# Patient Record
Sex: MALE | Race: WHITE | NOT HISPANIC OR LATINO | Employment: OTHER | URBAN - METROPOLITAN AREA
[De-identification: names, ages, dates, MRNs, and addresses within clinical notes are randomized per-mention and may not be internally consistent; named-entity substitution may affect disease eponyms.]

---

## 2019-01-17 ENCOUNTER — APPOINTMENT (OUTPATIENT)
Dept: CT IMAGING | Facility: CLINIC | Age: 79
DRG: 194 | End: 2019-01-17
Payer: COMMERCIAL

## 2019-01-17 ENCOUNTER — HOSPITAL ENCOUNTER (INPATIENT)
Facility: CLINIC | Age: 79
LOS: 10 days | Discharge: HOME HEALTH | DRG: 194 | End: 2019-01-28
Attending: EMERGENCY MEDICINE | Admitting: HOSPITALIST
Payer: COMMERCIAL

## 2019-01-17 DIAGNOSIS — R04.2 HEMOPTYSIS: ICD-10-CM

## 2019-01-17 DIAGNOSIS — I77.89 SNEDDON SYNDROME (H): ICD-10-CM

## 2019-01-17 DIAGNOSIS — J44.9 CHRONIC OBSTRUCTIVE PULMONARY DISEASE, UNSPECIFIED COPD TYPE (H): Primary | ICD-10-CM

## 2019-01-17 DIAGNOSIS — I10 BENIGN ESSENTIAL HYPERTENSION: ICD-10-CM

## 2019-01-17 LAB
ANION GAP SERPL CALCULATED.3IONS-SCNC: 9 MMOL/L (ref 3–14)
BASOPHILS # BLD AUTO: 0.1 10E9/L (ref 0–0.2)
BASOPHILS NFR BLD AUTO: 1 %
BUN SERPL-MCNC: 15 MG/DL (ref 7–30)
CALCIUM SERPL-MCNC: 8.2 MG/DL (ref 8.5–10.1)
CHLORIDE SERPL-SCNC: 109 MMOL/L (ref 94–109)
CO2 SERPL-SCNC: 26 MMOL/L (ref 20–32)
CREAT SERPL-MCNC: 0.97 MG/DL (ref 0.66–1.25)
DIFFERENTIAL METHOD BLD: ABNORMAL
EOSINOPHIL # BLD AUTO: 0.5 10E9/L (ref 0–0.7)
EOSINOPHIL NFR BLD AUTO: 7 %
ERYTHROCYTE [DISTWIDTH] IN BLOOD BY AUTOMATED COUNT: 14.3 % (ref 10–15)
GFR SERPL CREATININE-BSD FRML MDRD: 75 ML/MIN/{1.73_M2}
GLUCOSE SERPL-MCNC: 122 MG/DL (ref 70–99)
HCT VFR BLD AUTO: 39.3 % (ref 40–53)
HCT VFR BLD CALC: 39 %PCV (ref 40–53)
HGB BLD CALC-MCNC: 13.3 G/DL (ref 13.3–17.7)
HGB BLD-MCNC: 13 G/DL (ref 13.3–17.7)
IMM GRANULOCYTES # BLD: 0 10E9/L (ref 0–0.4)
IMM GRANULOCYTES NFR BLD: 0.4 %
INR PPP: 2.16 (ref 0.86–1.14)
LYMPHOCYTES # BLD AUTO: 1.9 10E9/L (ref 0.8–5.3)
LYMPHOCYTES NFR BLD AUTO: 25.8 %
MCH RBC QN AUTO: 30.7 PG (ref 26.5–33)
MCHC RBC AUTO-ENTMCNC: 33.1 G/DL (ref 31.5–36.5)
MCV RBC AUTO: 93 FL (ref 78–100)
MONOCYTES # BLD AUTO: 0.5 10E9/L (ref 0–1.3)
MONOCYTES NFR BLD AUTO: 7.2 %
NEUTROPHILS # BLD AUTO: 4.3 10E9/L (ref 1.6–8.3)
NEUTROPHILS NFR BLD AUTO: 58.6 %
NRBC # BLD AUTO: 0 10*3/UL
NRBC BLD AUTO-RTO: 0 /100
NT-PROBNP SERPL-MCNC: 125 PG/ML (ref 0–1800)
PLATELET # BLD AUTO: 153 10E9/L (ref 150–450)
POTASSIUM BLD-SCNC: 3.5 MMOL/L (ref 3.4–5.3)
POTASSIUM SERPL-SCNC: 3.6 MMOL/L (ref 3.4–5.3)
RBC # BLD AUTO: 4.24 10E12/L (ref 4.4–5.9)
SODIUM BLD-SCNC: 143 MMOL/L (ref 133–144)
SODIUM SERPL-SCNC: 144 MMOL/L (ref 133–144)
TROPONIN I SERPL-MCNC: <0.015 UG/L (ref 0–0.04)
WBC # BLD AUTO: 7.3 10E9/L (ref 4–11)

## 2019-01-17 PROCEDURE — 40000501 ZZHCL STATISTIC HEMATOCRIT ED POCT

## 2019-01-17 PROCEDURE — 85018 HEMOGLOBIN: CPT | Performed by: INTERNAL MEDICINE

## 2019-01-17 PROCEDURE — 94640 AIRWAY INHALATION TREATMENT: CPT

## 2019-01-17 PROCEDURE — 85610 PROTHROMBIN TIME: CPT | Performed by: EMERGENCY MEDICINE

## 2019-01-17 PROCEDURE — 25000125 ZZHC RX 250: Performed by: EMERGENCY MEDICINE

## 2019-01-17 PROCEDURE — 83880 ASSAY OF NATRIURETIC PEPTIDE: CPT | Performed by: EMERGENCY MEDICINE

## 2019-01-17 PROCEDURE — 36415 COLL VENOUS BLD VENIPUNCTURE: CPT | Performed by: INTERNAL MEDICINE

## 2019-01-17 PROCEDURE — 25000128 H RX IP 250 OP 636: Performed by: EMERGENCY MEDICINE

## 2019-01-17 PROCEDURE — 84484 ASSAY OF TROPONIN QUANT: CPT | Performed by: EMERGENCY MEDICINE

## 2019-01-17 PROCEDURE — 25000125 ZZHC RX 250

## 2019-01-17 PROCEDURE — 40000498 ZZHCL STATISTIC POTASSIUM ED POCT

## 2019-01-17 PROCEDURE — 40000497 ZZHCL STATISTIC SODIUM ED POCT

## 2019-01-17 PROCEDURE — 71260 CT THORAX DX C+: CPT

## 2019-01-17 PROCEDURE — 99285 EMERGENCY DEPT VISIT HI MDM: CPT | Mod: 25

## 2019-01-17 PROCEDURE — 84145 PROCALCITONIN (PCT): CPT | Performed by: EMERGENCY MEDICINE

## 2019-01-17 PROCEDURE — 80048 BASIC METABOLIC PNL TOTAL CA: CPT | Performed by: EMERGENCY MEDICINE

## 2019-01-17 PROCEDURE — 85025 COMPLETE CBC W/AUTO DIFF WBC: CPT | Performed by: EMERGENCY MEDICINE

## 2019-01-17 RX ORDER — CHOLECALCIFEROL (VITAMIN D3) 50 MCG
2000 TABLET ORAL DAILY
COMMUNITY

## 2019-01-17 RX ORDER — IPRATROPIUM BROMIDE AND ALBUTEROL SULFATE 2.5; .5 MG/3ML; MG/3ML
SOLUTION RESPIRATORY (INHALATION)
Status: DISCONTINUED
Start: 2019-01-17 | End: 2019-01-17 | Stop reason: HOSPADM

## 2019-01-17 RX ORDER — MULTIPLE VITAMINS W/ MINERALS TAB 9MG-400MCG
1 TAB ORAL EVERY MORNING
COMMUNITY

## 2019-01-17 RX ORDER — UBIDECARENONE 100 MG
100 CAPSULE ORAL EVERY MORNING
COMMUNITY

## 2019-01-17 RX ORDER — DOCUSATE SODIUM 100 MG/1
100 CAPSULE, LIQUID FILLED ORAL EVERY EVENING
COMMUNITY

## 2019-01-17 RX ORDER — IPRATROPIUM BROMIDE AND ALBUTEROL SULFATE 2.5; .5 MG/3ML; MG/3ML
3 SOLUTION RESPIRATORY (INHALATION) ONCE
Status: COMPLETED | OUTPATIENT
Start: 2019-01-17 | End: 2019-01-17

## 2019-01-17 RX ORDER — BRINZOLAMIDE 10 MG/ML
1 SUSPENSION/ DROPS OPHTHALMIC EVERY MORNING
COMMUNITY

## 2019-01-17 RX ORDER — MIRABEGRON 50 MG/1
50 TABLET, EXTENDED RELEASE ORAL EVERY MORNING
COMMUNITY

## 2019-01-17 RX ORDER — AMLODIPINE BESYLATE 2.5 MG/1
2.5 TABLET ORAL EVERY MORNING
Status: ON HOLD | COMMUNITY
End: 2019-01-28

## 2019-01-17 RX ORDER — ESOMEPRAZOLE MAGNESIUM 40 MG/1
40 CAPSULE, DELAYED RELEASE ORAL
COMMUNITY

## 2019-01-17 RX ORDER — WARFARIN SODIUM 5 MG/1
TABLET ORAL EVERY EVENING
Status: ON HOLD | COMMUNITY
End: 2019-01-21

## 2019-01-17 RX ORDER — IOPAMIDOL 755 MG/ML
79 INJECTION, SOLUTION INTRAVASCULAR ONCE
Status: COMPLETED | OUTPATIENT
Start: 2019-01-17 | End: 2019-01-17

## 2019-01-17 RX ORDER — TIOTROPIUM BROMIDE 18 UG/1
18 CAPSULE ORAL; RESPIRATORY (INHALATION) EVERY MORNING
Status: ON HOLD | COMMUNITY
End: 2019-01-21

## 2019-01-17 RX ORDER — BRIMONIDINE TARTRATE AND TIMOLOL MALEATE 2; 5 MG/ML; MG/ML
1 SOLUTION OPHTHALMIC EVERY MORNING
COMMUNITY

## 2019-01-17 RX ORDER — LATANOPROST 50 UG/ML
1 SOLUTION/ DROPS OPHTHALMIC AT BEDTIME
COMMUNITY

## 2019-01-17 RX ORDER — FUROSEMIDE 40 MG
40 TABLET ORAL DAILY
Status: ON HOLD | COMMUNITY
End: 2019-01-21

## 2019-01-17 RX ORDER — IPRATROPIUM BROMIDE AND ALBUTEROL SULFATE 2.5; .5 MG/3ML; MG/3ML
SOLUTION RESPIRATORY (INHALATION)
Status: COMPLETED
Start: 2019-01-17 | End: 2019-01-17

## 2019-01-17 RX ORDER — WARFARIN SODIUM 1 MG/1
TABLET ORAL DAILY
COMMUNITY
End: 2019-01-17

## 2019-01-17 RX ADMIN — IPRATROPIUM BROMIDE AND ALBUTEROL SULFATE 3 ML: .5; 2.5 SOLUTION RESPIRATORY (INHALATION) at 22:00

## 2019-01-17 RX ADMIN — LIDOCAINE HYDROCHLORIDE: 20 JELLY TOPICAL at 22:03

## 2019-01-17 RX ADMIN — IPRATROPIUM BROMIDE AND ALBUTEROL SULFATE 3 ML: .5; 2.5 SOLUTION RESPIRATORY (INHALATION) at 22:38

## 2019-01-17 RX ADMIN — SODIUM CHLORIDE 100 ML: 9 INJECTION, SOLUTION INTRAVENOUS at 23:40

## 2019-01-17 RX ADMIN — IPRATROPIUM BROMIDE AND ALBUTEROL SULFATE 3 ML: .5; 2.5 SOLUTION RESPIRATORY (INHALATION) at 22:18

## 2019-01-17 RX ADMIN — IOPAMIDOL 79 ML: 755 INJECTION, SOLUTION INTRAVENOUS at 23:22

## 2019-01-17 RX ADMIN — IPRATROPIUM BROMIDE AND ALBUTEROL SULFATE 3 ML: 2.5; .5 SOLUTION RESPIRATORY (INHALATION) at 22:00

## 2019-01-17 ASSESSMENT — MIFFLIN-ST. JEOR: SCORE: 1731.9

## 2019-01-18 ENCOUNTER — APPOINTMENT (OUTPATIENT)
Dept: PHYSICAL THERAPY | Facility: CLINIC | Age: 79
DRG: 194 | End: 2019-01-18
Payer: COMMERCIAL

## 2019-01-18 PROBLEM — R04.2 HEMOPTYSIS: Status: ACTIVE | Noted: 2019-01-18

## 2019-01-18 LAB
ABO + RH BLD: NORMAL
ALBUMIN SERPL-MCNC: 2.7 G/DL (ref 3.4–5)
ALP SERPL-CCNC: 55 U/L (ref 40–150)
ALT SERPL W P-5'-P-CCNC: 19 U/L (ref 0–70)
ANION GAP SERPL CALCULATED.3IONS-SCNC: 7 MMOL/L (ref 3–14)
AST SERPL W P-5'-P-CCNC: 18 U/L (ref 0–45)
BILIRUB DIRECT SERPL-MCNC: <0.1 MG/DL (ref 0–0.2)
BILIRUB SERPL-MCNC: 0.4 MG/DL (ref 0.2–1.3)
BLD GP AB SCN SERPL QL: NORMAL
BLOOD BANK CMNT PATIENT-IMP: NORMAL
BUN SERPL-MCNC: 22 MG/DL (ref 7–30)
CALCIUM SERPL-MCNC: 8 MG/DL (ref 8.5–10.1)
CHLORIDE SERPL-SCNC: 110 MMOL/L (ref 94–109)
CO2 SERPL-SCNC: 26 MMOL/L (ref 20–32)
CREAT SERPL-MCNC: 0.93 MG/DL (ref 0.66–1.25)
GFR SERPL CREATININE-BSD FRML MDRD: 79 ML/MIN/{1.73_M2}
GLUCOSE BLDC GLUCOMTR-MCNC: 101 MG/DL (ref 70–99)
GLUCOSE BLDC GLUCOMTR-MCNC: 111 MG/DL (ref 70–99)
GLUCOSE BLDC GLUCOMTR-MCNC: 122 MG/DL (ref 70–99)
GLUCOSE SERPL-MCNC: 119 MG/DL (ref 70–99)
HGB BLD-MCNC: 11.3 G/DL (ref 13.3–17.7)
HGB BLD-MCNC: 11.5 G/DL (ref 13.3–17.7)
HGB BLD-MCNC: 11.6 G/DL (ref 13.3–17.7)
HGB BLD-MCNC: 11.7 G/DL (ref 13.3–17.7)
INR PPP: 1.78 (ref 0.86–1.14)
POTASSIUM SERPL-SCNC: 3.9 MMOL/L (ref 3.4–5.3)
PROCALCITONIN SERPL-MCNC: <0.05 NG/ML
PROT SERPL-MCNC: 5.9 G/DL (ref 6.8–8.8)
SODIUM SERPL-SCNC: 143 MMOL/L (ref 133–144)
SPECIMEN EXP DATE BLD: NORMAL
SPECIMEN EXP DATE BLD: NORMAL

## 2019-01-18 PROCEDURE — 25000128 H RX IP 250 OP 636: Performed by: HOSPITALIST

## 2019-01-18 PROCEDURE — 40000193 ZZH STATISTIC PT WARD VISIT

## 2019-01-18 PROCEDURE — 97161 PT EVAL LOW COMPLEX 20 MIN: CPT | Mod: GP

## 2019-01-18 PROCEDURE — 86850 RBC ANTIBODY SCREEN: CPT | Performed by: HOSPITALIST

## 2019-01-18 PROCEDURE — 25000132 ZZH RX MED GY IP 250 OP 250 PS 637: Performed by: INTERNAL MEDICINE

## 2019-01-18 PROCEDURE — 80076 HEPATIC FUNCTION PANEL: CPT | Performed by: HOSPITALIST

## 2019-01-18 PROCEDURE — 85610 PROTHROMBIN TIME: CPT | Performed by: HOSPITALIST

## 2019-01-18 PROCEDURE — 25000132 ZZH RX MED GY IP 250 OP 250 PS 637: Performed by: HOSPITALIST

## 2019-01-18 PROCEDURE — 99223 1ST HOSP IP/OBS HIGH 75: CPT | Mod: AI | Performed by: HOSPITALIST

## 2019-01-18 PROCEDURE — 85018 HEMOGLOBIN: CPT | Performed by: HOSPITALIST

## 2019-01-18 PROCEDURE — 80048 BASIC METABOLIC PNL TOTAL CA: CPT | Performed by: HOSPITALIST

## 2019-01-18 PROCEDURE — 86900 BLOOD TYPING SEROLOGIC ABO: CPT | Performed by: HOSPITALIST

## 2019-01-18 PROCEDURE — 25000125 ZZHC RX 250: Performed by: HOSPITALIST

## 2019-01-18 PROCEDURE — 97110 THERAPEUTIC EXERCISES: CPT | Mod: GP

## 2019-01-18 PROCEDURE — 36415 COLL VENOUS BLD VENIPUNCTURE: CPT | Performed by: HOSPITALIST

## 2019-01-18 PROCEDURE — 25000128 H RX IP 250 OP 636: Performed by: INTERNAL MEDICINE

## 2019-01-18 PROCEDURE — 86901 BLOOD TYPING SEROLOGIC RH(D): CPT | Performed by: HOSPITALIST

## 2019-01-18 PROCEDURE — 97530 THERAPEUTIC ACTIVITIES: CPT | Mod: GP

## 2019-01-18 PROCEDURE — 12000000 ZZH R&B MED SURG/OB

## 2019-01-18 PROCEDURE — 00000146 ZZHCL STATISTIC GLUCOSE BY METER IP

## 2019-01-18 RX ORDER — MIRABEGRON 50 MG/1
50 TABLET, EXTENDED RELEASE ORAL EVERY MORNING
Status: DISCONTINUED | OUTPATIENT
Start: 2019-01-18 | End: 2019-01-28 | Stop reason: HOSPADM

## 2019-01-18 RX ORDER — PANTOPRAZOLE SODIUM 40 MG/1
40 TABLET, DELAYED RELEASE ORAL
Status: DISCONTINUED | OUTPATIENT
Start: 2019-01-18 | End: 2019-01-28 | Stop reason: HOSPADM

## 2019-01-18 RX ORDER — CEFTRIAXONE 1 G/1
1 INJECTION, POWDER, FOR SOLUTION INTRAMUSCULAR; INTRAVENOUS EVERY 24 HOURS
Status: DISCONTINUED | OUTPATIENT
Start: 2019-01-18 | End: 2019-01-21

## 2019-01-18 RX ORDER — BRINZOLAMIDE 10 MG/ML
1 SUSPENSION/ DROPS OPHTHALMIC EVERY MORNING
Status: DISCONTINUED | OUTPATIENT
Start: 2019-01-18 | End: 2019-01-28 | Stop reason: HOSPADM

## 2019-01-18 RX ORDER — AMLODIPINE BESYLATE 2.5 MG/1
2.5 TABLET ORAL EVERY MORNING
Status: DISCONTINUED | OUTPATIENT
Start: 2019-01-18 | End: 2019-01-22

## 2019-01-18 RX ORDER — BRIMONIDINE TARTRATE AND TIMOLOL MALEATE 2; 5 MG/ML; MG/ML
1 SOLUTION OPHTHALMIC EVERY MORNING
Status: DISCONTINUED | OUTPATIENT
Start: 2019-01-18 | End: 2019-01-28 | Stop reason: HOSPADM

## 2019-01-18 RX ORDER — ONDANSETRON 4 MG/1
4 TABLET, ORALLY DISINTEGRATING ORAL EVERY 6 HOURS PRN
Status: DISCONTINUED | OUTPATIENT
Start: 2019-01-18 | End: 2019-01-28 | Stop reason: HOSPADM

## 2019-01-18 RX ORDER — AZITHROMYCIN 250 MG/1
250 TABLET, FILM COATED ORAL DAILY
Status: DISCONTINUED | OUTPATIENT
Start: 2019-01-19 | End: 2019-01-21

## 2019-01-18 RX ORDER — ONDANSETRON 2 MG/ML
4 INJECTION INTRAMUSCULAR; INTRAVENOUS EVERY 6 HOURS PRN
Status: DISCONTINUED | OUTPATIENT
Start: 2019-01-18 | End: 2019-01-28 | Stop reason: HOSPADM

## 2019-01-18 RX ORDER — HYDRALAZINE HYDROCHLORIDE 20 MG/ML
10 INJECTION INTRAMUSCULAR; INTRAVENOUS EVERY 4 HOURS PRN
Status: DISCONTINUED | OUTPATIENT
Start: 2019-01-18 | End: 2019-01-28 | Stop reason: HOSPADM

## 2019-01-18 RX ORDER — BENZONATATE 100 MG/1
200 CAPSULE ORAL 3 TIMES DAILY PRN
Status: DISCONTINUED | OUTPATIENT
Start: 2019-01-18 | End: 2019-01-28 | Stop reason: HOSPADM

## 2019-01-18 RX ORDER — GUAIFENESIN 600 MG/1
600 TABLET, EXTENDED RELEASE ORAL 2 TIMES DAILY
Status: DISCONTINUED | OUTPATIENT
Start: 2019-01-18 | End: 2019-01-28 | Stop reason: HOSPADM

## 2019-01-18 RX ORDER — NALOXONE HYDROCHLORIDE 0.4 MG/ML
.1-.4 INJECTION, SOLUTION INTRAMUSCULAR; INTRAVENOUS; SUBCUTANEOUS
Status: DISCONTINUED | OUTPATIENT
Start: 2019-01-18 | End: 2019-01-28 | Stop reason: HOSPADM

## 2019-01-18 RX ORDER — LATANOPROST 50 UG/ML
1 SOLUTION/ DROPS OPHTHALMIC AT BEDTIME
Status: DISCONTINUED | OUTPATIENT
Start: 2019-01-18 | End: 2019-01-28 | Stop reason: HOSPADM

## 2019-01-18 RX ORDER — AMOXICILLIN 250 MG
2 CAPSULE ORAL 2 TIMES DAILY PRN
Status: DISCONTINUED | OUTPATIENT
Start: 2019-01-18 | End: 2019-01-28 | Stop reason: HOSPADM

## 2019-01-18 RX ORDER — IPRATROPIUM BROMIDE AND ALBUTEROL SULFATE 2.5; .5 MG/3ML; MG/3ML
3 SOLUTION RESPIRATORY (INHALATION) EVERY 4 HOURS PRN
Status: DISCONTINUED | OUTPATIENT
Start: 2019-01-18 | End: 2019-01-19

## 2019-01-18 RX ORDER — SODIUM CHLORIDE 9 MG/ML
INJECTION, SOLUTION INTRAVENOUS CONTINUOUS
Status: DISCONTINUED | OUTPATIENT
Start: 2019-01-18 | End: 2019-01-18

## 2019-01-18 RX ORDER — DOCUSATE SODIUM 100 MG/1
100 CAPSULE, LIQUID FILLED ORAL EVERY EVENING
Status: DISCONTINUED | OUTPATIENT
Start: 2019-01-18 | End: 2019-01-28 | Stop reason: HOSPADM

## 2019-01-18 RX ORDER — AZITHROMYCIN 250 MG/1
500 TABLET, FILM COATED ORAL ONCE
Status: COMPLETED | OUTPATIENT
Start: 2019-01-18 | End: 2019-01-18

## 2019-01-18 RX ORDER — AMOXICILLIN 250 MG
1 CAPSULE ORAL 2 TIMES DAILY PRN
Status: DISCONTINUED | OUTPATIENT
Start: 2019-01-18 | End: 2019-01-28 | Stop reason: HOSPADM

## 2019-01-18 RX ADMIN — UMECLIDINIUM 1 PUFF: 62.5 AEROSOL, POWDER ORAL at 09:45

## 2019-01-18 RX ADMIN — PHYTONADIONE 5 MG: 10 INJECTION, EMULSION INTRAMUSCULAR; INTRAVENOUS; SUBCUTANEOUS at 02:19

## 2019-01-18 RX ADMIN — DOCUSATE SODIUM 100 MG: 100 CAPSULE, LIQUID FILLED ORAL at 21:31

## 2019-01-18 RX ADMIN — AMLODIPINE BESYLATE 2.5 MG: 2.5 TABLET ORAL at 09:39

## 2019-01-18 RX ADMIN — AZITHROMYCIN 500 MG: 250 TABLET, FILM COATED ORAL at 09:52

## 2019-01-18 RX ADMIN — GUAIFENESIN 600 MG: 600 TABLET, EXTENDED RELEASE ORAL at 21:31

## 2019-01-18 RX ADMIN — LATANOPROST 1 DROP: 50 SOLUTION OPHTHALMIC at 21:32

## 2019-01-18 RX ADMIN — MIRABEGRON 50 MG: 50 TABLET, FILM COATED, EXTENDED RELEASE ORAL at 09:39

## 2019-01-18 RX ADMIN — PANTOPRAZOLE SODIUM 40 MG: 40 TABLET, DELAYED RELEASE ORAL at 07:38

## 2019-01-18 RX ADMIN — BRINZOLAMIDE 1 DROP: 10 SUSPENSION/ DROPS OPHTHALMIC at 09:46

## 2019-01-18 RX ADMIN — BRIMONIDINE TARTRATE, TIMOLOL MALEATE 1 DROP: 2; 5 SOLUTION/ DROPS OPHTHALMIC at 09:43

## 2019-01-18 RX ADMIN — CEFTRIAXONE SODIUM 1 G: 1 INJECTION, POWDER, FOR SOLUTION INTRAMUSCULAR; INTRAVENOUS at 09:40

## 2019-01-18 RX ADMIN — HYDRALAZINE HYDROCHLORIDE 10 MG: 20 INJECTION INTRAMUSCULAR; INTRAVENOUS at 04:39

## 2019-01-18 RX ADMIN — SODIUM CHLORIDE: 9 INJECTION, SOLUTION INTRAVENOUS at 02:15

## 2019-01-18 ASSESSMENT — ENCOUNTER SYMPTOMS
FEVER: 0
COUGH: 1
WHEEZING: 1
FATIGUE: 1
CHEST TIGHTNESS: 0
CHILLS: 0
SHORTNESS OF BREATH: 1

## 2019-01-18 ASSESSMENT — ACTIVITIES OF DAILY LIVING (ADL)
ADLS_ACUITY_SCORE: 20
ADLS_ACUITY_SCORE: 19

## 2019-01-18 NOTE — PLAN OF CARE
PT:  Orders received and chart reviewed. Attempted to complete PT evaluation this AM, able to gain subjective information from patient (will need to clarify with family when able as patient unreliable historian). Patient noting lightheadedness while supine, BP WNL; MD then in to talk to patient and family. Discussed with RN.

## 2019-01-18 NOTE — PLAN OF CARE
Discharge Planner PT   Patient plan for discharge: Per spouse, would like to return home with home PT/OT services   Current status: Orders received, chart reviewed, PT evaluation completed and treatment initiated. Patient admitted on 1/17/19 for evaluation of coughing up blood. Patient lives in a condo with his spouse with no steps to enter or contend with. Spouse notes patient normally uses a 4WW at baseline and receives assistance from her getting dressed and showering. Spouse notes she does not receive any additional services to care for her spouse, but notes she has seen things declining in the last few weeks with patient needing more assistance with mobility and ADLs.     Patient supine in bed upon arrival of therapist, agreeable to working with PT. Patient noting lightheadedness at in supine, but BP WNL. Completed supine<>sit with CGA and bed rails. Patient needing assist to use bedside urinal at EOB. Sit<>stand and stand-pivot to chair completed with min A x 2 and B HHA. Patient slightly unsteady during standing and few steps. Patient in chair and completed seated exercises x 15 reps bilaterally, tolerating well. Patient on 1L O2 throughout session with SpO2 noted as 91-96% with mobility. Patient in chair at end of session with all needs in reach and chair alarm on. Discussed discharge recommendations with spouse, who states she would prefer to go home instead of a rehab/TCU.   Barriers to return to prior living situation: Current level of assist, decreased tolerance to activity   Recommendations for discharge: TCU   Rationale for recommendations: Patient currently requiring A for all mobility this date and demonstrates decreased tolerance to activity. Patient would benefit from continued skilled therapy at TCU to further improve strength, balance, and independence with mobility and ambulation to address functional limitations and decrease falls risk. If patient/spouse not open to TCU, would require 24 hour A  for all mobility and benefit from further skilled home PT to address above functional limitations.        Entered by: Gretta Hernandez 01/18/2019 10:50 AM

## 2019-01-18 NOTE — PLAN OF CARE
"Transefr from ICU this afternoon.      AOx2, reoriented to time and situation.  VSS on r/a, denies pain.  Up with 1 with GB&W.  Tolerating low sat. Fat diet.  Urinal for voids.  C/o mild light headedness, states, \"I've felt like this since I came in.\"  Patient up in chair.  BM's black.  No hemoptysis this shift.  Hgb 11.7 now.  INR 1.78. IVF discontinued.  "

## 2019-01-18 NOTE — ED NOTES
"[facility]  ED Nurse Handoff Report    ED Chief complaint: Hemoptysis (coughing up bright red blood x 3 hrs)      ED Diagnosis:   Final diagnoses:   None       Code Status: DNR / DNI- per patient report, no scanned document    Allergies: No Known Allergies    Activity level - Baseline/Home:  Independent    Activity Level - Current:   Total care     Needed?: No    Isolation: Yes  Infection: pending, coughing up blood    Bariatric?: Yes    Vital Signs:   Vitals:    01/17/19 2154 01/17/19 2156 01/17/19 2220 01/17/19 2230   BP: (!) 175/93 167/78 148/53 161/72   Pulse:  98 98 101   Resp: 26 18 22 20   Temp:       TempSrc:       SpO2: 91% 91%     Weight:       Height:           Cardiac Rhythm: ,        Pain level: 0-10 Pain Scale: 0    Is this patient confused?: No   Does this patient have a guardian?  No         If yes, is there guardianship documents in the Epic \"Code/ACP\" activity?  N/A         Guardian Notified?  N/A  [place] - Suicide Severity Rating Scale Completed?  Yes  If yes, what color did the patient score?  White    Patient Report: Initial Complaint: coughing up blood  Focused Assessment: Patient arrived via EMS. Report of cough productive of large amounts bloody sputum. Patient is alert and oriented x 4. He denies pain. Says he had a frequent cough since yesterday but bloody sputum began 3 hrs ago. Long term use of Warfarin for CVA at a young age. Patient does  Have COPD. Continent of B&B. Per [atients wife patient has not been taking his  Furosemide for a few weeks due to lapsed prescription. He has 3+ edema of bilateral lower extremities.   Tests Performed: labs, chest CT  Abnormal Results:   Results for orders placed or performed during the hospital encounter of 01/17/19   CBC with platelets differential   Result Value Ref Range    WBC 7.3 4.0 - 11.0 10e9/L    RBC Count 4.24 (L) 4.4 - 5.9 10e12/L    Hemoglobin 13.0 (L) 13.3 - 17.7 g/dL    Hematocrit 39.3 (L) 40.0 - 53.0 %    " MCV 93 78 - 100 fl    MCH 30.7 26.5 - 33.0 pg    MCHC 33.1 31.5 - 36.5 g/dL    RDW 14.3 10.0 - 15.0 %    Platelet Count 153 150 - 450 10e9/L    Diff Method Automated Method     % Neutrophils 58.6 %    % Lymphocytes 25.8 %    % Monocytes 7.2 %    % Eosinophils 7.0 %    % Basophils 1.0 %    % Immature Granulocytes 0.4 %    Nucleated RBCs 0 0 /100    Absolute Neutrophil 4.3 1.6 - 8.3 10e9/L    Absolute Lymphocytes 1.9 0.8 - 5.3 10e9/L    Absolute Monocytes 0.5 0.0 - 1.3 10e9/L    Absolute Eosinophils 0.5 0.0 - 0.7 10e9/L    Absolute Basophils 0.1 0.0 - 0.2 10e9/L    Abs Immature Granulocytes 0.0 0 - 0.4 10e9/L    Absolute Nucleated RBC 0.0    INR   Result Value Ref Range    INR 2.16 (H) 0.86 - 1.14   Basic metabolic panel   Result Value Ref Range    Sodium 144 133 - 144 mmol/L    Potassium 3.6 3.4 - 5.3 mmol/L    Chloride 109 94 - 109 mmol/L    Carbon Dioxide 26 20 - 32 mmol/L    Anion Gap 9 3 - 14 mmol/L    Glucose 122 (H) 70 - 99 mg/dL    Urea Nitrogen 15 7 - 30 mg/dL    Creatinine 0.97 0.66 - 1.25 mg/dL    GFR Estimate 75 >60 mL/min/[1.73_m2]    GFR Estimate If Black 87 >60 mL/min/[1.73_m2]    Calcium 8.2 (L) 8.5 - 10.1 mg/dL   Nt probnp inpatient   Result Value Ref Range    N-Terminal Pro BNP Inpatient 125 0 - 1,800 pg/mL   Troponin I   Result Value Ref Range    Troponin I ES <0.015 0.000 - 0.045 ug/L   ISTAT electrolytes POCT   Result Value Ref Range    Sodium 143 133 - 144 mmol/L    Potassium 3.5 3.4 - 5.3 mmol/L    Hemoglobin 13.3 13.3 - 17.7 g/dL    Hematocrit - POCT 39 (L) 40.0 - 53.0 %PCV       Treatments provided: nebs, O2    Family Comments: wife and daughter at bedside    OBS brochure/video discussed/provided to patient/family: No              Name of person given brochure if not patient: n/a              Relationship to patient: n/a    ED Medications:   Medications   ipratropium - albuterol 0.5 mg/2.5 mg/3 mL (DUONEB) 0.5-2.5 (3) MG/3ML neb solution (not administered)   ipratropium - albuterol 0.5 mg/2.5  mg/3 mL (DUONEB) neb solution 3 mL (3 mLs Nebulization Given 1/17/19 2200)   lidocaine (XYLOCAINE) 2 % topical gel (  Given 1/17/19 2203)   ipratropium - albuterol 0.5 mg/2.5 mg/3 mL (DUONEB) neb solution 3 mL (3 mLs Nebulization Given 1/17/19 2218)   ipratropium - albuterol 0.5 mg/2.5 mg/3 mL (DUONEB) neb solution 3 mL (3 mLs Nebulization Given 1/17/19 2238)       Drips infusing?:  No    For the majority of the shift this patient was Green.   Interventions performed were n/a.    Severe Sepsis OR Septic Shock Diagnosis Present: No    To be done/followed up on inpatient unit:  none    ED NURSE PHONE NUMBER: [phone]

## 2019-01-18 NOTE — CONSULTS
Pulmonology Consultation      Edison Saldivar MRN# [id number]   YOB: 1940 Age: 78 year old   Date of Admission: 1/17/2019     Reason for consult: I was asked by Dr Barr to evaluate this patient for second opinion on bronchoscopy for hemoptysis that took place yesterday.            Assessment and Plan:   Massive hemoptysis  Aspiration of blood/pulmonary infiltrates  Acute anemia due to blood loss; hgb 13 with normal indices dropped to 11 g while in ED last night.   Cough, started 1 day before.   Renal density, incidental finding on CT      Also: recent problems with INR; was up to 4.8 then down below 2.0 then up a little. Dose was being adjusted. Was 2.1 at presentation.    Asthma, lifelong  Hx of Sarcoidosis in remission  COPD quit smoking 1989  FV Leiden mutation/Snedden's worked up at [facility] on chronic coumadin for 30 y  Hx of nosebleeds  Hx of multiple strokes  Hx of weakness due to strokes; uses walker and wc.   Hx of mild vascular dementia  Hx of normopressure hydrocephalus, unshunted due to risk of bleeding  Hx of KORY on CPAP  Recently moved here 10/2018 from 25 y in [place] to be near daughter.     PLAN:    He bled for hours last night and intubation for safe bronchoscopy to locate active bleeding would have been required. ICU staff opted to observe.  He is no longer actively bleeding, just going to cough up fresh clot most likely. If bleeding resumes, intubation and scope to try to locate site of bleeding while in the icu would be reasonable.  Suspect left side source since most of aspirated blood is in left lower lobe. IR could do a coil procedure with angiography if we localized the bleeding first. (There is currently no good  way to stop bleeding with a bronchoscope except temporarily with topical epi).  As he is not presently bleeding, bronchoscopy will be deferred. There will be considerable clot and he will likely cough this up over the next couple of days.   Keep under observation for the next  "24-48 h  Keep BP under control  Cough suppression at night.   Discuss with HEME whether he can safely resume coumadin in a few days or should be on a different anticoagulant.   Intubate and scope for recurrence of massive hemoptysis.   Most cases are self limited.   Complete a course of antibiotics at 5 days. Doubt infiltrates are pneumonia in this clinical setting--almost all of it is certainly blood he aspirated--but he did have a cough for one day before the hemoptysis started.   Check ACE and calcium, ferritin, serial hgb   Nebs for pulmonary toilet to make clots easy to cough up.   Might consider resuming anticoagulant next week sometime.   Will need follow up imaging in 2-3 weeks to see if aspirated blood is being resolved. He may see Dr Durbin in the office in [place] in 3 weeks. Infiltrates must be followed until cleared and if area at left base does not resolve, would investigate further.   We will do PFTS in the office.   At discharge, consider changing his asthma/sarcoidosis/copd meds from Spiriva alone to Trelegy, a better choice.     Renal lesion may also need eval.     Thank you for asking up to see him.     KRGromerMD  [phone]  [phone]  [facility]                  Chief Complaint:   Hemoptysis yesterday    History is obtained from the patient and electronic health record and chiefly from his wife of many years/caregiver         History of Present Illness:   This patient is a 78 year old male who presents with a complex medical history of multiple strokes and small nosebleeds on coumadin for 30 y due to FVLeiden mutation and NPH; his wife gives most of his history very well. They recently moved her in October and his INR was going up and down more than usual. It was recently low and dose was increased. Day before admission, he was \"coughing a lot\" nonproductively but was not ill, no fever, sputum, chills, or malaise. Wife says he \"thought it was the coffee\", of which he drinks over " "a pot per day. He complained of a mild nosebleed.  He was fine during a social hour at 5 pm and then a visit with daughter last night which ended about 7 pm. He went to bathroom. He stayed there coughing a long time and wife checked on him about 8 pm and found the bathroom covered in blood. She called for help and he went to ED via 911. She reports there were multiple cups worth of blood and it took her over an hour to clean it up at 3 am last night. It was a frightening amount of blood.   His Hgb was 3 g when he presented and has remained dropped to 11 g.Lost 2 g of hgb. His CT showed bibasilar infiltrates and consolidation.   He has a normal wbc and no purulent sputum.   Active bleeding stopped after several hours in the ED. He got afrin, INR was reversed, upper airway was examined with blood in post pharynx. He was admitted to ICU over night and they opted not to intubate to scope. He has not coughed up blood today until during my interview when he coughed up a fresh looking BRB clot in clear mucous about 2\" long just once. He has had no further active bleeding since last night.               Past Medical History:   NPH  Vascular dementia, mild  Multiple strokes  Sarcoidosis diagnosed in 1990's, treated, never required repeat rx.  Lifelong asthma  Copd  Past smoking  FV Leiden mutation  Snedden's syndrome  Multiple strokes; requires walker and wc.   Obesity  KORY on CPAP  He follows with Neurology and Gastro and is looking for new caregivers in this area.   Medically complex  Wife recalls he had a TB work up for something over 25 y ago when she took the job in [place] because he was in hosp at [place] when she moved--possibly when sarcoidosis was diagnosed.        Past Surgical History:   No past surgical history on file.            Social History:     Social History     Tobacco Use     Smoking status: Not on file   Substance Use Topics     Alcohol use: Not on file      quit smoking 1989  Quit drinking for health " reasons years ago    Lives with wife   She retired from a [employer] job.   Daughter lives here and another in [facility]>       Family History:   No family history on file.          Immunizations:     There is no immunization history on file for this patient.          Allergies:   No Known Allergies          Medications:       He takes spiriva and prn albuterol and prn duonebs at home for asthma but no ICS>       Current Facility-Administered Medications Ordered in Epic   Medication Dose Route Frequency Last Rate Last Dose     amLODIPine (NORVASC) tablet 2.5 mg  2.5 mg Oral QAM   2.5 mg at 01/18/19 0939     [START ON 1/19/2019] azithromycin (ZITHROMAX) tablet 250 mg  250 mg Oral Daily         brimonidine-timolol (COMBIGAN) 0.2-0.5 % ophthalmic solution 1 drop  1 drop Both Eyes QAM   1 drop at 01/18/19 0943     brinzolamide (AZOPT) 1 % ophthalmic susp 1 drop  1 drop Both Eyes QAM   1 drop at 01/18/19 0946     cefTRIAXone (ROCEPHIN) 1 g vial to attach to  mL bag for ADULTS or NS 50 mL bag for PEDS  1 g Intravenous Q24H   1 g at 01/18/19 0940     docusate sodium (COLACE) capsule 100 mg  100 mg Oral QPM         hydrALAZINE (APRESOLINE) injection 10 mg  10 mg Intravenous Q4H PRN   10 mg at 01/18/19 0439     ipratropium - albuterol 0.5 mg/2.5 mg/3 mL (DUONEB) neb solution 3 mL  3 mL Nebulization Q4H PRN         latanoprost (XALATAN) 0.005 % ophthalmic solution 1 drop  1 drop Both Eyes At Bedtime         mirabegron (MYRBETRIQ) 24 hr tablet 50 mg  50 mg Oral QAM   50 mg at 01/18/19 0939     naloxone (NARCAN) injection 0.1-0.4 mg  0.1-0.4 mg Intravenous Q2 Min PRN         ondansetron (ZOFRAN-ODT) ODT tab 4 mg  4 mg Oral Q6H PRN        Or     ondansetron (ZOFRAN) injection 4 mg  4 mg Intravenous Q6H PRN         pantoprazole (PROTONIX) EC tablet 40 mg  40 mg Oral QAM AC   40 mg at 01/18/19 0738     senna-docusate (SENOKOT-S/PERICOLACE) 8.6-50 MG per tablet 1 tablet  1 tablet Oral BID PRN        Or     senna-docusate  (SENOKOT-S/PERICOLACE) 8.6-50 MG per tablet 2 tablet  2 tablet Oral BID PRN         umeclidinium (INCRUSE ELLIPTA) 62.5 MCG/INH inhaler 1 puff  1 puff Inhalation QAM   1 puff at 01/18/19 0945     No current [facility]-ordered outpatient medications on file.             Review of Systems:   The 5 point Review of Systems is negative other than noted in the HPI            Physical Exam:     Vitals were reviewed  Patient Vitals for the past 24 hrs:   BP Temp Temp src Pulse Heart Rate Resp SpO2 Height Weight   01/18/19 1157 120/67 97.2  F (36.2  C) Oral -- 75 16 95 % -- --   01/18/19 1100 143/46 -- -- 70 69 23 96 % -- --   01/18/19 1024 (!) 183/95 -- -- -- 73 (!) 31 -- -- --   01/18/19 1000 158/82 -- -- 77 76 (!) 33 98 % -- --   01/18/19 0900 158/76 -- -- 82 82 21 98 % -- --   01/18/19 0800 194/83 97.9  F (36.6  C) Oral 88 90 (!) 35 97 % -- --   01/18/19 0700 132/70 -- -- 91 92 10 99 % -- --   01/18/19 0600 140/63 -- -- 85 81 28 -- -- --   01/18/19 0530 123/69 -- -- 89 86 29 99 % -- --   01/18/19 0500 134/68 -- -- 94 92 13 99 % -- --   01/18/19 0430 195/86 -- -- 101 105 (!) 38 95 % -- --   01/18/19 0400 151/69 97.6  F (36.4  C) Axillary 82 82 28 97 % -- --   01/18/19 0330 142/69 -- -- -- 84 26 96 % -- --   01/18/19 0315 141/68 -- -- -- 84 25 95 % -- --   01/18/19 0300 138/62 -- -- -- 85 25 95 % -- --   01/18/19 0245 128/59 -- -- -- 87 26 96 % -- --   01/18/19 0230 144/67 -- -- 92 93 26 97 % -- --   01/18/19 0215 141/68 -- -- 92 94 23 95 % -- --   01/18/19 0200 140/67 -- -- 93 93 23 97 % -- --   01/18/19 0145 131/79 -- -- 93 95 15 98 % -- --   01/18/19 0130 148/75 97.9  F (36.6  C) Axillary 98 101 22 98 % -- --   01/18/19 0115 (!) 180/108 -- -- 109 111 9 97 % -- --   01/18/19 0112 (!) 177/105 -- -- 107 102 20 97 % -- --   01/18/19 0109 (!) 185/91 -- -- 105 109 23 97 % -- --   01/18/19 0050 125/78 -- -- 70 84 (!) 38 97 % -- --   01/18/19 0045 125/78 -- -- -- 101 (!) 31 96 % -- --   01/18/19 0000 151/81 -- -- 114 -- -- 95 % -- --  "  01/17/19 2310 133/65 -- -- 101 101 19 94 % -- --   01/17/19 2300 113/79 -- -- 112 102 22 94 % -- --   01/17/19 2240 139/64 -- -- 93 94 22 -- -- --   01/17/19 2230 161/72 -- -- 101 94 20 -- -- --   01/17/19 2220 148/53 -- -- 98 91 22 -- -- --   01/17/19 2156 167/78 -- -- 98 92 18 91 % -- --   01/17/19 2154 (!) 175/93 -- -- -- 96 26 91 % -- --   01/17/19 2142 -- 99.6  F (37.6  C) Temporal -- 109 22 92 % 1.702 m (5' 7\") 104.3 kg (230 lb)     Alert pleasant man nad ox3 but looks to wife to answer hx questions.   Obese  Seated in chair upright on RA nad  Cough once, BRB clot about 2\" in clear mucous on washrag.   Decreased in lung bases no wheeze no tachypnea no rales  RRR  +edema  -CC  Drinking his second cup of coffee in an hour as we visited    Wife present, excellent historian.            Data:     Lab Results   Component Value Date    WBC 7.3 01/17/2019    HGB 11.7 (L) 01/18/2019    HGB 11.5 (L) 01/18/2019    HGB 11.3 (L) 01/18/2019    HCT 39.3 (L) 01/17/2019     01/17/2019     01/18/2019     01/17/2019     01/17/2019    POTASSIUM 3.9 01/18/2019    POTASSIUM 3.5 01/17/2019    POTASSIUM 3.6 01/17/2019    CHLORIDE 110 (H) 01/18/2019    CHLORIDE 109 01/17/2019    CO2 26 01/18/2019    CO2 26 01/17/2019    BUN 22 01/18/2019    BUN 15 01/17/2019    CR 0.93 01/18/2019    CR 0.97 01/17/2019     (H) 01/18/2019     (H) 01/17/2019    NTBNPI 125 01/17/2019    TROPI <0.015 01/17/2019    AST 18 01/18/2019    ALT 19 01/18/2019    ALKPHOS 55 01/18/2019    BILITOTAL 0.4 01/18/2019    INR 1.78 (H) 01/18/2019    INR 2.16 (H) 01/17/2019     CT CHEST PULMONARY EMBOLISM WITH CONTRAST  1/17/2019 11:39 PM     HISTORY:  Shortness of breath; hemoptysis.     TECHNIQUE: Scans obtained from the apices through the diaphragm with  IV contrast, 79 mL Isovue-370 injected. Radiation dose for this scan  was reduced using automated exposure control, adjustment of the mA  and/or kV according to patient size, or " iterative reconstruction  technique.     COMPARISON:  None.     FINDINGS:  Evaluation of the pulmonary arterial system shows no  evidence of embolus. The thoracic aorta is calcified and tortuous. No  aortic aneurysm or dissection. There are coronary artery  atherosclerotic calcifications. The heart size is normal. There are  multiple calcified lymph nodes in  the mediastinum and bilateral lauri.  No lymph node enlargement. There is a consolidation in the left lower  lobe medially which is likely pneumonia. Patchy infiltrates in  bilateral lung bases are also likely pneumonia. Mild pulmonary edema  is also a possibility. There are a few peripheral bands of scar or  atelectasis. Calcified granuloma in the right lower lobe. There are 2  small nodules in the left upper lobe laterally measuring up to 0.7 cm.  No pneumothorax or pleural effusion. Images through the upper abdomen  are remarkable for an indeterminant lesion at the lower pole of the  right kidney measuring 3.8 cm. This may be a high-density cyst. There  are cysts in the upper pole of the right kidney. No upper abdominal  lymph node enlargement. The gallbladder is absent. The adrenal glands  appear normal. There is atherosclerotic calcification of the aorta and  its branches. Degenerative disease in the spine.                                                                      IMPRESSION:  1. There is no pulmonary embolus, aortic aneurysm or dissection.  2. Left lower lobe consolidation is most likely pneumonia. Follow-up  to resolution is recommended to rule out the possibility of a mass.  There are patchy infiltrates in bilateral lower lungs, also likely  pneumonia.  3. Old granulomatous disease.  4. Indeterminant lesion in the lower pole of the right kidney.  Follow-up recommended.  5. There are 2 small indeterminate left upper lobe lung nodules.     Recommendations for an incidental lung nodule = or > 6mm to 8mm:    Low risk patients: Initial follow-up CT  at 6-12 months, then  consider CT at 18-24 months if no change.    High risk patients: Initial follow-up CT at 6-12 months, then CT at  18-24 months if no change.     *Low Risk: Minimal or absent history of smoking or other known risk  factors.  *Nonsolid (ground-glass) or partly solid nodules may require longer  follow-up to exclude indolent adenocarcinoma.  *Recommendations based on Guidelines for the Management of Incidental  Pulmonary Nodules Detected at CT: From the Fleischner Society 2017,  Radiology 2017.        TAZ RICE MD   Order-Level Documents:     There are no order-level documents.   Lab and Collection     CT Chest Pulmonary Embolism w Contrast (Order: [id number]) - 1/17/2019   Result History     CT Chest Pulmonary Embolism w Contrast (Order #[id number]) on 1/18/2019 - Order Result History Report - Result Edited   Result Information     Status: Final result (Exam End: 1/17/2019 11:39 PM)       All imaging studies reviewed by me.

## 2019-01-18 NOTE — ED NOTES
Bed: [facility]  Expected date: 1/17/19  Expected time: 9:30 PM  Means of arrival: Ambulance  Comments:   78M coughing bright red blood

## 2019-01-18 NOTE — PROGRESS NOTES
[facility]    Internal Medicine Hospitalist Progress Note  01/18/2019  I evaluated patient on the above date.    Jason Louis Jr., MD  [phone] (p)  Text Page        Assessment & Plan New actions/orders today (01/18/2019) are underlined.    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    Hemoptysis, unclear etiology, question pneumonia or lung mass with concomitant anticoagulation.  LLL consolidation/pneumonia vs mass.  Initially had an episode of epistaxis evening 1/17 that was mild and stopped on its own. This was followed by coughing up blood x 3 hours. Presented to ED 1/17 and continued to cough up blood. No evidence of continued epistaxis when evaluated by ED physician.  On initial evaluation 1/17, hgb 13.0, WBC normal, plts normal, INR 2.16, procalcitonin < 0.05. CT chest 1/17 showed LLL consolidation, PNA vs mass with some scattered patchy infiltrates with some indeterminate GEORGE nodules. Pt given vit K early am 1/18.  - Start azithromycin + ceftriaxone for possible pneumonia.  - Continue to hold warfarin for now.  - Pulmonology consulted, appreciate help.  - Advance diet.    Anemia, mild, suspect acute blood loss component from above.  * Issues with hemoptysis as above.  Recent Labs   Lab 01/18/19  0530 01/18/19  0230 01/17/19  2148 01/17/19  2140   HGB 11.5* 11.3* 13.3 13.0*   - Management of hemoptysis as above.  - Monitor CBC.  - Consider prbc transfusion if hgb </= 7.0 or if significant bleeding with hemodynamic instability or if symptomatic.     Skin changes right forearm, suspect secondary to blood pressure cuff placement in the setting of livedo reticularis.  Pt with petechial type skin changes that developed on right forearm with blood pressure cuff placement.   - Try cuff on left upper extremity; if unable, then monitor closely for signs of vascular compromise - no signs  so far.    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  * Pt with h/o Sneddon syndrome, which is a rare genetic disorder characterized by widespread livedo reticularis in association with stroke. Diagnosed at [facility]. Maintained on warfarin. Unclear if pt has h/o antiphospholipid antibodies (commonly associated with Sneddon syndrome).  * Warfarin held and given vit K on admit as above.  - Continue to hold warfarin for now.    Left upper lobe lung nodules.  Noted by CT as above 1/17. Hx smoking.  - Follow-up CT short term for above LLL consolidation; otherwise, would recommend repeat CT in 6-12mo, then 18-24 mo if no change for the GEORGE nodules.  - Follow-up with PCP.     Hypertension (benign essential).  Possible h/o CHF.  [PTA: amlodipine 2.5 mg daily, furosemide 40 mg daily.]  * Pt notes h/o heart murmur and issues with his left ventricle, details unclear.  - Continue amlodipine.  - Hold PTA Lasix given risk of hypovolemia given hemoptysis.\  - Continue PRN IV hydralazine.  - Monitor i/o's, daily wts.  - Obtain records from [place].     COPD.  Chronic, stable.   - Continue Incruse Ellipta, PRN inh/nebs.     KORY.  - Continue CPAP at night, ONLY if patient is without hemoptysis x2 hours.        Diet: NPO for Medical/Clinical Reasons Except for: Meds, Ice Chips    Prophylaxis: PCD's, ambulation.  Torres Catheter: not present  Code Status: Full Code      Disposition Plan   Expected discharge: 1-2d, recommended to prior living arrangement once if continues to improved and pending further Pulm rec's.  Entered: Jason Louis MD 01/18/2019, 8:55 AM       Communication.  - I d/w pt's wife and daughter 1/18.    Interval History   Doing better. No further hemoptysis. C/o dry mouth, desires to eat/drink.     -Data reviewed today: I reviewed all new labs and imaging over the last 24 hours. I personally reviewed no images or EKG's today.    Physical Exam   Heart Rate: 92, Blood pressure 132/70, pulse  "91, temperature 97.9  F (36.6  C), temperature source Oral, resp. rate 10, height 1.702 m (5' 7\"), weight 104.3 kg (230 lb), SpO2 99 %.  Vitals:    01/17/19 2142   Weight: 104.3 kg (230 lb)     Vital Signs with Ranges  Temp:  [97.6  F (36.4  C)-99.6  F (37.6  C)] 97.9  F (36.6  C)  Pulse:  [] 91  Heart Rate:  [] 92  Resp:  [9-38] 10  BP: (113-195)/() 132/70  SpO2:  [91 %-99 %] 99 %  Patient Vitals for the past 24 hrs:   BP Temp Temp src Pulse Heart Rate Resp SpO2 Height Weight   01/18/19 0800 -- 97.9  F (36.6  C) Oral -- -- -- -- -- --   01/18/19 0700 132/70 -- -- 91 92 10 99 % -- --   01/18/19 0600 140/63 -- -- 85 81 28 -- -- --   01/18/19 0530 123/69 -- -- 89 86 29 99 % -- --   01/18/19 0500 134/68 -- -- 94 92 13 99 % -- --   01/18/19 0430 195/86 -- -- 101 105 (!) 38 95 % -- --   01/18/19 0400 151/69 97.6  F (36.4  C) Axillary 82 82 28 97 % -- --   01/18/19 0330 142/69 -- -- -- 84 26 96 % -- --   01/18/19 0315 141/68 -- -- -- 84 25 95 % -- --   01/18/19 0300 138/62 -- -- -- 85 25 95 % -- --   01/18/19 0245 128/59 -- -- -- 87 26 96 % -- --   01/18/19 0230 144/67 -- -- 92 93 26 97 % -- --   01/18/19 0215 141/68 -- -- 92 94 23 95 % -- --   01/18/19 0200 140/67 -- -- 93 93 23 97 % -- --   01/18/19 0145 131/79 -- -- 93 95 15 98 % -- --   01/18/19 0130 148/75 97.9  F (36.6  C) Axillary 98 101 22 98 % -- --   01/18/19 0115 (!) 180/108 -- -- 109 111 9 97 % -- --   01/18/19 0112 (!) 177/105 -- -- 107 102 20 97 % -- --   01/18/19 0109 (!) 185/91 -- -- 105 109 23 97 % -- --   01/18/19 0050 125/78 -- -- 70 84 (!) 38 97 % -- --   01/18/19 0045 125/78 -- -- -- 101 (!) 31 96 % -- --   01/18/19 0000 151/81 -- -- 114 -- -- 95 % -- --   01/17/19 2310 133/65 -- -- 101 101 19 94 % -- --   01/17/19 2300 113/79 -- -- 112 102 22 94 % -- --   01/17/19 2240 139/64 -- -- 93 94 22 -- -- --   01/17/19 2230 161/72 -- -- 101 94 20 -- -- --   01/17/19 2220 148/53 -- -- 98 91 22 -- -- --   01/17/19 2156 167/78 -- -- 98 92 18 91 " "% -- --   01/17/19 2154 (!) 175/93 -- -- -- 96 26 91 % -- --   01/17/19 2142 -- 99.6  F (37.6  C) Temporal -- 109 22 92 % 1.702 m (5' 7\") 104.3 kg (230 lb)     I/O's Last 24 hours  I/O last 3 completed shifts:  In: 87.5 [I.V.:87.5]  Out: 380 [Urine:380]    Constitutional: Alert, oriented, pleasant.  Respiratory: Diminished in bases. No crackles or wheezes.  Cardiovascular: RRR, +I/VI systolic m, no g/r.  GI: Obese, nt, +BS.  Skin/Integumen:   Other:        Data   Recent Labs   Lab 01/18/19 0530 01/18/19 0230 01/17/19 2148 01/17/19 2140   WBC  --   --   --  7.3   HGB 11.5* 11.3* 13.3 13.0*   MCV  --   --   --  93   PLT  --   --   --  153   INR 1.78*  --   --  2.16*     --  143 144   POTASSIUM 3.9  --  3.5 3.6   CHLORIDE 110*  --   --  109   CO2 26  --   --  26   BUN 22  --   --  15   CR 0.93  --   --  0.97   ANIONGAP 7  --   --  9   JULIANNE 8.0*  --   --  8.2*   *  --   --  122*   ALBUMIN  --  2.7*  --   --    PROTTOTAL  --  5.9*  --   --    BILITOTAL  --  0.4  --   --    ALKPHOS  --  55  --   --    ALT  --  19  --   --    AST  --  18  --   --    TROPI  --   --   --  <0.015     Recent Labs   Lab Test 01/18/19 0530 01/18/19  0103 01/17/19  2140   *  --  122*   BGM  --  122*  --          Recent Results (from the past 24 hour(s))   CT Chest Pulmonary Embolism w Contrast    Narrative    CT CHEST PULMONARY EMBOLISM WITH CONTRAST  1/17/2019 11:39 PM    HISTORY:  Shortness of breath; hemoptysis.    TECHNIQUE: Scans obtained from the apices through the diaphragm with  IV contrast, 79 mL Isovue-370 injected. Radiation dose for this scan  was reduced using automated exposure control, adjustment of the mA  and/or kV according to patient size, or iterative reconstruction  technique.    COMPARISON:  None.    FINDINGS:  Evaluation of the pulmonary arterial system shows no  evidence of embolus. The thoracic aorta is calcified and tortuous. No  aortic aneurysm or dissection. There are coronary " artery  atherosclerotic calcifications. The heart size is normal. There are  multiple calcified lymph nodes in  the mediastinum and bilateral lauri.  No lymph node enlargement. There is a consolidation in the left lower  lobe medially which is likely pneumonia. Patchy infiltrates in  bilateral lung bases are also likely pneumonia. Mild pulmonary edema  is also a possibility. There are a few peripheral bands of scar or  atelectasis. Calcified granuloma in the right lower lobe. There are 2  small nodules in the left upper lobe laterally measuring up to 0.7 cm.  No pneumothorax or pleural effusion. Images through the upper abdomen  are remarkable for an indeterminant lesion at the lower pole of the  right kidney measuring 3.8 cm. This may be a high-density cyst. There  are cysts in the upper pole of the right kidney. No upper abdominal  lymph node enlargement. The gallbladder is absent. The adrenal glands  appear normal. There is atherosclerotic calcification of the aorta and  its branches. Degenerative disease in the spine.      Impression    IMPRESSION:  1. There is no pulmonary embolus, aortic aneurysm or dissection.  2. Left lower lobe consolidation is most likely pneumonia. Follow-up  to resolution is recommended to rule out the possibility of a mass.  There are patchy infiltrates in bilateral lower lungs, also likely  pneumonia.  3. Old granulomatous disease.  4. Indeterminant lesion in the lower pole of the right kidney.  Follow-up recommended.  5. There are 2 small indeterminate left upper lobe lung nodules.    Recommendations for an incidental lung nodule = or > 6mm to 8mm:    Low risk patients: Initial follow-up CT at 6-12 months, then  consider CT at 18-24 months if no change.    High risk patients: Initial follow-up CT at 6-12 months, then CT at  18-24 months if no change.    *Low Risk: Minimal or absent history of smoking or other known risk  factors.  *Nonsolid (ground-glass) or partly solid nodules may  require longer  follow-up to exclude indolent adenocarcinoma.  *Recommendations based on Guidelines for the Management of Incidental  Pulmonary Nodules Detected at CT: From the Fleischner Society 2017,  Radiology 2017.       TAZ RICE MD       Medications   All medications were reviewed.    sodium chloride 50 mL/hr at 01/18/19 0215       amLODIPine  2.5 mg Oral QAM     brimonidine-timolol  1 drop Both Eyes QAM     brinzolamide  1 drop Both Eyes QAM     docusate sodium  100 mg Oral QPM     latanoprost  1 drop Both Eyes At Bedtime     mirabegron  50 mg Oral QAM     pantoprazole  40 mg Oral QAM AC     umeclidinium  1 puff Inhalation QAM

## 2019-01-18 NOTE — PHARMACY-ADMISSION MEDICATION HISTORY
Admission Medication History    Admission medication history interview status for the 1/17/2019 admission is complete. See EPIC admission navigator for prior to admission medications     Medication history source reliability:Good    Actions taken by pharmacist (provider contacted, etc):None     Additional medication history information not noted on PTA med list :None    Medication reconciliation/reorder completed by provider prior to medication history? No    Time spent in this activity: 30 minutes    Prior to Admission medications    Medication Sig Last Dose Taking? Auth Provider   amLODIPine (NORVASC) 2.5 MG tablet Take 2.5 mg by mouth every morning 1/17/2019 at AM Yes Unknown, Entered By History   brimonidine-timolol (COMBIGAN) 0.2-0.5 % ophthalmic solution Place 1 drop into both eyes every morning 1/17/2019 at AM Yes Unknown, Entered By History   brinzolamide (AZOPT) 1 % ophthalmic suspension Place 1 drop into both eyes every morning 1/17/2019 at AM Yes Unknown, Entered By History   CALCIUM-MAGNESIUM-ZINC PO Take 1 tablet by mouth every morning 100/40/15  Yes Unknown, Entered By History   co-enzyme Q-10 100 MG CAPS capsule Take 100 mg by mouth every morning 1/17/2019 at AM Yes Unknown, Entered By History   docusate sodium (COLACE) 100 MG capsule Take 100 mg by mouth every evening 1/16/2019 at PM Yes Unknown, Entered By History   esomeprazole (NEXIUM) 40 MG DR capsule Take 40 mg by mouth every morning (before breakfast) Take 30-60 minutes before eating. 1/17/2019 at AM Yes Unknown, Entered By History   furosemide (LASIX) 40 MG tablet Take 40 mg by mouth daily  Past Month at Unknown time Yes Reported, Patient   latanoprost (XALATAN) 0.005 % ophthalmic solution Place 1 drop into both eyes At Bedtime 1/16/2019 at HS Yes Unknown, Entered By History   Magnesium Oxide 250 MG TABS Take 500 mg by mouth daily 1/17/2019 at AM Yes Unknown, Entered By History   mirabegron (MYRBETRIQ) 50 MG 24 hr tablet Take 50 mg by mouth  every morning 1/17/2019 at AM Yes Unknown, Entered By History   multivitamin w/minerals (THERA-VIT-M) tablet Take 1 tablet by mouth every morning 1/17/2019 at AM Yes Unknown, Entered By History   tiotropium (SPIRIVA) 18 MCG inhaled capsule Inhale 18 mcg into the lungs every morning 1/17/2019 at AM Yes Unknown, Entered By History   vitamin B-12 (CYANOCOBALAMIN) 1000 MCG tablet Take 1,000 mcg by mouth every morning 1/17/2019 at AM Yes Unknown, Entered By History   vitamin D3 (CHOLECALCIFEROL) 2000 units tablet Take 2,000 Units by mouth daily 1/17/2019 at AM Yes Unknown, Entered By History   warfarin (COUMADIN) 5 MG tablet Take by mouth every evening -  INR Goal 2-3  10 mg on Monday, Wednesday and Friday  7.5 mg on all other days 1/16/2019 at PM Yes Unknown, Entered By History       Armando Escobedo, PharmD

## 2019-01-18 NOTE — PROGRESS NOTES
"[facility] Progress Note  [facility]    Brief visit with pt, per request in chart.  Pt was sitting in a chair when I arrived, while his wife talked on the phone across the room.  Pt said, \"I coughed so hard I was coughing up blood, so I came to the hospital.\"    Pt said that he's [employer], and expressed interest in a visit from our .   I will leave word for Fr. Blanco.   team is available if additional needs arise.                                                                                                                                                 Brielle Camp M.A.  Staff   Pager [phone]  Phone [phone]      "

## 2019-01-18 NOTE — PROGRESS NOTES
Pt alert to self. BP elevated, prn hydralazine given with relief. Other VSS on 2 LNC. C/o of SOB. No bloody vomit since transfered to ICU. Hgb dropped from 13 to 11.5 this am.  Vit K given, INR 1.78 this am. Coumadin on hold. Multiple skin scabs, scratches, old wounds. Chest Ct done, see results. Pulmonary consult for possible bronchoscopy. NPO. Will cont to closely monitor.

## 2019-01-18 NOTE — ED PROVIDER NOTES
"  History     Chief Complaint:    Hemoptysis (coughing up bright red blood x 3 hrs)      HPI   Edison Saldivar is a 78 year old male who presents with hemoptysis that started tonight.  Patient notes that he had a slight nosebleed earlier in the night but is very mild and stopped.  Patient is on Coumadin for history of strokes in the past.  States he has been on Coumadin for probably 40 years.  Patient feels slight shortness of breath and is felt that way for the last couple of days.  Denies fevers or chills but he has had some coughing spells yesterday and today.  Patient has a history of asthma.  Patient denies nausea or vomiting.  When asked where he thinks the blood is coming from he thinks it is coming from his throat.    Allergies:    No Known Allergies     Medications:        No current outpatient medications on file.    Problem List:      Patient Active Problem List    Diagnosis Date Noted     Hemoptysis 01/18/2019     Priority: Medium        Past Medical History:    Prior TIA/stroke  HTN  GERD  Asthma/COPD    Past Surgical History:    Left knee    Family History:      No family history on file.    Social History:    Former smoker  Marital Status:   [2]  Social History     Tobacco Use     Smoking status: Not on file   Substance Use Topics     Alcohol use: Not on file     Drug use: Not on file        Review of Systems   Constitutional: Positive for fatigue. Negative for chills and fever.   Respiratory: Positive for cough, shortness of breath and wheezing. Negative for chest tightness.    Cardiovascular: Negative for chest pain.   All other systems reviewed and are negative.        Physical Exam   First Vitals:  BP: (!) 175/93  Pulse: 98  Heart Rate: 109  Temp: 99.6  F (37.6  C)  Resp: 22  Height: 170.2 cm (5' 7\")  Weight: 104.3 kg (230 lb)  SpO2: 92 %      Physical Exam  GENERAL: well developed, pleasant  HEAD: atraumatic  EYES: pupils reactive, extraocular muscles intact, conjunctivae normal  ENT:  mucus " membranes moist, nares without evidence of bleeding, pooling of blood in posterior oropharynx  NECK:  trachea midline, normal range of motion  RESPIRATORY: mild tachypnea, diminished lung sounds  CVS: normal S1/S2, no murmurs, intact distal pulses  ABDOMEN: soft, nontender, nondistention  MUSCULOSKELETAL: no deformities  SKIN: warm and dry, no acute rashes or ulceration  NEURO: GCS 15, cranial nerves intact, alert and oriented x3  PSYCH:  Mood/affect normal        Emergency Department Course       Imaging:  CT Chest Pulmonary Embolism w Contrast   Final Result   IMPRESSION:   1. There is no pulmonary embolus, aortic aneurysm or dissection.   2. Left lower lobe consolidation is most likely pneumonia. Follow-up   to resolution is recommended to rule out the possibility of a mass.   There are patchy infiltrates in bilateral lower lungs, also likely   pneumonia.   3. Old granulomatous disease.   4. Indeterminant lesion in the lower pole of the right kidney.   Follow-up recommended.   5. There are 2 small indeterminate left upper lobe lung nodules.      Recommendations for an incidental lung nodule = or > 6mm to 8mm:     Low risk patients: Initial follow-up CT at 6-12 months, then   consider CT at 18-24 months if no change.     High risk patients: Initial follow-up CT at 6-12 months, then CT at   18-24 months if no change.      *Low Risk: Minimal or absent history of smoking or other known risk   factors.   *Nonsolid (ground-glass) or partly solid nodules may require longer   follow-up to exclude indolent adenocarcinoma.   *Recommendations based on Guidelines for the Management of Incidental   Pulmonary Nodules Detected at CT: From the Fleischner Society 2017,   Radiology 2017.          TAZ RICE MD            Laboratory:  Labs Ordered and Resulted from Time of ED Arrival Up to the Time of Departure from the ED   CBC WITH PLATELETS DIFFERENTIAL - Abnormal; Notable for the following components:       Result Value     RBC Count 4.24 (*)     Hemoglobin 13.0 (*)     Hematocrit 39.3 (*)     All other components within normal limits   INR - Abnormal; Notable for the following components:    INR 2.16 (*)     All other components within normal limits   BASIC METABOLIC PANEL - Abnormal; Notable for the following components:    Glucose 122 (*)     Calcium 8.2 (*)     All other components within normal limits   ISTAT ELECTROLYTES POCT - Abnormal; Notable for the following components:    Hematocrit - POCT 39 (*)     All other components within normal limits   NT PROBNP INPATIENT   TROPONIN I   PROCALCITONIN   PERIPHERAL IV CATHETER         Procedures:          Interventions:  Duonebs x 3    Emergency Department Course:         Impression & Plan         Medical Decision Making:  Presents with hemoptysis.  Patient has a fair amount of blood that he is coughing up with mild increased work of breathing.  Patient notes recent epistaxis so was curious if this was more of a posterior nosebleed that is pulling in the back of his throat and is having a hard time clearing the secretions and is more of a nasal issue as opposed to pulmonary issue.  And build Afrin in both nares and looked with a headlamp and nasal speculum did not see any evidence of bleeding.  With a tongue blade could see blood in the posterior pharynx but again he would be coughing blood and could not completely clear out his mouth.  I used a machine to scope and looked down the right nares to the point of his epiglottis and arytenoids/vocal cords and did not see any evidence of bleeding in the posterior pharynx or throat.  Patient was not vomiting and looks to be more coughing with bright red blood.  Patient was given DuoNeb's and labs were obtained.  CT chest was obtained showing findings as above.  CT reads infiltrate in the left lower lobe and some scattered infiltrates possibly edema but suggesting pneumonia.  Patient has not had any infectious complaints terms of fevers or  chills.  He has noted some shortness of breath recently as well as a few coughing spells.  Patient's white blood cell count and differential is completely normal and his pro calcitonin is unmeasurable.  Certainly pneumonia seems unlikely given the lack of infectious complaints and a completely normal CBC and pro calcitonin.  BNP is also normal making CHF highly unlikely.  Patient's hemoptysis has improved and looks to be stable at this time.  Patient be admitted to the hospitalist.  Did speak with ICU with possibility of bronchoscopy.  Will hold on intubation at this time as he is stable and able to protect his airway.  I have held on antibiotics given the reasons above.  Patient was initially seen in our critical care room and then transferred out to 1 of her main rooms when he proved to be more stable.    Critical Care time:  was 35 minutes for this patient excluding procedures.    Diagnosis:    ICD-10-CM    1. Hemoptysis R04.2 Procalcitonin     Glucose by meter     Glucose by meter     Hemoglobin     Hemoglobin     Hepatic panel     Type and Screen (AM Draw)     INR     Basic metabolic panel     ABO and Rh     ABO and Rh     Hemoglobin     Hemoglobin     Glucose by meter     Glucose by meter     Hemoglobin     Glucose by meter     Glucose by meter       Disposition:  Admitted to ICU    Discharge Medications:     Medication List      There are no discharge medications for this visit.           Marky Lala  1/17/2019    EMERGENCY DEPARTMENT       Marky Lala MD  01/18/19 [id number]

## 2019-01-18 NOTE — PROGRESS NOTES
Notified Dr Barr regarding elevated blood pressure, will await for prn meds.   Received prn meds for BP and vit K.

## 2019-01-18 NOTE — PROGRESS NOTES
01/18/19 0900   Quick Adds   Type of Visit Initial PT Evaluation   Living Environment   Lives With spouse   Living Arrangements independent living facility   Home Accessibility no concerns   Self-Care   Current Activity Tolerance fair   Equipment Currently Used at Home walker, rolling   Activity/Exercise/Self-Care Comment 4WW   Functional Level Prior   Ambulation 1-->assistive equipment   Transferring 1-->assistive equipment   Toileting 1-->assistive equipment   Bathing 2-->assistive person   Fall history within last six months no   Which of the above functional risks had a recent onset or change? ambulation;transferring   Prior Functional Level Comment Per spouse, patient and her live in independent living facility and do not receive any outside services. Spouse notes decline in mobility in the last few weeks, states patient requires more assistance for mobility. Spouse now assisting with bathing and dressing; patient normally able to ambulate ~1.5 city blocks with 4WW prior to needing to sit down.    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    Precautions/Limitations fall precautions   Weight-Bearing Status - LLE full weight-bearing   Weight-Bearing Status - RLE full weight-bearing   General Observations Patient supine in bed upon arrival of therapist, agreeable to working with PT. States being lightheaded in supine but BP WNL (slightly elevated)    Cognitive Status Examination   Orientation person   Level of Consciousness alert   Follows Commands and Answers Questions 75% of the  "time;able to follow multistep instructions   Pain Assessment   Patient Currently in Pain No   Integumentary/Edema   Integumentary/Edema Comments B LE slightly swollen compared to baseline per family    Posture    Posture Forward head position   Range of Motion (ROM)   ROM Comment B LE ROM WNL    Strength   Strength Comments Not formally assessed but at least 3+/5 with functional transfers and gait    Bed Mobility   Bed Mobility Comments Supine>sit with CGA and bed rails    Transfer Skills   Transfer Comments Sit>stand with Min A x 2 and B HHA    Gait   Gait Comments Min A x 2 for few steps from bed to chair    Balance   Balance Comments Unsteady in standing with Min A x 2, sitting balance good with supervision and B UE support    Sensory Examination   Sensory Perception Comments Denies numbness/tingling    General Therapy Interventions   Planned Therapy Interventions balance training;bed mobility training;gait training;strengthening;transfer training;home program guidelines   Clinical Impression   Criteria for Skilled Therapeutic Intervention yes, treatment indicated   PT Diagnosis Impaired mobility and gait    Influenced by the following impairments weakness, deconditioning    Functional limitations due to impairments bed mobility, transfers, gait, stairs    Clinical Presentation Stable/Uncomplicated   Clinical Presentation Rationale Based on PMH, current presentation, and social support    Clinical Decision Making (Complexity) Low complexity   Therapy Frequency` daily   Predicted Duration of Therapy Intervention (days/wks) 4 days    Anticipated Discharge Disposition Transitional Care Facility   Risk & Benefits of therapy have been explained Yes   Patient, Family & other staff in agreement with plan of care Yes   [facility] AM-PAC TM \"6 Clicks\"   2016, Trustees of [facility], under license to [employer].  All rights reserved.   6 Clicks Short Forms Basic Mobility Inpatient Short Form   [facility]" "[facility]  \"6 Clicks\" V.2 Basic Mobility Inpatient Short Form   1. Turning from your back to your side while in a flat bed without using bedrails? 4 - None   2. Moving from lying on your back to sitting on the side of a flat bed without using bedrails? 3 - A Little   3. Moving to and from a bed to a chair (including a wheelchair)? 3 - A Little   4. Standing up from a chair using your arms (e.g., wheelchair, or bedside chair)? 3 - A Little   5. To walk in hospital room? 2 - A Lot   6. Climbing 3-5 steps with a railing? 2 - A Lot   Basic Mobility Raw Score (Score out of 24.Lower scores equate to lower levels of function) 17   Total Evaluation Time   Total Evaluation Time (Minutes) 10     "

## 2019-01-19 ENCOUNTER — APPOINTMENT (OUTPATIENT)
Dept: OCCUPATIONAL THERAPY | Facility: CLINIC | Age: 79
DRG: 194 | End: 2019-01-19
Payer: COMMERCIAL

## 2019-01-19 ENCOUNTER — APPOINTMENT (OUTPATIENT)
Dept: PHYSICAL THERAPY | Facility: CLINIC | Age: 79
DRG: 194 | End: 2019-01-19
Payer: COMMERCIAL

## 2019-01-19 LAB
ANION GAP SERPL CALCULATED.3IONS-SCNC: 7 MMOL/L (ref 3–14)
BASOPHILS # BLD AUTO: 0.1 10E9/L (ref 0–0.2)
BASOPHILS NFR BLD AUTO: 0.6 %
BUN SERPL-MCNC: 20 MG/DL (ref 7–30)
CA-I BLD-MCNC: 4.7 MG/DL (ref 4.4–5.2)
CALCIUM SERPL-MCNC: 8.2 MG/DL (ref 8.5–10.1)
CHLORIDE SERPL-SCNC: 111 MMOL/L (ref 94–109)
CO2 SERPL-SCNC: 25 MMOL/L (ref 20–32)
CREAT SERPL-MCNC: 1 MG/DL (ref 0.66–1.25)
DIFFERENTIAL METHOD BLD: ABNORMAL
EOSINOPHIL # BLD AUTO: 0.5 10E9/L (ref 0–0.7)
EOSINOPHIL NFR BLD AUTO: 5 %
ERYTHROCYTE [DISTWIDTH] IN BLOOD BY AUTOMATED COUNT: 14.3 % (ref 10–15)
FERRITIN SERPL-MCNC: 39 NG/ML (ref 26–388)
GFR SERPL CREATININE-BSD FRML MDRD: 73 ML/MIN/{1.73_M2}
GLUCOSE BLDC GLUCOMTR-MCNC: 104 MG/DL (ref 70–99)
GLUCOSE BLDC GLUCOMTR-MCNC: 112 MG/DL (ref 70–99)
GLUCOSE BLDC GLUCOMTR-MCNC: 114 MG/DL (ref 70–99)
GLUCOSE BLDC GLUCOMTR-MCNC: 120 MG/DL (ref 70–99)
GLUCOSE BLDC GLUCOMTR-MCNC: 136 MG/DL (ref 70–99)
GLUCOSE BLDC GLUCOMTR-MCNC: 168 MG/DL (ref 70–99)
GLUCOSE SERPL-MCNC: 109 MG/DL (ref 70–99)
HCT VFR BLD AUTO: 31.6 % (ref 40–53)
HGB BLD-MCNC: 10.4 G/DL (ref 13.3–17.7)
HGB BLD-MCNC: 10.5 G/DL (ref 13.3–17.7)
HGB BLD-MCNC: 10.6 G/DL (ref 13.3–17.7)
HGB BLD-MCNC: 11.3 G/DL (ref 13.3–17.7)
IMM GRANULOCYTES # BLD: 0 10E9/L (ref 0–0.4)
IMM GRANULOCYTES NFR BLD: 0.3 %
LYMPHOCYTES # BLD AUTO: 1.2 10E9/L (ref 0.8–5.3)
LYMPHOCYTES NFR BLD AUTO: 13 %
MCH RBC QN AUTO: 30.6 PG (ref 26.5–33)
MCHC RBC AUTO-ENTMCNC: 33.2 G/DL (ref 31.5–36.5)
MCV RBC AUTO: 92 FL (ref 78–100)
MONOCYTES # BLD AUTO: 0.7 10E9/L (ref 0–1.3)
MONOCYTES NFR BLD AUTO: 7 %
NEUTROPHILS # BLD AUTO: 7 10E9/L (ref 1.6–8.3)
NEUTROPHILS NFR BLD AUTO: 74.1 %
NRBC # BLD AUTO: 0 10*3/UL
NRBC BLD AUTO-RTO: 0 /100
PLATELET # BLD AUTO: 141 10E9/L (ref 150–450)
POTASSIUM SERPL-SCNC: 3.6 MMOL/L (ref 3.4–5.3)
RBC # BLD AUTO: 3.43 10E12/L (ref 4.4–5.9)
SODIUM SERPL-SCNC: 143 MMOL/L (ref 133–144)
WBC # BLD AUTO: 9.5 10E9/L (ref 4–11)

## 2019-01-19 PROCEDURE — 82164 ANGIOTENSIN I ENZYME TEST: CPT | Performed by: INTERNAL MEDICINE

## 2019-01-19 PROCEDURE — 00000146 ZZHCL STATISTIC GLUCOSE BY METER IP

## 2019-01-19 PROCEDURE — 25000132 ZZH RX MED GY IP 250 OP 250 PS 637: Performed by: HOSPITALIST

## 2019-01-19 PROCEDURE — 40000133 ZZH STATISTIC OT WARD VISIT: Performed by: OCCUPATIONAL THERAPIST

## 2019-01-19 PROCEDURE — 25000132 ZZH RX MED GY IP 250 OP 250 PS 637: Performed by: INTERNAL MEDICINE

## 2019-01-19 PROCEDURE — 97116 GAIT TRAINING THERAPY: CPT | Mod: GP

## 2019-01-19 PROCEDURE — 97535 SELF CARE MNGMENT TRAINING: CPT | Mod: GO | Performed by: OCCUPATIONAL THERAPIST

## 2019-01-19 PROCEDURE — 94660 CPAP INITIATION&MGMT: CPT

## 2019-01-19 PROCEDURE — 94640 AIRWAY INHALATION TREATMENT: CPT | Mod: 76

## 2019-01-19 PROCEDURE — 40000275 ZZH STATISTIC RCP TIME EA 10 MIN

## 2019-01-19 PROCEDURE — 99232 SBSQ HOSP IP/OBS MODERATE 35: CPT | Performed by: INTERNAL MEDICINE

## 2019-01-19 PROCEDURE — 25000128 H RX IP 250 OP 636: Performed by: INTERNAL MEDICINE

## 2019-01-19 PROCEDURE — 82728 ASSAY OF FERRITIN: CPT | Performed by: INTERNAL MEDICINE

## 2019-01-19 PROCEDURE — 40000193 ZZH STATISTIC PT WARD VISIT

## 2019-01-19 PROCEDURE — 25000125 ZZHC RX 250: Performed by: INTERNAL MEDICINE

## 2019-01-19 PROCEDURE — 82330 ASSAY OF CALCIUM: CPT | Performed by: INTERNAL MEDICINE

## 2019-01-19 PROCEDURE — 97530 THERAPEUTIC ACTIVITIES: CPT | Mod: GP

## 2019-01-19 PROCEDURE — 94640 AIRWAY INHALATION TREATMENT: CPT

## 2019-01-19 PROCEDURE — 80048 BASIC METABOLIC PNL TOTAL CA: CPT | Performed by: INTERNAL MEDICINE

## 2019-01-19 PROCEDURE — 12000000 ZZH R&B MED SURG/OB

## 2019-01-19 PROCEDURE — 36415 COLL VENOUS BLD VENIPUNCTURE: CPT | Performed by: INTERNAL MEDICINE

## 2019-01-19 PROCEDURE — 85025 COMPLETE CBC W/AUTO DIFF WBC: CPT | Performed by: INTERNAL MEDICINE

## 2019-01-19 PROCEDURE — 85018 HEMOGLOBIN: CPT | Performed by: INTERNAL MEDICINE

## 2019-01-19 PROCEDURE — 97110 THERAPEUTIC EXERCISES: CPT | Mod: GP

## 2019-01-19 PROCEDURE — 97165 OT EVAL LOW COMPLEX 30 MIN: CPT | Mod: GO | Performed by: OCCUPATIONAL THERAPIST

## 2019-01-19 RX ORDER — ALBUTEROL SULFATE 0.83 MG/ML
3 SOLUTION RESPIRATORY (INHALATION)
Status: DISCONTINUED | OUTPATIENT
Start: 2019-01-19 | End: 2019-01-24

## 2019-01-19 RX ADMIN — AZITHROMYCIN 250 MG: 250 TABLET, FILM COATED ORAL at 08:14

## 2019-01-19 RX ADMIN — ALBUTEROL SULFATE 2.5 MG: 2.5 SOLUTION RESPIRATORY (INHALATION) at 19:26

## 2019-01-19 RX ADMIN — UMECLIDINIUM 1 PUFF: 62.5 AEROSOL, POWDER ORAL at 08:15

## 2019-01-19 RX ADMIN — LATANOPROST 1 DROP: 50 SOLUTION OPHTHALMIC at 21:11

## 2019-01-19 RX ADMIN — GUAIFENESIN 600 MG: 600 TABLET, EXTENDED RELEASE ORAL at 21:10

## 2019-01-19 RX ADMIN — BRINZOLAMIDE 1 DROP: 10 SUSPENSION/ DROPS OPHTHALMIC at 08:16

## 2019-01-19 RX ADMIN — DOCUSATE SODIUM 100 MG: 100 CAPSULE, LIQUID FILLED ORAL at 21:11

## 2019-01-19 RX ADMIN — BRIMONIDINE TARTRATE, TIMOLOL MALEATE 1 DROP: 2; 5 SOLUTION/ DROPS OPHTHALMIC at 08:16

## 2019-01-19 RX ADMIN — ALBUTEROL SULFATE 2.5 MG: 2.5 SOLUTION RESPIRATORY (INHALATION) at 11:51

## 2019-01-19 RX ADMIN — CEFTRIAXONE SODIUM 1 G: 1 INJECTION, POWDER, FOR SOLUTION INTRAMUSCULAR; INTRAVENOUS at 09:56

## 2019-01-19 RX ADMIN — ALBUTEROL SULFATE 2.5 MG: 2.5 SOLUTION RESPIRATORY (INHALATION) at 23:06

## 2019-01-19 RX ADMIN — ALBUTEROL SULFATE 2.5 MG: 2.5 SOLUTION RESPIRATORY (INHALATION) at 15:33

## 2019-01-19 RX ADMIN — PANTOPRAZOLE SODIUM 40 MG: 40 TABLET, DELAYED RELEASE ORAL at 08:14

## 2019-01-19 RX ADMIN — GUAIFENESIN 600 MG: 600 TABLET, EXTENDED RELEASE ORAL at 08:14

## 2019-01-19 RX ADMIN — AMLODIPINE BESYLATE 2.5 MG: 2.5 TABLET ORAL at 08:14

## 2019-01-19 RX ADMIN — MIRABEGRON 50 MG: 50 TABLET, FILM COATED, EXTENDED RELEASE ORAL at 08:14

## 2019-01-19 ASSESSMENT — ACTIVITIES OF DAILY LIVING (ADL)
ADLS_ACUITY_SCORE: 22
ADLS_ACUITY_SCORE: 22
ADLS_ACUITY_SCORE: 27
ADLS_ACUITY_SCORE: 27
ADLS_ACUITY_SCORE: 20
ADLS_ACUITY_SCORE: 24

## 2019-01-19 ASSESSMENT — MIFFLIN-ST. JEOR: SCORE: 1775.63

## 2019-01-19 NOTE — PROGRESS NOTES
01/19/19 1445   Quick Adds   Type of Visit Initial Occupational Therapy Evaluation   Living Environment   Lives With spouse   Living Arrangements independent living facility   Home Accessibility (tub/shower)   Self-Care   Usual Activity Tolerance fair   Current Activity Tolerance poor   Equipment Currently Used at Home walker, rolling   Activity/Exercise/Self-Care Comment 4ww   Functional Level   Ambulation 1-->assistive equipment   Transferring 1-->assistive equipment   Toileting 1-->assistive equipment   Bathing 2-->assistive person   Dressing 2-->assistive person   Eating 2-->assistive person   Communication 2-->difficulty understanding (not related to language barrier)   Swallowing 2-->difficulty swallowing liquids   Cognition 1 - attention or memory deficits   Fall history within last six months no   Which of the above functional risks had a recent onset or change? ambulation;transferring   Prior Functional Level Comment Family reports they are helping with dressing, showering, but pt typically showers on his own. They have access to 4WW and power WC, as well as lightweight WC. Family states he is mostly sitting during the day and only gets up to use the bathroom. Per spouse, patient and her live in independent living facility and do not receive any outside services. Spouse notes decline in mobility in the last few weeks, states patient requires more assistance for mobility. Spouse now assisting with bathing and dressing; patient normally able to ambulate ~1.5 city blocks with 4WW prior to needing to sit down.    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    Precautions/Limitations fall precautions;swallowing precautions   Cognitive Status Examination   Orientation person;place   Level of Consciousness lethargic/somnolent;alert   Follows Commands (Cognition) follows one step commands   Memory impaired   Visual Perception   Visual Perception Comments some reduced visual attention   Pain Assessment   Patient Currently in Pain No   Range of Motion (ROM)   ROM Comment BUE WFL for ADLs   Strength   Strength Comments B shoulders 3+, elbows 4/5   Hand Strength   Hand Strength Comments B hands 4+/5   Coordination   Coordination Comments Some incoordination noted during ADLs, pt is able to compensate somewhat but difficulty noted   Transfer Skills   Transfer Comments CGA   Transfer Skill: Bed to Chair/Chair to Bed   Level of [place]: Bed to Chair minimum assist (75% patients effort)   Physical Assist/Nonphysical Assist: Bed to Chair supervision;verbal cues;1 person assist   Weight-Bearing Restrictions full weight-bearing   Assistive Device - Transfer Skill Bed to Chair Chair to Bed Rehab Eval rolling walker   Transfer Skill: Sit to Stand   Level of [place]: Sit/Stand contact guard   Physical Assist/Nonphysical Assist: Sit/Stand supervision;verbal cues;1 person assist   Transfer Skill: Sit to Stand full weight-bearing   Assistive Device for Transfer: Sit/Stand rolling walker   Bathing   Level of [place] moderate assist (50% patients effort)   Upper Body Dressing   Level of [place]: Dress Upper Body minimum assist (75% patients effort)   Lower Body Dressing   Level of [place]: Dress Lower Body moderate assist (50% patients effort)   Toileting   Level of [place]: Toilet minimum assist (75% patients effort)   Grooming   Level of [place]: Grooming minimum assist (75% patients effort)   Eating/Self Feeding   Level of [place]: Eating stand-by assist   Activities of Daily Living Analysis   Impairments Contributing to Impaired Activities of  "Daily Living balance impaired;cognition impaired;coordination impaired;ROM decreased;strength decreased   General Therapy Interventions   Planned Therapy Interventions ADL retraining;bed mobility training;cognition;fine motor coordination training;ROM;strengthening;home program guidelines;progressive activity/exercise   Clinical Impression   Criteria for Skilled Therapeutic Interventions Met yes, treatment indicated   OT Diagnosis reduced IND In ADLs   Influenced by the following impairments balance impaired;cognition impaired;coordination impaired;ROM decreased;strength decreased   Assessment of Occupational Performance 3-5 Performance Deficits   Identified Performance Deficits dressing, bathing, g/h, toileting   Clinical Decision Making (Complexity) Low complexity   Therapy Frequency daily   Predicted Duration of Therapy Intervention (days/wks) 3 days   Anticipated Discharge Disposition Transitional Care Facility   Risks and Benefits of Treatment have been explained. Yes   Patient, Family & other staff in agreement with plan of care Yes   [facility] TM \"6 Clicks\"   2016, Trustees of [facility], under license to [employer].  All rights reserved.   6 Clicks Short Forms Basic Mobility Inpatient Short Form;Daily Activity Inpatient Short Form   [facility]  \"6 Clicks\" Daily Activity Inpatient Short Form   1. Putting on and taking off regular lower body clothing? 2 - A Lot   2. Bathing (including washing, rinsing, drying)? 2 - A Lot   3. Toileting, which includes using toilet, bedpan or urinal? 3 - A Little   4. Putting on and taking off regular upper body clothing? 3 - A Little   5. Taking care of personal grooming such as brushing teeth? 3 - A Little   6. Eating meals? 3 - A Little   Daily Activity Raw Score (Score out of 24.Lower scores equate to lower levels of function) 16   Total Evaluation Time   Total Evaluation Time (Minutes) 10     "

## 2019-01-19 NOTE — PROGRESS NOTES
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     Hemoptysis, unclear etiology, question pneumonia or lung mass with concomitant anticoagulation.  LLL consolidation/pneumonia vs mass.  Initially had an episode of epistaxis evening 1/17 that was mild and stopped on its own. This was followed by coughing up blood x 3 hours. Presented to ED 1/17 and continued to cough up blood. No evidence of continued epistaxis when evaluated by ED physician.  On initial evaluation 1/17, hgb 13.0, WBC normal, plts normal, INR 2.16, procalcitonin < 0.05. CT chest 1/17 showed LLL consolidation, PNA vs mass with some scattered patchy infiltrates with some indeterminate GEORGE nodules. Pt given vit K early am 1/18.  - Started azithromycin + ceftriaxone for possible pneumonia but seems more likely to represent aspirated blood. Will treat for 5 days.   - ACE,calcium pending.    - Continue to hold warfarin for now. May be able to resume in one week.   - Pulmonology consulted, appreciate help.  - Would require intubation and bronchoscopy for any recurrent major bleeding.  Would possibly need IR coiling.   - Follow up imaging in 3-4 weeks recommended.    - Nebs.      Anemia, mild, suspect acute blood loss component from above.  * Issues with hemoptysis as above.  - Management of hemoptysis as above.  - Monitor CBC.  - Hgb stable at 10.6.   - Consider prbc transfusion if hgb </= 7.0 or if significant bleeding with hemodynamic instability or if symptomatic.     Skin changes right forearm, suspect secondary to blood pressure cuff placement in the setting of livedo reticularis.  Pt with petechial type skin changes that developed on right forearm with blood pressure cuff placement.   - Try cuff on left upper  extremity; if unable, then monitor closely for signs of vascular compromise - no signs so far.     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  * Pt with h/o Sneddon syndrome, which is a rare genetic disorder characterized by widespread livedo reticularis in association with stroke. Diagnosed at [facility]. Maintained on warfarin. Unclear if pt has h/o antiphospholipid antibodies (commonly associated with Sneddon syndrome).  * Warfarin held and given vit K on admit as above.  - Continue to hold warfarin for now.     Left upper lobe lung nodules.  Noted by CT as above 1/17. Hx smoking.  - Follow-up CT short term for above LLL consolidation; otherwise, would recommend repeat CT in 6-12mo, then 18-24 mo if no change for the GEORGE nodules.  - Follow-up with PCP.     Hypertension (benign essential).  Possible h/o CHF.  [PTA: amlodipine 2.5 mg daily, furosemide 40 mg daily.]  * Pt notes h/o heart murmur and issues with his left ventricle, details unclear.  - Continue amlodipine.  - Hold PTA Lasix given risk of hypovolemia given hemoptysis.  - Continue PRN IV hydralazine.  - Monitor i/o's, daily wts.  - Obtain records from [place].     COPD.  Chronic, stable.   - Continue Incruse Ellipta, PRN inh/nebs.  - Trelegy recommended on discharge.      KORY.  - Continue CPAP at night     Diet: Low sodium  Prophylaxis: PCD's, ambulation.  Torres Catheter: not present  Code Status: Full Code       Disposition: Possibly tomorrow if hgb stable.         Kev Meza         Interval History   No acute overnight events. No recurrent hemoptysis since admit. No new complaints.     -Data reviewed today: I reviewed all new labs and imaging results over the last 24 hours. I personally reviewed no images or EKG's today.    Physical Exam   Temp: 98  F (36.7  C) Temp src: Oral BP: 109/55 Pulse: 89 Heart Rate: 80 Resp: 18 SpO2: 93 % O2 Device: None (Room air)    Vitals:    01/17/19 2142 01/19/19 0500   Weight: 104.3 kg  (230 lb) 108.7 kg (239 lb 10.2 oz)     Vital Signs with Ranges  Temp:  [97.3  F (36.3  C)-98  F (36.7  C)] 98  F (36.7  C)  Pulse:  [89-96] 89  Heart Rate:  [80-96] 80  Resp:  [18] 18  BP: (109-153)/(55-82) 109/55  FiO2 (%):  [40 %] 40 %  SpO2:  [92 %-97 %] 93 %  I/O last 3 completed shifts:  In: 820 [P.O.:720; I.V.:100]  Out: 1335 [Urine:1335]    Gen: Patient in no acute distress.  Appears comfortable.  Heart:  S1S2+, regular rate and rhythm, No murmurs.  Lungs:  Clear to auscultation at apices, no wheezing, no rales. Decreased at bases.    Abdomen:  Soft, non tender, non distended, bowel sounds positive.  Extremities:  B/l LE edema.    Medications       albuterol  3 mL Nebulization Q4H While awake     amLODIPine  2.5 mg Oral QAM     azithromycin  250 mg Oral Daily     brimonidine-timolol  1 drop Both Eyes QAM     brinzolamide  1 drop Both Eyes QAM     cefTRIAXone  1 g Intravenous Q24H     docusate sodium  100 mg Oral QPM     guaiFENesin  600 mg Oral BID     latanoprost  1 drop Both Eyes At Bedtime     mirabegron  50 mg Oral QAM     pantoprazole  40 mg Oral QAM AC     umeclidinium  1 puff Inhalation QAM       Data   Recent Labs   Lab 01/19/19  1213 01/19/19  0553 01/18/19  2332  01/18/19  0530 01/18/19  0230 01/17/19  2148 01/17/19  2140   WBC  --  9.5  --   --   --   --   --  7.3   HGB 10.6* 10.5* 11.3*   < > 11.5* 11.3* 13.3 13.0*   MCV  --  92  --   --   --   --   --  93   PLT  --  141*  --   --   --   --   --  153   INR  --   --   --   --  1.78*  --   --  2.16*   NA  --  143  --   --  143  --  143 144   POTASSIUM  --  3.6  --   --  3.9  --  3.5 3.6   CHLORIDE  --  111*  --   --  110*  --   --  109   CO2  --  25  --   --  26  --   --  26   BUN  --  20  --   --  22  --   --  15   CR  --  1.00  --   --  0.93  --   --  0.97   ANIONGAP  --  7  --   --  7  --   --  9   JULIANNE  --  8.2*  --   --  8.0*  --   --  8.2*   GLC  --  109*  --   --  119*  --   --  122*   ALBUMIN  --   --   --   --   --  2.7*  --   --     PROTTOTAL  --   --   --   --   --  5.9*  --   --    BILITOTAL  --   --   --   --   --  0.4  --   --    ALKPHOS  --   --   --   --   --  55  --   --    ALT  --   --   --   --   --  19  --   --    AST  --   --   --   --   --  18  --   --    TROPI  --   --   --   --   --   --   --  <0.015    < > = values in this interval not displayed.       No results found for this or any previous visit (from the past 24 hour(s)).

## 2019-01-19 NOTE — PLAN OF CARE
Pt is A&O x 2, disoriented to time and situation. VSS on RA, denied pain. Lung sound diminished throughout, MONTALVO with activities. Law saturated, Na < < 2400 mg with good appetite, voiding frequently with adequate output. Hgb 11.6. Discharge pending in progress. Continue to monitor.

## 2019-01-19 NOTE — PLAN OF CARE
Discharge Planner PT   Patient plan for discharge: Not stated   Current status: Patient sitting in chair upon arrival of therapist, agreeable to working with PT. Sit<>stand from chair and commode overlay in bathroom with FWW and CGA. Patient ambulated 150 feet with use of FWW and CGA, slow gait with intermittent bilateral shuffling gait noted, no overt LOB. Patient noted to be more SOB following gait, but SpO2 noted as 92% on RA. Patient completed seated exercises x 15 reps bilaterally. Patient in chair at end of session with all needs in reach and chair alarm on.   Barriers to return to prior living situation: Current level of assist, decreased tolerance to activity, falls risk   Recommendations for discharge: TCU   Rationale for recommendations: Patient would benefit from continued skilled therapy to further improve strength, balance, and independence with mobility and ambulation to address functional limitations and decrease falls risk. However, per previous discussion with spouse during PT evaluation, spouse wanting to return home with home OT and PT. If spouse/patient refusing TCU would require 24 hour A and supervision with all mobility and benefit from continued skilled home therapy to address above functional limitations.        Entered by: Gretta Hernandez 01/19/2019 11:52 AM

## 2019-01-19 NOTE — PLAN OF CARE
Care Plan Summary Note: vss, disoriented to time and situation. Denied pain. +BS, lungs diminished, on RA. Ax1 to bathroom. /120 (Q 4 hours x48 hours). No signs of bleeding. Hgb at noon was 10.6, will recheck at 1800. Cpap at night. PT recommending TCU. Monitor.

## 2019-01-19 NOTE — PLAN OF CARE
Discharge Planner OT   Patient plan for discharge: TCU  Current status: Orders received, eval completed, treatment initiated. Pt in chair with O2 sats at 94% on RA. Pt becomes SOB with talking or small movements. Pt transferred to 2WW with CG-MIN A, ambulated with 2WW with close CGA pt appeared highly fatigued following a walk to the bathroom with standing G/H for apx 2 min. Pt reports some dizziness, O2 sats 92%, symptoms resolved with sitting.   Barriers to return to prior living situation: Fall risk, level of assist, reduced activity tolerance  Recommendations for discharge: TCU  Rationale for recommendations: Pt is below baseline for ADLs and functional mobility, if pt returns home would require 24 hour assist for ADLs and all mobility.       Entered by: Irasema Rehman 01/19/2019 5:09 PM

## 2019-01-19 NOTE — PLAN OF CARE
Pt is A&O x 2, disoriented to situation and time. VSS on RA, except hypertensive. Denied pain and SOB. Slept well with CPAP in placed. Up x 1 assist to bathroom x 2, voiding well. BG check Q 4 hours for 48 hours.  & 112.  Hgb trending down from 11.6 to 10.5. No sign of bleeding noted. No BM during this shift. Discharge pending in progress. Continue to monitor.

## 2019-01-20 ENCOUNTER — APPOINTMENT (OUTPATIENT)
Dept: OCCUPATIONAL THERAPY | Facility: CLINIC | Age: 79
DRG: 194 | End: 2019-01-20
Payer: COMMERCIAL

## 2019-01-20 LAB
ACE SERPL-CCNC: 23 U/L (ref 9–67)
ERYTHROCYTE [DISTWIDTH] IN BLOOD BY AUTOMATED COUNT: 14.5 % (ref 10–15)
GLUCOSE BLDC GLUCOMTR-MCNC: 111 MG/DL (ref 70–99)
GLUCOSE BLDC GLUCOMTR-MCNC: 122 MG/DL (ref 70–99)
HCT VFR BLD AUTO: 32.1 % (ref 40–53)
HGB BLD-MCNC: 10.4 G/DL (ref 13.3–17.7)
HGB BLD-MCNC: 10.4 G/DL (ref 13.3–17.7)
HGB BLD-MCNC: 10.6 G/DL (ref 13.3–17.7)
HGB BLD-MCNC: 9.8 G/DL (ref 13.3–17.7)
INR PPP: 1.12 (ref 0.86–1.14)
MCH RBC QN AUTO: 30.7 PG (ref 26.5–33)
MCHC RBC AUTO-ENTMCNC: 33 G/DL (ref 31.5–36.5)
MCV RBC AUTO: 93 FL (ref 78–100)
PLATELET # BLD AUTO: 154 10E9/L (ref 150–450)
RBC # BLD AUTO: 3.45 10E12/L (ref 4.4–5.9)
WBC # BLD AUTO: 8.6 10E9/L (ref 4–11)

## 2019-01-20 PROCEDURE — 85610 PROTHROMBIN TIME: CPT | Performed by: INTERNAL MEDICINE

## 2019-01-20 PROCEDURE — 40000133 ZZH STATISTIC OT WARD VISIT

## 2019-01-20 PROCEDURE — 97535 SELF CARE MNGMENT TRAINING: CPT | Mod: GO

## 2019-01-20 PROCEDURE — 36415 COLL VENOUS BLD VENIPUNCTURE: CPT | Performed by: INTERNAL MEDICINE

## 2019-01-20 PROCEDURE — 94640 AIRWAY INHALATION TREATMENT: CPT

## 2019-01-20 PROCEDURE — 85027 COMPLETE CBC AUTOMATED: CPT | Performed by: INTERNAL MEDICINE

## 2019-01-20 PROCEDURE — 85018 HEMOGLOBIN: CPT | Performed by: INTERNAL MEDICINE

## 2019-01-20 PROCEDURE — 99232 SBSQ HOSP IP/OBS MODERATE 35: CPT | Performed by: INTERNAL MEDICINE

## 2019-01-20 PROCEDURE — 97530 THERAPEUTIC ACTIVITIES: CPT | Mod: GO

## 2019-01-20 PROCEDURE — 25000132 ZZH RX MED GY IP 250 OP 250 PS 637: Performed by: HOSPITALIST

## 2019-01-20 PROCEDURE — 25000132 ZZH RX MED GY IP 250 OP 250 PS 637: Performed by: INTERNAL MEDICINE

## 2019-01-20 PROCEDURE — 25000125 ZZHC RX 250: Performed by: INTERNAL MEDICINE

## 2019-01-20 PROCEDURE — 00000146 ZZHCL STATISTIC GLUCOSE BY METER IP

## 2019-01-20 PROCEDURE — 94660 CPAP INITIATION&MGMT: CPT

## 2019-01-20 PROCEDURE — 12000000 ZZH R&B MED SURG/OB

## 2019-01-20 PROCEDURE — 40000275 ZZH STATISTIC RCP TIME EA 10 MIN

## 2019-01-20 PROCEDURE — 25000128 H RX IP 250 OP 636: Performed by: INTERNAL MEDICINE

## 2019-01-20 PROCEDURE — 94640 AIRWAY INHALATION TREATMENT: CPT | Mod: 76

## 2019-01-20 RX ORDER — FUROSEMIDE 40 MG
40 TABLET ORAL DAILY
Status: DISCONTINUED | OUTPATIENT
Start: 2019-01-21 | End: 2019-01-28 | Stop reason: HOSPADM

## 2019-01-20 RX ADMIN — ALBUTEROL SULFATE 2.5 MG: 2.5 SOLUTION RESPIRATORY (INHALATION) at 20:05

## 2019-01-20 RX ADMIN — ALBUTEROL SULFATE 2.5 MG: 2.5 SOLUTION RESPIRATORY (INHALATION) at 22:51

## 2019-01-20 RX ADMIN — AZITHROMYCIN 250 MG: 250 TABLET, FILM COATED ORAL at 08:43

## 2019-01-20 RX ADMIN — GUAIFENESIN 600 MG: 600 TABLET, EXTENDED RELEASE ORAL at 20:11

## 2019-01-20 RX ADMIN — AMLODIPINE BESYLATE 2.5 MG: 2.5 TABLET ORAL at 08:43

## 2019-01-20 RX ADMIN — GUAIFENESIN 600 MG: 600 TABLET, EXTENDED RELEASE ORAL at 08:43

## 2019-01-20 RX ADMIN — BRINZOLAMIDE 1 DROP: 10 SUSPENSION/ DROPS OPHTHALMIC at 08:43

## 2019-01-20 RX ADMIN — CEFTRIAXONE SODIUM 1 G: 1 INJECTION, POWDER, FOR SOLUTION INTRAMUSCULAR; INTRAVENOUS at 09:54

## 2019-01-20 RX ADMIN — MIRABEGRON 50 MG: 50 TABLET, FILM COATED, EXTENDED RELEASE ORAL at 08:42

## 2019-01-20 RX ADMIN — ALBUTEROL SULFATE 2.5 MG: 2.5 SOLUTION RESPIRATORY (INHALATION) at 16:10

## 2019-01-20 RX ADMIN — BRIMONIDINE TARTRATE, TIMOLOL MALEATE 1 DROP: 2; 5 SOLUTION/ DROPS OPHTHALMIC at 08:43

## 2019-01-20 RX ADMIN — DOCUSATE SODIUM 100 MG: 100 CAPSULE, LIQUID FILLED ORAL at 20:11

## 2019-01-20 RX ADMIN — LATANOPROST 1 DROP: 50 SOLUTION OPHTHALMIC at 21:53

## 2019-01-20 RX ADMIN — PANTOPRAZOLE SODIUM 40 MG: 40 TABLET, DELAYED RELEASE ORAL at 07:07

## 2019-01-20 RX ADMIN — ALBUTEROL SULFATE 2.5 MG: 2.5 SOLUTION RESPIRATORY (INHALATION) at 08:14

## 2019-01-20 RX ADMIN — UMECLIDINIUM 1 PUFF: 62.5 AEROSOL, POWDER ORAL at 08:43

## 2019-01-20 ASSESSMENT — ACTIVITIES OF DAILY LIVING (ADL)
ADLS_ACUITY_SCORE: 27

## 2019-01-20 NOTE — PLAN OF CARE
Pt is A&O,sometimes confused, VSS, denies any pain. Used CPAP overnight.Up with 1 assist, Still on trending HB every 6 hrs,so far has been 11.5, 10.5, 10.6, 10.4 and the last one was 9.8.BS checks every .Tolerating low fat NA diet, I.V I s/l.

## 2019-01-20 NOTE — PLAN OF CARE
Discharge Planner OT   Patient plan for discharge: None stated.   Current status: CG A for amb in room for ADL with walker. SB A toilet tx.   Barriers to return to prior living situation: Level of A with ADL and mobility.  Recommendations for discharge: TCU.  Rationale for recommendations: Patient would benefit from daily therapy to A in returning patient to his baseline prior to returning home.       Entered by: Esha Dozier 01/20/2019 4:01 PM

## 2019-01-20 NOTE — PLAN OF CARE
Pt is A&O x 2, disoriented to situation and time. VSS on RA. Denied pain and SOB.up x 1 assist to bathroom frequenly, voiding well. Cardiac diet and tolerating well.  and 114.  Hgb trending down from 10.6 to 10.4. No sign of bleeding noted. Lung sound diminished throughout, MONTALVO, receiving PRN neb treatment. CPAP applied at HS.  SW following, Anticipated discharge to TCU on Monday. Continue to monitor.

## 2019-01-20 NOTE — CONSULTS
Care Transition Initial Assessment - SW     Met with: patient, wife and daughter  Active Problems:    Hemoptysis       DATA  Lives With: spouse   Living Arrangements: independent living facility ([facility]) without any services.  Patient's insurance is a [employer] Advantage which requires pre-authorization for admission into a TCU.    Identified issues/concerns regarding health management: Patient admitted on 1/18 with Hemoptysis and per Dr Meza is stable for discharge today.    Patient evaluated by PT who recommends TCU.  Wife and daughter support TCU.    Barrier to discharge today due to his insurance requiring pre-authorization.  Met with wife, patient and their daughter Juliane.  Wife explains they moved to [place] from their home in [place] back in Oct. 2018.  Daughter lives locally.  At baseline, patient uses a rolling walker.  He wears a CPAP at night which wife helps set up at night.    Patient has only had one brief experience in a TCU in [place] which wife and daughter describe as horrendous.  Offered a TCU list which daughter took. Also gave them the Medicare web site.  Based on reviewing the list, their first preference is [facility] or [facility] in a private room if available.  They are aware of the private room fee.   Discussed Medicare SNF benefits anticipating [employer] covers the first 20 days at full benefit as long as patient requires skilled services.  Explained pre-authorization is needed with their plan.    ASSESSMENT  Cognitive Status:  Per nursing orientation compromised, flucuates.  Patient showed eye contact and seemed engaged but did not ask any questions.      PLAN Referrals made to 3 TCU's, will wait to hear back from the TCU and await the TCU to receive insurance authorization.  Financial costs for the patient includes cost for private room and possibly transportation  Patient given options and choices for discharge yes  Patient/family is  agreeable to the plan?  Family strongly feels patient will benefit from TCU level of therapy  Patient Goals and Preferences: private room at a TCU  Patient anticipates discharging to: TCU

## 2019-01-20 NOTE — PLAN OF CARE
Care Plan Summary Note: vss, disoriented to time. Denied pain. +BS, lungs clear, on RA. Ax1 with GB/WK to bathroom. Showered, ambulated in cho twice with staff, tolerated well. Hgb 10.4 at noon, stable. Frequent urination, denied burning/pain. Wife reported it was an issue prior to admission. Family at bedside, spoke with MD. Possible discharge pending placement.

## 2019-01-21 ENCOUNTER — APPOINTMENT (OUTPATIENT)
Dept: OCCUPATIONAL THERAPY | Facility: CLINIC | Age: 79
DRG: 194 | End: 2019-01-21
Payer: COMMERCIAL

## 2019-01-21 ENCOUNTER — APPOINTMENT (OUTPATIENT)
Dept: PHYSICAL THERAPY | Facility: CLINIC | Age: 79
DRG: 194 | End: 2019-01-21
Payer: COMMERCIAL

## 2019-01-21 LAB
HGB BLD-MCNC: 10.1 G/DL (ref 13.3–17.7)
HGB BLD-MCNC: 10.2 G/DL (ref 13.3–17.7)
HGB BLD-MCNC: 10.3 G/DL (ref 13.3–17.7)

## 2019-01-21 PROCEDURE — 94640 AIRWAY INHALATION TREATMENT: CPT | Mod: 76

## 2019-01-21 PROCEDURE — 85018 HEMOGLOBIN: CPT | Performed by: INTERNAL MEDICINE

## 2019-01-21 PROCEDURE — 97110 THERAPEUTIC EXERCISES: CPT | Mod: GP

## 2019-01-21 PROCEDURE — 40000275 ZZH STATISTIC RCP TIME EA 10 MIN

## 2019-01-21 PROCEDURE — 25000132 ZZH RX MED GY IP 250 OP 250 PS 637: Performed by: HOSPITALIST

## 2019-01-21 PROCEDURE — 94660 CPAP INITIATION&MGMT: CPT

## 2019-01-21 PROCEDURE — 25000125 ZZHC RX 250: Performed by: INTERNAL MEDICINE

## 2019-01-21 PROCEDURE — 97116 GAIT TRAINING THERAPY: CPT | Mod: GP

## 2019-01-21 PROCEDURE — 25000132 ZZH RX MED GY IP 250 OP 250 PS 637: Performed by: INTERNAL MEDICINE

## 2019-01-21 PROCEDURE — 40000193 ZZH STATISTIC PT WARD VISIT

## 2019-01-21 PROCEDURE — 36415 COLL VENOUS BLD VENIPUNCTURE: CPT | Performed by: INTERNAL MEDICINE

## 2019-01-21 PROCEDURE — 40000133 ZZH STATISTIC OT WARD VISIT

## 2019-01-21 PROCEDURE — 12000000 ZZH R&B MED SURG/OB

## 2019-01-21 PROCEDURE — 94640 AIRWAY INHALATION TREATMENT: CPT

## 2019-01-21 PROCEDURE — 99232 SBSQ HOSP IP/OBS MODERATE 35: CPT | Performed by: INTERNAL MEDICINE

## 2019-01-21 PROCEDURE — 97535 SELF CARE MNGMENT TRAINING: CPT | Mod: GO

## 2019-01-21 RX ORDER — BENZONATATE 200 MG/1
200 CAPSULE ORAL 3 TIMES DAILY PRN
DISCHARGE
Start: 2019-01-21 | End: 2019-01-28

## 2019-01-21 RX ORDER — GUAIFENESIN 600 MG/1
600 TABLET, EXTENDED RELEASE ORAL 2 TIMES DAILY
Qty: 20 TABLET | Refills: 0 | DISCHARGE
Start: 2019-01-21 | End: 2019-01-28

## 2019-01-21 RX ORDER — FUROSEMIDE 40 MG
40 TABLET ORAL DAILY
Qty: 30 TABLET | Refills: 3 | DISCHARGE
Start: 2019-01-21 | End: 2019-09-27

## 2019-01-21 RX ORDER — ALBUTEROL SULFATE 90 UG/1
2 AEROSOL, METERED RESPIRATORY (INHALATION) EVERY 4 HOURS PRN
DISCHARGE
Start: 2019-01-21 | End: 2019-01-28

## 2019-01-21 RX ADMIN — UMECLIDINIUM 1 PUFF: 62.5 AEROSOL, POWDER ORAL at 09:12

## 2019-01-21 RX ADMIN — AMOXICILLIN AND CLAVULANATE POTASSIUM 1 TABLET: 875; 125 TABLET, FILM COATED ORAL at 20:17

## 2019-01-21 RX ADMIN — LATANOPROST 1 DROP: 50 SOLUTION OPHTHALMIC at 21:01

## 2019-01-21 RX ADMIN — ALBUTEROL SULFATE 2.5 MG: 2.5 SOLUTION RESPIRATORY (INHALATION) at 19:20

## 2019-01-21 RX ADMIN — PANTOPRAZOLE SODIUM 40 MG: 40 TABLET, DELAYED RELEASE ORAL at 06:24

## 2019-01-21 RX ADMIN — ALBUTEROL SULFATE 2.5 MG: 2.5 SOLUTION RESPIRATORY (INHALATION) at 07:50

## 2019-01-21 RX ADMIN — AMOXICILLIN AND CLAVULANATE POTASSIUM 1 TABLET: 875; 125 TABLET, FILM COATED ORAL at 13:22

## 2019-01-21 RX ADMIN — GUAIFENESIN 600 MG: 600 TABLET, EXTENDED RELEASE ORAL at 09:07

## 2019-01-21 RX ADMIN — ALBUTEROL SULFATE 2.5 MG: 2.5 SOLUTION RESPIRATORY (INHALATION) at 23:37

## 2019-01-21 RX ADMIN — BRINZOLAMIDE 1 DROP: 10 SUSPENSION/ DROPS OPHTHALMIC at 09:10

## 2019-01-21 RX ADMIN — DOCUSATE SODIUM 100 MG: 100 CAPSULE, LIQUID FILLED ORAL at 20:17

## 2019-01-21 RX ADMIN — AZITHROMYCIN 250 MG: 250 TABLET, FILM COATED ORAL at 09:10

## 2019-01-21 RX ADMIN — ALBUTEROL SULFATE 2.5 MG: 2.5 SOLUTION RESPIRATORY (INHALATION) at 15:25

## 2019-01-21 RX ADMIN — GUAIFENESIN 600 MG: 600 TABLET, EXTENDED RELEASE ORAL at 20:17

## 2019-01-21 RX ADMIN — MIRABEGRON 50 MG: 50 TABLET, FILM COATED, EXTENDED RELEASE ORAL at 09:07

## 2019-01-21 RX ADMIN — AMLODIPINE BESYLATE 2.5 MG: 2.5 TABLET ORAL at 09:07

## 2019-01-21 RX ADMIN — FUROSEMIDE 40 MG: 40 TABLET ORAL at 09:07

## 2019-01-21 RX ADMIN — BRIMONIDINE TARTRATE, TIMOLOL MALEATE 1 DROP: 2; 5 SOLUTION/ DROPS OPHTHALMIC at 09:19

## 2019-01-21 ASSESSMENT — ACTIVITIES OF DAILY LIVING (ADL)
ADLS_ACUITY_SCORE: 27
ADLS_ACUITY_SCORE: 28
ADLS_ACUITY_SCORE: 27
ADLS_ACUITY_SCORE: 27

## 2019-01-21 NOTE — PLAN OF CARE
Pt is A&O x 2, disoriented to situation and time. VSS on RA. Denied pain and SOB. Up x 1 assist to bathroom frequenly, voiding well. Cardiac diet and tolerating well. Lung sound diminished throughout, MONTALVO, receiving PRN neb treatment.CPAP applied at HS.  SW following, Discharge pending in prior authorization for insurance.  ntinue to monitor.

## 2019-01-21 NOTE — PROGRESS NOTES
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     Hemoptysis, unclear etiology, question pneumonia or lung mass with concomitant anticoagulation.  LLL consolidation/pneumonia vs mass.  Initially had an episode of epistaxis evening 1/17 that was mild and stopped on its own. This was followed by coughing up blood x 3 hours. Presented to ED 1/17 and continued to cough up blood. No evidence of continued epistaxis when evaluated by ED physician.  On initial evaluation 1/17, hgb 13.0, WBC normal, plts normal, INR 2.16, procalcitonin < 0.05. CT chest 1/17 showed LLL consolidation, PNA vs mass with some scattered patchy infiltrates with some indeterminate GEORGE nodules. Pt given vit K early am 1/18.  - Started azithromycin + ceftriaxone for possible pneumonia but seems more likely to represent aspirated blood. Will treat for 5 days. Transitioned to Augmentin in anticipation non discharge.    - ACE,calcium pending.    - Continue to hold warfarin for now. May be able to resume in one week.   - No recurrent hemoptysis since admit.   - Pulmonology consulted, appreciate help.  - Would require intubation and bronchoscopy for any recurrent major bleeding.  Would possibly need IR coiling.   - Follow up imaging in 3-4 weeks recommended.    - Nebs.      Anemia, mild, suspect acute blood loss component from above.  * Issues with hemoptysis as above.  - Management of hemoptysis as above.  - Monitor CBC.  - Hgb stable at 10.4.   - Consider prbc transfusion if hgb </= 7.0 or if significant bleeding with hemodynamic instability or if symptomatic.     Skin changes right forearm, suspect secondary to blood pressure cuff placement in the setting of livedo reticularis.  Pt with petechial type skin  changes that developed on right forearm with blood pressure cuff placement.   - Try cuff on left upper extremity; if unable, then monitor closely for signs of vascular compromise - no signs so far.     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  * Pt with h/o Sneddon syndrome, which is a rare genetic disorder characterized by widespread livedo reticularis in association with stroke. Diagnosed at [facility]. Maintained on warfarin. Unclear if pt has h/o antiphospholipid antibodies (commonly associated with Sneddon syndrome).  * Warfarin held and given vit K on admit as above.  - Continue to hold warfarin for now.     Left upper lobe lung nodules.  Noted by CT as above 1/17. Hx smoking.  - Follow-up CT short term for above LLL consolidation; otherwise, would recommend repeat CT in 6-12mo, then 18-24 mo if no change for the GEORGE nodules.  - Follow-up with PCP.     Hypertension (benign essential).  Possible h/o CHF.  [PTA: amlodipine 2.5 mg daily, furosemide 40 mg daily.]  * Pt notes h/o heart murmur and issues with his left ventricle, details unclear.  - Continue amlodipine.  - Ok to resume PTA Lasix 1/21  - Continue PRN IV hydralazine.  - Monitor i/o's, daily wts.  - Obtain records from [place].     COPD.  Chronic, stable.   - Continue Incruse Ellipta, PRN inh/nebs.  - Trelegy recommended on discharge.      KORY.  - Continue CPAP at night     Diet: Low sodium  Prophylaxis: PCD's, ambulation.  Torres Catheter: not present  Code Status: Full Code       Disposition: Pending placement at TCU.         Kev Meza         Interval History   No acute overnight events. No recurrent hemoptysis since admit. No new complaints.     -Data reviewed today: I reviewed all new labs and imaging results over the last 24 hours. I personally reviewed no images or EKG's today.    Physical Exam   Temp: 97.7  F (36.5  C) Temp src: Oral BP: 150/82 Pulse: 88 Heart Rate: 93 Resp: 20 SpO2: 94 % O2 Device: None (Room  air)    Vitals:    01/17/19 2142 01/19/19 0500   Weight: 104.3 kg (230 lb) 108.7 kg (239 lb 10.2 oz)     Vital Signs with Ranges  Temp:  [97.7  F (36.5  C)-98.3  F (36.8  C)] 97.7  F (36.5  C)  Pulse:  [88-91] 88  Heart Rate:  [93] 93  Resp:  [16-22] 20  BP: (139-159)/(72-86) 150/82  FiO2 (%):  [40 %] 40 %  SpO2:  [94 %-95 %] 94 %  I/O last 3 completed shifts:  In: 580 [P.O.:480; I.V.:100]  Out: -     Gen: Patient in no acute distress.  Appears comfortable.  Heart:  S1S2+, regular rate and rhythm, No murmurs.  Lungs:  Clear to auscultation at apices, no wheezing, no rales. Decreased at bases.    Abdomen:  Soft, non tender, non distended, bowel sounds positive.  Extremities:  B/l LE edema.    Medications       albuterol  3 mL Nebulization Q4H While awake     amLODIPine  2.5 mg Oral QAM     amoxicillin-clavulanate  1 tablet Oral Q12H MARIE     brimonidine-timolol  1 drop Both Eyes QAM     brinzolamide  1 drop Both Eyes QAM     cefTRIAXone  1 g Intravenous Q24H     docusate sodium  100 mg Oral QPM     furosemide  40 mg Oral Daily     guaiFENesin  600 mg Oral BID     latanoprost  1 drop Both Eyes At Bedtime     mirabegron  50 mg Oral QAM     pantoprazole  40 mg Oral QAM AC     umeclidinium  1 puff Inhalation QAM       Data   Recent Labs   Lab 01/21/19  1242 01/21/19  0621 01/21/19  0001  01/20/19  0603  01/19/19  0553  01/18/19  0530 01/18/19  0230 01/17/19  2148 01/17/19  2140   WBC  --   --   --   --  8.6  --  9.5  --   --   --   --  7.3   HGB 10.1* 10.3* 10.2*   < > 10.6*   < > 10.5*   < > 11.5* 11.3* 13.3 13.0*   MCV  --   --   --   --  93  --  92  --   --   --   --  93   PLT  --   --   --   --  154  --  141*  --   --   --   --  153   INR  --   --   --   --  1.12  --   --   --  1.78*  --   --  2.16*   NA  --   --   --   --   --   --  143  --  143  --  143 144   POTASSIUM  --   --   --   --   --   --  3.6  --  3.9  --  3.5 3.6   CHLORIDE  --   --   --   --   --   --  111*  --  110*  --   --  109   CO2  --   --   --    --   --   --  25  --  26  --   --  26   BUN  --   --   --   --   --   --  20  --  22  --   --  15   CR  --   --   --   --   --   --  1.00  --  0.93  --   --  0.97   ANIONGAP  --   --   --   --   --   --  7  --  7  --   --  9   JULIANNE  --   --   --   --   --   --  8.2*  --  8.0*  --   --  8.2*   GLC  --   --   --   --   --   --  109*  --  119*  --   --  122*   ALBUMIN  --   --   --   --   --   --   --   --   --  2.7*  --   --    PROTTOTAL  --   --   --   --   --   --   --   --   --  5.9*  --   --    BILITOTAL  --   --   --   --   --   --   --   --   --  0.4  --   --    ALKPHOS  --   --   --   --   --   --   --   --   --  55  --   --    ALT  --   --   --   --   --   --   --   --   --  19  --   --    AST  --   --   --   --   --   --   --   --   --  18  --   --    TROPI  --   --   --   --   --   --   --   --   --   --   --  <0.015    < > = values in this interval not displayed.       No results found for this or any previous visit (from the past 24 hour(s)).

## 2019-01-21 NOTE — PLAN OF CARE
A&O x3, d/o time. VSS on CPAP, RA when awake. A1 with walker/gb to BR. MONTALVO. Denied pain. LS clear. Midnight Hgb 10.2, previous 10.4. OT/PT follow. SW follow TCU placement.

## 2019-01-21 NOTE — PROGRESS NOTES
Patient on CPAP 7 40% throughout the night. Skin intact, alarms set to 10. Neb tx given as ordered. Will continue to follow.    Gil RAMIREZ

## 2019-01-21 NOTE — PLAN OF CARE
Discharge Planner OT   Patient plan for discharge: TCU today   Current status: CGA for ambulation into bathroom with FWW. SBA for toilet transfer with grab bars x 2. SBA for grooming in stance at sink x 5 minutes. No LOB noted but dec activity tolerance throughout.    Barriers to return to prior living situation: dec activity tolerance, current level of A, not at baseline   Recommendations for discharge: TCU   Rationale for recommendations: Pt would benefit from continued skilled OT services to improve independence and safety with ADL's and functional transfers as pt is not at baseline.           Entered by: Irasema Benitez 01/21/2019 9:42 AM

## 2019-01-21 NOTE — PROGRESS NOTES
SW:  Update regarding discharge planning.  [facility] - no vacancy as of today    [facility] - no vacancy for today or tomorrow    [facility]- admissions on hold due to Norovirus    [facility]- have not heard back yet.    Updated wife.  Printed out the Medicare web site for TCU's within 25 mile radius.  After reviewing the list she is requesting referrals be sent to [facility], knowing they only have double rooms, [facility], [facility] [place], [facility] in [place].  Will hold off making these referrals until we hear from [facility].      Update Following the above conversation, writer received a call from [facility].  They can clinically accept patient into a private room however need to obtain insurance authorization first.   Wilma at [facility] also said that they have had some patient's with GI symptoms and by tomorrow should know if these are isolated patient's or if there is a break out of GI bug.  Tomorrow morning Wilma will call  with an update.  Wife updated and she spoke with her daughter.  Their preference continues to be [facility] based on location however they will accept [facility] if there is no GI break out.  Wife understands only one facility can begin the insurance process and if [facility] can accept tomorrow and [facility] doesn't have a bed, we will ask [facility] to begin the insurance authorization process.

## 2019-01-21 NOTE — PLAN OF CARE
Discharge Planner PT   Patient plan for discharge: rehab  Current status: Pt requires SBA for transfers with a FWW, CGA for gait x 250' with a FWW. Gait distance continues to be limited by LE weakness and fatigue in UEs with heavy use of the walker for support.  Barriers to return to prior living situation: level of assist required, weakness, fatigue, fall risk  Recommendations for discharge: TCU  Rationale for recommendations: Pt would benefit from PT at TCU to progress strength, balance and mobility so pt can return home safely.        Entered by: Camille Soto 01/21/2019 3:46 PM

## 2019-01-21 NOTE — PLAN OF CARE
Patient is up with SBA/walker/GB; did have some MONTALVO with ambulation in cho.  Lungs clear; on room air; sat after walking cho was 93%.  Hgb stable; no hemoptysis.  Good intake.  Skin intact.  Denies pain.  Continent; up to bathroom frequently.  Discharge pending insurance approval.

## 2019-01-22 ENCOUNTER — APPOINTMENT (OUTPATIENT)
Dept: OCCUPATIONAL THERAPY | Facility: CLINIC | Age: 79
DRG: 194 | End: 2019-01-22
Payer: COMMERCIAL

## 2019-01-22 ENCOUNTER — APPOINTMENT (OUTPATIENT)
Dept: PHYSICAL THERAPY | Facility: CLINIC | Age: 79
DRG: 194 | End: 2019-01-22
Payer: COMMERCIAL

## 2019-01-22 PROCEDURE — 40000133 ZZH STATISTIC OT WARD VISIT

## 2019-01-22 PROCEDURE — 25000132 ZZH RX MED GY IP 250 OP 250 PS 637: Performed by: INTERNAL MEDICINE

## 2019-01-22 PROCEDURE — 99232 SBSQ HOSP IP/OBS MODERATE 35: CPT | Performed by: INTERNAL MEDICINE

## 2019-01-22 PROCEDURE — 94640 AIRWAY INHALATION TREATMENT: CPT

## 2019-01-22 PROCEDURE — 25000132 ZZH RX MED GY IP 250 OP 250 PS 637: Performed by: HOSPITALIST

## 2019-01-22 PROCEDURE — 94640 AIRWAY INHALATION TREATMENT: CPT | Mod: 76

## 2019-01-22 PROCEDURE — 97535 SELF CARE MNGMENT TRAINING: CPT | Mod: GO

## 2019-01-22 PROCEDURE — 97116 GAIT TRAINING THERAPY: CPT | Mod: GP | Performed by: PHYSICAL THERAPY ASSISTANT

## 2019-01-22 PROCEDURE — 94660 CPAP INITIATION&MGMT: CPT

## 2019-01-22 PROCEDURE — 40000275 ZZH STATISTIC RCP TIME EA 10 MIN

## 2019-01-22 PROCEDURE — 97110 THERAPEUTIC EXERCISES: CPT | Mod: GP | Performed by: PHYSICAL THERAPY ASSISTANT

## 2019-01-22 PROCEDURE — 40000193 ZZH STATISTIC PT WARD VISIT: Performed by: PHYSICAL THERAPY ASSISTANT

## 2019-01-22 PROCEDURE — 25000125 ZZHC RX 250: Performed by: INTERNAL MEDICINE

## 2019-01-22 PROCEDURE — 12000000 ZZH R&B MED SURG/OB

## 2019-01-22 RX ORDER — AMLODIPINE BESYLATE 2.5 MG/1
5 TABLET ORAL EVERY MORNING
Status: DISCONTINUED | OUTPATIENT
Start: 2019-01-23 | End: 2019-01-28 | Stop reason: HOSPADM

## 2019-01-22 RX ADMIN — FUROSEMIDE 40 MG: 40 TABLET ORAL at 08:17

## 2019-01-22 RX ADMIN — DOCUSATE SODIUM 100 MG: 100 CAPSULE, LIQUID FILLED ORAL at 20:21

## 2019-01-22 RX ADMIN — LATANOPROST 1 DROP: 50 SOLUTION OPHTHALMIC at 22:13

## 2019-01-22 RX ADMIN — GUAIFENESIN 600 MG: 600 TABLET, EXTENDED RELEASE ORAL at 20:21

## 2019-01-22 RX ADMIN — AMLODIPINE BESYLATE 2.5 MG: 2.5 TABLET ORAL at 08:17

## 2019-01-22 RX ADMIN — SENNOSIDES AND DOCUSATE SODIUM 1 TABLET: 8.6; 5 TABLET ORAL at 08:23

## 2019-01-22 RX ADMIN — MIRABEGRON 50 MG: 50 TABLET, FILM COATED, EXTENDED RELEASE ORAL at 08:16

## 2019-01-22 RX ADMIN — AMOXICILLIN AND CLAVULANATE POTASSIUM 1 TABLET: 875; 125 TABLET, FILM COATED ORAL at 20:21

## 2019-01-22 RX ADMIN — BRINZOLAMIDE 1 DROP: 10 SUSPENSION/ DROPS OPHTHALMIC at 08:15

## 2019-01-22 RX ADMIN — UMECLIDINIUM 1 PUFF: 62.5 AEROSOL, POWDER ORAL at 08:18

## 2019-01-22 RX ADMIN — ALBUTEROL SULFATE 2.5 MG: 2.5 SOLUTION RESPIRATORY (INHALATION) at 19:23

## 2019-01-22 RX ADMIN — ALBUTEROL SULFATE 2.5 MG: 2.5 SOLUTION RESPIRATORY (INHALATION) at 15:54

## 2019-01-22 RX ADMIN — PANTOPRAZOLE SODIUM 40 MG: 40 TABLET, DELAYED RELEASE ORAL at 06:30

## 2019-01-22 RX ADMIN — BRIMONIDINE TARTRATE, TIMOLOL MALEATE 1 DROP: 2; 5 SOLUTION/ DROPS OPHTHALMIC at 08:15

## 2019-01-22 RX ADMIN — GUAIFENESIN 600 MG: 600 TABLET, EXTENDED RELEASE ORAL at 08:17

## 2019-01-22 RX ADMIN — AMOXICILLIN AND CLAVULANATE POTASSIUM 1 TABLET: 875; 125 TABLET, FILM COATED ORAL at 08:16

## 2019-01-22 RX ADMIN — ALBUTEROL SULFATE 2.5 MG: 2.5 SOLUTION RESPIRATORY (INHALATION) at 11:30

## 2019-01-22 ASSESSMENT — ACTIVITIES OF DAILY LIVING (ADL)
ADLS_ACUITY_SCORE: 27
ADLS_ACUITY_SCORE: 28
ADLS_ACUITY_SCORE: 27
ADLS_ACUITY_SCORE: 28
ADLS_ACUITY_SCORE: 28
ADLS_ACUITY_SCORE: 26

## 2019-01-22 NOTE — PLAN OF CARE
A&Ox4. VSS on RA. CPAP on throughout night. Up with SBA walker and GB. Denies pain. MONTALVO at times. Hgb stable, no hemoptysis. Plan to discharge pending insurance approval. Will continue to monitor.

## 2019-01-22 NOTE — PROGRESS NOTES
"PULMONARY PROGRESS NOTE          Interval History:      Feeling better, on room air, no further hemoptysis, dispo planning ongoing.         Physical Exam:      Blood pressure 133/59, pulse 88, temperature 97.6  F (36.4  C), temperature source Oral, resp. rate 18, height 1.702 m (5' 7\"), weight 108.7 kg (239 lb 10.2 oz), SpO2 99 %.  Vitals:    01/17/19 2142 01/19/19 0500   Weight: 104.3 kg (230 lb) 108.7 kg (239 lb 10.2 oz)     Vital Signs with Ranges  Temp:  [97.6  F (36.4  C)-97.9  F (36.6  C)] 97.6  F (36.4  C)  Pulse:  [88] 88  Heart Rate:  [77-93] 77  Resp:  [18-22] 18  BP: (133-159)/(59-86) 133/59  FiO2 (%):  [40 %] 40 %  SpO2:  [93 %-99 %] 99 %  I/O's Last 24 hours  I/O last 3 completed shifts:  In: 1490 [P.O.:1490]  Out: -     Lungs: LCAB, dec, no wheezing   Cardiovascular: regular   Other: abd soft               Medications:          albuterol  3 mL Nebulization Q4H While awake     amLODIPine  2.5 mg Oral QAM     amoxicillin-clavulanate  1 tablet Oral Q12H MARIE     brimonidine-timolol  1 drop Both Eyes QAM     brinzolamide  1 drop Both Eyes QAM     docusate sodium  100 mg Oral QPM     furosemide  40 mg Oral Daily     guaiFENesin  600 mg Oral BID     latanoprost  1 drop Both Eyes At Bedtime     mirabegron  50 mg Oral QAM     pantoprazole  40 mg Oral QAM AC     umeclidinium  1 puff Inhalation QAM            Data:      All new lab and imaging data was reviewed.   Recent Labs   Lab Test 01/21/19  1242 01/21/19  0621 01/21/19  0001  01/20/19  0603  01/19/19  0553  01/18/19  0530  01/17/19  2140   WBC  --   --   --   --  8.6  --  9.5  --   --   --  7.3   HGB 10.1* 10.3* 10.2*   < > 10.6*   < > 10.5*   < > 11.5*   < > 13.0*   MCV  --   --   --   --  93  --  92  --   --   --  93   PLT  --   --   --   --  154  --  141*  --   --   --  153   INR  --   --   --   --  1.12  --   --   --  1.78*  --  2.16*    < > = values in this interval not displayed.      Recent Labs   Lab Test 01/19/19  0553 01/18/19  0530 01/17/19  2148 " 01/17/19  2140    143 143 144   POTASSIUM 3.6 3.9 3.5 3.6   CHLORIDE 111* 110*  --  109   CO2 25 26  --  26   BUN 20 22  --  15   CR 1.00 0.93  --  0.97   ANIONGAP 7 7  --  9   JULIANNE 8.2* 8.0*  --  8.2*   * 119*  --  122*        I reviewed the patient's new clinical lab test results.     I reviewed the patient's new imaging test results.       Active Problems:    Hemoptysis           Assessment and Plan:      78M, recent sig hemoptysis, no resolved with abx and adjustment in anticoagulation. Suspect that CT chest findings are infiltrate rather than mass but needs outpt pulm followup and CT chest in 4-6 weeks. Will also get PFTs at that time. No role for bronch currently.     -continue bronchodilators (trelegy on discharge)  -complete abx  -outpt pulm followup with Dr Durbin in [place] in 4-6 weeks, needs noncon CT chest few days prior  -on room air  -likely able to discharge soon  -please call if needed. Will sign off.    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

## 2019-01-22 NOTE — PROGRESS NOTES
MJ Note:    D/I:  SW placed call to [facility] who would be able to accept patient and would be willing to start patient's authorization for insurance if patient and spouse are in agreement.  SW spoke with patient and spouse and spouse felt that [facility] was too far for her to drive and would prefer patient would go to [facility], [facility] or [facility] in [place].  SW sent referrals and called facilities to see if they accept patient's insurance.  SW left  with admissions of facilities to follow up on bed availability and insurance coverage.      Addendum: Spouse and patient requested that an additional referral be sent to [facility] in [place].  SW discussed choosing a facility as we would need to begin insurance auth for patient.  Spouse stated that she would prefer [facility] in [place] as first choice and [facility] in [place] as her second choice.  SW sent referral to [facility] via [facility].    P: SW will continue to follow for discharge planning.    Blaise Reardon, MSW, LGSW

## 2019-01-22 NOTE — CONSULTS
MJ Consult Note:    D/I:  MJ Initial Consult Completed on 1/20/19.  See MJ Note.     P: SW will continue to assist for discharge.    AMBER Carmen, LGSW

## 2019-01-22 NOTE — PLAN OF CARE
Discharge Planner PT   Patient plan for discharge: rehab  Current status: Pt performed sit to/from stand transfers with CGA and cues for safe hand placement.  Gait training x 280 ft using wheeled walker and CGA.  Slow pace.  Less reliance on UE support on walker today.  Less UE fatigue noted.  Pt participated well with standing LE exs.  Mild SOB noted with activity and rest breaks needed.  O2 sats were 94% with activity on RA.  Barriers to return to prior living situation: level of assist required, weakness, fatigue, fall risk  Recommendations for discharge: TCU per plan established by the PT.   Rationale for recommendations: Pt would benefit from PT at TCU to progress strength, balance and mobility so pt can return home safely.          Entered by: Esha Iniguez 01/22/2019 10:43 AM

## 2019-01-22 NOTE — PLAN OF CARE
Discharge Planner OT   Patient plan for discharge: TCU   Current status: Pt required min A for LE dressing, difficulty getting correct leg in pant leg. Pt reports spouse A with this and takes him a few attempts to complete correctly at home. Pt able to don/doff socks IND. CGA for sit <> Stand transfer with FWW. CGA for ambulation into bathroom. Stood at sink x 2 minutes with close SBA. CGA for toilet transfer wtih grab bars. Min A for toileting tasks (A to pull briefs up). Discussed d/c plans with spouse and pt, wanting to find right TCU in regards to location to home.   Barriers to return to prior living situation: current level of A   Recommendations for discharge: TCU   Rationale for recommendations: Pt would benefit from continued skilled OT services to improve independence and safety with ADL's and functional transfers as pt is not at baseline.           Entered by: Irasema Benitez 01/22/2019 2:12 PM

## 2019-01-22 NOTE — PROGRESS NOTES
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     Hemoptysis, unclear etiology, question pneumonia or lung mass with concomitant anticoagulation.  LLL consolidation/pneumonia vs mass.  Initially had an episode of epistaxis evening 1/17 that was mild and stopped on its own. This was followed by coughing up blood x 3 hours. Presented to ED 1/17 and continued to cough up blood. No evidence of continued epistaxis when evaluated by ED physician.  On initial evaluation 1/17, hgb 13.0, WBC normal, plts normal, INR 2.16, procalcitonin < 0.05. CT chest 1/17 showed LLL consolidation, PNA vs mass with some scattered patchy infiltrates with some indeterminate GEORGE nodules. Pt given vit K early am 1/18.  - Started azithromycin + ceftriaxone for possible pneumonia but seems more likely to represent aspirated blood. Will treat for 5 days. Transitioned to Augmentin in anticipation of discharge.     - ACE,calcium pending.    - Continue to hold warfarin for now. May be able to resume in one week after follow up with PCP.   - No recurrent hemoptysis since admit.   - Pulmonology consulted, appreciate help.  - Would require intubation and bronchoscopy for any recurrent major bleeding.  Would possibly need IR coiling.   - Will need follow up imaging in 3-4 weeks to evaluate for resolution of infiltrate.    - Follow up in Pulmonary clinic with Dr. Durbin in 4 weeks post CT.   - Nebs.      Anemia, mild, suspect acute blood loss component from above.  * Issues with hemoptysis as above.  - Management of hemoptysis as above.  - Monitor CBC.  - Hgb stable at 10.4.   - Consider prbc transfusion if hgb </= 7.0 or if significant bleeding with hemodynamic instability or if symptomatic.     Skin changes  right forearm, suspect secondary to blood pressure cuff placement in the setting of livedo reticularis.  Pt with petechial type skin changes that developed on right forearm with blood pressure cuff placement.   - Try cuff on left upper extremity; if unable, then monitor closely for signs of vascular compromise - no signs so far.     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  * Pt with h/o Sneddon syndrome, which is a rare genetic disorder characterized by widespread livedo reticularis in association with stroke. Diagnosed at [facility]. Maintained on warfarin. Unclear if pt has h/o antiphospholipid antibodies (commonly associated with Sneddon syndrome).  * Warfarin held and given vit K on admit as above.  - Continue to hold warfarin for now.   - Will hold for one week.      Left upper lobe lung nodules.  Noted by CT as above 1/17. Hx smoking.  - Follow-up CT short term for above LLL consolidation; otherwise, would recommend repeat CT in 6-12mo, then 18-24 mo if no change for the GEORGE nodules.  - Follow-up with PCP.     Hypertension (benign essential).  Possible h/o CHF.  [PTA: amlodipine 2.5 mg daily, furosemide 40 mg daily.]  * Pt notes h/o heart murmur and issues with his left ventricle, details unclear.  - Continue amlodipine.  - Resumed PTA Lasix 1/21  - Increase PTA amlodipine to 5 mg 1/22  - Continue PRN IV hydralazine.  - Monitor i/o's, daily wts.  - Obtain records from [place].     COPD.  Chronic, stable.   - Continue Incruse Ellipta, PRN inh/nebs.  - Trelegy recommended on discharge.      KORY.  - Continue CPAP at night     Diet: Low sodium  Prophylaxis: PCD's, ambulation.  Torres Catheter: not present  Code Status: Full Code       Disposition: Pending placement at TCU.         Kev Meza         Interval History   No acute overnight events. No recurrent hemoptysis since admit. No new complaints.     -Data reviewed today: I reviewed all new labs and imaging results over the  last 24 hours. I personally reviewed no images or EKG's today.    Physical Exam   Temp: 98  F (36.7  C) Temp src: Oral BP: 146/86 Pulse: 88 Heart Rate: 89 Resp: 16 SpO2: 92 % O2 Device: None (Room air)    Vitals:    01/17/19 2142 01/19/19 0500   Weight: 104.3 kg (230 lb) 108.7 kg (239 lb 10.2 oz)     Vital Signs with Ranges  Temp:  [97.6  F (36.4  C)-98.1  F (36.7  C)] 98  F (36.7  C)  Pulse:  [88] 88  Heart Rate:  [77-89] 89  Resp:  [16-18] 16  BP: (133-150)/(59-90) 146/86  FiO2 (%):  [40 %] 40 %  SpO2:  [92 %-99 %] 92 %  I/O last 3 completed shifts:  In: 1700 [P.O.:1700]  Out: 525 [Urine:525]    Gen: Patient in no acute distress.  Appears comfortable.  Heart:  S1S2+, regular rate and rhythm, No murmurs.  Lungs:  Clear to auscultation at apices, no wheezing, no rales. Decreased at bases.    Abdomen:  Soft, non tender, non distended, bowel sounds positive.  Extremities:  B/l LE edema.    Medications       albuterol  3 mL Nebulization Q4H While awake     amLODIPine  2.5 mg Oral QAM     amoxicillin-clavulanate  1 tablet Oral Q12H MARIE     brimonidine-timolol  1 drop Both Eyes QAM     brinzolamide  1 drop Both Eyes QAM     docusate sodium  100 mg Oral QPM     furosemide  40 mg Oral Daily     guaiFENesin  600 mg Oral BID     latanoprost  1 drop Both Eyes At Bedtime     mirabegron  50 mg Oral QAM     pantoprazole  40 mg Oral QAM AC     umeclidinium  1 puff Inhalation QAM       Data   Recent Labs   Lab 01/21/19  1242 01/21/19  0621 01/21/19  0001  01/20/19  0603  01/19/19  0553  01/18/19  0530 01/18/19  0230 01/17/19  2148 01/17/19  2140   WBC  --   --   --   --  8.6  --  9.5  --   --   --   --  7.3   HGB 10.1* 10.3* 10.2*   < > 10.6*   < > 10.5*   < > 11.5* 11.3* 13.3 13.0*   MCV  --   --   --   --  93  --  92  --   --   --   --  93   PLT  --   --   --   --  154  --  141*  --   --   --   --  153   INR  --   --   --   --  1.12  --   --   --  1.78*  --   --  2.16*   NA  --   --   --   --   --   --  143  --  143  --  143 144    POTASSIUM  --   --   --   --   --   --  3.6  --  3.9  --  3.5 3.6   CHLORIDE  --   --   --   --   --   --  111*  --  110*  --   --  109   CO2  --   --   --   --   --   --  25  --  26  --   --  26   BUN  --   --   --   --   --   --  20  --  22  --   --  15   CR  --   --   --   --   --   --  1.00  --  0.93  --   --  0.97   ANIONGAP  --   --   --   --   --   --  7  --  7  --   --  9   JULIANNE  --   --   --   --   --   --  8.2*  --  8.0*  --   --  8.2*   GLC  --   --   --   --   --   --  109*  --  119*  --   --  122*   ALBUMIN  --   --   --   --   --   --   --   --   --  2.7*  --   --    PROTTOTAL  --   --   --   --   --   --   --   --   --  5.9*  --   --    BILITOTAL  --   --   --   --   --   --   --   --   --  0.4  --   --    ALKPHOS  --   --   --   --   --   --   --   --   --  55  --   --    ALT  --   --   --   --   --   --   --   --   --  19  --   --    AST  --   --   --   --   --   --   --   --   --  18  --   --    TROPI  --   --   --   --   --   --   --   --   --   --   --  <0.015    < > = values in this interval not displayed.       No results found for this or any previous visit (from the past 24 hour(s)).

## 2019-01-22 NOTE — PROGRESS NOTES
A&Ox4. VSS. Denies pain. Lung sounds clear on RA, MONTALVO at times. CPAP at night. Murmur. +2 BLE edema, encouraged patient to elevate legs. Continent of B&B. Urinary frequency, no c/o dysuria. Up with SBA walker and GB. Tolerating low Na diet, good appetite. Skin intact. Hgb stable, no hemoptysis. Seen by pulmonology, f/u OP in 4-6 weeks, see note. Plan to discharge pending insurance approval. Will continue to monitor.

## 2019-01-23 ENCOUNTER — APPOINTMENT (OUTPATIENT)
Dept: PHYSICAL THERAPY | Facility: CLINIC | Age: 79
DRG: 194 | End: 2019-01-23
Payer: COMMERCIAL

## 2019-01-23 ENCOUNTER — APPOINTMENT (OUTPATIENT)
Dept: OCCUPATIONAL THERAPY | Facility: CLINIC | Age: 79
DRG: 194 | End: 2019-01-23
Payer: COMMERCIAL

## 2019-01-23 LAB
ANION GAP SERPL CALCULATED.3IONS-SCNC: 7 MMOL/L (ref 3–14)
BUN SERPL-MCNC: 13 MG/DL (ref 7–30)
CALCIUM SERPL-MCNC: 8.3 MG/DL (ref 8.5–10.1)
CHLORIDE SERPL-SCNC: 107 MMOL/L (ref 94–109)
CO2 SERPL-SCNC: 25 MMOL/L (ref 20–32)
CREAT SERPL-MCNC: 0.98 MG/DL (ref 0.66–1.25)
GFR SERPL CREATININE-BSD FRML MDRD: 75 ML/MIN/{1.73_M2}
GLUCOSE SERPL-MCNC: 129 MG/DL (ref 70–99)
HGB BLD-MCNC: 10 G/DL (ref 13.3–17.7)
INR PPP: 1.15 (ref 0.86–1.14)
POTASSIUM SERPL-SCNC: 3.5 MMOL/L (ref 3.4–5.3)
SODIUM SERPL-SCNC: 139 MMOL/L (ref 133–144)

## 2019-01-23 PROCEDURE — 25000131 ZZH RX MED GY IP 250 OP 636 PS 637: Performed by: HOSPITALIST

## 2019-01-23 PROCEDURE — 40000193 ZZH STATISTIC PT WARD VISIT

## 2019-01-23 PROCEDURE — 85610 PROTHROMBIN TIME: CPT | Performed by: INTERNAL MEDICINE

## 2019-01-23 PROCEDURE — 94640 AIRWAY INHALATION TREATMENT: CPT | Mod: 76

## 2019-01-23 PROCEDURE — 97530 THERAPEUTIC ACTIVITIES: CPT | Mod: GP

## 2019-01-23 PROCEDURE — 25000132 ZZH RX MED GY IP 250 OP 250 PS 637: Performed by: HOSPITALIST

## 2019-01-23 PROCEDURE — 94640 AIRWAY INHALATION TREATMENT: CPT

## 2019-01-23 PROCEDURE — 25000132 ZZH RX MED GY IP 250 OP 250 PS 637: Performed by: INTERNAL MEDICINE

## 2019-01-23 PROCEDURE — 99232 SBSQ HOSP IP/OBS MODERATE 35: CPT | Performed by: INTERNAL MEDICINE

## 2019-01-23 PROCEDURE — 85018 HEMOGLOBIN: CPT | Performed by: INTERNAL MEDICINE

## 2019-01-23 PROCEDURE — 40000133 ZZH STATISTIC OT WARD VISIT

## 2019-01-23 PROCEDURE — 40000275 ZZH STATISTIC RCP TIME EA 10 MIN

## 2019-01-23 PROCEDURE — 12000000 ZZH R&B MED SURG/OB

## 2019-01-23 PROCEDURE — 97530 THERAPEUTIC ACTIVITIES: CPT | Mod: GO

## 2019-01-23 PROCEDURE — 25000125 ZZHC RX 250: Performed by: INTERNAL MEDICINE

## 2019-01-23 PROCEDURE — 94660 CPAP INITIATION&MGMT: CPT

## 2019-01-23 PROCEDURE — 36415 COLL VENOUS BLD VENIPUNCTURE: CPT | Performed by: INTERNAL MEDICINE

## 2019-01-23 PROCEDURE — 80048 BASIC METABOLIC PNL TOTAL CA: CPT | Performed by: INTERNAL MEDICINE

## 2019-01-23 PROCEDURE — 97110 THERAPEUTIC EXERCISES: CPT | Mod: GP

## 2019-01-23 PROCEDURE — 97535 SELF CARE MNGMENT TRAINING: CPT | Mod: GO

## 2019-01-23 RX ORDER — BISACODYL 10 MG
10 SUPPOSITORY, RECTAL RECTAL DAILY PRN
Status: DISCONTINUED | OUTPATIENT
Start: 2019-01-23 | End: 2019-01-28 | Stop reason: HOSPADM

## 2019-01-23 RX ADMIN — ALBUTEROL SULFATE 2.5 MG: 2.5 SOLUTION RESPIRATORY (INHALATION) at 20:06

## 2019-01-23 RX ADMIN — MIRABEGRON 50 MG: 50 TABLET, FILM COATED, EXTENDED RELEASE ORAL at 08:17

## 2019-01-23 RX ADMIN — BRIMONIDINE TARTRATE, TIMOLOL MALEATE 1 DROP: 2; 5 SOLUTION/ DROPS OPHTHALMIC at 09:06

## 2019-01-23 RX ADMIN — ONDANSETRON 4 MG: 4 TABLET, ORALLY DISINTEGRATING ORAL at 15:41

## 2019-01-23 RX ADMIN — SENNOSIDES AND DOCUSATE SODIUM 2 TABLET: 8.6; 5 TABLET ORAL at 14:50

## 2019-01-23 RX ADMIN — GUAIFENESIN 600 MG: 600 TABLET, EXTENDED RELEASE ORAL at 21:39

## 2019-01-23 RX ADMIN — BRINZOLAMIDE 1 DROP: 10 SUSPENSION/ DROPS OPHTHALMIC at 08:16

## 2019-01-23 RX ADMIN — ALBUTEROL SULFATE 2.5 MG: 2.5 SOLUTION RESPIRATORY (INHALATION) at 23:09

## 2019-01-23 RX ADMIN — AMLODIPINE BESYLATE 5 MG: 2.5 TABLET ORAL at 08:17

## 2019-01-23 RX ADMIN — UMECLIDINIUM 1 PUFF: 62.5 AEROSOL, POWDER ORAL at 08:18

## 2019-01-23 RX ADMIN — DOCUSATE SODIUM 100 MG: 100 CAPSULE, LIQUID FILLED ORAL at 21:39

## 2019-01-23 RX ADMIN — FUROSEMIDE 40 MG: 40 TABLET ORAL at 08:17

## 2019-01-23 RX ADMIN — ALBUTEROL SULFATE 2.5 MG: 2.5 SOLUTION RESPIRATORY (INHALATION) at 07:46

## 2019-01-23 RX ADMIN — LATANOPROST 1 DROP: 50 SOLUTION OPHTHALMIC at 21:39

## 2019-01-23 RX ADMIN — AMOXICILLIN AND CLAVULANATE POTASSIUM 1 TABLET: 875; 125 TABLET, FILM COATED ORAL at 08:17

## 2019-01-23 RX ADMIN — Medication 10 MG: at 14:52

## 2019-01-23 RX ADMIN — AMOXICILLIN AND CLAVULANATE POTASSIUM 1 TABLET: 875; 125 TABLET, FILM COATED ORAL at 21:39

## 2019-01-23 RX ADMIN — ALBUTEROL SULFATE 2.5 MG: 2.5 SOLUTION RESPIRATORY (INHALATION) at 11:39

## 2019-01-23 RX ADMIN — PANTOPRAZOLE SODIUM 40 MG: 40 TABLET, DELAYED RELEASE ORAL at 06:39

## 2019-01-23 RX ADMIN — GUAIFENESIN 600 MG: 600 TABLET, EXTENDED RELEASE ORAL at 08:17

## 2019-01-23 RX ADMIN — ALBUTEROL SULFATE 2.5 MG: 2.5 SOLUTION RESPIRATORY (INHALATION) at 00:02

## 2019-01-23 ASSESSMENT — ACTIVITIES OF DAILY LIVING (ADL)
ADLS_ACUITY_SCORE: 27
ADLS_ACUITY_SCORE: 27
ADLS_ACUITY_SCORE: 26
ADLS_ACUITY_SCORE: 26
ADLS_ACUITY_SCORE: 28
ADLS_ACUITY_SCORE: 28

## 2019-01-23 NOTE — PLAN OF CARE
Discharge Planner OT   Patient plan for discharge: thinking TCU   Current status: SBA for toilet transfer and toileting with fWW and grab bars. Pt stood at sink x 6 minutes for grooming tasks with set-up and SBA with FWW. No LOB noted but dec activity tolerance with all activity. Sit <> stand x 3 with SBA and FWW.  Pt ambulated x 2 minutes with close SBA and FWW with emphasis on in home functional ambulation   Barriers to return to prior living situation: dec ax tolerance   Recommendations for discharge: home with spouse A with IADL's (shopping, cleaning, laundry, cooking) and bathing. HHOT  Rationale for recommendations: pt completing ADL's and transfers with SBA and FWW. Pt limited by dec activity tolerance but is steady on feet. Anticipate pt safe to discharge home with spouse A as needed.  Pt would benefit from home health OT as he requires assistive device and assist of another person to leave the home.          Entered by: Irasema Benitez 01/23/2019 10:50 AM

## 2019-01-23 NOTE — PLAN OF CARE
Discharge Planner PT   Patient plan for discharge: TCU, but open to home with HH PT/OT  Current status: patient sitting in chair upon arrival of therapist, agreeable to working with PT. Sit<>stand from chair with FWW and supervision. Patient ambulated 200 feet with use of FWW and supervision, steady reciprocal gait noted with FWW with no physical assist needed for unsteadiness. Patient slightly SOB after gait but recovers quickly after gait with seated rest break. RT present at end of session, stand-pivot from bed to chair with FWW and Mod I completed. Patient seated at EOB upon therapist exit with RT present. Extended time spent discussing discharge recommendations with patient and spouse.   Barriers to return to prior living situation: Current level of assist, decreased tolerance to activity   Recommendations for discharge: Home with assist from spouse for household tasks and HH PT/OT  Rationale for recommendations: Patient with improved mobility and ambulation overall. Patient mobilizing with FWW and supervision to mod I. Patient would be safe to discharge back to independent living facility with spouse, once medically able, with assist from spouse for household tasks and continued skilled HH PT to further address functional limitations and improve strength, balance, and endurance.        Entered by: Gretta Hernandez 01/23/2019 12:21 PM

## 2019-01-23 NOTE — PLAN OF CARE
A&O x4, SBA +gb/walker.  VSS on CPAP.  Low sat fat/low Na+ diet.  Denies pain.  BLE edema 2-3+, legs elevated on pillows.  LS diminished, MONTALVO.  Continue to monitor.

## 2019-01-23 NOTE — PROGRESS NOTES
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     Hemoptysis, unclear etiology, question pneumonia or lung mass with concomitant anticoagulation.  LLL consolidation/pneumonia vs mass.  Initially had an episode of epistaxis evening 1/17 that was mild and stopped on its own. This was followed by coughing up blood x 3 hours. Presented to ED 1/17 and continued to cough up blood. No evidence of continued epistaxis when evaluated by ED physician.  On initial evaluation 1/17, hgb 13.0, WBC normal, plts normal, INR 2.16, procalcitonin < 0.05. CT chest 1/17 showed LLL consolidation, PNA vs mass with some scattered patchy infiltrates with some indeterminate GEORGE nodules. Pt given vit K early am 1/18.   - Warfarin held.   - Started azithromycin + ceftriaxone for possible pneumonia but seems more likely to represent aspirated blood. Will treat for 5 days. Transitioned to Augmentin with last dose 1/23.   - ACE level normal at 27, ionized calcium normal at 4.7.   - No recurrent hemoptysis since admit.   - Pulmonology consulted, appreciate help (Would require intubation and bronchoscopy for any recurrent major bleeding.  Would possibly need IR coiling.)  - Will need follow up imaging in 3-4 weeks to evaluate for resolution of infiltrate.    - Follow up in Pulmonary clinic with Dr. Durbin in 4 weeks post CT.   - Will plan on resuming Warfarin Friday 1/25/19 and follow for any recurring symptoms.      Anemia, mild, suspect acute blood loss component from above.  Issues with hemoptysis as above.  - Management of hemoptysis as above.  - Monitor CBC.  - Hgb stable at 10.0.   - Consider prbc transfusion if hgb </= 7.0 or if significant bleeding with hemodynamic instability or if  symptomatic.     Skin changes right forearm, suspect secondary to blood pressure cuff placement in the setting of livedo reticularis.  Pt with petechial type skin changes that developed on right forearm with blood pressure cuff placement.    - No signs of vascular compromise.      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  Pt with h/o Sneddon syndrome, which is a rare genetic disorder characterized by widespread livedo reticularis in association with stroke. Diagnosed at [facility]. Maintained on warfarin. Unclear if pt has h/o antiphospholipid antibodies (commonly associated with Sneddon syndrome). Warfarin held and given vit K on admit as above.  - Continue to hold warfarin for now x 1 week.    - Would plan on resuming Warfarin on Friday 1/25 and monitoring for any recurrent symptoms.      Left upper lobe lung nodules.  Noted by CT as above 1/17. Hx smoking.  - Follow-up CT short term for above LLL consolidation; otherwise, would recommend repeat CT in 6-12mo, then 18-24 mo if no change for the GEORGE nodules.  - Follow-up with PCP and Pulm.      Hypertension (benign essential).  Possible h/o CHF.  [PTA: amlodipine 2.5 mg daily, furosemide 40 mg daily.].  Pt notes h/o heart murmur and issues with his left ventricle, details unclear.  - Continue amlodipine.  - Resumed PTA Lasix 1/21  - Increased PTA amlodipine to 5 mg 1/23 for better BP control.   - Continue PRN IV hydralazine.  - Monitor i/o's, daily wts.     COPD.  Chronic, stable.   - Continue Incruse Ellipta, PRN inh/nebs.  - Trelegy recommended on discharge.      KORY.  - Continue CPAP at night     Diet: Low sodium  Prophylaxis: PCD's, ambulation.  Torres Catheter: not present  Code Status: Full Code       Disposition: Pending placement at TCU.       Kev Meza       Interval History   No acute overnight events. No recurrent hemoptysis since admit. No new complaints.     -Data reviewed today: I reviewed all new labs and imaging  results over the last 24 hours. I personally reviewed no images or EKG's today.    Physical Exam   Temp: 97.4  F (36.3  C) Temp src: Oral BP: 167/89   Heart Rate: 83 Resp: 20 SpO2: 94 % O2 Device: None (Room air)    Vitals:    01/17/19 2142 01/19/19 0500   Weight: 104.3 kg (230 lb) 108.7 kg (239 lb 10.2 oz)     Vital Signs with Ranges  Temp:  [97.4  F (36.3  C)-97.9  F (36.6  C)] 97.4  F (36.3  C)  Heart Rate:  [80-83] 83  Resp:  [16-20] 20  BP: (139-167)/(72-89) 167/89  FiO2 (%):  [30 %] 30 %  SpO2:  [92 %-98 %] 94 %  I/O last 3 completed shifts:  In: 420 [P.O.:420]  Out: -     Gen: Patient in no acute distress.  Appears comfortable.  Heart:  S1S2+, regular rate and rhythm, No murmurs.  Lungs:  Clear to auscultation at apices, no wheezing, no rales. Decreased at bases.    Abdomen:  Soft, non tender, non distended, bowel sounds positive.  Extremities:  B/l LE edema.    Medications       albuterol  3 mL Nebulization Q4H While awake     amLODIPine  5 mg Oral QAM     amoxicillin-clavulanate  1 tablet Oral Q12H MARIE     brimonidine-timolol  1 drop Both Eyes QAM     brinzolamide  1 drop Both Eyes QAM     docusate sodium  100 mg Oral QPM     furosemide  40 mg Oral Daily     guaiFENesin  600 mg Oral BID     latanoprost  1 drop Both Eyes At Bedtime     mirabegron  50 mg Oral QAM     pantoprazole  40 mg Oral QAM AC     umeclidinium  1 puff Inhalation QAM       Data   Recent Labs   Lab 01/23/19  1103 01/21/19  1242 01/21/19  0621  01/20/19  0603  01/19/19  0553  01/18/19  0530 01/18/19  0230  01/17/19  2140   WBC  --   --   --   --  8.6  --  9.5  --   --   --   --  7.3   HGB 10.0* 10.1* 10.3*   < > 10.6*   < > 10.5*   < > 11.5* 11.3*   < > 13.0*   MCV  --   --   --   --  93  --  92  --   --   --   --  93   PLT  --   --   --   --  154  --  141*  --   --   --   --  153   INR 1.15*  --   --   --  1.12  --   --   --  1.78*  --   --  2.16*     --   --   --   --   --  143  --  143  --    < > 144   POTASSIUM 3.5  --   --   --    --   --  3.6  --  3.9  --    < > 3.6   CHLORIDE 107  --   --   --   --   --  111*  --  110*  --   --  109   CO2 25  --   --   --   --   --  25  --  26  --   --  26   BUN 13  --   --   --   --   --  20  --  22  --   --  15   CR 0.98  --   --   --   --   --  1.00  --  0.93  --   --  0.97   ANIONGAP 7  --   --   --   --   --  7  --  7  --   --  9   JULIANNE 8.3*  --   --   --   --   --  8.2*  --  8.0*  --   --  8.2*   *  --   --   --   --   --  109*  --  119*  --   --  122*   ALBUMIN  --   --   --   --   --   --   --   --   --  2.7*  --   --    PROTTOTAL  --   --   --   --   --   --   --   --   --  5.9*  --   --    BILITOTAL  --   --   --   --   --   --   --   --   --  0.4  --   --    ALKPHOS  --   --   --   --   --   --   --   --   --  55  --   --    ALT  --   --   --   --   --   --   --   --   --  19  --   --    AST  --   --   --   --   --   --   --   --   --  18  --   --    TROPI  --   --   --   --   --   --   --   --   --   --   --  <0.015    < > = values in this interval not displayed.       No results found for this or any previous visit (from the past 24 hour(s)).

## 2019-01-23 NOTE — PLAN OF CARE
Up with SBA/walker/GB; PT recommended home with spouse but family is concerned that he is still too weak to go home and prefer a TCU for therapies.  Good intake.  Afebrile.  BP elevated, improved after oral meds.  Edema of lower extremities, usually wears compression stockings, spouse will bring his in tomorrow.  Constipation, is passing flatus, taking senna-s, and suppository will be offered.  Denies discomfort.  No cough.  Off coumadin until 1/24.

## 2019-01-23 NOTE — PROGRESS NOTES
"SW:  D: Received a message from daughter that she plans for patient to enter a TCU and not to discharge home directly with home care.(RN, PT, OT, HHA)  Wife reports she is willing to bring patient home however she feels daughter is concerned because she(wife) tires easily due to her rheumatoid arthritis.  She reports patient has a history of falls and \"performs for therapy\" but she doesn't feel he will participate in exercises and walking on the days home therapy is not there.   She and her daughter felt if patient went to a TCU with BID therapy he would have a better chance of gaining endurance.    Wife understands that based on history [facility] not feel [facility] given patient is walking 200 feet with therapy.  [facility] no longer has a room to offer patient.     A:  Daughter concerns are reasonable however  however insurance may not feel patient requires TCU level of care.    PLAN:  Writer will contact other TCU's which referrals had been sent to.  Will review with wife again tomorrow and speak with daughter if wife requests.        "

## 2019-01-23 NOTE — PROGRESS NOTES
SW:  D:  Met with wife and patient to discuss OT and PT change in recommendation to home with home therapies rather than TCU.  Wife shared she had hoped patient would transfer to TCU to continue building his endurance. She shared she has rheumatoid arthritis which limits her ability to help patient.    She reports her daughter  Juliane has called [facility] TCU and was told based on the length of time it will take to receive approval from [employer], they can offer patient a TCU vacancy on Saturday.Wife shares she doesn't like the idea of patient being here till Saturday but she understand the delay with [employer].    Writer confirmed discussion daughter had  with Shoaib at [facility].  Also explained to  Shoaib the discharge recommendation has changed to home care.  Based on patient's current progress in therapy, Shoaib is concerned [employer] will deny SNF authorization.   She reports her daughter is strong in her opinion that patient should go to a TCU.  Wife asked writer to call daughter to discuss home care options.  Attempted to reach daughter on her cell and work number.    Will try after 1330.

## 2019-01-23 NOTE — PLAN OF CARE
Patient A&0x4. Up with SBA and gait belt and walker. Lungs diminished in bases. Infrequent dry cough noted. On RA. C/o MONTALVO. +3 edema noted in bilateral feet,ankles and legs. Denies pain.

## 2019-01-24 ENCOUNTER — APPOINTMENT (OUTPATIENT)
Dept: PHYSICAL THERAPY | Facility: CLINIC | Age: 79
DRG: 194 | End: 2019-01-24
Payer: COMMERCIAL

## 2019-01-24 ENCOUNTER — APPOINTMENT (OUTPATIENT)
Dept: OCCUPATIONAL THERAPY | Facility: CLINIC | Age: 79
DRG: 194 | End: 2019-01-24
Payer: COMMERCIAL

## 2019-01-24 ENCOUNTER — APPOINTMENT (OUTPATIENT)
Dept: CARDIOLOGY | Facility: CLINIC | Age: 79
DRG: 194 | End: 2019-01-24
Payer: COMMERCIAL

## 2019-01-24 LAB
ERYTHROCYTE [DISTWIDTH] IN BLOOD BY AUTOMATED COUNT: 14.4 % (ref 10–15)
HCT VFR BLD AUTO: 29.9 % (ref 40–53)
HGB BLD-MCNC: 10 G/DL (ref 13.3–17.7)
INR PPP: 1.1 (ref 0.86–1.14)
LMWH PPP CHRO-ACNC: <0.1 IU/ML
MCH RBC QN AUTO: 31 PG (ref 26.5–33)
MCHC RBC AUTO-ENTMCNC: 33.4 G/DL (ref 31.5–36.5)
MCV RBC AUTO: 93 FL (ref 78–100)
PLATELET # BLD AUTO: 194 10E9/L (ref 150–450)
RBC # BLD AUTO: 3.23 10E12/L (ref 4.4–5.9)
WBC # BLD AUTO: 8.2 10E9/L (ref 4–11)

## 2019-01-24 PROCEDURE — 00000167 ZZHCL STATISTIC INR NC: Performed by: INTERNAL MEDICINE

## 2019-01-24 PROCEDURE — 83876 ASSAY MYELOPEROXIDASE: CPT | Performed by: INTERNAL MEDICINE

## 2019-01-24 PROCEDURE — 40000193 ZZH STATISTIC PT WARD VISIT: Performed by: PHYSICAL THERAPY ASSISTANT

## 2019-01-24 PROCEDURE — 93306 TTE W/DOPPLER COMPLETE: CPT | Mod: 26 | Performed by: INTERNAL MEDICINE

## 2019-01-24 PROCEDURE — 86147 CARDIOLIPIN ANTIBODY EA IG: CPT | Performed by: INTERNAL MEDICINE

## 2019-01-24 PROCEDURE — 97535 SELF CARE MNGMENT TRAINING: CPT | Mod: GO | Performed by: OCCUPATIONAL THERAPY ASSISTANT

## 2019-01-24 PROCEDURE — 85520 HEPARIN ASSAY: CPT | Performed by: HOSPITALIST

## 2019-01-24 PROCEDURE — 85027 COMPLETE CBC AUTOMATED: CPT | Performed by: HOSPITALIST

## 2019-01-24 PROCEDURE — 94660 CPAP INITIATION&MGMT: CPT

## 2019-01-24 PROCEDURE — 86146 BETA-2 GLYCOPROTEIN ANTIBODY: CPT | Performed by: INTERNAL MEDICINE

## 2019-01-24 PROCEDURE — 85613 RUSSELL VIPER VENOM DILUTED: CPT | Performed by: INTERNAL MEDICINE

## 2019-01-24 PROCEDURE — 40000275 ZZH STATISTIC RCP TIME EA 10 MIN

## 2019-01-24 PROCEDURE — 25000132 ZZH RX MED GY IP 250 OP 250 PS 637: Performed by: INTERNAL MEDICINE

## 2019-01-24 PROCEDURE — 99223 1ST HOSP IP/OBS HIGH 75: CPT | Performed by: INTERNAL MEDICINE

## 2019-01-24 PROCEDURE — 00000401 ZZHCL STATISTIC THROMBIN TIME NC: Performed by: INTERNAL MEDICINE

## 2019-01-24 PROCEDURE — 40000133 ZZH STATISTIC OT WARD VISIT: Performed by: OCCUPATIONAL THERAPY ASSISTANT

## 2019-01-24 PROCEDURE — 83516 IMMUNOASSAY NONANTIBODY: CPT | Performed by: INTERNAL MEDICINE

## 2019-01-24 PROCEDURE — 99207 ZZC CDG-MDM COMPONENT: MEETS LOW - DOWN CODED: CPT | Performed by: HOSPITALIST

## 2019-01-24 PROCEDURE — 99232 SBSQ HOSP IP/OBS MODERATE 35: CPT | Performed by: HOSPITALIST

## 2019-01-24 PROCEDURE — 12000000 ZZH R&B MED SURG/OB

## 2019-01-24 PROCEDURE — 85732 THROMBOPLASTIN TIME PARTIAL: CPT | Performed by: INTERNAL MEDICINE

## 2019-01-24 PROCEDURE — 36415 COLL VENOUS BLD VENIPUNCTURE: CPT | Performed by: HOSPITALIST

## 2019-01-24 PROCEDURE — 94640 AIRWAY INHALATION TREATMENT: CPT

## 2019-01-24 PROCEDURE — 25000132 ZZH RX MED GY IP 250 OP 250 PS 637: Performed by: HOSPITALIST

## 2019-01-24 PROCEDURE — 85597 PHOSPHOLIPID PLTLT NEUTRALIZ: CPT | Performed by: INTERNAL MEDICINE

## 2019-01-24 PROCEDURE — 40000264 ECHOCARDIOGRAM COMPLETE

## 2019-01-24 PROCEDURE — 97116 GAIT TRAINING THERAPY: CPT | Mod: GP | Performed by: PHYSICAL THERAPY ASSISTANT

## 2019-01-24 PROCEDURE — 25500064 ZZH RX 255 OP 636: Performed by: HOSPITALIST

## 2019-01-24 PROCEDURE — 97530 THERAPEUTIC ACTIVITIES: CPT | Mod: GO | Performed by: OCCUPATIONAL THERAPY ASSISTANT

## 2019-01-24 PROCEDURE — 85610 PROTHROMBIN TIME: CPT | Performed by: HOSPITALIST

## 2019-01-24 PROCEDURE — 25000128 H RX IP 250 OP 636: Performed by: INTERNAL MEDICINE

## 2019-01-24 PROCEDURE — 25000125 ZZHC RX 250: Performed by: INTERNAL MEDICINE

## 2019-01-24 PROCEDURE — 36415 COLL VENOUS BLD VENIPUNCTURE: CPT | Performed by: INTERNAL MEDICINE

## 2019-01-24 PROCEDURE — 85730 THROMBOPLASTIN TIME PARTIAL: CPT | Performed by: INTERNAL MEDICINE

## 2019-01-24 RX ORDER — WARFARIN SODIUM 10 MG/1
10 TABLET ORAL
Status: COMPLETED | OUTPATIENT
Start: 2019-01-24 | End: 2019-01-24

## 2019-01-24 RX ORDER — ALBUTEROL SULFATE 0.83 MG/ML
3 SOLUTION RESPIRATORY (INHALATION) EVERY 4 HOURS PRN
Status: DISCONTINUED | OUTPATIENT
Start: 2019-01-24 | End: 2019-01-28 | Stop reason: HOSPADM

## 2019-01-24 RX ADMIN — UMECLIDINIUM 1 PUFF: 62.5 AEROSOL, POWDER ORAL at 08:55

## 2019-01-24 RX ADMIN — LATANOPROST 1 DROP: 50 SOLUTION OPHTHALMIC at 22:26

## 2019-01-24 RX ADMIN — HEPARIN SODIUM 1000 UNITS/HR: 10000 INJECTION, SOLUTION INTRAVENOUS at 14:49

## 2019-01-24 RX ADMIN — BRIMONIDINE TARTRATE, TIMOLOL MALEATE 1 DROP: 2; 5 SOLUTION/ DROPS OPHTHALMIC at 08:57

## 2019-01-24 RX ADMIN — WARFARIN SODIUM 10 MG: 10 TABLET ORAL at 18:20

## 2019-01-24 RX ADMIN — GUAIFENESIN 600 MG: 600 TABLET, EXTENDED RELEASE ORAL at 22:26

## 2019-01-24 RX ADMIN — SENNOSIDES AND DOCUSATE SODIUM 1 TABLET: 8.6; 5 TABLET ORAL at 08:00

## 2019-01-24 RX ADMIN — BRINZOLAMIDE 1 DROP: 10 SUSPENSION/ DROPS OPHTHALMIC at 08:54

## 2019-01-24 RX ADMIN — PANTOPRAZOLE SODIUM 40 MG: 40 TABLET, DELAYED RELEASE ORAL at 06:28

## 2019-01-24 RX ADMIN — AMLODIPINE BESYLATE 5 MG: 2.5 TABLET ORAL at 08:00

## 2019-01-24 RX ADMIN — HUMAN ALBUMIN MICROSPHERES AND PERFLUTREN 6 ML: 10; .22 INJECTION, SOLUTION INTRAVENOUS at 14:30

## 2019-01-24 RX ADMIN — ALBUTEROL SULFATE 2.5 MG: 2.5 SOLUTION RESPIRATORY (INHALATION) at 07:51

## 2019-01-24 RX ADMIN — DOCUSATE SODIUM 100 MG: 100 CAPSULE, LIQUID FILLED ORAL at 22:26

## 2019-01-24 RX ADMIN — MIRABEGRON 50 MG: 50 TABLET, FILM COATED, EXTENDED RELEASE ORAL at 08:00

## 2019-01-24 RX ADMIN — FUROSEMIDE 40 MG: 40 TABLET ORAL at 08:00

## 2019-01-24 RX ADMIN — GUAIFENESIN 600 MG: 600 TABLET, EXTENDED RELEASE ORAL at 08:00

## 2019-01-24 ASSESSMENT — MIFFLIN-ST. JEOR: SCORE: 1809.01

## 2019-01-24 ASSESSMENT — ACTIVITIES OF DAILY LIVING (ADL)
ADLS_ACUITY_SCORE: 26
ADLS_ACUITY_SCORE: 27
ADLS_ACUITY_SCORE: 26
ADLS_ACUITY_SCORE: 27

## 2019-01-24 NOTE — CONSULTS
[facility]  Vascular Medicine Consultation         George Jhaveri MD,[facility],NAKITA Saldivar MRN# [id number]   YOB: 1940 Age: 78 year old      Date of Admission:  1/17/2019  Date of Consult: 1/24/2019         Assessment and Plan:     1. Hemoptysis , admitted initially  to ICU on 1/17/19  in the setting of long standing anticoagulation  ( more than 30 years with warfarin for multiple CVAs ) he developed severe cough before hemoptysis.  INR 2.1 at the time of admission.  Admission CT LLE consolidation/pneumonia ?? Mass. Small GEORGE nodules.( former smoker)  ( off anticoagulation since 1/17/19, reversed on admission, no more hemoptysis or epistaxis last 6 plus days)    2. Hx of Epistaxis 24 hours before hemoptysis     3. 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 Hx of sarcoidosis:  (normal ACE level and ionized calcium this admission)    6.Hx of COPD ( former smoker)     7. HTN:    8.Aortic Stenosis, Moderate  ( BAYRON 1.7cm2, mean gradient 27.9 mm hg, peak AoV pressure gradient 45.3 mm hg)      This is a very pleasant male who carries the diagnosis of Sneddon syndrome, history of sarcoidosis, COPD/asthma, hypertension, obesity with sleep apnea wears CPAP machine, history of recurrent CVA/TIAs taking chronic anticoagulation more than 30 years admitted with hemoptysis and also epistaxis requiring reversal of anticoagulation and holding warfarin for the last 1 week.  He is currently stable, no more hemoptysis or epistaxis last 6 days.  Hemoglobin stable.  He was treated with antibiotics for 5-6 days.  No more cough.  No constitutional symptoms.  No arthralgias.  Extensive evaluation done in [facility] and in [place]. (Unfortunately minimal records are available in [facility] everywhere from [facility]) no records from [place].    He denies any headache, vision problems.  Patient's wife is worried about holding  anticoagulation and he has a history of recurrent TIAs and strokes when he was off of anticoagulation.  Reviewed imaging studies and laboratory data in the epic.      At present my recommendations,    1.  Restart anticoagulation with IV heparin and continue for 48 hours.  2.  Initiate warfarin starting tonight, if he tolerates IV heparin 48 hours consider bridging with Lovenox.  If any signs of recurrent hemoptysis or hematemesis, stop anticoagulation and he will need ENT and pulmonary evaluation.  3.  Check APLA, vasculitis panel , ESR and CRP for now (decide additional testing based on the results)  4.  Echocardiogram with bubble study today  5.  Follow pulmonary recommendations and treat COPD/asthma   6.  Repeat CT scan per pulmonary recommendations and he will need follow-up with pulmonary clinic 2-3 weeks after discharge and he can get PFTs in the pulmonary office.  7.  Good blood pressure control (avoid ACE inhibitor given his presentation which can cause cough 15-20% of the patient's)    Thank you for the consultation.    Vascular medicine service will follow with you    This note was dictated by utilizing dragon software.  Copy to hospitalist service, primary care physician    Geroge Jhaveri MD,[facility]  Vascular medicine service              Code Status:   Full Code         Primary Care Physician:   Brown, Merlin Emery [phone]         Requesting Physician:      Wero Barlow DO         Chief Complaint:     Admitted with hemoptysis and epistaxis , with known Hx of snedden syndrome /multiple CVAs , anticoagulated with warfarin for 30 years. Recently moved form [place] and previoulsy evaluated at [facility]    History is obtained from the patients wife, reviewed Epic and discussed with hospitalist         History of Present Illness:   Edison Saldivar is a 78 year old male with past medical history of Sneddon syndrome, multiple CVAs/TIAs extensively evaluated at [facility], hypertension,  COPD/asthma, sarcoidosis, obesity with obstructive sleep apnea uses CPAP machine  recently moved to [place] from [place] admitted on January 17, 2019 with severe cough spells followed by hemoptysis.  One day prior to cough spells and hemoptysis he also had an episode of epistaxis which was self-limited.  He was chronically anticoagulated with warfarin and maintaining INR but  recently it was greater than 4 and adjusted the medication .  He was subtherapeutic for a while and at the time of admission INR was 2.1.  His hemoglobin dropped approximately 2 g and he was initially admitted to the intensive care unit, no source of bleed found on flexible nasal scope.  He was evaluated by pulmonologist.  INR was reversed and he is warfarin was held since January 17, 2019.  CT chest revealed left lower lobe consolidation/pneumonia ?  Mass.  He has no constitutional symptoms prior to this.  No recent travel.  No previous history of TB.  No exposure to people with tuberculosis.  No previous history of hemoptysis or epistaxis.  He was treated many years ago sarcoidosis and no issues since then.  Recent ionized calcium and  ACE level normal.    Reviewed admission CT scan no pulmonary embolism and no our take aneurysm or dissection.  There are patchy infiltrates in both lungs lower portion.  There was a small indeterminate lesion in the lower pole of right kidney, too small indeterminate left upper lobe lung nodules.  He is a former smoker quit many years ago.  No arthralgias, history of libido reticularis which has not changed.  Patient wife was present at bedside most of her history given by her.  In the past whenever he needed  anticoagulation interruption usually he was bridged with heparin or Lovenox.  He has a history of recurrent CVAs and TIAs .    Reviewed hospitalist and pulmonologist evaluation in the epic.             Past Medical History:   Sarcoidosis  Snedden ssyndrome  Livedoreticularis  KORY  Obesity   Hx of  CVA  COPD            Past Surgical History:     S/p left knee surgery         Home Medications:     Prior to Admission medications    Medication Sig Last Dose Taking? Auth Provider   albuterol (PROAIR HFA/PROVENTIL HFA/VENTOLIN HFA) 108 (90 Base) MCG/ACT inhaler Inhale 2 puffs into the lungs every 4 hours as needed for shortness of breath / dyspnea or wheezing  Yes Kev Meza MD   amLODIPine (NORVASC) 2.5 MG tablet Take 2.5 mg by mouth every morning 1/17/2019 at AM Yes Unknown, Entered By History   amoxicillin-clavulanate (AUGMENTIN) 875-125 MG tablet Take 1 tablet by mouth 2 times daily for 3 days  Yes Kev Meza MD   benzonatate (TESSALON) 200 MG capsule Take 1 capsule (200 mg) by mouth 3 times daily as needed for cough  Yes Kev Meza MD   brimonidine-timolol (COMBIGAN) 0.2-0.5 % ophthalmic solution Place 1 drop into both eyes every morning 1/17/2019 at AM Yes Unknown, Entered By History   brinzolamide (AZOPT) 1 % ophthalmic suspension Place 1 drop into both eyes every morning 1/17/2019 at AM Yes Unknown, Entered By History   CALCIUM-MAGNESIUM-ZINC PO Take 1 tablet by mouth every morning 100/40/15  Yes Unknown, Entered By History   co-enzyme Q-10 100 MG CAPS capsule Take 100 mg by mouth every morning 1/17/2019 at AM Yes Unknown, Entered By History   docusate sodium (COLACE) 100 MG capsule Take 100 mg by mouth every evening 1/16/2019 at PM Yes Unknown, Entered By History   esomeprazole (NEXIUM) 40 MG DR capsule Take 40 mg by mouth every morning (before breakfast) Take 30-60 minutes before eating. 1/17/2019 at AM Yes Unknown, Entered By History   Fluticasone-Umeclidin-Vilanterol (TRELEGY ELLIPTA) 100-62.5-25 MCG/INH oral inhaler Inhale 1 puff into the lungs daily  Yes Kev Meza MD   furosemide (LASIX) 40 MG tablet Take 1 tablet (40 mg) by mouth daily  Yes Kev Meza MD   guaiFENesin (MUCINEX) 600 MG 12 hr tablet Take 1 tablet (600 mg) by  "mouth 2 times daily for 10 days  Yes Kev Meza MD   latanoprost (XALATAN) 0.005 % ophthalmic solution Place 1 drop into both eyes At Bedtime 1/16/2019 at HS Yes Unknown, Entered By History   Magnesium Oxide 250 MG TABS Take 500 mg by mouth daily 1/17/2019 at AM Yes Unknown, Entered By History   mirabegron (MYRBETRIQ) 50 MG 24 hr tablet Take 50 mg by mouth every morning 1/17/2019 at AM Yes Unknown, Entered By History   multivitamin w/minerals (THERA-VIT-M) tablet Take 1 tablet by mouth every morning 1/17/2019 at AM Yes Unknown, Entered By History   vitamin B-12 (CYANOCOBALAMIN) 1000 MCG tablet Take 1,000 mcg by mouth every morning 1/17/2019 at AM Yes Unknown, Entered By History   vitamin D3 (CHOLECALCIFEROL) 2000 units tablet Take 2,000 Units by mouth daily 1/17/2019 at AM Yes Unknown, Entered By History            Current Medications:           amLODIPine  5 mg Oral QAM     brimonidine-timolol  1 drop Both Eyes QAM     brinzolamide  1 drop Both Eyes QAM     docusate sodium  100 mg Oral QPM     furosemide  40 mg Oral Daily     guaiFENesin  600 mg Oral BID     latanoprost  1 drop Both Eyes At Bedtime     mirabegron  50 mg Oral QAM     pantoprazole  40 mg Oral QAM AC     umeclidinium  1 puff Inhalation QAM     albuterol, benzonatate, bisacodyl, hydrALAZINE, naloxone, ondansetron **OR** ondansetron, senna-docusate **OR** senna-docusate         Allergies:   No Known Allergies         Social History:   Edison Saldivar is a former smoker quit in 1989            Family History:   Reviewed , negative          Review of Systems:   The 10 point Review of Systems is negative other than noted in the HPI.             Physical Exam:   Heart Rate: 77, Blood pressure 147/75, pulse 78, temperature 97.8  F (36.6  C), temperature source Oral, resp. rate 20, height 1.702 m (5' 7\"), weight 112 kg (247 lb), SpO2 90 %.  247 lbs 0 oz    Constitutional: negative   Psych: Normal affect and mood   Neuro: AAOX 3 this am, residual " left sided weakness from previous CVA unchanged    Eyes: PERRLA, EOMI    ENT: Negative    Lymph: No palpable LN   Cardiovascular: RRR, 2-3/AXEL at Aortic valve area    Lungs: CTA    Abdomen: Soft, obese, positive NBS   Skin: Diffuse levidoreticularis changes   Musculoskeletal: Trace leg edema   Other:  Palpable pulses.          Data:   All new lab and imaging data was reviewed.    Results for orders placed or performed during the hospital encounter of 01/17/19   CT Chest Pulmonary Embolism w Contrast    Narrative    CT CHEST PULMONARY EMBOLISM WITH CONTRAST  1/17/2019 11:39 PM    HISTORY:  Shortness of breath; hemoptysis.    TECHNIQUE: Scans obtained from the apices through the diaphragm with  IV contrast, 79 mL Isovue-370 injected. Radiation dose for this scan  was reduced using automated exposure control, adjustment of the mA  and/or kV according to patient size, or iterative reconstruction  technique.    COMPARISON:  None.    FINDINGS:  Evaluation of the pulmonary arterial system shows no  evidence of embolus. The thoracic aorta is calcified and tortuous. No  aortic aneurysm or dissection. There are coronary artery  atherosclerotic calcifications. The heart size is normal. There are  multiple calcified lymph nodes in  the mediastinum and bilateral lauri.  No lymph node enlargement. There is a consolidation in the left lower  lobe medially which is likely pneumonia. Patchy infiltrates in  bilateral lung bases are also likely pneumonia. Mild pulmonary edema  is also a possibility. There are a few peripheral bands of scar or  atelectasis. Calcified granuloma in the right lower lobe. There are 2  small nodules in the left upper lobe laterally measuring up to 0.7 cm.  No pneumothorax or pleural effusion. Images through the upper abdomen  are remarkable for an indeterminant lesion at the lower pole of the  right kidney measuring 3.8 cm. This may be a high-density cyst. There  are cysts in the upper pole of the right  kidney. No upper abdominal  lymph node enlargement. The gallbladder is absent. The adrenal glands  appear normal. There is atherosclerotic calcification of the aorta and  its branches. Degenerative disease in the spine.      Impression    IMPRESSION:  1. There is no pulmonary embolus, aortic aneurysm or dissection.  2. Left lower lobe consolidation is most likely pneumonia. Follow-up  to resolution is recommended to rule out the possibility of a mass.  There are patchy infiltrates in bilateral lower lungs, also likely  pneumonia.  3. Old granulomatous disease.  4. Indeterminant lesion in the lower pole of the right kidney.  Follow-up recommended.  5. There are 2 small indeterminate left upper lobe lung nodules.    Recommendations for an incidental lung nodule = or > 6mm to 8mm:    Low risk patients: Initial follow-up CT at 6-12 months, then  consider CT at 18-24 months if no change.    High risk patients: Initial follow-up CT at 6-12 months, then CT at  18-24 months if no change.    *Low Risk: Minimal or absent history of smoking or other known risk  factors.  *Nonsolid (ground-glass) or partly solid nodules may require longer  follow-up to exclude indolent adenocarcinoma.  *Recommendations based on Guidelines for the Management of Incidental  Pulmonary Nodules Detected at CT: From the Fleischner Society 2017,  Radiology 2017.       TAZ RICE MD   CBC with platelets differential   Result Value Ref Range    WBC 7.3 4.0 - 11.0 10e9/L    RBC Count 4.24 (L) 4.4 - 5.9 10e12/L    Hemoglobin 13.0 (L) 13.3 - 17.7 g/dL    Hematocrit 39.3 (L) 40.0 - 53.0 %    MCV 93 78 - 100 fl    MCH 30.7 26.5 - 33.0 pg    MCHC 33.1 31.5 - 36.5 g/dL    RDW 14.3 10.0 - 15.0 %    Platelet Count 153 150 - 450 10e9/L    Diff Method Automated Method     % Neutrophils 58.6 %    % Lymphocytes 25.8 %    % Monocytes 7.2 %    % Eosinophils 7.0 %    % Basophils 1.0 %    % Immature Granulocytes 0.4 %    Nucleated RBCs 0 0 /100    Absolute  Neutrophil 4.3 1.6 - 8.3 10e9/L    Absolute Lymphocytes 1.9 0.8 - 5.3 10e9/L    Absolute Monocytes 0.5 0.0 - 1.3 10e9/L    Absolute Eosinophils 0.5 0.0 - 0.7 10e9/L    Absolute Basophils 0.1 0.0 - 0.2 10e9/L    Abs Immature Granulocytes 0.0 0 - 0.4 10e9/L    Absolute Nucleated RBC 0.0    INR   Result Value Ref Range    INR 2.16 (H) 0.86 - 1.14   Basic metabolic panel   Result Value Ref Range    Sodium 144 133 - 144 mmol/L    Potassium 3.6 3.4 - 5.3 mmol/L    Chloride 109 94 - 109 mmol/L    Carbon Dioxide 26 20 - 32 mmol/L    Anion Gap 9 3 - 14 mmol/L    Glucose 122 (H) 70 - 99 mg/dL    Urea Nitrogen 15 7 - 30 mg/dL    Creatinine 0.97 0.66 - 1.25 mg/dL    GFR Estimate 75 >60 mL/min/[1.73_m2]    GFR Estimate If Black 87 >60 mL/min/[1.73_m2]    Calcium 8.2 (L) 8.5 - 10.1 mg/dL   Nt probnp inpatient   Result Value Ref Range    N-Terminal Pro BNP Inpatient 125 0 - 1,800 pg/mL   Troponin I   Result Value Ref Range    Troponin I ES <0.015 0.000 - 0.045 ug/L   Procalcitonin   Result Value Ref Range    Procalcitonin <0.05 ng/ml   Glucose by meter   Result Value Ref Range    Glucose 122 (H) 70 - 99 mg/dL   Hemoglobin   Result Value Ref Range    Hemoglobin 11.3 (L) 13.3 - 17.7 g/dL   Hemoglobin   Result Value Ref Range    Hemoglobin 11.5 (L) 13.3 - 17.7 g/dL   Hepatic panel   Result Value Ref Range    Bilirubin Direct <0.1 0.0 - 0.2 mg/dL    Bilirubin Total 0.4 0.2 - 1.3 mg/dL    Albumin 2.7 (L) 3.4 - 5.0 g/dL    Protein Total 5.9 (L) 6.8 - 8.8 g/dL    Alkaline Phosphatase 55 40 - 150 U/L    ALT 19 0 - 70 U/L    AST 18 0 - 45 U/L   INR   Result Value Ref Range    INR 1.78 (H) 0.86 - 1.14   Basic metabolic panel   Result Value Ref Range    Sodium 143 133 - 144 mmol/L    Potassium 3.9 3.4 - 5.3 mmol/L    Chloride 110 (H) 94 - 109 mmol/L    Carbon Dioxide 26 20 - 32 mmol/L    Anion Gap 7 3 - 14 mmol/L    Glucose 119 (H) 70 - 99 mg/dL    Urea Nitrogen 22 7 - 30 mg/dL    Creatinine 0.93 0.66 - 1.25 mg/dL    GFR Estimate 79 >60  mL/min/[1.73_m2]    GFR Estimate If Black >90 >60 mL/min/[1.73_m2]    Calcium 8.0 (L) 8.5 - 10.1 mg/dL   Hemoglobin   Result Value Ref Range    Hemoglobin 11.7 (L) 13.3 - 17.7 g/dL   Hemoglobin   Result Value Ref Range    Hemoglobin 11.6 (L) 13.3 - 17.7 g/dL   Glucose by meter   Result Value Ref Range    Glucose 111 (H) 70 - 99 mg/dL   Hemoglobin   Result Value Ref Range    Hemoglobin 11.3 (L) 13.3 - 17.7 g/dL   Glucose by meter   Result Value Ref Range    Glucose 101 (H) 70 - 99 mg/dL   CBC with platelets differential   Result Value Ref Range    WBC 9.5 4.0 - 11.0 10e9/L    RBC Count 3.43 (L) 4.4 - 5.9 10e12/L    Hemoglobin 10.5 (L) 13.3 - 17.7 g/dL    Hematocrit 31.6 (L) 40.0 - 53.0 %    MCV 92 78 - 100 fl    MCH 30.6 26.5 - 33.0 pg    MCHC 33.2 31.5 - 36.5 g/dL    RDW 14.3 10.0 - 15.0 %    Platelet Count 141 (L) 150 - 450 10e9/L    Diff Method Automated Method     % Neutrophils 74.1 %    % Lymphocytes 13.0 %    % Monocytes 7.0 %    % Eosinophils 5.0 %    % Basophils 0.6 %    % Immature Granulocytes 0.3 %    Nucleated RBCs 0 0 /100    Absolute Neutrophil 7.0 1.6 - 8.3 10e9/L    Absolute Lymphocytes 1.2 0.8 - 5.3 10e9/L    Absolute Monocytes 0.7 0.0 - 1.3 10e9/L    Absolute Eosinophils 0.5 0.0 - 0.7 10e9/L    Absolute Basophils 0.1 0.0 - 0.2 10e9/L    Abs Immature Granulocytes 0.0 0 - 0.4 10e9/L    Absolute Nucleated RBC 0.0    Basic metabolic panel   Result Value Ref Range    Sodium 143 133 - 144 mmol/L    Potassium 3.6 3.4 - 5.3 mmol/L    Chloride 111 (H) 94 - 109 mmol/L    Carbon Dioxide 25 20 - 32 mmol/L    Anion Gap 7 3 - 14 mmol/L    Glucose 109 (H) 70 - 99 mg/dL    Urea Nitrogen 20 7 - 30 mg/dL    Creatinine 1.00 0.66 - 1.25 mg/dL    GFR Estimate 73 >60 mL/min/[1.73_m2]    GFR Estimate If Black 84 >60 mL/min/[1.73_m2]    Calcium 8.2 (L) 8.5 - 10.1 mg/dL   Ferritin   Result Value Ref Range    Ferritin 39 26 - 388 ng/mL   Calcium ionized whole blood (Add on or recollect)   Result Value Ref Range    Calcium  Ionized Whole Blood 4.7 4.4 - 5.2 mg/dL   Angiotensin converting enzyme   Result Value Ref Range    Angiotensin Converting Enzyme 23 9 - 67 U/L   Glucose by meter   Result Value Ref Range    Glucose 136 (H) 70 - 99 mg/dL   Glucose by meter   Result Value Ref Range    Glucose 112 (H) 70 - 99 mg/dL   Hemoglobin   Result Value Ref Range    Hemoglobin 10.6 (L) 13.3 - 17.7 g/dL   Glucose by meter   Result Value Ref Range    Glucose 104 (H) 70 - 99 mg/dL   Hemoglobin   Result Value Ref Range    Hemoglobin 10.4 (L) 13.3 - 17.7 g/dL   Glucose by meter   Result Value Ref Range    Glucose 120 (H) 70 - 99 mg/dL   Hemoglobin   Result Value Ref Range    Hemoglobin 9.8 (L) 13.3 - 17.7 g/dL   Glucose by meter   Result Value Ref Range    Glucose 168 (H) 70 - 99 mg/dL   Glucose by meter   Result Value Ref Range    Glucose 114 (H) 70 - 99 mg/dL   INR   Result Value Ref Range    INR 1.12 0.86 - 1.14   CBC with platelets   Result Value Ref Range    WBC 8.6 4.0 - 11.0 10e9/L    RBC Count 3.45 (L) 4.4 - 5.9 10e12/L    Hemoglobin 10.6 (L) 13.3 - 17.7 g/dL    Hematocrit 32.1 (L) 40.0 - 53.0 %    MCV 93 78 - 100 fl    MCH 30.7 26.5 - 33.0 pg    MCHC 33.0 31.5 - 36.5 g/dL    RDW 14.5 10.0 - 15.0 %    Platelet Count 154 150 - 450 10e9/L   Glucose by meter   Result Value Ref Range    Glucose 122 (H) 70 - 99 mg/dL   Hemoglobin   Result Value Ref Range    Hemoglobin 10.4 (L) 13.3 - 17.7 g/dL   Glucose by meter   Result Value Ref Range    Glucose 111 (H) 70 - 99 mg/dL   Hemoglobin   Result Value Ref Range    Hemoglobin 10.4 (L) 13.3 - 17.7 g/dL   Hemoglobin   Result Value Ref Range    Hemoglobin 10.2 (L) 13.3 - 17.7 g/dL   Hemoglobin   Result Value Ref Range    Hemoglobin 10.3 (L) 13.3 - 17.7 g/dL   Hemoglobin   Result Value Ref Range    Hemoglobin 10.1 (L) 13.3 - 17.7 g/dL   Basic metabolic panel   Result Value Ref Range    Sodium 139 133 - 144 mmol/L    Potassium 3.5 3.4 - 5.3 mmol/L    Chloride 107 94 - 109 mmol/L    Carbon Dioxide 25 20 - 32  mmol/L    Anion Gap 7 3 - 14 mmol/L    Glucose 129 (H) 70 - 99 mg/dL    Urea Nitrogen 13 7 - 30 mg/dL    Creatinine 0.98 0.66 - 1.25 mg/dL    GFR Estimate 75 >60 mL/min/[1.73_m2]    GFR Estimate If Black 86 >60 mL/min/[1.73_m2]    Calcium 8.3 (L) 8.5 - 10.1 mg/dL   Hemoglobin   Result Value Ref Range    Hemoglobin 10.0 (L) 13.3 - 17.7 g/dL   INR   Result Value Ref Range    INR 1.15 (H) 0.86 - 1.14   Pulmonology IP Consult: Patient to be seen: Routine - within 24 hours; hemoptysis; Consultant may enter orders: Yes    Narrative    Samantha Hernandez MD     1/19/2019 12:34 PM  Pulmonology Consultation      Edison Saldivar MRN# [id number]   YOB: 1940 Age: 78 year old   Date of Admission: 1/17/2019     Reason for consult: I was asked by Dr Barr to evaluate this   patient for second opinion on bronchoscopy for hemoptysis that   took place yesterday.            Assessment and Plan:   Massive hemoptysis  Aspiration of blood/pulmonary infiltrates  Acute anemia due to blood loss; hgb 13 with normal indices   dropped to 11 g while in ED last night.   Cough, started 1 day before.   Renal density, incidental finding on CT      Also: recent problems with INR; was up to 4.8 then down below 2.0   then up a little. Dose was being adjusted. Was 2.1 at   presentation.    Asthma, lifelong  Hx of Sarcoidosis in remission  COPD quit smoking 1989  FV Leiden mutation/Snedden's worked up at [place] on chronic   coumadin for 30 y  Hx of nosebleeds  Hx of multiple strokes  Hx of weakness due to strokes; uses walker and wc.   Hx of mild vascular dementia  Hx of normopressure hydrocephalus, unshunted due to risk of   bleeding  Hx of KORY on CPAP  Recently moved here 10/2018 from 25 y in [place] to be near   daughter.     PLAN:    He bled for hours last night and intubation for safe bronchoscopy   to locate active bleeding would have been required. ICU staff   opted to observe.  He is no longer actively bleeding, just going   to cough up fresh  clot most likely. If bleeding resumes,   intubation and scope to try to locate site of bleeding while in   the icu would be reasonable.  Suspect left side source since most   of aspirated blood is in left lower lobe. IR could do a coil   procedure with angiography if we localized the bleeding first.   (There is currently no good  way to stop bleeding with a   bronchoscope except temporarily with topical epi).  As he is not   presently bleeding, bronchoscopy will be deferred. There will be   considerable clot and he will likely cough this up over the next   couple of days.   Keep under observation for the next 24-48 h  Keep BP under control  Cough suppression at night.   Discuss with HEME whether he can safely resume coumadin in a few   days or should be on a different anticoagulant.   Intubate and scope for recurrence of massive hemoptysis.   Most cases are self limited.   Complete a course of antibiotics at 5 days. Doubt infiltrates are   pneumonia in this clinical setting--almost all of it is certainly   blood he aspirated--but he did have a cough for one day before   the hemoptysis started.   Check ACE and calcium, ferritin, serial hgb   Nebs for pulmonary toilet to make clots easy to cough up.   Might consider resuming anticoagulant next week sometime.   Will need follow up imaging in 2-3 weeks to see if aspirated   blood is being resolved. He may see Dr Durbin in the office in   [place] in 3 weeks. Infiltrates must be followed until cleared and   if area at left base does not resolve, would investigate further.     We will do PFTS in the office.   At discharge, consider changing his asthma/sarcoidosis/copd meds   from Spiriva alone to Trelegy, a better choice.     Renal lesion may also need eval.     Thank you for asking up to see him.     KRGromerMD  [phone]  [facility]                  Chief Complaint:   Hemoptysis yesterday    History is obtained from the patient and electronic  "health record   and chiefly from his wife of many years/caregiver         History of Present Illness:   This patient is a 78 year old male who presents with a complex   medical history of multiple strokes and small nosebleeds on   coumadin for 30 y due to FVLeiden mutation and NPH; his wife   gives most of his history very well. They recently moved her in   October and his INR was going up and down more than usual. It was   recently low and dose was increased. Day before admission, he was   \"coughing a lot\" nonproductively but was not ill, no fever,   sputum, chills, or malaise. Wife says he \"thought it was the   coffee\", of which he drinks over a pot per day. He complained of   a mild nosebleed.  He was fine during a social hour at 5 pm and   then a visit with daughter last night which ended about 7 pm. He   went to bathroom. He stayed there coughing a long time and wife   checked on him about 8 pm and found the bathroom covered in   blood. She called for help and he went to ED via 911. She reports   there were multiple cups worth of blood and it took her over an   hour to clean it up at 3 am last night. It was a frightening   amount of blood.   His Hgb was 3 g when he presented and has remained dropped to 11   g.Lost 2 g of hgb. His CT showed bibasilar infiltrates and   consolidation.   He has a normal wbc and no purulent sputum.   Active bleeding stopped after several hours in the ED. He got   afrin, INR was reversed, upper airway was examined with blood in   post pharynx. He was admitted to ICU over night and they opted   not to intubate to scope. He has not coughed up blood today until   during my interview when he coughed up a fresh looking BRB clot   in clear mucous about 2\" long just once. He has had no further   active bleeding since last night.               Past Medical History:   NPH  Vascular dementia, mild  Multiple strokes  Sarcoidosis diagnosed in 1990's, treated, never required repeat "   rx.  Lifelong asthma  Copd  Past smoking  FV Leiden mutation  Snedden's syndrome  Multiple strokes; requires walker and wc.   Obesity  KORY on CPAP  He follows with Neurology and Gastro and is looking for new   caregivers in this area.   Medically complex  Wife recalls he had a TB work up for something over 25 y ago when   she took the job in [employer] because he was in hosp at [place] when she   moved--possibly when sarcoidosis was diagnosed.        Past Surgical History:   No past surgical history on file.            Social History:     Social History     Tobacco Use     Smoking status: Not on file   Substance Use Topics     Alcohol use: Not on file      quit smoking 1989  Quit drinking for health reasons years ago    Lives with wife   She retired from a [employer] job.   Daughter lives here and another in [facility]>       Family History:   No family history on file.          Immunizations:     There is no immunization history on file for this patient.          Allergies:   No Known Allergies          Medications:       He takes spiriva and prn albuterol and prn duonebs at home for   asthma but no ICS>       Current Facility-Administered Medications Ordered in Epic   Medication Dose Route Frequency Last Rate Last Dose     amLODIPine (NORVASC) tablet 2.5 mg  2.5 mg Oral QAM   2.5 mg at   01/18/19 0939     [START ON 1/19/2019] azithromycin (ZITHROMAX) tablet 250 mg    250 mg Oral Daily         brimonidine-timolol (COMBIGAN) 0.2-0.5 % ophthalmic solution 1   drop  1 drop Both Eyes QAM   1 drop at 01/18/19 0943     brinzolamide (AZOPT) 1 % ophthalmic susp 1 drop  1 drop Both   Eyes QAM   1 drop at 01/18/19 0946     cefTRIAXone (ROCEPHIN) 1 g vial to attach to  mL bag for   ADULTS or NS 50 mL bag for PEDS  1 g Intravenous Q24H   1 g at   01/18/19 0940     docusate sodium (COLACE) capsule 100 mg  100 mg Oral QPM         hydrALAZINE (APRESOLINE) injection 10 mg  10 mg Intravenous Q4H   PRN   10 mg at 01/18/19 4748      ipratropium - albuterol 0.5 mg/2.5 mg/3 mL (DUONEB) neb   solution 3 mL  3 mL Nebulization Q4H PRN         latanoprost (XALATAN) 0.005 % ophthalmic solution 1 drop  1   drop Both Eyes At Bedtime         mirabegron (MYRBETRIQ) 24 hr tablet 50 mg  50 mg Oral QAM   50   mg at 01/18/19 0939     naloxone (NARCAN) injection 0.1-0.4 mg  0.1-0.4 mg Intravenous   Q2 Min PRN         ondansetron (ZOFRAN-ODT) ODT tab 4 mg  4 mg Oral Q6H PRN        Or     ondansetron (ZOFRAN) injection 4 mg  4 mg Intravenous Q6H PRN           pantoprazole (PROTONIX) EC tablet 40 mg  40 mg Oral QAM AC   40   mg at 01/18/19 0738     senna-docusate (SENOKOT-S/PERICOLACE) 8.6-50 MG per tablet 1   tablet  1 tablet Oral BID PRN        Or     senna-docusate (SENOKOT-S/PERICOLACE) 8.6-50 MG per tablet 2   tablet  2 tablet Oral BID PRN         umeclidinium (INCRUSE ELLIPTA) 62.5 MCG/INH inhaler 1 puff  1   puff Inhalation QAM   1 puff at 01/18/19 0945     No current [facility]-ordered outpatient medications on file.             Review of Systems:   The 5 point Review of Systems is negative other than noted in the   HPI            Physical Exam:     Vitals were reviewed  Patient Vitals for the past 24 hrs:   BP Temp Temp src Pulse Heart Rate Resp SpO2 Height Weight   01/18/19 1157 120/67 97.2  F (36.2  C) Oral -- 75 16 95 % -- --   01/18/19 1100 143/46 -- -- 70 69 23 96 % -- --   01/18/19 1024 (!) 183/95 -- -- -- 73 (!) 31 -- -- --   01/18/19 1000 158/82 -- -- 77 76 (!) 33 98 % -- --   01/18/19 0900 158/76 -- -- 82 82 21 98 % -- --   01/18/19 0800 194/83 97.9  F (36.6  C) Oral 88 90 (!) 35 97 % -- --     01/18/19 0700 132/70 -- -- 91 92 10 99 % -- --   01/18/19 0600 140/63 -- -- 85 81 28 -- -- --   01/18/19 0530 123/69 -- -- 89 86 29 99 % -- --   01/18/19 0500 134/68 -- -- 94 92 13 99 % -- --   01/18/19 0430 195/86 -- -- 101 105 (!) 38 95 % -- --   01/18/19 0400 151/69 97.6  F (36.4  C) Axillary 82 82 28 97 % -- --     01/18/19 0330 142/69 -- -- -- 84 26 96 %  "-- --   01/18/19 0315 141/68 -- -- -- 84 25 95 % -- --   01/18/19 0300 138/62 -- -- -- 85 25 95 % -- --   01/18/19 0245 128/59 -- -- -- 87 26 96 % -- --   01/18/19 0230 144/67 -- -- 92 93 26 97 % -- --   01/18/19 0215 141/68 -- -- 92 94 23 95 % -- --   01/18/19 0200 140/67 -- -- 93 93 23 97 % -- --   01/18/19 0145 131/79 -- -- 93 95 15 98 % -- --   01/18/19 0130 148/75 97.9  F (36.6  C) Axillary 98 101 22 98 % --   --   01/18/19 0115 (!) 180/108 -- -- 109 111 9 97 % -- --   01/18/19 0112 (!) 177/105 -- -- 107 102 20 97 % -- --   01/18/19 0109 (!) 185/91 -- -- 105 109 23 97 % -- --   01/18/19 0050 125/78 -- -- 70 84 (!) 38 97 % -- --   01/18/19 0045 125/78 -- -- -- 101 (!) 31 96 % -- --   01/18/19 0000 151/81 -- -- 114 -- -- 95 % -- --   01/17/19 2310 133/65 -- -- 101 101 19 94 % -- --   01/17/19 2300 113/79 -- -- 112 102 22 94 % -- --   01/17/19 2240 139/64 -- -- 93 94 22 -- -- --   01/17/19 2230 161/72 -- -- 101 94 20 -- -- --   01/17/19 2220 148/53 -- -- 98 91 22 -- -- --   01/17/19 2156 167/78 -- -- 98 92 18 91 % -- --   01/17/19 2154 (!) 175/93 -- -- -- 96 26 91 % -- --   01/17/19 2142 -- 99.6  F (37.6  C) Temporal -- 109 22 92 % 1.702 m   (5' 7\") 104.3 kg (230 lb)     Alert pleasant man nad ox3 but looks to wife to answer hx   questions.   Obese  Seated in chair upright on RA nad  Cough once, BRB clot about 2\" in clear mucous on washrag.   Decreased in lung bases no wheeze no tachypnea no rales  RRR  +edema  -CC  Drinking his second cup of coffee in an hour as we visited    Wife present, excellent historian.            Data:     Lab Results   Component Value Date    WBC 7.3 01/17/2019    HGB 11.7 (L) 01/18/2019    HGB 11.5 (L) 01/18/2019    HGB 11.3 (L) 01/18/2019    HCT 39.3 (L) 01/17/2019     01/17/2019     01/18/2019     01/17/2019     01/17/2019    POTASSIUM 3.9 01/18/2019    POTASSIUM 3.5 01/17/2019    POTASSIUM 3.6 01/17/2019    CHLORIDE 110 (H) 01/18/2019    CHLORIDE 109 " 01/17/2019    CO2 26 01/18/2019    CO2 26 01/17/2019    BUN 22 01/18/2019    BUN 15 01/17/2019    CR 0.93 01/18/2019    CR 0.97 01/17/2019     (H) 01/18/2019     (H) 01/17/2019    NTBNPI 125 01/17/2019    TROPI <0.015 01/17/2019    AST 18 01/18/2019    ALT 19 01/18/2019    ALKPHOS 55 01/18/2019    BILITOTAL 0.4 01/18/2019    INR 1.78 (H) 01/18/2019    INR 2.16 (H) 01/17/2019     CT CHEST PULMONARY EMBOLISM WITH CONTRAST  1/17/2019 11:39 PM     HISTORY:  Shortness of breath; hemoptysis.     TECHNIQUE: Scans obtained from the apices through the diaphragm   with  IV contrast, 79 mL Isovue-370 injected. Radiation dose for this   scan  was reduced using automated exposure control, adjustment of the   mA  and/or kV according to patient size, or iterative reconstruction  technique.     COMPARISON:  None.     FINDINGS:  Evaluation of the pulmonary arterial system shows no  evidence of embolus. The thoracic aorta is calcified and   tortuous. No  aortic aneurysm or dissection. There are coronary artery  atherosclerotic calcifications. The heart size is normal. There   are  multiple calcified lymph nodes in  the mediastinum and bilateral   lauri.  No lymph node enlargement. There is a consolidation in the left   lower  lobe medially which is likely pneumonia. Patchy infiltrates in  bilateral lung bases are also likely pneumonia. Mild pulmonary   edema  is also a possibility. There are a few peripheral bands of scar   or  atelectasis. Calcified granuloma in the right lower lobe. There   are 2  small nodules in the left upper lobe laterally measuring up to   0.7 cm.  No pneumothorax or pleural effusion. Images through the upper   abdomen  are remarkable for an indeterminant lesion at the lower pole of   the  right kidney measuring 3.8 cm. This may be a high-density cyst.   There  are cysts in the upper pole of the right kidney. No upper   abdominal  lymph node enlargement. The gallbladder is absent. The adrenal    glands  appear normal. There is atherosclerotic calcification of the   aorta and  its branches. Degenerative disease in the spine.                                                                      IMPRESSION:  1. There is no pulmonary embolus, aortic aneurysm or dissection.  2. Left lower lobe consolidation is most likely pneumonia.   Follow-up  to resolution is recommended to rule out the possibility of a   mass.  There are patchy infiltrates in bilateral lower lungs, also   likely  pneumonia.  3. Old granulomatous disease.  4. Indeterminant lesion in the lower pole of the right kidney.  Follow-up recommended.  5. There are 2 small indeterminate left upper lobe lung nodules.     Recommendations for an incidental lung nodule = or > 6mm to 8mm:    Low risk patients: Initial follow-up CT at 6-12 months, then  consider CT at 18-24 months if no change.    High risk patients: Initial follow-up CT at 6-12 months, then   CT at  18-24 months if no change.     *Low Risk: Minimal or absent history of smoking or other known   risk  factors.  *Nonsolid (ground-glass) or partly solid nodules may require   longer  follow-up to exclude indolent adenocarcinoma.  *Recommendations based on Guidelines for the Management of   Incidental  Pulmonary Nodules Detected at CT: From the Fleischner Society   2017,  Radiology 2017.        TAZ RICE MD   Order-Level Documents:     There are no order-level documents.   Lab and Collection     CT Chest Pulmonary Embolism w Contrast (Order: [id number]) -   1/17/2019   Result History     CT Chest Pulmonary Embolism w Contrast (Order #[id number]) on   1/18/2019 - Order Result History Report - Result Edited   Result Information     Status: Final result (Exam End: 1/17/2019 11:39 PM)       All imaging studies reviewed by me.    Care Transition RN/MJ IP Consult    Narrative    Blaise Reardon LSW     1/22/2019  9:46 AM  MJ Consult Note:    D/I:  MJ Initial Consult Completed on 1/20/19.  See MJ  Note.     P: SW will continue to assist for discharge.    AMBER Carmen, LGSW       ISTAT electrolytes POCT   Result Value Ref Range    Sodium 143 133 - 144 mmol/L    Potassium 3.5 3.4 - 5.3 mmol/L    Hemoglobin 13.3 13.3 - 17.7 g/dL    Hematocrit - POCT 39 (L) 40.0 - 53.0 %PCV   Type and Screen (AM Draw)   Result Value Ref Range    ABO O     RH(D) Neg     Antibody Screen Neg     Test Valid Only At [facility]        Specimen Expires 01/21/2019    ABO and Rh   Result Value Ref Range    ABO O     RH(D) Neg     Specimen Expires 01/20/2019

## 2019-01-24 NOTE — PROGRESS NOTES
MJ  D:  Working with wife/daughter regarding discharge planning. Spoke with daughter this afternoon.  She requests Dr Lowery call her when he rounds on Friday.  We agreed we will talk again tomorrow after she speak with MD.

## 2019-01-24 NOTE — PLAN OF CARE
Pt is A&O x 3, disoriented to times. Calm and pleasant. VSS on RA, denied pain. Lung sound diminished. Denied SOB. Up x 1 to bathroom, frequently voids. Had medium soft to hard BM. Cardiac diet with good appetite. Discharge pending for TCU placement. Continue to monitor.

## 2019-01-24 NOTE — PLAN OF CARE
Discharge Planner PT   Patient plan for discharge: TCU, but open to home with HH PT/OT  Current status: Pt eating breakfast upon arrival but agreeable to PT.  Performed sit to/from stand transfers with SBA.  Gait training x 400 ft using wheeled walker and CGA.  Step length and velocity improving.  Good stability.  O2 sats were 90% following gait on RA.  Barriers to return to prior living situation: Current level of assist  Recommendations for discharge: Home with assist from spouse for household tasks and HH PT/OT per plan established by the PT.   Rationale for recommendations: Patient with improved mobility and ambulation overall. Patient would be safe to discharge back to independent living facility with spouse, once medically able, with assist from spouse for household tasks and continued skilled HH PT to further address functional limitations and improve strength, balance, and endurance.           Entered by: Esha Iniguez 01/24/2019 12:07 PM

## 2019-01-24 NOTE — PLAN OF CARE
Discharge Planner OT   Patient plan for discharge: thinking TCU   Current status: pt SBA sit to stand, amb with FWW SBA to/from bathroom with occasional cues needed for walker safety with keeping in front of self during transfers as pt pushes off to side, toilet transfer with RTS SBA, SBA standing EOS for ADLS. Joseline to doff/don depends over feet   Barriers to return to prior living situation: dec ax tolerance   Recommendations for discharge: home with spouse A with IADL's (shopping, cleaning, laundry, cooking) and bathing. HHOT per plan established by the Occupational Therapist  Rationale for recommendations: pt completing ADL's and transfers with SBA and FWW. Pt limited by dec activity tolerance but is steady on feet. Anticipate pt safe to discharge home with spouse A as needed.  Pt would benefit from home health OT as he requires assistive device and assist of another person to leave the home.          Entered by: Whitney Joiner 01/24/2019 7:37 AM

## 2019-01-24 NOTE — PHARMACY-ANTICOAGULATION SERVICE
Clinical Pharmacy - Warfarin Dosing Consult     Pharmacy has been consulted to manage this patient s warfarin therapy.  Indication: Other - specify in comments(hx of stroke)  Therapy Goal: INR 2-3  Warfarin Prior to Admission: Yes  Warfarin PTA Regimen: 10mg MWF; 7.5mg ROW     INR   Date Value Ref Range Status   01/23/2019 1.15 (H) 0.86 - 1.14 Final   01/20/2019 1.12 0.86 - 1.14 Final       Recommend warfarin 10 mg today.  Pharmacy will monitor Edison Saldivar daily and order warfarin doses to achieve specified goal.      Please contact pharmacy as soon as possible if the warfarin needs to be held for a procedure or if the warfarin goals change.

## 2019-01-24 NOTE — PROGRESS NOTES
Alert and Oriented. VSS. RA. BiPAP/CPAP on during the night. MONTALVO. Low fat diet. Ax1. No signs of bleeding. Spouse to bring compression socks today. Denies SOB, chest pain. Discharge pending TCU placement.

## 2019-01-24 NOTE — PROGRESS NOTES
[facility]    Hospitalist Progress Note    Date of Service (when I saw the patient): 01/24/2019    Assessment & Plan   Edison Saldivar is a 78 year old male with history including Sneddon syndrome with h/o CVI on warfarin, COPD, HTN and KORY, who presented 1/17 with hemoptysis.     Hemoptysis, unclear etiology, question pneumonia or lung mass with concomitant anticoagulation.  LLL consolidation/pneumonia vs mass.  Initially had an episode of epistaxis evening 1/17 that was mild and stopped on its own. This was followed by coughing up blood x 3 hours. Presented to ED 1/17 and continued to cough up blood. No evidence of continued epistaxis when evaluated by ED physician.  On initial evaluation 1/17, hgb 13.0, WBC normal, plts normal, INR 2.16, procalcitonin < 0.05. CT chest 1/17 showed LLL consolidation, PNA vs mass with some scattered patchy infiltrates with some indeterminate GEORGE nodules. Pt given vit K early am 1/18.   - Warfarin held.   - Started azithromycin + ceftriaxone for possible pneumonia but seems more likely to represent aspirated blood. Will treat for 5 days. Transitioned to Augmentin with last dose 1/23.   - ACE level normal at 27, ionized calcium normal at 4.7.   - No recurrent hemoptysis since admit.   - Pulmonology consulted, appreciate help (Would require intubation and bronchoscopy for any recurrent major bleeding.  Would possibly need IR coiling.)  - Will need follow up imaging in 3-4 weeks to evaluate for resolution of infiltrate.    - Follow up in Pulmonary clinic with Dr. Durbin in 4 weeks post CT.   - patient is at risk for recurrence of his hemoptysis if warfarin is restarted, especially if underlying lesion in the setting of sarcoid or mass. On the other hand, he has history of stroke when taken off the warfarin. We discussed the risks and benefits with the patient and his wife, and will ask vascular medicine to lend some of their expertise.  - he also endorses possible history  of PE as well, on possibly more than 1 occasion. Will attempt to get records from both [place] and [place]     Anemia, mild, suspect acute blood loss component from above.  Issues with hemoptysis as above.  - Management of hemoptysis as above.  - Monitor CBC.  - Hgb stable at 10.0.   - Consider prbc transfusion if hgb </= 7.0 or if significant bleeding with hemodynamic instability or if symptomatic.     Skin changes right forearm, suspect secondary to blood pressure cuff placement in the setting of livedo reticularis.  Pt with petechial type skin changes that developed on right forearm with blood pressure cuff placement.    - No signs of vascular compromise.      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  Pt with h/o Sneddon syndrome, which is a rare genetic disorder characterized by widespread livedo reticularis in association with stroke. Diagnosed at [facility]. Maintained on warfarin. Unclear if pt has h/o antiphospholipid antibodies (commonly associated with Sneddon syndrome). Warfarin held and given vit K on admit as above.  - Continue to hold warfarin for now  - wife states normally he is maintained on heparin while not taking the warfarin  - would like vascular medicine to consult in regards to anticoagulation moving forward given his lack of established outpatient providers.     Left upper lobe lung nodules.  Noted by CT as above 1/17. Hx smoking.  - Follow-up CT short term for above LLL consolidation; otherwise, would recommend repeat CT in 6-12mo, then 18-24 mo if no change for the GEORGE nodules.  - Follow-up with PCP and Pulm.      Hypertension (benign essential).  Possible h/o CHF.  [PTA: amlodipine 2.5 mg daily, furosemide 40 mg daily.].  Pt notes h/o heart murmur and issues with his left ventricle, details unclear.  - Continue amlodipine.  - Resumed PTA Lasix 1/21  - Increased PTA amlodipine to 5 mg 1/23 for better BP control.   - Continue PRN IV hydralazine.  - Monitor i/o's,  daily wts.     COPD.  Chronic, stable.   - Continue Incruse Ellipta, PRN inh/nebs.  - Trelegy recommended on discharge.      KORY.  - Continue CPAP at night     Diet: Low sodium  Prophylaxis: PCD's, ambulation.  Torres Catheter: not present  Code Status: Full Code       Disposition: Pending placement at TCU.       Wero LEWLaurie Reyes DO      Interval History   No acute overnight events. No recurrent hemoptysis since admit. No new complaints.     Discussed history of his Sneddon syndrome with his wife in detail. Frequent ministrokes, has needed heparin bridge when off the warfarin for minor strokes    -Data reviewed today: I reviewed all new labs and imaging results over the last 24 hours. I personally reviewed no images or EKG's today.    Physical Exam   Temp: 97.8  F (36.6  C) Temp src: Oral BP: 147/75 Pulse: 78 Heart Rate: 77 Resp: 20 SpO2: 90 % O2 Device: None (Room air)    Vitals:    01/17/19 2142 01/19/19 0500 01/24/19 0427   Weight: 104.3 kg (230 lb) 108.7 kg (239 lb 10.2 oz) 112 kg (247 lb)     Vital Signs with Ranges  Temp:  [97.8  F (36.6  C)-98.9  F (37.2  C)] 97.8  F (36.6  C)  Pulse:  [78] 78  Heart Rate:  [68-78] 77  Resp:  [16-20] 20  BP: (128-147)/(66-77) 147/75  FiO2 (%):  [30 %] 30 %  SpO2:  [90 %-97 %] 90 %  I/O last 3 completed shifts:  In: 960 [P.O.:960]  Out: -     Gen: Patient in no acute distress.  Appears comfortable.  Heart:  S1S2+, regular rate and rhythm, No murmurs.  Lungs:  Clear to auscultation at apices, no wheezing, no rales. Decreased at bases.    Abdomen:  Soft, non tender, non distended, bowel sounds positive.  Extremities:  B/l LE edema.    Medications       amLODIPine  5 mg Oral QAM     brimonidine-timolol  1 drop Both Eyes QAM     brinzolamide  1 drop Both Eyes QAM     docusate sodium  100 mg Oral QPM     furosemide  40 mg Oral Daily     guaiFENesin  600 mg Oral BID     latanoprost  1 drop Both Eyes At Bedtime     mirabegron  50 mg Oral QAM     pantoprazole  40 mg Oral QAM AC      umeclidinium  1 puff Inhalation QAM       Data   Recent Labs   Lab 01/23/19  1103 01/21/19  1242 01/21/19  0621  01/20/19  0603  01/19/19  0553  01/18/19  0530 01/18/19  0230  01/17/19  2140   WBC  --   --   --   --  8.6  --  9.5  --   --   --   --  7.3   HGB 10.0* 10.1* 10.3*   < > 10.6*   < > 10.5*   < > 11.5* 11.3*   < > 13.0*   MCV  --   --   --   --  93  --  92  --   --   --   --  93   PLT  --   --   --   --  154  --  141*  --   --   --   --  153   INR 1.15*  --   --   --  1.12  --   --   --  1.78*  --   --  2.16*     --   --   --   --   --  143  --  143  --    < > 144   POTASSIUM 3.5  --   --   --   --   --  3.6  --  3.9  --    < > 3.6   CHLORIDE 107  --   --   --   --   --  111*  --  110*  --   --  109   CO2 25  --   --   --   --   --  25  --  26  --   --  26   BUN 13  --   --   --   --   --  20  --  22  --   --  15   CR 0.98  --   --   --   --   --  1.00  --  0.93  --   --  0.97   ANIONGAP 7  --   --   --   --   --  7  --  7  --   --  9   JULIANNE 8.3*  --   --   --   --   --  8.2*  --  8.0*  --   --  8.2*   *  --   --   --   --   --  109*  --  119*  --   --  122*   ALBUMIN  --   --   --   --   --   --   --   --   --  2.7*  --   --    PROTTOTAL  --   --   --   --   --   --   --   --   --  5.9*  --   --    BILITOTAL  --   --   --   --   --   --   --   --   --  0.4  --   --    ALKPHOS  --   --   --   --   --   --   --   --   --  55  --   --    ALT  --   --   --   --   --   --   --   --   --  19  --   --    AST  --   --   --   --   --   --   --   --   --  18  --   --    TROPI  --   --   --   --   --   --   --   --   --   --   --  <0.015    < > = values in this interval not displayed.       No results found for this or any previous visit (from the past 24 hour(s)).

## 2019-01-24 NOTE — PLAN OF CARE
Vascular medicine consulted; cardiac echo completed results pending.  Starting on heparin drip now and restarting on warfarin.  Denies pain.  Up in cho, bathroom and chair with SBA/walker/GB.  Good intake.  VSS.  Nebs changed to prn; denies SOB.

## 2019-01-24 NOTE — PROGRESS NOTES
"BRIEF NUTRITION ASSESSMENT      REASON FOR ASSESSMENT:  LOS      CURRENT DIET AND INTAKE:  Diet:  LSF<2400 mg Na  Per flow sheets, eating 100% consistently.                ANTHROPOMETRICS:  Height: 5' 7\"  Weight: 112 kg  BMI: 38.69 kg/m2  IBW:  67.3 kg +/- 10%  Weight Status: Obesity Grade II BMI 35-39.9  Weight History: Deferred      LABS:  Labs noted      NUTRITION INTERVENTION:  Nutrition Diagnosis:  No nutrition diagnosis at this time.    Implementation:  Nutrition Education:  Per Provider order if indicated    FOLLOW UP/MONITORING:   Will re-evaluate in 7 - 10 days, or sooner, if re-consulted.      Trisha Moya RD  Pager [phone] (M-F)            [phone] (W/E & Hol)            "

## 2019-01-25 ENCOUNTER — APPOINTMENT (OUTPATIENT)
Dept: OCCUPATIONAL THERAPY | Facility: CLINIC | Age: 79
DRG: 194 | End: 2019-01-25
Payer: COMMERCIAL

## 2019-01-25 LAB
ANION GAP SERPL CALCULATED.3IONS-SCNC: 7 MMOL/L (ref 3–14)
BASOPHILS # BLD AUTO: 0.1 10E9/L (ref 0–0.2)
BASOPHILS NFR BLD AUTO: 0.6 %
BUN SERPL-MCNC: 14 MG/DL (ref 7–30)
CALCIUM SERPL-MCNC: 8.5 MG/DL (ref 8.5–10.1)
CARDIOLIPIN ANTIBODY IGG: <1.6 GPL-U/ML (ref 0–19.9)
CARDIOLIPIN ANTIBODY IGM: 1.7 MPL-U/ML (ref 0–19.9)
CHLORIDE SERPL-SCNC: 106 MMOL/L (ref 94–109)
CO2 SERPL-SCNC: 26 MMOL/L (ref 20–32)
CREAT SERPL-MCNC: 1.06 MG/DL (ref 0.66–1.25)
CRP SERPL-MCNC: 92.7 MG/L (ref 0–8)
DIFFERENTIAL METHOD BLD: ABNORMAL
EOSINOPHIL # BLD AUTO: 0.6 10E9/L (ref 0–0.7)
EOSINOPHIL NFR BLD AUTO: 6.3 %
ERYTHROCYTE [DISTWIDTH] IN BLOOD BY AUTOMATED COUNT: 14.1 % (ref 10–15)
ERYTHROCYTE [DISTWIDTH] IN BLOOD BY AUTOMATED COUNT: 14.3 % (ref 10–15)
ERYTHROCYTE [SEDIMENTATION RATE] IN BLOOD BY WESTERGREN METHOD: 108 MM/H (ref 0–20)
GFR SERPL CREATININE-BSD FRML MDRD: 68 ML/MIN/{1.73_M2}
GLUCOSE SERPL-MCNC: 109 MG/DL (ref 70–99)
HCT VFR BLD AUTO: 30.1 % (ref 40–53)
HCT VFR BLD AUTO: 30.3 % (ref 40–53)
HGB BLD-MCNC: 10.1 G/DL (ref 13.3–17.7)
HGB BLD-MCNC: 10.2 G/DL (ref 13.3–17.7)
IMM GRANULOCYTES # BLD: 0 10E9/L (ref 0–0.4)
IMM GRANULOCYTES NFR BLD: 0.3 %
INR PPP: 1.13 (ref 0.86–1.14)
LMWH PPP CHRO-ACNC: 0.15 IU/ML
LYMPHOCYTES # BLD AUTO: 1.2 10E9/L (ref 0.8–5.3)
LYMPHOCYTES NFR BLD AUTO: 13.7 %
MCH RBC QN AUTO: 30.7 PG (ref 26.5–33)
MCH RBC QN AUTO: 31.3 PG (ref 26.5–33)
MCHC RBC AUTO-ENTMCNC: 33.3 G/DL (ref 31.5–36.5)
MCHC RBC AUTO-ENTMCNC: 33.9 G/DL (ref 31.5–36.5)
MCV RBC AUTO: 92 FL (ref 78–100)
MCV RBC AUTO: 92 FL (ref 78–100)
MONOCYTES # BLD AUTO: 1 10E9/L (ref 0–1.3)
MONOCYTES NFR BLD AUTO: 11.1 %
MYELOPEROXIDASE AB SER-ACNC: <0.2 AI (ref 0–0.9)
NEUTROPHILS # BLD AUTO: 6 10E9/L (ref 1.6–8.3)
NEUTROPHILS NFR BLD AUTO: 68 %
NRBC # BLD AUTO: 0 10*3/UL
NRBC BLD AUTO-RTO: 0 /100
PLATELET # BLD AUTO: 197 10E9/L (ref 150–450)
PLATELET # BLD AUTO: 201 10E9/L (ref 150–450)
POTASSIUM SERPL-SCNC: 3.4 MMOL/L (ref 3.4–5.3)
PROTEINASE3 IGG SER-ACNC: <0.2 AI (ref 0–0.9)
RBC # BLD AUTO: 3.26 10E12/L (ref 4.4–5.9)
RBC # BLD AUTO: 3.29 10E12/L (ref 4.4–5.9)
RETICS # AUTO: 87 10E9/L (ref 25–95)
RETICS/RBC NFR AUTO: 2.7 % (ref 0.5–2)
SODIUM SERPL-SCNC: 139 MMOL/L (ref 133–144)
WBC # BLD AUTO: 7.9 10E9/L (ref 4–11)
WBC # BLD AUTO: 8.9 10E9/L (ref 4–11)

## 2019-01-25 PROCEDURE — 25000132 ZZH RX MED GY IP 250 OP 250 PS 637: Performed by: INTERNAL MEDICINE

## 2019-01-25 PROCEDURE — 85610 PROTHROMBIN TIME: CPT | Performed by: HOSPITALIST

## 2019-01-25 PROCEDURE — 86160 COMPLEMENT ANTIGEN: CPT | Performed by: INTERNAL MEDICINE

## 2019-01-25 PROCEDURE — 94660 CPAP INITIATION&MGMT: CPT

## 2019-01-25 PROCEDURE — 80048 BASIC METABOLIC PNL TOTAL CA: CPT | Performed by: HOSPITALIST

## 2019-01-25 PROCEDURE — 40000275 ZZH STATISTIC RCP TIME EA 10 MIN

## 2019-01-25 PROCEDURE — 85060 BLOOD SMEAR INTERPRETATION: CPT | Performed by: INTERNAL MEDICINE

## 2019-01-25 PROCEDURE — 40000847 ZZHCL STATISTIC MORPHOLOGY W/INTERP HISTOLOGY TC 85060: Performed by: INTERNAL MEDICINE

## 2019-01-25 PROCEDURE — 85025 COMPLETE CBC W/AUTO DIFF WBC: CPT | Performed by: INTERNAL MEDICINE

## 2019-01-25 PROCEDURE — 85027 COMPLETE CBC AUTOMATED: CPT | Performed by: HOSPITALIST

## 2019-01-25 PROCEDURE — 86038 ANTINUCLEAR ANTIBODIES: CPT | Performed by: HOSPITALIST

## 2019-01-25 PROCEDURE — 99232 SBSQ HOSP IP/OBS MODERATE 35: CPT | Performed by: HOSPITALIST

## 2019-01-25 PROCEDURE — 85652 RBC SED RATE AUTOMATED: CPT | Performed by: HOSPITALIST

## 2019-01-25 PROCEDURE — 12000000 ZZH R&B MED SURG/OB

## 2019-01-25 PROCEDURE — 85045 AUTOMATED RETICULOCYTE COUNT: CPT | Performed by: INTERNAL MEDICINE

## 2019-01-25 PROCEDURE — 97530 THERAPEUTIC ACTIVITIES: CPT | Mod: GO

## 2019-01-25 PROCEDURE — 36415 COLL VENOUS BLD VENIPUNCTURE: CPT | Performed by: HOSPITALIST

## 2019-01-25 PROCEDURE — 99233 SBSQ HOSP IP/OBS HIGH 50: CPT | Performed by: INTERNAL MEDICINE

## 2019-01-25 PROCEDURE — 25000128 H RX IP 250 OP 636: Performed by: HOSPITALIST

## 2019-01-25 PROCEDURE — 85520 HEPARIN ASSAY: CPT | Performed by: HOSPITALIST

## 2019-01-25 PROCEDURE — 86039 ANTINUCLEAR ANTIBODIES (ANA): CPT | Performed by: HOSPITALIST

## 2019-01-25 PROCEDURE — 97110 THERAPEUTIC EXERCISES: CPT | Mod: GO

## 2019-01-25 PROCEDURE — 86140 C-REACTIVE PROTEIN: CPT | Performed by: HOSPITALIST

## 2019-01-25 PROCEDURE — 40000133 ZZH STATISTIC OT WARD VISIT

## 2019-01-25 PROCEDURE — 36415 COLL VENOUS BLD VENIPUNCTURE: CPT | Performed by: INTERNAL MEDICINE

## 2019-01-25 PROCEDURE — 25000132 ZZH RX MED GY IP 250 OP 250 PS 637: Performed by: HOSPITALIST

## 2019-01-25 RX ORDER — WARFARIN SODIUM 10 MG/1
10 TABLET ORAL
Status: COMPLETED | OUTPATIENT
Start: 2019-01-25 | End: 2019-01-25

## 2019-01-25 RX ADMIN — FUROSEMIDE 40 MG: 40 TABLET ORAL at 08:48

## 2019-01-25 RX ADMIN — LATANOPROST 1 DROP: 50 SOLUTION OPHTHALMIC at 21:33

## 2019-01-25 RX ADMIN — GUAIFENESIN 600 MG: 600 TABLET, EXTENDED RELEASE ORAL at 08:48

## 2019-01-25 RX ADMIN — PANTOPRAZOLE SODIUM 40 MG: 40 TABLET, DELAYED RELEASE ORAL at 06:42

## 2019-01-25 RX ADMIN — BRINZOLAMIDE 1 DROP: 10 SUSPENSION/ DROPS OPHTHALMIC at 08:53

## 2019-01-25 RX ADMIN — AMLODIPINE BESYLATE 5 MG: 2.5 TABLET ORAL at 08:48

## 2019-01-25 RX ADMIN — BRIMONIDINE TARTRATE, TIMOLOL MALEATE 1 DROP: 2; 5 SOLUTION/ DROPS OPHTHALMIC at 08:52

## 2019-01-25 RX ADMIN — GUAIFENESIN 600 MG: 600 TABLET, EXTENDED RELEASE ORAL at 20:26

## 2019-01-25 RX ADMIN — UMECLIDINIUM 1 PUFF: 62.5 AEROSOL, POWDER ORAL at 08:54

## 2019-01-25 RX ADMIN — MIRABEGRON 50 MG: 50 TABLET, FILM COATED, EXTENDED RELEASE ORAL at 08:48

## 2019-01-25 RX ADMIN — HEPARIN SODIUM 1200 UNITS/HR: 10000 INJECTION, SOLUTION INTRAVENOUS at 13:45

## 2019-01-25 RX ADMIN — WARFARIN SODIUM 10 MG: 10 TABLET ORAL at 17:49

## 2019-01-25 RX ADMIN — Medication 3300 UNITS: at 17:46

## 2019-01-25 ASSESSMENT — ACTIVITIES OF DAILY LIVING (ADL)
ADLS_ACUITY_SCORE: 26

## 2019-01-25 ASSESSMENT — MIFFLIN-ST. JEOR: SCORE: 1762.75

## 2019-01-25 NOTE — PROGRESS NOTES
SW:  Asked [facility]'s to re-assess patient following vascular input.  Nataly, the Director  Of Admissions completed assessment and continues to decline patient feeling that patient does not meet skilled criteria for TCU based on PT and OT notes.  Updated wife who continues to disagree and wants TCU.  Patient was accepted by [facility] however the facility is out of the [employer] network.   [facility] is assessing patient.    Explained to wife that once we have a TCU which is accepting patient, the TCU then can request authorization from [facility].

## 2019-01-25 NOTE — PROGRESS NOTES
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     Hemoptysis, unclear etiology, question pneumonia or lung mass with concomitant anticoagulation.  LLL consolidation/pneumonia vs mass.  Factor V leiden  Initially had an episode of epistaxis evening 1/17 that was mild and stopped on its own. This was followed by coughing up blood x 3 hours. Presented to ED 1/17 and continued to cough up blood. No evidence of continued epistaxis when evaluated by ED physician.  On initial evaluation 1/17, hgb 13.0, WBC normal, plts normal, INR 2.16, procalcitonin < 0.05. CT chest 1/17 showed LLL consolidation, PNA vs mass with some scattered patchy infiltrates with some indeterminate GEORGE nodules. Pt given vit K early am 1/18 and warfar was held. Vascular medicine consulted on 1/24 to discuss risks/benefits of restarting warfarin given the sneddon syndrome and factor V leiden and heparin was restarted on 1/24  plan  - Warfarin held.   - completed course of antibiotics 7 days total (Ceftri/azithro to augmentin)  - ACE level normal at 27, ionized calcium normal at 4.7.   - Pulmonology consulted and signed off: no need for bronch, would require intubation  - Will need follow up CT of the chest in 3-4 weeks to evaluate for resolution of infiltrate.    - Follow up in Pulmonary clinic with Dr. Durbin in 4 weeks post CT.   - vascular medicine consulted: plan to continue heparin for 48 hours as bridge to warfarin  - given his frailty and inability of his wife to help care for him because of her own advanced health issues, recommend TCU on discharge     Anemia, mild, suspect acute blood loss component from above.  Issues with hemoptysis as above.  - Management of hemoptysis as above.  - Monitor CBC.  -  Hgb stable at 10.0.   - Consider prbc transfusion if hgb </= 7.0 or if significant bleeding with hemodynamic instability or if symptomatic.     Skin changes right forearm, suspect secondary to blood pressure cuff placement in the setting of livedo reticularis.  Pt with petechial type skin changes that developed on right forearm with blood pressure cuff placement.    - No signs of vascular compromise.      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  Pt with h/o Sneddon syndrome, which is a rare genetic disorder characterized by widespread livedo reticularis in association with stroke. Diagnosed at [facility]. Maintained on warfarin. Unclear if pt has h/o antiphospholipid antibodies (commonly associated with Sneddon syndrome). Warfarin held and given vit K on admit as above.  - restarted heparin and warfarin on 1/24 after being held for 1 week as per vascular medicine  - follow up on APLA, CELY as ordered by the vascular medicine team     Left upper lobe lung nodules.  Noted by CT as above 1/17. Hx smoking.  - Follow-up CT short term for above LLL consolidation; otherwise, would recommend repeat CT in 6-12mo, then 18-24 mo if no change for the GEORGE nodules.  - Follow-up with PCP and Pulm.      Hypertension (benign essential).  Possible h/o CHF.  [PTA: amlodipine 2.5 mg daily, furosemide 40 mg daily.].  Pt notes h/o heart murmur and issues with his left ventricle, details unclear.  - Continue amlodipine.  - Resumed PTA Lasix 1/21  - Increased PTA amlodipine to 5 mg 1/23 for better BP control.   - Continue PRN IV hydralazine.  - Monitor i/o's, daily wts.     COPD.  Chronic, stable.   - Continue Incruse Ellipta, PRN inh/nebs.  - Trelegy recommended on discharge.      KORY.  - Continue CPAP at night     Diet: Low sodium  Prophylaxis: PCD's, ambulation.  Torres Catheter: not present  Code Status: Full Code       Disposition: once shows tolerability       Wero Reyes DO      Interval History    vasc consulted, started heparin with bridge to warfarin to see if tolerates. Right now no more issues with hemoptysis while on the heparin. Warfarin started as well. Discussed risks of bleeding/clotting with patient, wife, and daughter with his ok. He would greatly benefit from discharge to TCU.    -Data reviewed today: I reviewed all new labs and imaging results over the last 24 hours. I personally reviewed no images or EKG's today.    Physical Exam   Temp: 98  F (36.7  C) Temp src: Oral BP: 154/82   Heart Rate: 79 Resp: 18 SpO2: 95 % O2 Device: BiPAP/CPAP    Vitals:    01/19/19 0500 01/24/19 0427 01/25/19 0648   Weight: 108.7 kg (239 lb 10.2 oz) 112 kg (247 lb) 107.4 kg (236 lb 12.8 oz)     Vital Signs with Ranges  Temp:  [97.9  F (36.6  C)-98  F (36.7  C)] 98  F (36.7  C)  Heart Rate:  [67-79] 79  Resp:  [18-20] 18  BP: (125-154)/(65-82) 154/82  SpO2:  [93 %-95 %] 95 %  I/O last 3 completed shifts:  In: 180 [P.O.:180]  Out: 75 [Urine:75]    Gen: Patient in no acute distress.  Appears comfortable.  Heart:  S1S2+, regular rate and rhythm, No murmurs.  Lungs:  Clear to auscultation at apices, no wheezing, no rales. Decreased at bases.    Abdomen:  Soft, non tender, non distended, bowel sounds positive.  Extremities:  B/l LE edema.    Medications     HEParin 1,200 Units/hr (01/25/19 1345)     Warfarin Therapy Reminder         amLODIPine  5 mg Oral QAM     brimonidine-timolol  1 drop Both Eyes QAM     brinzolamide  1 drop Both Eyes QAM     docusate sodium  100 mg Oral QPM     furosemide  40 mg Oral Daily     guaiFENesin  600 mg Oral BID     latanoprost  1 drop Both Eyes At Bedtime     mirabegron  50 mg Oral QAM     pantoprazole  40 mg Oral QAM AC     umeclidinium  1 puff Inhalation QAM     warfarin  10 mg Oral ONCE at 18:00       Data   Recent Labs   Lab 01/25/19  0832 01/24/19  1525 01/23/19  1103  01/20/19  0603  01/19/19  0553   WBC 7.9 8.2  --   --  8.6  --  9.5   HGB 10.1* 10.0* 10.0*   < > 10.6*   < > 10.5*    MCV 92 93  --   --  93  --  92    194  --   --  154  --  141*   INR 1.13 1.10 1.15*  --  1.12   < >  --      --  139  --   --   --  143   POTASSIUM 3.4  --  3.5  --   --   --  3.6   CHLORIDE 106  --  107  --   --   --  111*   CO2 26  --  25  --   --   --  25   BUN 14  --  13  --   --   --  20   CR 1.06  --  0.98  --   --   --  1.00   ANIONGAP 7  --  7  --   --   --  7   JULIANNE 8.5  --  8.3*  --   --   --  8.2*   *  --  129*  --   --   --  109*    < > = values in this interval not displayed.       No results found for this or any previous visit (from the past 24 hour(s)).

## 2019-01-25 NOTE — PROGRESS NOTES
A/Ox4. VSS. CPAP at night. Ax1. Low fat diet. Hep @ 12ml/hr. Hep10a <0.10; redraw in AM. Denies SOB, chest pain. Discharge pending TCU placement.

## 2019-01-25 NOTE — PLAN OF CARE
Pt is A&O x 4, forgetful, calm and cooperative for cares. VSS on RA, denied pain and SOB. Up x 1 assist to bathroom, voiding frequency. Heparin drip at 10 mL/hr. Hep 10A level jonah, result pending. Discharge pending in progress for  placement. Continue to monitor.

## 2019-01-25 NOTE — PLAN OF CARE
Discharge Planner OT   Patient plan for discharge: thinking TCU   Current status: Instructed on BUE strengthening exercises with red resistance banc x 12 reps each. Pt declined all ADL's as he did them this AM already. Ambulated in hallway x 6 minutes with fWW and SBA, no LOB. Emphasis on functional mobility within home environment. Spouse reports pt needs to go to TCU at discharge.   Barriers to return to prior living situation: dec ax tolerance   Recommendations for discharge: physically pt would be safe to discharge home with spouse A with IADL's (shopping, cleaning, laundry, cooking) and bathing. HHOT.   Rationale for recommendations: pt completing ADL's and transfers with SBA and FWW. Pt limited by dec activity tolerance but is steady on feet. Anticipate pt safe to discharge home with spouse A as needed.  Pt would benefit from home health OT as he requires assistive device and assist of another person to leave the home.  Per spouse, she reports pt will need TCU at discharge per her discussion with medical team.          Entered by: Irasema Benitez 01/25/2019 11:40 AM

## 2019-01-25 NOTE — SIGNIFICANT EVENT
Daughter reports patient has history of factor V leiden, unclear if homo or heterozygosity present    Await records from [place] and [place]

## 2019-01-25 NOTE — PROGRESS NOTES
[facility]    Vascular Medicine Progress Note    Date of Service (when I saw the patient): 01/25/2019     Assessment & Plan      Edison Saldivar is a 78 year old male who was admitted on 1/17/2019.     1. Hemoptysis , admitted initially  to ICU on 1/17/19  in the setting of long standing anticoagulation  ( more than 30 years with warfarin for multiple CVAs ) he developed severe cough before hemoptysis.  INR 2.1 at the time of admission.  Admission CT LLE consolidation/pneumonia ?? Mass. Small GEORGE nodules.( former smoker)  ( off anticoagulation since 1/17/19, reversed on admission, no more hemoptysis or epistaxis )     2. Hx of Epistaxis 24 hours before hemoptysis      3. 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 Hx of sarcoidosis:  (normal ACE level and ionized calcium this admission)     6.Hx of COPD ( former smoker)      7. HTN:     8.Aortic Stenosis,moderate  ( BAYRON 1.7cm2, mean gradient 27.9 mm hg, peak AoV pressure gradient 45.3 mm hg)        This is a very pleasant male who carries the diagnosis of Sneddon syndrome, history of sarcoidosis, COPD/asthma, hypertension, obesity with sleep apnea wears CPAP machine, history of recurrent CVA/TIAs taking chronic anticoagulation more than 30 years admitted with hemoptysis and also epistaxis requiring reversal of anticoagulation and holding warfarin for the last 1 week.  He is currently stable, no more hemoptysis or epistaxis last 6 days.  Hemoglobin stable.  He was treated with antibiotics for 5-6 days.  No more cough.  No constitutional symptoms.  No arthralgias.  Extensive evaluation done in the past  in [facility] and in [place]. (Unfortunately minimal records are available in [facility] everywhere from [facility]) no records from [place].     He denies any headache, vision problems.  Patient's wife is worried about holding anticoagulation and he has a history of  recurrent TIAs and strokes when he was off of anticoagulation.  Reviewed imaging studies and laboratory data in the epic.    Initiated IV heparin and warfarin 1/14/19, tolerating well .    Very High  and CRP 92.7   Vasculitis panel negative  CELY pending  ACLA negaative LA beta 2 GP abs pending  Eosinophils 7% on admission and recently 5%        Plan:  Continue IV heparin high intensity protocol  Continue warfarin  Check C4, C3, blood morphology  Follow pending labs  PT/OT   Follow pulmonary recommendations and treat COPD/asthma    Repeat CT scan per pulmonary recommendations and he will need follow-up with pulmonary clinic 2-3 weeks after discharge and he can get PFTs in the pulmonary office.    Good blood pressure control (avoid ACE inhibitor given his presentation which can cause cough 15-20% of the patient's)  He will benefit  Going to TCU given multiple issues, decreased ambulation, fall risk , morbid obesity on anticoagulation and his wife is small built with rheumatoid arthritis and other medical issues can not handle him at home( he is morbidly obese)  discussed with care team today    Patient care time spent 35 minutes today.    George Jhaveri MD,[facility]  Vascular Medicine    Interval History     Tolerating IV heparin, no more hemptysis of epistaxis, HGB stable  Very High ESR and CRP  Vasculitis panel negative  ACLA negative.    Physical Exam   Temp: 98  F (36.7  C) Temp src: Oral BP: 154/82   Heart Rate: 79 Resp: 18 SpO2: 95 % O2 Device: BiPAP/CPAP    Vitals:    01/19/19 0500 01/24/19 0427 01/25/19 0648   Weight: 108.7 kg (239 lb 10.2 oz) 112 kg (247 lb) 107.4 kg (236 lb 12.8 oz)     Vital Signs with Ranges  Temp:  [97.9  F (36.6  C)-98  F (36.7  C)] 98  F (36.7  C)  Heart Rate:  [67-79] 79  Resp:  [18-20] 18  BP: (125-154)/(65-82) 154/82  SpO2:  [93 %-95 %] 95 %  I/O last 3 completed shifts:  In: 780 [P.O.:780]  Out: 75 [Urine:75]    Constitutional: Awake, alert, cooperative, no apparent  distress, and appears stated age.  Eyes: Lids and lashes normal, pupils equal, round and reactive to light, extra ocular muscles intact, sclera clear, conjunctiva normal.  ENT: Normocephalic, without obvious abnormality  Respiratory: No increased work of breathing, good air exchange, clear to auscultation bilaterally, no crackles or wheezing.  Cardiovascular: Normal apical impulse, regular rate and rhythm, normal S1 and S2, no S3 or S4, and no murmur noted.  GI:  normal bowel sounds, soft, non-distended, non-tender, no masses palpated, no hepatosplenomegaly.  Lymph/Hematologic: No cervical lymphadenopathy and no supraclavicular lymphadenopathy.  Genitourinary:  negative  Skin:Livido reticularis skin lesions unchanged.  Musculoskeletal: trace leg edema  Neurologic: Awake, alert, oriented to name, place and time.  Cranial nerves II-XII are grossly intact.  Motor is 5 out of 5 bilaterally.   Neuropsychiatric: Calm, normal eye contact, alert, normal affect, oriented to self, place, time and situation, memory for past and recent events intact and thought process normal.  Pulses:  palpable pulses    Medications     HEParin 1,200 Units/hr (01/25/19 1345)     Warfarin Therapy Reminder         amLODIPine  5 mg Oral QAM     brimonidine-timolol  1 drop Both Eyes QAM     brinzolamide  1 drop Both Eyes QAM     docusate sodium  100 mg Oral QPM     furosemide  40 mg Oral Daily     guaiFENesin  600 mg Oral BID     latanoprost  1 drop Both Eyes At Bedtime     mirabegron  50 mg Oral QAM     pantoprazole  40 mg Oral QAM AC     umeclidinium  1 puff Inhalation QAM     warfarin  10 mg Oral ONCE at 18:00       Data   Recent Labs   Lab 01/25/19  0832 01/24/19  1525 01/23/19  1103  01/20/19  0603  01/19/19  0553   WBC 7.9 8.2  --   --  8.6  --  9.5   HGB 10.1* 10.0* 10.0*   < > 10.6*   < > 10.5*   MCV 92 93  --   --  93  --  92    194  --   --  154  --  141*   INR 1.13 1.10 1.15*  --  1.12   < >  --      --  139  --   --   --   143   POTASSIUM 3.4  --  3.5  --   --   --  3.6   CHLORIDE 106  --  107  --   --   --  111*   CO2 26  --  25  --   --   --  25   BUN 14  --  13  --   --   --  20   CR 1.06  --  0.98  --   --   --  1.00   ANIONGAP 7  --  7  --   --   --  7   JULIANNE 8.5  --  8.3*  --   --   --  8.2*   *  --  129*  --   --   --  109*    < > = values in this interval not displayed.

## 2019-01-26 ENCOUNTER — APPOINTMENT (OUTPATIENT)
Dept: OCCUPATIONAL THERAPY | Facility: CLINIC | Age: 79
DRG: 194 | End: 2019-01-26
Payer: COMMERCIAL

## 2019-01-26 ENCOUNTER — APPOINTMENT (OUTPATIENT)
Dept: PHYSICAL THERAPY | Facility: CLINIC | Age: 79
DRG: 194 | End: 2019-01-26
Payer: COMMERCIAL

## 2019-01-26 LAB
B2 GLYCOPROT1 IGG SERPL IA-ACNC: 0.9 U/ML
B2 GLYCOPROT1 IGM SERPL IA-ACNC: 1.3 U/ML
INR PPP: 1.21 (ref 0.86–1.14)
LMWH PPP CHRO-ACNC: 0.25 IU/ML
LMWH PPP CHRO-ACNC: 0.54 IU/ML

## 2019-01-26 PROCEDURE — 25000132 ZZH RX MED GY IP 250 OP 250 PS 637: Performed by: HOSPITALIST

## 2019-01-26 PROCEDURE — 25000132 ZZH RX MED GY IP 250 OP 250 PS 637: Performed by: INTERNAL MEDICINE

## 2019-01-26 PROCEDURE — 85610 PROTHROMBIN TIME: CPT | Performed by: HOSPITALIST

## 2019-01-26 PROCEDURE — 36415 COLL VENOUS BLD VENIPUNCTURE: CPT | Performed by: INTERNAL MEDICINE

## 2019-01-26 PROCEDURE — 36415 COLL VENOUS BLD VENIPUNCTURE: CPT | Performed by: HOSPITALIST

## 2019-01-26 PROCEDURE — 97110 THERAPEUTIC EXERCISES: CPT | Mod: GP

## 2019-01-26 PROCEDURE — 25000128 H RX IP 250 OP 636: Performed by: HOSPITALIST

## 2019-01-26 PROCEDURE — 40000133 ZZH STATISTIC OT WARD VISIT: Performed by: OCCUPATIONAL THERAPIST

## 2019-01-26 PROCEDURE — 99232 SBSQ HOSP IP/OBS MODERATE 35: CPT | Performed by: HOSPITALIST

## 2019-01-26 PROCEDURE — 97116 GAIT TRAINING THERAPY: CPT | Mod: GP

## 2019-01-26 PROCEDURE — 40000193 ZZH STATISTIC PT WARD VISIT

## 2019-01-26 PROCEDURE — 99233 SBSQ HOSP IP/OBS HIGH 50: CPT | Performed by: INTERNAL MEDICINE

## 2019-01-26 PROCEDURE — 12000000 ZZH R&B MED SURG/OB

## 2019-01-26 PROCEDURE — 40000275 ZZH STATISTIC RCP TIME EA 10 MIN

## 2019-01-26 PROCEDURE — 97110 THERAPEUTIC EXERCISES: CPT | Mod: GO | Performed by: OCCUPATIONAL THERAPIST

## 2019-01-26 PROCEDURE — 85520 HEPARIN ASSAY: CPT | Performed by: HOSPITALIST

## 2019-01-26 PROCEDURE — 94660 CPAP INITIATION&MGMT: CPT

## 2019-01-26 PROCEDURE — 85520 HEPARIN ASSAY: CPT | Performed by: INTERNAL MEDICINE

## 2019-01-26 RX ORDER — WARFARIN SODIUM 10 MG/1
10 TABLET ORAL
Status: COMPLETED | OUTPATIENT
Start: 2019-01-26 | End: 2019-01-26

## 2019-01-26 RX ADMIN — PANTOPRAZOLE SODIUM 40 MG: 40 TABLET, DELAYED RELEASE ORAL at 06:40

## 2019-01-26 RX ADMIN — GUAIFENESIN 600 MG: 600 TABLET, EXTENDED RELEASE ORAL at 08:00

## 2019-01-26 RX ADMIN — AMLODIPINE BESYLATE 5 MG: 2.5 TABLET ORAL at 08:00

## 2019-01-26 RX ADMIN — MIRABEGRON 50 MG: 50 TABLET, FILM COATED, EXTENDED RELEASE ORAL at 08:00

## 2019-01-26 RX ADMIN — DOCUSATE SODIUM 100 MG: 100 CAPSULE, LIQUID FILLED ORAL at 21:17

## 2019-01-26 RX ADMIN — FUROSEMIDE 40 MG: 40 TABLET ORAL at 08:04

## 2019-01-26 RX ADMIN — BRINZOLAMIDE 1 DROP: 10 SUSPENSION/ DROPS OPHTHALMIC at 08:00

## 2019-01-26 RX ADMIN — HEPARIN SODIUM 1750 UNITS/HR: 10000 INJECTION, SOLUTION INTRAVENOUS at 16:07

## 2019-01-26 RX ADMIN — WARFARIN SODIUM 10 MG: 10 TABLET ORAL at 18:02

## 2019-01-26 RX ADMIN — BRIMONIDINE TARTRATE, TIMOLOL MALEATE 1 DROP: 2; 5 SOLUTION/ DROPS OPHTHALMIC at 08:01

## 2019-01-26 RX ADMIN — Medication 2500 UNITS: at 02:54

## 2019-01-26 RX ADMIN — LATANOPROST 1 DROP: 50 SOLUTION OPHTHALMIC at 21:20

## 2019-01-26 RX ADMIN — GUAIFENESIN 600 MG: 600 TABLET, EXTENDED RELEASE ORAL at 21:17

## 2019-01-26 RX ADMIN — HEPARIN SODIUM 1750 UNITS/HR: 10000 INJECTION, SOLUTION INTRAVENOUS at 02:58

## 2019-01-26 ASSESSMENT — ACTIVITIES OF DAILY LIVING (ADL)
ADLS_ACUITY_SCORE: 26

## 2019-01-26 ASSESSMENT — MIFFLIN-ST. JEOR: SCORE: 1766.63

## 2019-01-26 NOTE — PLAN OF CARE
Afebrile, no pain. Up in the chair-assist of one to the bathroom. Large formed BM. Frequently voiding. Good appetite. Hep drip continued-tolerating. Hep drip adjusted. Received coumadin. No signs of bleeding. Wife at the bedside.

## 2019-01-26 NOTE — PROGRESS NOTES
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     Hemoptysis, unclear etiology, question pneumonia or lung mass with concomitant anticoagulation.  LLL consolidation/pneumonia vs mass.  Factor V leiden  Initially had an episode of epistaxis evening 1/17 that was mild and stopped on its own. This was followed by coughing up blood x 3 hours. Presented to ED 1/17 and continued to cough up blood. No evidence of continued epistaxis when evaluated by ED physician.  On initial evaluation 1/17, hgb 13.0, WBC normal, plts normal, INR 2.16, procalcitonin < 0.05. CT chest 1/17 showed LLL consolidation, PNA vs mass with some scattered patchy infiltrates with some indeterminate GEORGE nodules. Pt given vit K early am 1/18 and warfar was held. Vascular medicine consulted on 1/24 to discuss risks/benefits of restarting warfarin given the sneddon syndrome and factor V leiden and heparin was restarted on 1/24  plan  - Warfarin held.   - completed course of antibiotics 7 days total (Ceftri/azithro to augmentin)  - ACE level normal at 27, ionized calcium normal at 4.7.   - Pulmonology consulted and signed off: no need for bronch, would require intubation  - Will need follow up CT of the chest in 3-4 weeks to evaluate for resolution of infiltrate.    - Follow up in Pulmonary clinic with Dr. Durbin in 4 weeks post CT.   - vascular medicine consulted: plan to continue heparin for 48 hours as bridge to warfarin  - given his frailty and inability of his wife to help care for him because of her own advanced health issues, recommend TCU on discharge. Appreciate looking into placement     Anemia, mild, suspect acute blood loss component from above.  Issues with hemoptysis as above.  - Management of  hemoptysis as above.  - Monitor CBC.  - Hgb stable at 10.0.   - Consider prbc transfusion if hgb </= 7.0 or if significant bleeding with hemodynamic instability or if symptomatic.     Skin changes right forearm, suspect secondary to blood pressure cuff placement in the setting of livedo reticularis.  Pt with petechial type skin changes that developed on right forearm with blood pressure cuff placement.    - No signs of vascular compromise.      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  Pt with h/o Sneddon syndrome, which is a rare genetic disorder characterized by widespread livedo reticularis in association with stroke. Diagnosed at [facility]. Maintained on warfarin. Unclear if pt has h/o antiphospholipid antibodies (commonly associated with Sneddon syndrome). Warfarin held and given vit K on admit as above.  - restarted heparin and warfarin on 1/24 after being held for 1 week as per vascular medicine  - follow up on APLA, CELY as ordered by the vascular medicine team  - lovenox when okayed by vascular     Left upper lobe lung nodules.  Noted by CT as above 1/17. Hx smoking.  - Follow-up CT short term for above LLL consolidation; otherwise, would recommend repeat CT in 6-12mo, then 18-24 mo if no change for the GEORGE nodules.  - Follow-up with PCP and Pulm.      Hypertension (benign essential).  Possible h/o CHF.  [PTA: amlodipine 2.5 mg daily, furosemide 40 mg daily.].  Pt notes h/o heart murmur and issues with his left ventricle, details unclear.  - Continue amlodipine.  - Resumed PTA Lasix 1/21  - Increased PTA amlodipine to 5 mg 1/23 for better BP control.   - Continue PRN IV hydralazine.  - Monitor i/o's, daily wts.     COPD.  Chronic, stable.   - Continue Incruse Ellipta, PRN inh/nebs.  - Trelegy recommended on discharge.      KORY.  - Continue CPAP at night     Diet: Low sodium  Prophylaxis: PCD's, ambulation.  Torres Catheter: not present  Code Status: Full Code       Disposition: once  shows tolerability       Wero Reyes DO      Interval History   Feeling well, no hemoptysis, no shortness of breath, no chest pain    -Data reviewed today: I reviewed all new labs and imaging results over the last 24 hours. I personally reviewed no images or EKG's today.    Physical Exam   Temp: 97.7  F (36.5  C) Temp src: Oral BP: 160/78   Heart Rate: 83 Resp: 18 SpO2: 93 % O2 Device: None (Room air)    Vitals:    01/24/19 0427 01/25/19 0648 01/26/19 0606   Weight: 112 kg (247 lb) 107.4 kg (236 lb 12.8 oz) 107.8 kg (237 lb 10.5 oz)     Vital Signs with Ranges  Temp:  [97.2  F (36.2  C)-98.5  F (36.9  C)] 97.7  F (36.5  C)  Heart Rate:  [67-83] 83  Resp:  [18] 18  BP: (112-160)/(63-78) 160/78  SpO2:  [93 %-96 %] 93 %  I/O last 3 completed shifts:  In: 960 [P.O.:960]  Out: 350 [Urine:350]    Gen: Patient in no acute distress.  Appears comfortable.  Heart:  S1S2+, regular rate and rhythm, No murmurs.  Lungs:  Clear to auscultation at apices, no wheezing, no rales. Decreased at bases.    Abdomen:  Soft, non tender, non distended, bowel sounds positive.  Extremities:  B/l LE edema.    Medications     HEParin 1,750 Units/hr (01/26/19 0258)     Warfarin Therapy Reminder         amLODIPine  5 mg Oral QAM     brimonidine-timolol  1 drop Both Eyes QAM     brinzolamide  1 drop Both Eyes QAM     docusate sodium  100 mg Oral QPM     furosemide  40 mg Oral Daily     guaiFENesin  600 mg Oral BID     latanoprost  1 drop Both Eyes At Bedtime     mirabegron  50 mg Oral QAM     pantoprazole  40 mg Oral QAM AC     umeclidinium  1 puff Inhalation QAM     warfarin  10 mg Oral ONCE at 18:00       Data   Recent Labs   Lab 01/26/19  0805 01/25/19  1713 01/25/19  0832 01/24/19  1525 01/23/19  1103   WBC  --  8.9 7.9 8.2  --    HGB  --  10.2* 10.1* 10.0* 10.0*   MCV  --  92 92 93  --    PLT  --  201 197 194  --    INR 1.21*  --  1.13 1.10 1.15*   NA  --   --  139  --  139   POTASSIUM  --   --  3.4  --  3.5   CHLORIDE  --   --  106   --  107   CO2  --   --  26  --  25   BUN  --   --  14  --  13   CR  --   --  1.06  --  0.98   ANIONGAP  --   --  7  --  7   JULIANNE  --   --  8.5  --  8.3*   GLC  --   --  109*  --  129*       No results found for this or any previous visit (from the past 24 hour(s)).

## 2019-01-26 NOTE — PLAN OF CARE
Discharge Planner PT   Patient plan for discharge: TCU   Current status: Patient sitting in chair upon arrival of therapist, agreeable to working with PT. Sit<>stand throughout session with FWW and SBA. Patient ambulated 200 feet with use of FWW and supervision. Patient with decreased step length bilaterally but no overt LOB or unsteadiness noted. Patient with improved tolerance to activity this date. Completed standing exercises x 10 reps with FWW for support, patient tolerating well. Patient used restroom during session, needed min A for management of briefs. Patient in chair at end of session with all needs in reach and chair alarmon.   Barriers to return to prior living situation: decreased tolerance to activity, current level of assist   Recommendations for discharge: Home with HH PT  Rationale for recommendations: Patient mobilizing well with use of FWW and supervision in halls for safety; no overt LOB or unsteadiness noted with ambulation or mobility this date. Patient safe to discharge home with assist from spouse for household tasks as needed and continued skilled HH PT to further improve strength and independence with mobility and ambulation.        Entered by: Gretta Hernandez 01/26/2019 11:14 AM

## 2019-01-26 NOTE — PLAN OF CARE
Pt A&Ox4, forgetful at times.  VSS on RA.  Denies pain.  +2 BLE edema.  Up w/A1 and walker.  Voiding frequently.  Heparin infusing at 17.5mL/hr.  Recheck at 0900.  Cardiac diet.  CPAP on at NOC.  Nursing to continue to monitor.

## 2019-01-26 NOTE — PLAN OF CARE
Afebrile, no pain. Fall risk- up in the chair. Frequently voiding. Clear lung sounds, no cough. No signs of bleeding. Good appetite. Calls appropriately. On heparin drip. Receiving coumadin. Wife updated.

## 2019-01-26 NOTE — PROGRESS NOTES
[facility]    Vascular Medicine Progress Note    Date of Service (when I saw the patient): 01/26/2019     Assessment & Plan      Edison Saldivar is a 78 year old male who was admitted on 1/17/2019.     1. Hemoptysis , admitted initially  to ICU on 1/17/19  in the setting of long standing anticoagulation  ( more than 30 years with warfarin for multiple CVAs ) he developed severe cough before hemoptysis.  INR 2.1 at the time of admission.  Admission CT LLE consolidation/pneumonia ?? Mass. Small GEORGE nodules.( former smoker)  ( off anticoagulation since 1/17/19, reversed on admission, no more hemoptysis or epistaxis )     2. Hx of Epistaxis 24 hours before hemoptysis      3. 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 Hx of sarcoidosis:  (normal ACE level and ionized calcium this admission) treated per family many years ago?     6.Hx of COPD ( former smoker)      7. HTN:     8.Aortic Stenosis,moderate  ( BAYRON 1.7cm2, mean gradient 27.9 mm hg, peak AoV pressure gradient 45.3 mm hg)        This is a very pleasant male who carries the diagnosis of Sneddon syndrome, history of sarcoidosis, COPD/asthma, hypertension, obesity with sleep apnea wears CPAP machine, history of recurrent CVA/TIAs taking chronic anticoagulation more than 30 years admitted with hemoptysis and also epistaxis requiring reversal of anticoagulation and holding warfarin for the last 1 week.  He is currently stable, no more hemoptysis or epistaxis last 6 days.  Hemoglobin stable.  He was treated with antibiotics for 5-6 days.  No more cough.  No constitutional symptoms.  No arthralgias but deconditioned.  Extensive evaluation done in the past  in [facility] and in [place]. (Unfortunately minimal records are available in [facility] everywhere from [facility]) no records from [place].     He denies any headache, vision problems.  Patient's wife is worried about  holding anticoagulation and he has a history of recurrent TIAs and strokes when he was off of anticoagulation.  Reviewed imaging studies and laboratory data in the epic.    Initiated IV heparin and warfarin 1/24/19, tolerating well .    Very High  and CRP 92.7   Vasculitis panel negative  CELY pending  ACLA negative.  LA and beta 2 GP abs pending  Eosinophils 7% on admission and recently 5%   blood morphology, complements pending     Plan:  Continue IV heparin high intensity protocol   Continue warfarin  Follow pending labs  PT/OT   Follow pulmonary recommendations and treat COPD/asthma    Repeat CT scan per pulmonary recommendations and he will need follow-up with pulmonary clinic 2-3 weeks after discharge and he can get PFTs in the pulmonary office.    Good blood pressure control (avoid ACE inhibitor given his presentation which can cause cough 15-20% of the patient's)    He will benefit  Going to TCU given multiple issues, decreased ambulation, fall risk , morbid obesity on anticoagulation and his wife is small built with rheumatoid arthritis and other medical issues can not handle him at home( he is morbidly obese)  SW and clinical coordinator exploring options.    Patient care time spent 35 minutes today.    George Jhaveri MD,FS,[facility]  Vascular Medicine    Interval History     Tolerating IV heparin, no more hemptysis of epistaxis, HGB stable  Very High ESR and CRP  Vasculitis panel negative  ACLA negative.  Deconditioned   INR 1.21    Physical Exam   Temp: 97.7  F (36.5  C) Temp src: Oral BP: 160/78   Heart Rate: 83 Resp: 18 SpO2: 93 % O2 Device: None (Room air)    Vitals:    01/24/19 0427 01/25/19 0648 01/26/19 0606   Weight: 112 kg (247 lb) 107.4 kg (236 lb 12.8 oz) 107.8 kg (237 lb 10.5 oz)     Vital Signs with Ranges  Temp:  [97.2  F (36.2  C)-98.5  F (36.9  C)] 97.7  F (36.5  C)  Heart Rate:  [67-83] 83  Resp:  [18] 18  BP: (112-160)/(63-78) 160/78  SpO2:  [93 %-96 %] 93 %  I/O last 3  completed shifts:  In: 960 [P.O.:960]  Out: 350 [Urine:350]    Constitutional: Awake, alert, cooperative, no apparent distress, and appears stated age.  Eyes: Lids and lashes normal, pupils equal, round and reactive to light, extra ocular muscles intact, sclera clear, conjunctiva normal.  ENT: Normocephalic, without obvious abnormality  Respiratory: No increased work of breathing, good air exchange, clear to auscultation bilaterally, no crackles or wheezing.  Cardiovascular: Normal apical impulse, regular rate and rhythm, normal S1 and S2, no S3 or S4, and no murmur noted.  GI:  normal bowel sounds, soft, non-distended, non-tender, no masses palpated, no hepatosplenomegaly.  Lymph/Hematologic: No cervical lymphadenopathy and no supraclavicular lymphadenopathy.  Genitourinary:  negative  Skin:Livido reticularis skin lesions unchanged.  Musculoskeletal: trace leg edema  Neurologic: Awake, alert, oriented to name, place and time.  Cranial nerves II-XII are grossly intact.  Motor is 5 out of 5 bilaterally.   Neuropsychiatric: Calm, normal eye contact, alert, normal affect, oriented to self, place, time and situation, memory for past and recent events intact and thought process normal.  Pulses:  palpable pulses    Medications     HEParin 1,750 Units/hr (01/26/19 0258)     Warfarin Therapy Reminder         amLODIPine  5 mg Oral QAM     brimonidine-timolol  1 drop Both Eyes QAM     brinzolamide  1 drop Both Eyes QAM     docusate sodium  100 mg Oral QPM     furosemide  40 mg Oral Daily     guaiFENesin  600 mg Oral BID     latanoprost  1 drop Both Eyes At Bedtime     mirabegron  50 mg Oral QAM     pantoprazole  40 mg Oral QAM AC     umeclidinium  1 puff Inhalation QAM     warfarin  10 mg Oral ONCE at 18:00       Data   Recent Labs   Lab 01/26/19  0805 01/25/19  1713 01/25/19  0832 01/24/19  1525 01/23/19  1103   WBC  --  8.9 7.9 8.2  --    HGB  --  10.2* 10.1* 10.0* 10.0*   MCV  --  92 92 93  --    PLT  --  201 197 194  --     INR 1.21*  --  1.13 1.10 1.15*   NA  --   --  139  --  139   POTASSIUM  --   --  3.4  --  3.5   CHLORIDE  --   --  106  --  107   CO2  --   --  26  --  25   BUN  --   --  14  --  13   CR  --   --  1.06  --  0.98   ANIONGAP  --   --  7  --  7   JULIANNE  --   --  8.5  --  8.3*   GLC  --   --  109*  --  129*

## 2019-01-26 NOTE — PLAN OF CARE
Discharge Planner OT   Patient plan for discharge: thinking TCU   Current status: Pt sitting up in chair, declined out of chair activity but agreed to UE exercise.  Pt completed 6 B UE exercises with red theraband x 10 reps.  Needed some physical hand over hand cues to start and maintain proper form.  Denied any issues or complaints.   Barriers to return to prior living situation: decreased activity  tolerance   Recommendations for discharge: physically pt would be safe to discharge home with spouse A with IADL's (shopping, cleaning, laundry, cooking) and bathing. HHOT.   Rationale for recommendations: pt completing ADL's and transfers with SBA and FWW. Pt limited by dec activity tolerance but is steady on feet. Anticipate pt safe to discharge home with spouse A as needed.  Pt would benefit from home health OT as he requires assistive device and assist of another person to leave the home.  Per spouse, she reports pt will need TCU at discharge per her discussion with medical team.          Entered by: Shoaib Armenta 01/26/2019 4:04 PM

## 2019-01-27 ENCOUNTER — APPOINTMENT (OUTPATIENT)
Dept: OCCUPATIONAL THERAPY | Facility: CLINIC | Age: 79
DRG: 194 | End: 2019-01-27
Payer: COMMERCIAL

## 2019-01-27 LAB
ERYTHROCYTE [DISTWIDTH] IN BLOOD BY AUTOMATED COUNT: 14 % (ref 10–15)
HCT VFR BLD AUTO: 30.5 % (ref 40–53)
HGB BLD-MCNC: 10.2 G/DL (ref 13.3–17.7)
INR PPP: 1.35 (ref 0.86–1.14)
LMWH PPP CHRO-ACNC: 0.47 IU/ML
MCH RBC QN AUTO: 30.5 PG (ref 26.5–33)
MCHC RBC AUTO-ENTMCNC: 33.4 G/DL (ref 31.5–36.5)
MCV RBC AUTO: 91 FL (ref 78–100)
PLATELET # BLD AUTO: 207 10E9/L (ref 150–450)
RBC # BLD AUTO: 3.34 10E12/L (ref 4.4–5.9)
WBC # BLD AUTO: 7.7 10E9/L (ref 4–11)

## 2019-01-27 PROCEDURE — 40000133 ZZH STATISTIC OT WARD VISIT: Performed by: OCCUPATIONAL THERAPIST

## 2019-01-27 PROCEDURE — 85520 HEPARIN ASSAY: CPT | Performed by: HOSPITALIST

## 2019-01-27 PROCEDURE — 97530 THERAPEUTIC ACTIVITIES: CPT | Mod: GO | Performed by: OCCUPATIONAL THERAPIST

## 2019-01-27 PROCEDURE — 25000132 ZZH RX MED GY IP 250 OP 250 PS 637: Performed by: HOSPITALIST

## 2019-01-27 PROCEDURE — 25000128 H RX IP 250 OP 636: Performed by: HOSPITALIST

## 2019-01-27 PROCEDURE — 99233 SBSQ HOSP IP/OBS HIGH 50: CPT | Performed by: INTERNAL MEDICINE

## 2019-01-27 PROCEDURE — 85610 PROTHROMBIN TIME: CPT | Performed by: HOSPITALIST

## 2019-01-27 PROCEDURE — 25000132 ZZH RX MED GY IP 250 OP 250 PS 637: Performed by: INTERNAL MEDICINE

## 2019-01-27 PROCEDURE — 99232 SBSQ HOSP IP/OBS MODERATE 35: CPT | Performed by: HOSPITALIST

## 2019-01-27 PROCEDURE — 94660 CPAP INITIATION&MGMT: CPT

## 2019-01-27 PROCEDURE — 40000275 ZZH STATISTIC RCP TIME EA 10 MIN

## 2019-01-27 PROCEDURE — 85027 COMPLETE CBC AUTOMATED: CPT | Performed by: HOSPITALIST

## 2019-01-27 PROCEDURE — 97535 SELF CARE MNGMENT TRAINING: CPT | Mod: GO | Performed by: OCCUPATIONAL THERAPIST

## 2019-01-27 PROCEDURE — 36415 COLL VENOUS BLD VENIPUNCTURE: CPT | Performed by: HOSPITALIST

## 2019-01-27 PROCEDURE — 12000000 ZZH R&B MED SURG/OB

## 2019-01-27 RX ADMIN — PANTOPRAZOLE SODIUM 40 MG: 40 TABLET, DELAYED RELEASE ORAL at 06:15

## 2019-01-27 RX ADMIN — UMECLIDINIUM 1 PUFF: 62.5 AEROSOL, POWDER ORAL at 08:25

## 2019-01-27 RX ADMIN — BRIMONIDINE TARTRATE, TIMOLOL MALEATE 1 DROP: 2; 5 SOLUTION/ DROPS OPHTHALMIC at 08:25

## 2019-01-27 RX ADMIN — AMLODIPINE BESYLATE 5 MG: 2.5 TABLET ORAL at 08:24

## 2019-01-27 RX ADMIN — GUAIFENESIN 600 MG: 600 TABLET, EXTENDED RELEASE ORAL at 08:24

## 2019-01-27 RX ADMIN — HEPARIN SODIUM 1750 UNITS/HR: 10000 INJECTION, SOLUTION INTRAVENOUS at 05:13

## 2019-01-27 RX ADMIN — BRINZOLAMIDE 1 DROP: 10 SUSPENSION/ DROPS OPHTHALMIC at 08:24

## 2019-01-27 RX ADMIN — MIRABEGRON 50 MG: 50 TABLET, FILM COATED, EXTENDED RELEASE ORAL at 08:24

## 2019-01-27 RX ADMIN — DOCUSATE SODIUM 100 MG: 100 CAPSULE, LIQUID FILLED ORAL at 19:19

## 2019-01-27 RX ADMIN — GUAIFENESIN 600 MG: 600 TABLET, EXTENDED RELEASE ORAL at 21:47

## 2019-01-27 RX ADMIN — HEPARIN SODIUM 1750 UNITS/HR: 10000 INJECTION, SOLUTION INTRAVENOUS at 19:41

## 2019-01-27 RX ADMIN — FUROSEMIDE 40 MG: 40 TABLET ORAL at 08:24

## 2019-01-27 RX ADMIN — WARFARIN SODIUM 12.5 MG: 10 TABLET ORAL at 19:19

## 2019-01-27 ASSESSMENT — ACTIVITIES OF DAILY LIVING (ADL)
ADLS_ACUITY_SCORE: 26

## 2019-01-27 ASSESSMENT — MIFFLIN-ST. JEOR: SCORE: 1751.63

## 2019-01-27 NOTE — PLAN OF CARE
PT A&O VSS ,  LS clear BS +, denies pain CMS intact uses CPAP at night. Tolerated diet up with SBA/ with Walker,  frequent voiding, fall risk- up in the chair. Frequently voiding. Pt heparin drip bleeding precaution assessed , hep-a lab schedule this morning plan to go home pending continue to monitor.

## 2019-01-27 NOTE — PLAN OF CARE
Afebrile, no pain. Good appetite. INR 1.3, receiving coumadin. Hep drip infusing. Fall risk-steady of feet, up to the bathroom frequently with a sba and walker. Voiding frequently. Wife at the bedside.

## 2019-01-27 NOTE — PROGRESS NOTES
[facility]    Hospitalist Progress Note    Date of Service (when I saw the patient): 01/27/2019    Assessment & Plan   Edison Saldivar is a 78 year old male with history including Sneddon syndrome with h/o CVI on warfarin, COPD, HTN and KORY, who presented 1/17 with hemoptysis.     Hemoptysis, unclear etiology, question pneumonia or lung mass with concomitant anticoagulation.  LLL consolidation/pneumonia vs mass.  Factor V leiden  Initially had an episode of epistaxis evening 1/17 that was mild and stopped on its own. This was followed by coughing up blood x 3 hours. Presented to ED 1/17 and continued to cough up blood. No evidence of continued epistaxis when evaluated by ED physician.  On initial evaluation 1/17, hgb 13.0, WBC normal, plts normal, INR 2.16, procalcitonin < 0.05. CT chest 1/17 showed LLL consolidation, PNA vs mass with some scattered patchy infiltrates with some indeterminate GEORGE nodules. Pt given vit K early am 1/18 and warfar was held. Vascular medicine consulted on 1/24 to discuss risks/benefits of restarting warfarin given the sneddon syndrome and factor V leiden and heparin was restarted on 1/24  plan  - Warfarin held.   - completed course of antibiotics 7 days total (Ceftri/azithro to augmentin)  - ACE level normal at 27, ionized calcium normal at 4.7.   - Pulmonology consulted and signed off: no need for bronch, would require intubation  - Will need follow up CT of the chest in 3-4 weeks to evaluate for resolution of infiltrate.    - Follow up in Pulmonary clinic with Dr. Durbin in 4 weeks post CT.   - vascular medicine consulted: plan to continue heparin for 48 hours as bridge to warfarin  - given his frailty and inability of his wife to help care for him because of her own advanced health issues, recommend TCU on discharge. Appreciate looking into placement     Anemia, mild, suspect acute blood loss component from above.  Issues with hemoptysis as above.  - Management of  hemoptysis as above.  - Monitor CBC.  - Hgb stable  - Consider prbc transfusion if hgb </= 7.0 or if significant bleeding with hemodynamic instability or if symptomatic.     Skin changes right forearm, suspect secondary to blood pressure cuff placement in the setting of livedo reticularis.  Pt with petechial type skin changes that developed on right forearm with blood pressure cuff placement.    - No signs of vascular compromise.      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  Pt with h/o Sneddon syndrome, which is a rare genetic disorder characterized by widespread livedo reticularis in association with stroke. Diagnosed at [facility]. Maintained on warfarin. Unclear if pt has h/o antiphospholipid antibodies (commonly associated with Sneddon syndrome). Warfarin held and given vit K on admit as above.  - restarted heparin and warfarin on 1/24 after being held for 1 week as per vascular medicine  - follow up on APLA, CELY as ordered by the vascular medicine team  - lovenox when okayed by vascular  - follow up in 1 month with Dr. Jhaveri in the vascular medicine clinic     Left upper lobe lung nodules.  Noted by CT as above 1/17. Hx smoking.  - Follow-up CT short term for above LLL consolidation; otherwise, would recommend repeat CT in 6-12mo, then 18-24 mo if no change for the GEORGE nodules.  - Follow-up with PCP and Pulm     Hypertension (benign essential).  Possible h/o CHF.  [PTA: amlodipine 2.5 mg daily, furosemide 40 mg daily.].  Pt notes h/o heart murmur and issues with his left ventricle, details unclear.  - Continue amlodipine.  - Resumed PTA Lasix 1/21  - Increased PTA amlodipine to 5 mg 1/23 for better BP control.   - Continue PRN IV hydralazine.  - Monitor i/o's, daily wts.     COPD.  Chronic, stable.   - Continue Incruse Ellipta, PRN inh/nebs.  - Trelegy recommended on discharge.      KORY.  - Continue CPAP at night     Diet: Low sodium  Prophylaxis: PCD's, ambulation.  Brian  Catheter: not present  Code Status: Full Code       Disposition: once shows tolerability       Wero Reyes DO      Interval History   Feeling well, no hemoptysis, no shortness of breath, no chest pain    -Data reviewed today: I reviewed all new labs and imaging results over the last 24 hours. I personally reviewed no images or EKG's today.    Physical Exam   Temp: 97.4  F (36.3  C) Temp src: Oral BP: 141/74   Heart Rate: 78 Resp: 18 SpO2: 94 % O2 Device: None (Room air)    Vitals:    01/25/19 0648 01/26/19 0606 01/27/19 0601   Weight: 107.4 kg (236 lb 12.8 oz) 107.8 kg (237 lb 10.5 oz) 106.3 kg (234 lb 5.6 oz)     Vital Signs with Ranges  Temp:  [97.4  F (36.3  C)-98.2  F (36.8  C)] 97.4  F (36.3  C)  Heart Rate:  [70-78] 78  Resp:  [18] 18  BP: (141-153)/(74-80) 141/74  SpO2:  [94 %] 94 %  I/O last 3 completed shifts:  In: -   Out: 150 [Urine:150]    Gen: Patient in no acute distress.  Appears comfortable.  Heart:  S1S2+, regular rate and rhythm, No murmurs.  Lungs:  Clear to auscultation at apices, no wheezing, no rales. Decreased at bases.    Abdomen:  Soft, non tender, non distended, bowel sounds positive.  Extremities:  B/l LE edema.    Medications     HEParin 1,750 Units/hr (01/27/19 0513)     Warfarin Therapy Reminder         amLODIPine  5 mg Oral QAM     brimonidine-timolol  1 drop Both Eyes QAM     brinzolamide  1 drop Both Eyes QAM     docusate sodium  100 mg Oral QPM     furosemide  40 mg Oral Daily     guaiFENesin  600 mg Oral BID     latanoprost  1 drop Both Eyes At Bedtime     mirabegron  50 mg Oral QAM     pantoprazole  40 mg Oral QAM AC     umeclidinium  1 puff Inhalation QAM     warfarin  12.5 mg Oral ONCE at 18:00       Data   Recent Labs   Lab 01/27/19  0810 01/26/19  0805 01/25/19  1713 01/25/19  0832  01/23/19  1103   WBC 7.7  --  8.9 7.9   < >  --    HGB 10.2*  --  10.2* 10.1*   < > 10.0*   MCV 91  --  92 92   < >  --      --  201 197   < >  --    INR 1.35* 1.21*  --  1.13   < >  1.15*   NA  --   --   --  139  --  139   POTASSIUM  --   --   --  3.4  --  3.5   CHLORIDE  --   --   --  106  --  107   CO2  --   --   --  26  --  25   BUN  --   --   --  14  --  13   CR  --   --   --  1.06  --  0.98   ANIONGAP  --   --   --  7  --  7   JULIANNE  --   --   --  8.5  --  8.3*   GLC  --   --   --  109*  --  129*    < > = values in this interval not displayed.       No results found for this or any previous visit (from the past 24 hour(s)).

## 2019-01-27 NOTE — PROGRESS NOTES
[facility]    Vascular Medicine Progress Note    Date of Service (when I saw the patient): 01/27/2019     Assessment & Plan      Edison Saldivar is a 78 year old male who was admitted on 1/17/2019.     1. Hemoptysis , admitted initially  to ICU on 1/17/19  in the setting of long standing anticoagulation  ( more than 30 years with warfarin for multiple CVAs ) he developed severe cough before hemoptysis.  INR 2.1 at the time of admission.  Admission CT LLE consolidation/pneumonia ?? Mass. Small GEORGE nodules.( former smoker)  ( off anticoagulation since 1/17/19, reversed on admission, no more hemoptysis or epistaxis )     2. Hx of Epistaxis 24 hours before hemoptysis 1/16/19     3. 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    4.KORY /Obesity: wears CPAP     5. Hx of sarcoidosis:  (normal ACE level and ionized calcium this admission) treated per family many years ago?     6.Hx of COPD ( former smoker)      7. HTN:     8.Aortic Stenosis,moderate  ( BAYRON 1.7cm2, mean gradient 27.9 mm hg, peak AoV pressure gradient 45.3 mm hg)        This is a very pleasant male who carries the diagnosis of Sneddon syndrome, history of sarcoidosis, COPD/asthma, hypertension, obesity with sleep apnea wears CPAP machine, history of recurrent CVA/TIAs taking chronic anticoagulation more than 30 years admitted with hemoptysis and also epistaxis requiring reversal of anticoagulation and holding warfarin for the last 1 week.  He is currently stable, no more hemoptysis or epistaxis last 6 days.  Hemoglobin stable.  He was treated with antibiotics for 5-6 days.  No more cough.  No constitutional symptoms.  No arthralgias but deconditioned.  Extensive evaluation done in the past  in [facility] and in [place]. (Unfortunately minimal records are available in [facility] everywhere from [facility]) no records from  [place].     He denies any headache, vision problems.  Patient's wife is worried about holding anticoagulation and he has a history of recurrent TIAs and strokes when he was off of anticoagulation.  Reviewed imaging studies and laboratory data in the epic.    Initiated IV heparin and warfarin 1/24/19, tolerating well .    Very High  and CRP 92.7  3 days ago ??? Related to current sickness, clinically improving repeat in am.  Vasculitis panel negative  CELY,LA  pending  ACLA negative.  LA and beta 2 GP abs pending  Eosinophils 7% on admission and recently 5%   blood morphology, complements pending     Plan:  Continue IV heparin high intensity protocol as long as in the hospital then bridge with lovenox at the time of discharge if needed  Continue warfarin  Follow pending labs  PT/OT   Follow pulmonary recommendations and treat COPD/asthma    Repeat CT scan per pulmonary recommendations and he will need follow-up with pulmonary clinic 2-3 weeks after discharge and he can get PFTs in the pulmonary office.    Good blood pressure control (avoid ACE inhibitor given his presentation which can cause cough 15-20% of the patient's)    Repeat ESR and CRP tomorrow to see if any improvement.    He will benefit  Going to TCU given multiple issues, decreased ambulation, fall risk , morbid obesity on anticoagulation and his wife is small built with rheumatoid arthritis and other medical issues can not handle him at home( he is morbidly obese)    SW and clinical coordinator exploring options.    When discharged from hospital please make follow up appointment with Dr. Jhaveri at [facility] in one month [phone]    Patient care time spent 35 minutes today.    Dr. Torres from vascular Medicine service will follow this patient starting tomorrow.    George Jhaveri MD,[facility]  Vascular Medicine    Interval History     Tolerating IV heparin, no more hemptysis of epistaxis, HGB stable  Very High ESR and CRP 2 days  ago  Vasculitis panel negative  ACLA negative.  Deconditioned   INR 1.35    Physical Exam   Temp: 97.4  F (36.3  C) Temp src: Oral BP: 141/74   Heart Rate: 78 Resp: 18 SpO2: 94 % O2 Device: None (Room air)    Vitals:    01/25/19 0648 01/26/19 0606 01/27/19 0601   Weight: 107.4 kg (236 lb 12.8 oz) 107.8 kg (237 lb 10.5 oz) 106.3 kg (234 lb 5.6 oz)     Vital Signs with Ranges  Temp:  [97.4  F (36.3  C)-98.2  F (36.8  C)] 97.4  F (36.3  C)  Heart Rate:  [69-78] 78  Resp:  [18] 18  BP: (139-153)/(73-80) 141/74  SpO2:  [91 %-94 %] 94 %  I/O last 3 completed shifts:  In: 920 [P.O.:920]  Out: 150 [Urine:150]    Constitutional: Awake, alert, cooperative, no apparent distress, and appears stated age.  Eyes: Lids and lashes normal, pupils equal, round and reactive to light, extra ocular muscles intact, sclera clear, conjunctiva normal.  ENT: Normocephalic, without obvious abnormality  Respiratory: No increased work of breathing, good air exchange, clear to auscultation bilaterally, no crackles or wheezing.  Cardiovascular: Normal apical impulse, regular rate and rhythm, normal S1 and S2, no S3 or S4, and no murmur noted.  GI:  normal bowel sounds, soft, non-distended, non-tender, no masses palpated, no hepatosplenomegaly.  Lymph/Hematologic: No cervical lymphadenopathy and no supraclavicular lymphadenopathy.  Genitourinary:  negative  Skin:Livido reticularis skin lesions unchanged.  Musculoskeletal: trace leg edema  Neurologic: Awake, alert, oriented to name, place and time.  Cranial nerves II-XII are grossly intact.  Motor is 5 out of 5 bilaterally.   Neuropsychiatric: Calm, normal eye contact, alert, normal affect, oriented to self, place, time and situation, memory for past and recent events intact and thought process normal.  Pulses:  palpable pulses    Medications     HEParin 1,750 Units/hr (01/27/19 0513)     Warfarin Therapy Reminder         amLODIPine  5 mg Oral QAM     brimonidine-timolol  1 drop Both Eyes QAM      brinzolamide  1 drop Both Eyes QAM     docusate sodium  100 mg Oral QPM     furosemide  40 mg Oral Daily     guaiFENesin  600 mg Oral BID     latanoprost  1 drop Both Eyes At Bedtime     mirabegron  50 mg Oral QAM     pantoprazole  40 mg Oral QAM AC     umeclidinium  1 puff Inhalation QAM       Data   Recent Labs   Lab 01/27/19  0810 01/26/19  0805 01/25/19  1713 01/25/19  0832  01/23/19  1103   WBC 7.7  --  8.9 7.9   < >  --    HGB 10.2*  --  10.2* 10.1*   < > 10.0*   MCV 91  --  92 92   < >  --      --  201 197   < >  --    INR 1.35* 1.21*  --  1.13   < > 1.15*   NA  --   --   --  139  --  139   POTASSIUM  --   --   --  3.4  --  3.5   CHLORIDE  --   --   --  106  --  107   CO2  --   --   --  26  --  25   BUN  --   --   --  14  --  13   CR  --   --   --  1.06  --  0.98   ANIONGAP  --   --   --  7  --  7   JULIANNE  --   --   --  8.5  --  8.3*   GLC  --   --   --  109*  --  129*    < > = values in this interval not displayed.

## 2019-01-27 NOTE — PLAN OF CARE
Discharge Planner OT   Patient plan for discharge: TCU, per wife  Current status: patient agreeable to activity, and ambulated in hallway x150' with 2WW and CGA.  Patient stood at sink for shaving following walk, and completed toileting task with CGA for toilet transfer.  Patient with some SOB following walking, but this resolved as patient stood at sink for hygienes. Patient needing direction for initiation of tasks, and for directions in hallway and in room.  Patient completed shaving, but needed VC to wash face of remaining lather after. Patient completed toilet task, with VC for managing clothing. Per wife, patient has been declining cognitively over past while; she states he never remembers whether to turn L or R to get to the elevator from their apartment. She states she assists with dressing and bathing. She reports he likes to bathe in the tub, but last time he was in the tub he was unable to get out again.  She reports she would like some HHA, and is hoping to get this set up following TCU stay.   Barriers to return to prior living situation: cognition, decreased endurance, assist needed for transfers  Recommendations for discharge: physically patient would be safe to transfer to home with resumed assist from wife for home chores/errands, and with HHOT and HHA for bathing.   Rationale for recommendations: pt completing ADL's and transfers with SBA and FWW. Pt limited by dec activity tolerance but is steady on feet. Anticipate pt safe to discharge home with spouse A as needed.  Pt would benefit from home health OT as he requires assistive device and assist of another person to leave the home.  Per spouse, she reports pt will need TCU at discharge per her discussion with medical team.         Entered by: RANDY SANFORD 01/27/2019 2:12 PM

## 2019-01-28 ENCOUNTER — APPOINTMENT (OUTPATIENT)
Dept: PHYSICAL THERAPY | Facility: CLINIC | Age: 79
DRG: 194 | End: 2019-01-28
Payer: COMMERCIAL

## 2019-01-28 VITALS
WEIGHT: 239.86 LBS | HEIGHT: 67 IN | BODY MASS INDEX: 37.65 KG/M2 | OXYGEN SATURATION: 93 % | TEMPERATURE: 98.8 F | SYSTOLIC BLOOD PRESSURE: 105 MMHG | HEART RATE: 78 BPM | RESPIRATION RATE: 18 BRPM | DIASTOLIC BLOOD PRESSURE: 58 MMHG

## 2019-01-28 LAB
ANA PAT SER IF-IMP: ABNORMAL
ANA SER QL IF: ABNORMAL
ANA TITR SER IF: ABNORMAL {TITER}
C3 SERPL-MCNC: 144 MG/DL (ref 76–169)
C4 SERPL-MCNC: 31 MG/DL (ref 15–50)
COPATH REPORT: NORMAL
CRP SERPL-MCNC: 62.9 MG/L (ref 0–8)
ERYTHROCYTE [SEDIMENTATION RATE] IN BLOOD BY WESTERGREN METHOD: 104 MM/H (ref 0–20)
INR PPP: 1.69 (ref 0.86–1.14)
LA PPP-IMP: NEGATIVE
LMWH PPP CHRO-ACNC: 0.38 IU/ML

## 2019-01-28 PROCEDURE — 99239 HOSP IP/OBS DSCHRG MGMT >30: CPT | Performed by: HOSPITALIST

## 2019-01-28 PROCEDURE — 40000193 ZZH STATISTIC PT WARD VISIT

## 2019-01-28 PROCEDURE — 99233 SBSQ HOSP IP/OBS HIGH 50: CPT | Performed by: INTERNAL MEDICINE

## 2019-01-28 PROCEDURE — 40000275 ZZH STATISTIC RCP TIME EA 10 MIN

## 2019-01-28 PROCEDURE — 25000128 H RX IP 250 OP 636: Performed by: HOSPITALIST

## 2019-01-28 PROCEDURE — 85610 PROTHROMBIN TIME: CPT | Performed by: HOSPITALIST

## 2019-01-28 PROCEDURE — 97110 THERAPEUTIC EXERCISES: CPT | Mod: GP

## 2019-01-28 PROCEDURE — 86140 C-REACTIVE PROTEIN: CPT | Performed by: HOSPITALIST

## 2019-01-28 PROCEDURE — 25000132 ZZH RX MED GY IP 250 OP 250 PS 637: Performed by: HOSPITALIST

## 2019-01-28 PROCEDURE — 36415 COLL VENOUS BLD VENIPUNCTURE: CPT | Performed by: HOSPITALIST

## 2019-01-28 PROCEDURE — 85520 HEPARIN ASSAY: CPT | Performed by: HOSPITALIST

## 2019-01-28 PROCEDURE — 97116 GAIT TRAINING THERAPY: CPT | Mod: GP

## 2019-01-28 PROCEDURE — 85652 RBC SED RATE AUTOMATED: CPT | Performed by: HOSPITALIST

## 2019-01-28 PROCEDURE — 25000132 ZZH RX MED GY IP 250 OP 250 PS 637: Performed by: INTERNAL MEDICINE

## 2019-01-28 PROCEDURE — 94660 CPAP INITIATION&MGMT: CPT

## 2019-01-28 RX ORDER — ALBUTEROL SULFATE 90 UG/1
2 AEROSOL, METERED RESPIRATORY (INHALATION) EVERY 4 HOURS PRN
Qty: 1 INHALER | Refills: 0 | Status: SHIPPED | OUTPATIENT
Start: 2019-01-28 | End: 2019-09-27

## 2019-01-28 RX ORDER — AMLODIPINE BESYLATE 5 MG/1
5 TABLET ORAL EVERY MORNING
Qty: 30 TABLET | Refills: 0 | Status: SHIPPED | OUTPATIENT
Start: 2019-01-28 | End: 2019-09-27

## 2019-01-28 RX ORDER — WARFARIN SODIUM 7.5 MG/1
7.5 TABLET ORAL DAILY
DISCHARGE
Start: 2019-01-28 | End: 2019-09-27

## 2019-01-28 RX ORDER — WARFARIN SODIUM 7.5 MG/1
7.5 TABLET ORAL
Status: DISCONTINUED | OUTPATIENT
Start: 2019-01-28 | End: 2019-01-28 | Stop reason: HOSPADM

## 2019-01-28 RX ADMIN — FUROSEMIDE 40 MG: 40 TABLET ORAL at 08:24

## 2019-01-28 RX ADMIN — BRINZOLAMIDE 1 DROP: 10 SUSPENSION/ DROPS OPHTHALMIC at 08:24

## 2019-01-28 RX ADMIN — AMLODIPINE BESYLATE 5 MG: 2.5 TABLET ORAL at 08:23

## 2019-01-28 RX ADMIN — HEPARIN SODIUM 1750 UNITS/HR: 10000 INJECTION, SOLUTION INTRAVENOUS at 10:10

## 2019-01-28 RX ADMIN — GUAIFENESIN 600 MG: 600 TABLET, EXTENDED RELEASE ORAL at 08:23

## 2019-01-28 RX ADMIN — MIRABEGRON 50 MG: 50 TABLET, FILM COATED, EXTENDED RELEASE ORAL at 08:23

## 2019-01-28 RX ADMIN — BRIMONIDINE TARTRATE, TIMOLOL MALEATE 1 DROP: 2; 5 SOLUTION/ DROPS OPHTHALMIC at 08:24

## 2019-01-28 RX ADMIN — PANTOPRAZOLE SODIUM 40 MG: 40 TABLET, DELAYED RELEASE ORAL at 06:52

## 2019-01-28 RX ADMIN — ENOXAPARIN SODIUM 105 MG: 120 INJECTION SUBCUTANEOUS at 17:00

## 2019-01-28 RX ADMIN — UMECLIDINIUM 1 PUFF: 62.5 AEROSOL, POWDER ORAL at 08:24

## 2019-01-28 ASSESSMENT — ACTIVITIES OF DAILY LIVING (ADL)
ADLS_ACUITY_SCORE: 26

## 2019-01-28 ASSESSMENT — MIFFLIN-ST. JEOR: SCORE: 1776.63

## 2019-01-28 NOTE — PLAN OF CARE
Occupational Therapy Discharge Summary    Reason for therapy discharge:    Discharged to home with home therapy.    Progress towards therapy goal(s). See goals on Care Plan in [facility] electronic health record for goal details.  Goals partially met.  Barriers to achieving goals:   discharge from facility.    Therapy recommendation(s):    Continued therapy is recommended.  Rationale/Recommendations:  HHOT .

## 2019-01-28 NOTE — PLAN OF CARE
A&O x4, SBA +gb/walker.  Low sat fat/ low Na+ diet.  VSS except juju on CPAP overnight.  PIV with Heparin gtt @17.5 mL/hr.  Denies pain.  Continue to monitor.

## 2019-01-28 NOTE — DISCHARGE SUMMARY
[facility]  Hospitalist Discharge Summary       Date of Admission:  1/17/2019  Date of Discharge:  No discharge date for patient encounter.  Discharging Provider: Wero Reyes DO      Discharge Diagnoses   Hemoptysis, unclear etiology, question pneumonia or lung mass with concomitant anticoagulation.  LLL consolidation/pneumonia vs mass.  Factor V leiden          Follow-ups Needed After Discharge   Follow-up Appointments     Follow-up and recommended labs and tests       Follow up with primary care provider, Merlin Emery Brown, within 7 days to evaluate medication change and for hospital follow- up.  No follow up labs or test are needed.      INR recheck needed on 1/29      Follow up with [facility] as scheduled  Follow up with [place]. Durbin in [facility] as outlined in 4-6 weeks         Follow-up and recommended labs and tests       Appointment with DR Merlin Brown  2/1 at 10 am    Appointment with [facility] 2/14 at 1:15 pm with DR Sellers               Unresulted Labs Ordered in the Past 30 Days of this Admission     No orders found from 11/18/2018 to 1/18/2019.      These results will be followed up by     Hospital Course   Hemoptysis, unclear etiology, question pneumonia or lung mass with concomitant anticoagulation.  LLL consolidation/pneumonia vs mass.  Factor V leiden  Initially had an episode of epistaxis evening 1/17 that was mild and stopped on its own. This was followed by coughing up blood x 3 hours. Presented to ED 1/17 and continued to cough up blood. No evidence of continued epistaxis when evaluated by ED physician.  On initial evaluation 1/17, hgb 13.0, WBC normal, plts normal, INR 2.16, procalcitonin < 0.05. CT chest 1/17 showed LLL consolidation, PNA vs mass with some scattered patchy infiltrates with some indeterminate GEORGE nodules. Pt given vit K early am 1/18 and warfar was held. Vascular medicine consulted on 1/24 to discuss risks/benefits of restarting warfarin given the  sneddon syndrome and factor V leiden and heparin was restarted on 1/24  plan  - completed course of antibiotics 7 days total (Ceftri/azithro to augmentin)  - ACE level normal at 27, ionized calcium normal at 4.7.   - Pulmonology consulted and signed off: no need for bronch, would require intubation  - Will need follow up CT of the chest in 3-4 weeks to evaluate for resolution of infiltrate, with Follow-up with Dr. Durbin in Pulmonary a few days after  - vascular medicine consulted while inpatient  - home health PT/OT and RN ordered     Anemia, mild, suspect acute blood loss component from above.  Issues with hemoptysis as above.  - Monitor CBC prn as outpatient  - Hgb stable     Skin changes right forearm, suspect secondary to blood pressure cuff placement in the setting of livedo reticularis.  Pt with petechial type skin changes that developed on right forearm with blood pressure cuff placement.    - No signs of vascular compromise.      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  Pt with h/o Sneddon syndrome, which is a rare genetic disorder characterized by widespread livedo reticularis in association with stroke. Diagnosed at [facility]. Maintained on warfarin. Unclear if pt has h/o antiphospholipid antibodies (commonly associated with Sneddon syndrome). Warfarin held and given vit K on admit as above.  - restarted heparin and warfarin on 1/24 after being held for 1 week as inpatient  - no evidence of bleeding  - continue lovenox to bridge to warfarin, stop the lovenox when therapeutic. Home nurse will draw INR on 1/29  - follow up in 1 month with Dr. Jhaveri in the vascular medicine clinic     Left upper lobe lung nodules.  Noted by CT as above 1/17. Hx smoking.  - Follow-up CT short term for above LLL consolidation; otherwise, would recommend repeat CT in 6-12mo, then 18-24 mo if no change for the GEORGE nodules.  - Follow-up with PCP and Pulm     Hypertension (benign  essential).  Possible h/o CHF.  [PTA: amlodipine 2.5 mg daily, furosemide 40 mg daily.].  Pt notes h/o heart murmur and issues with his left ventricle, details unclear.  - Continue amlodipine.  - Resumed PTA Lasix 1/21  - Increased PTA amlodipine to 5 mg 1/23 for better BP control.   - Continue PRN IV hydralazine.     COPD.  Chronic, stable.   - Continue Incruse Ellipta, PRN inh/nebs.  - Trelegy ordered on discharge.      KORY.  - Continue CPAP at night          Consultations This Hospital Stay   PHYSICAL THERAPY ADULT IP CONSULT  OCCUPATIONAL THERAPY ADULT IP CONSULT  PULMONARY IP CONSULT  CARE TRANSITION RN/SW IP CONSULT  PHYSICAL THERAPY ADULT IP CONSULT  OCCUPATIONAL THERAPY ADULT IP CONSULT  [facility] IP CONSULT  PHARMACY IP CONSULT  PHARMACY TO DOSE HEPARIN  PHARMACY TO DOSE WARFARIN  PHARMACY IP CONSULT    Code Status   Full Code    Time Spent on this Encounter   IWero, personally saw the patient today and spent greater than 30 minutes discharging this patient.       Wero Reyes, DO  [facility]  ______________________________________________________________________    Physical Exam   Vital Signs: Temp: 98.8  F (37.1  C) Temp src: Oral BP: 105/58   Heart Rate: 66 Resp: 18 SpO2: 93 % O2 Device: None (Room air)    Weight: 239 lbs 13.77 oz  Gen: Patient in no acute distress.  Appears comfortable.  Heart:  S1S2+, regular rate and rhythm, No murmurs.  Lungs:  Clear to auscultation at apices, no wheezing, no rales. Decreased at bases.    Abdomen:  Soft, non tender, non distended, bowel sounds positive.  Extremities:  B/l LE edema.             Primary Care Physician   Merlin Emery Brown    Discharge Disposition   Discharged to home  Condition at discharge: Stable    Significant Results and Procedures   Most Recent 3 CBC's:  Recent Labs   Lab Test 01/27/19  0810 01/25/19  1713 01/25/19  0832   WBC 7.7 8.9 7.9   HGB 10.2* 10.2* 10.1*   MCV 91 92 92     201 197     Most Recent 3 BMP's:  Recent Labs   Lab Test 01/25/19  0832 01/23/19  1103 01/19/19  0553    139 143   POTASSIUM 3.4 3.5 3.6   CHLORIDE 106 107 111*   CO2 26 25 25   BUN 14 13 20   CR 1.06 0.98 1.00   ANIONGAP 7 7 7   JULIANNE 8.5 8.3* 8.2*   * 129* 109*     Most Recent 2 LFT's:  Recent Labs   Lab Test 01/18/19  0230   AST 18   ALT 19   ALKPHOS 55   BILITOTAL 0.4     Most Recent 3 INR's:  Recent Labs   Lab Test 01/28/19  0905 01/27/19  0810 01/26/19  0805   INR 1.69* 1.35* 1.21*   ,   Results for orders placed or performed during the hospital encounter of 01/17/19   CT Chest Pulmonary Embolism w Contrast    Narrative    CT CHEST PULMONARY EMBOLISM WITH CONTRAST  1/17/2019 11:39 PM    HISTORY:  Shortness of breath; hemoptysis.    TECHNIQUE: Scans obtained from the apices through the diaphragm with  IV contrast, 79 mL Isovue-370 injected. Radiation dose for this scan  was reduced using automated exposure control, adjustment of the mA  and/or kV according to patient size, or iterative reconstruction  technique.    COMPARISON:  None.    FINDINGS:  Evaluation of the pulmonary arterial system shows no  evidence of embolus. The thoracic aorta is calcified and tortuous. No  aortic aneurysm or dissection. There are coronary artery  atherosclerotic calcifications. The heart size is normal. There are  multiple calcified lymph nodes in  the mediastinum and bilateral lauri.  No lymph node enlargement. There is a consolidation in the left lower  lobe medially which is likely pneumonia. Patchy infiltrates in  bilateral lung bases are also likely pneumonia. Mild pulmonary edema  is also a possibility. There are a few peripheral bands of scar or  atelectasis. Calcified granuloma in the right lower lobe. There are 2  small nodules in the left upper lobe laterally measuring up to 0.7 cm.  No pneumothorax or pleural effusion. Images through the upper abdomen  are remarkable for an indeterminant lesion at the lower  pole of the  right kidney measuring 3.8 cm. This may be a high-density cyst. There  are cysts in the upper pole of the right kidney. No upper abdominal  lymph node enlargement. The gallbladder is absent. The adrenal glands  appear normal. There is atherosclerotic calcification of the aorta and  its branches. Degenerative disease in the spine.      Impression    IMPRESSION:  1. There is no pulmonary embolus, aortic aneurysm or dissection.  2. Left lower lobe consolidation is most likely pneumonia. Follow-up  to resolution is recommended to rule out the possibility of a mass.  There are patchy infiltrates in bilateral lower lungs, also likely  pneumonia.  3. Old granulomatous disease.  4. Indeterminant lesion in the lower pole of the right kidney.  Follow-up recommended.  5. There are 2 small indeterminate left upper lobe lung nodules.    Recommendations for an incidental lung nodule = or > 6mm to 8mm:    Low risk patients: Initial follow-up CT at 6-12 months, then  consider CT at 18-24 months if no change.    High risk patients: Initial follow-up CT at 6-12 months, then CT at  18-24 months if no change.    *Low Risk: Minimal or absent history of smoking or other known risk  factors.  *Nonsolid (ground-glass) or partly solid nodules may require longer  follow-up to exclude indolent adenocarcinoma.  *Recommendations based on Guidelines for the Management of Incidental  Pulmonary Nodules Detected at CT: From the Fleischner Society 2017,  Radiology 2017.       TAZ RICE MD       Discharge Orders      CT Chest w/o contrast    Send results to Dr. Durbin     Home Care PT Referral for Hospital Discharge      Home Care OT Referral for Hospital Discharge      Home care nursing referral      Activity - Up with nursing assistance     Reason for your hospital stay    Coughing up blood  pneumonia     Follow-up and recommended labs and tests     Follow up with primary care provider, Merlin Emery Brown, within 7 days to  evaluate medication change and for hospital follow- up.  No follow up labs or test are needed.      INR recheck needed on 1/29      Follow up with [facility] as scheduled  Follow up with Dr. Durbin in [facility] as outlined in 4-6 weeks     Activity    Your activity upon discharge: activity as tolerated     Discharge Instructions    Take the lovenox injections until your INR is between 2-3 then stop.    Follow up with Vascular medicine as scheduled    Follow up with Dr. Durbin at [facility] in 4-6 weeks. The CT of the chest should be done a few days before.     MD face to face encounter    Documentation of Face to Face and Certification for Home Health Services    I certify that patient: Edison Saldivar is under my care and that I, or a nurse practitioner or physician's assistant working with me, had a face-to-face encounter that meets the physician face-to-face encounter requirements with this patient on: 1/28/2019.    This encounter with the patient was in whole, or in part, for the following medical condition, which is the primary reason for home health care: deconditioning after long hospitalization for pneumonia.    I certify that, based on my findings, the following services are medically necessary home health services: Nursing, Occupational Therapy and Physical Therapy.    My clinical findings support the need for the above services because: Nurse is needed: To provide assessment and oversight required in the home to assure adherence to the medical plan due to: congition issues from previous stroke.., Occupational Therapy Services are needed to assess and treat cognitive ability and address ADL safety due to impairment in deconditioning. and Physical Therapy Services are needed to assess and treat the following functional impairments: deconditoning.    Further, I certify that my clinical findings support that this patient is homebound (i.e. absences from home require considerable and taxing effort and are for  medical reasons or [employer] services or infrequently or of short duration when for other reasons) because: Leaving home is medically contraindicated for the following reason(s): does not drive. History of strokes. Sneddon syndrome..    Based on the above findings. I certify that this patient is confined to the home and needs intermittent skilled nursing care, physical therapy and/or speech therapy.  The patient is under my care, and I have initiated the establishment of the plan of care.  This patient will be followed by a physician who will periodically review the plan of care.  Physician/Provider to provide follow up care: Brown, Merlin Emery    Attending hospital physician (the Medicare certified [place] provider): Wero Reyes  Physician Signature: See electronic signature associated with these discharge orders.  Date: 1/28/2019     Follow-up and recommended labs and tests     Appointment with DR Merlin Brown  2/1 at 10 am    Appointment with [facility] 2/14 at 1:15 pm with DR Sellers     Full Code     Fall precautions     Advance Diet as Tolerated    Follow this diet upon discharge: Orders Placed This Encounter      Room Service      Low Saturated Fat Na <2400 mg     Diet    Follow this diet upon discharge: Orders Placed This Encounter      Room Service      Low Saturated Fat Na <2400 mg      Advance Diet as Tolerated     Discharge Medications   Current Discharge Medication List      START taking these medications    Details   albuterol (PROAIR HFA/PROVENTIL HFA/VENTOLIN HFA) 108 (90 Base) MCG/ACT inhaler Inhale 2 puffs into the lungs every 4 hours as needed for shortness of breath / dyspnea or wheezing  Qty: 1 Inhaler, Refills: 0    Associated Diagnoses: Chronic obstructive pulmonary disease, unspecified COPD type (H)      enoxaparin (LOVENOX) 120 MG/0.8ML syringe Inject 0.7 mLs (105 mg) Subcutaneous every 12 hours for 4 days Stop once the INR is therapeutic between 2-3  Qty: 7 mL, Refills: 0    Associated  Diagnoses: Sneddon syndrome (H)      !! Fluticasone-Umeclidin-Vilanterol (TRELEGY ELLIPTA) 100-62.5-25 MCG/INH oral inhaler Inhale 1 puff into the lungs daily    Associated Diagnoses: Chronic obstructive pulmonary disease, unspecified COPD type (H)      !! Fluticasone-Umeclidin-Vilanterol (TRELEGY ELLIPTA) 100-62.5-25 MCG/INH oral inhaler Inhale 1 puff into the lungs daily  Qty: 1 Inhaler, Refills: 0    Associated Diagnoses: Chronic obstructive pulmonary disease, unspecified COPD type (H)       !! - Potential duplicate medications found. Please discuss with provider.      CONTINUE these medications which have CHANGED    Details   amLODIPine (NORVASC) 5 MG tablet Take 1 tablet (5 mg) by mouth every morning  Qty: 30 tablet, Refills: 0    Associated Diagnoses: Sneddon syndrome (H)      furosemide (LASIX) 40 MG tablet Take 1 tablet (40 mg) by mouth daily  Qty: 30 tablet, Refills: 3    Associated Diagnoses: Benign essential hypertension      warfarin (COUMADIN) 7.5 MG tablet Take 1 tablet (7.5 mg) by mouth daily for 2 days    Associated Diagnoses: Sneddon syndrome (H)         CONTINUE these medications which have NOT CHANGED    Details   brimonidine-timolol (COMBIGAN) 0.2-0.5 % ophthalmic solution Place 1 drop into both eyes every morning      brinzolamide (AZOPT) 1 % ophthalmic suspension Place 1 drop into both eyes every morning      CALCIUM-MAGNESIUM-ZINC PO Take 1 tablet by mouth every morning 100/40/15      co-enzyme Q-10 100 MG CAPS capsule Take 100 mg by mouth every morning      docusate sodium (COLACE) 100 MG capsule Take 100 mg by mouth every evening      esomeprazole (NEXIUM) 40 MG DR capsule Take 40 mg by mouth every morning (before breakfast) Take 30-60 minutes before eating.      latanoprost (XALATAN) 0.005 % ophthalmic solution Place 1 drop into both eyes At Bedtime      Magnesium Oxide 250 MG TABS Take 500 mg by mouth daily      mirabegron (MYRBETRIQ) 50 MG 24 hr tablet Take 50 mg by mouth every morning       multivitamin w/minerals (THERA-VIT-M) tablet Take 1 tablet by mouth every morning      vitamin B-12 (CYANOCOBALAMIN) 1000 MCG tablet Take 1,000 mcg by mouth every morning      vitamin D3 (CHOLECALCIFEROL) 2000 units tablet Take 2,000 Units by mouth daily         STOP taking these medications       tiotropium (SPIRIVA) 18 MCG inhaled capsule Comments:   Reason for Stopping:             Allergies   No Known Allergies

## 2019-01-28 NOTE — PROGRESS NOTES
[facility]    Vascular Medicine Progress Note    Attestation: I have examined the patient independently of Mahsa Gonzalez PA-C and agree with the examination and plan as delineated below.    Mason Torres MD      Date of Service (when I saw the patient): 01/28/2019     Assessment & Plan      Edison Saldivar is a 78 year old male who was admitted on 1/17/2019.     1. Hemoptysis, admitted initially to ICU on 1/17/19 in the setting of long standing anticoagulation  (more than 30 years with warfarin for multiple CVAs). He developed severe cough before hemoptysis.  INR 2.1 at the time of admission.  Admission CT LLE consolidation/pneumonia ?? Mass. Small GEORGE nodules (former smoker)     2. Hx of Epistaxis 24 hours before hemoptysis 1/16/19     3. Hx of Sneddon syndrome with Livedo reticularis and multiple CVAs (on warfarin for 30 plus years)     Also Hx of factor 5 leyden mutation details unknown ?    4.KORY /Obesity: wears CPAP     5. Hx of sarcoidosis:  (normal ACE level and ionized calcium this admission) treated per family many years ago?     6.Hx of COPD ( former smoker)      7. HTN:     8.Aortic Stenosis, moderate  ( BAYRON 1.7cm2, mean gradient 27.9 mm hg, peak AoV pressure gradient 45.3 mm hg)        This is a very pleasant male who carries the diagnosis of Sneddon syndrome, history of sarcoidosis, COPD/asthma, hypertension, obesity with sleep apnea wears CPAP machine, history of recurrent CVA/TIAs taking chronic anticoagulation more than 30 years admitted with hemoptysis and also epistaxis requiring reversal of anticoagulation and holding warfarin 1/17/19-1/23/19.  He is currently stable, no more hemoptysis or epistaxis last 7 days.  Hemoglobin stable.  He was treated with antibiotics for 5-6 days.  No more cough.  No constitutional symptoms.  No arthralgias but deconditioned.  Extensive evaluation done in the past  in [facility] and in [place]. (Unfortunately minimal records are  available in [facility] care everywhere from [facility]) no records from [place].     He denies any headache, vision problems.  Patient's wife is worried about holding anticoagulation and he has a history of recurrent TIAs and strokes when he was off of anticoagulation.  Reviewed imaging studies and laboratory data in the epic.    Initiated IV heparin and warfarin 1/24/19, tolerating well . INR still only 1.69 today.     Very High  and CRP 92.7  3 days ago ??? Related to current sickness, clinically improving and ESR/CRP also improving.   Vasculitis panel negative  CELY, LA  pending  ACLA negative.  LA and beta 2 GP abs pending  Eosinophils 7% on admission and recently 5%  blood morphology, complements pending     Plan:    -Continue IV heparin high intensity protocol as long as in the hospital, then bridge with lovenox at the time of discharge.     -Continue warfarin. Goal INR of 2-3.    -Follow pending labs - results to be reviewed during follow up appointment with Dr. Jhaveri.     -Good blood pressure control (avoid ACE inhibitor given his presentation which can cause cough 15-20% of the patient's)    -OK to discharge from [facility] Med standpoint on Lovenox bridging at 1 mg/kg BID until INR therapeutic.     A follow up appointment with Dr. Jhaveri at [facility] has been scheduled for him in one month on February 26th at 9:30 am  ([phone]).         Interval History     Tolerating IV heparin, no more hemptysis of epistaxis, HGB stable  Very High ESR and CRP 2 days ago  Vasculitis panel negative  ACLA negative.  Deconditioned   INR 1.35    Physical Exam   Temp: 97.8  F (36.6  C) Temp src: Oral BP: 149/70   Heart Rate: 75 Resp: 20 SpO2: 90 % O2 Device: None (Room air)    Vitals:    01/26/19 0606 01/27/19 0601 01/28/19 0600   Weight: 107.8 kg (237 lb 10.5 oz) 106.3 kg (234 lb 5.6 oz) 108.8 kg (239 lb 13.8 oz)     Vital Signs with Ranges  Temp:  [97.5  F (36.4  C)-98.3  F (36.8  C)] 97.8  F (36.6  C)  Heart Rate:   [52-75] 75  Resp:  [18-24] 20  BP: (120-149)/(68-78) 149/70  SpO2:  [90 %-96 %] 90 %  I/O last 3 completed shifts:  In: 1119 [I.V.:1119]  Out: -     Constitutional: Awake, alert, cooperative, no apparent distress, and appears stated age.  Eyes: Lids and lashes normal, pupils equal, round and reactive to light, extra ocular muscles intact, sclera clear, conjunctiva normal.  ENT: Normocephalic, without obvious abnormality  Respiratory: No increased work of breathing, good air exchange, clear to auscultation bilaterally, no crackles or wheezing.  Cardiovascular: Normal apical impulse, regular rate and rhythm, normal S1 and S2, no S3 or S4, and no murmur noted.  GI:  normal bowel sounds, soft, non-distended, non-tender  Genitourinary:  negative  Skin:Livido reticularis skin lesions unchanged.  Musculoskeletal: trace leg edema  Neurologic: Awake, alert, oriented to name, place and time.  Cranial nerves II-XII are grossly intact.  Motor is 5 out of 5 bilaterally.   Neuropsychiatric: Calm, normal eye contact, alert, normal affect, oriented to self, place, time and situation, memory for past and recent events intact and thought process normal.  Pulses:  palpable pulses    Medications     HEParin 1,750 Units/hr (01/28/19 1054)     Warfarin Therapy Reminder         amLODIPine  5 mg Oral QAM     brimonidine-timolol  1 drop Both Eyes QAM     brinzolamide  1 drop Both Eyes QAM     docusate sodium  100 mg Oral QPM     furosemide  40 mg Oral Daily     guaiFENesin  600 mg Oral BID     latanoprost  1 drop Both Eyes At Bedtime     mirabegron  50 mg Oral QAM     pantoprazole  40 mg Oral QAM AC     umeclidinium  1 puff Inhalation QAM     warfarin  7.5 mg Oral ONCE at 18:00       Data   Recent Labs   Lab 01/28/19  0905 01/27/19  0810 01/26/19  0805 01/25/19  1713 01/25/19  0832  01/23/19  1103   WBC  --  7.7  --  8.9 7.9   < >  --    HGB  --  10.2*  --  10.2* 10.1*   < > 10.0*   MCV  --  91  --  92 92   < >  --    PLT  --  207  --  201  197   < >  --    INR 1.69* 1.35* 1.21*  --  1.13   < > 1.15*   NA  --   --   --   --  139  --  139   POTASSIUM  --   --   --   --  3.4  --  3.5   CHLORIDE  --   --   --   --  106  --  107   CO2  --   --   --   --  26  --  25   BUN  --   --   --   --  14  --  13   CR  --   --   --   --  1.06  --  0.98   ANIONGAP  --   --   --   --  7  --  7   JULIANNE  --   --   --   --  8.5  --  8.3*   GLC  --   --   --   --  109*  --  129*    < > = values in this interval not displayed.

## 2019-01-28 NOTE — PROGRESS NOTES
SW:  D:  Met with patient and his wife.  Reviewed the home care services hospitalist ordered which include home RN, PT,OT and bath aide.  Offered  Home care choice and wife stated she would like [facility].  Referral sent to [facility] for SOC on 1/29/19.  Wife explains she and her daughter will return later today to transport patient home.  Wife did not express any dissatisfied with the discharge plan to home.

## 2019-01-28 NOTE — PLAN OF CARE
PT:  Discharge Planner PT   Patient plan for discharge: Return home today with home cares  Current status: Pt required SBA for transfers in the bathroom and from the chair, CGA progressing to SBA for gait of 300 ft with FWW with steady balance and mild fatigue noted at end of session. Participated in seated strengthening in the chair well.   Barriers to return to prior living situation: None  Recommendations for discharge: Return home with home PT  Rationale for recommendations: Pt would benefit from continued PT to improve strength, balance, mobility to increase independence, reduce falls risk and increase safety before returning home.       Entered by: Estrella Millan 01/28/2019 2:27 PM     Pt discharging home today. PT goals not met.    Physical Therapy Discharge Summary    Reason for therapy discharge:    Discharged to home with home therapy.    Progress towards therapy goal(s). See goals on Care Plan in [facility] electronic health record for goal details.  Goals not met.  Barriers to achieving goals:   discharge from facility.    Therapy recommendation(s):    Continued therapy is recommended.  Rationale/Recommendations:  eval and treat with home PT.

## 2019-01-28 NOTE — PROGRESS NOTES
Discharge    Patient discharged to home via private transportation with wife and daughter.    Care plan note:  VSS; O2sats 90's RA; no SOB/N/V/pain.  No s/sx of hemoptysis noted.  Last Hgb 10.2.  Heparin was discontinued this pm.  Lovenox started.  Lovenox information provided and demonstration observed.  Patient's daughter verbalized understanding of Lovenox.  Discharge instructions and prescriptions were provided as well.  Patient and family all verbalized understanding of all information received.      Listed belongings gathered and returned to patient. Yes  Care Plan and Patient education resolved: Yes  Prescriptions if needed, hard copies sent with patient Yes  Home and hospital acquired medications returned to patient:Yes  Medication Bin checked and emptied on discharge Yes  Follow up appointment made for patient: Yes

## 2019-01-28 NOTE — PLAN OF CARE
VSS; O2sats 90's RA; no SOB/N/V/pain.  No s/sx of hemoptysis noted.  Last Hgb 10.2.  Heparin was discontinued this pm.  Lovenox started.  Lovenox information provided and demonstration observed.  Patient's daughter verbalized understanding of Lovenox.  Discharge instructions and prescriptions were provided as well.  Patient and family all verbalized understanding of all information received.  Patient is being discharged home with HC with his wife and his daughter via private transportation.

## 2019-01-28 NOTE — PROGRESS NOTES
SW:  D:  Received a call from Mona at [facility].    Based on PT and OT recommendations and progress documented by these therapies, Mona is declining patient as he does not meet Medicare SNF admission criteria.    This is the same opinion received from [facility]'s admission director.

## 2019-01-28 NOTE — DISCHARGE INSTRUCTIONS
When discharged from hospital please make follow up appointment with Dr. Jhaveri at [facility] in one month [phone]    [facility] has been ordered for home RN to begin on 1/29 (Tuesday).  You will also receive home Physical Therapy, Occupational Therapy and Bath Aide.  Each discipline will call ahead of time to arrange the visit.  Their staffing number at [facility] is [phone].

## 2019-01-29 ENCOUNTER — MYC MEDICAL ADVICE (OUTPATIENT)
Dept: OTHER | Facility: CLINIC | Age: 79
End: 2019-01-29

## 2019-01-30 NOTE — TELEPHONE ENCOUNTER
Patients daughter messaged on mychart below. Patient's daughter was directed to contact PCP.  Tong has not been seen in [facility] yet and was consulted on while admitted.    Christi BULLOCKN, RN

## 2019-01-31 ENCOUNTER — DOCUMENTATION ONLY (OUTPATIENT)
Dept: CARE COORDINATION | Facility: CLINIC | Age: 79
End: 2019-01-31

## 2019-01-31 NOTE — PROGRESS NOTES
[facility] now requests orders and shares plan of care/discharge summaries for some patients through [email or web address].  Please REPLY TO THIS MESSAGE OR ROUTE BACK TO THE AUTHOR in order to give authorization for orders when needed.  This is considered a verbal order, you will still receive a faxed copy of orders for signature.  Thank you for your assistance in improving collaboration for our patients.    ORDER  PT eval x 1 for gait/balance/fall risk.   OT eval x 1 for bathroom saftey/ADLS/seating/energy conservation.  HHA 2x/week for 3 weeks for bathing/dressing/skin checks.   SW eval x 1 for short and long range planning.    Thank you,   Cheli Michel RN  Case Gloria  [facility]  [email or web address]   Office: [phone]  Cell: [phone]

## 2019-02-26 ENCOUNTER — OFFICE VISIT (OUTPATIENT)
Dept: OTHER | Facility: CLINIC | Age: 79
End: 2019-02-26
Attending: INTERNAL MEDICINE
Payer: COMMERCIAL

## 2019-02-26 ENCOUNTER — HOSPITAL ENCOUNTER (OUTPATIENT)
Dept: LAB | Facility: CLINIC | Age: 79
Discharge: HOME | End: 2019-02-26
Attending: INTERNAL MEDICINE
Payer: COMMERCIAL

## 2019-02-26 VITALS
RESPIRATION RATE: 18 BRPM | HEIGHT: 67 IN | WEIGHT: 239 LBS | DIASTOLIC BLOOD PRESSURE: 80 MMHG | BODY MASS INDEX: 37.51 KG/M2 | HEART RATE: 84 BPM | SYSTOLIC BLOOD PRESSURE: 136 MMHG | OXYGEN SATURATION: 96 %

## 2019-02-26 DIAGNOSIS — I77.89 SNEDDON SYNDROME (H): ICD-10-CM

## 2019-02-26 DIAGNOSIS — E66.01 MORBID OBESITY (H): ICD-10-CM

## 2019-02-26 DIAGNOSIS — D68.51 FACTOR 5 LEIDEN MUTATION, HETEROZYGOUS (H): ICD-10-CM

## 2019-02-26 DIAGNOSIS — I10 BENIGN ESSENTIAL HYPERTENSION: ICD-10-CM

## 2019-02-26 DIAGNOSIS — R04.2 HEMOPTYSIS: Primary | ICD-10-CM

## 2019-02-26 DIAGNOSIS — R76.8 ELEVATED ANTINUCLEAR ANTIBODY (ANA) LEVEL: ICD-10-CM

## 2019-02-26 DIAGNOSIS — I35.0 AORTIC VALVE STENOSIS, ETIOLOGY OF CARDIAC VALVE DISEASE UNSPECIFIED: ICD-10-CM

## 2019-02-26 DIAGNOSIS — G47.33 OSA (OBSTRUCTIVE SLEEP APNEA): ICD-10-CM

## 2019-02-26 LAB
CRP SERPL-MCNC: 3.6 MG/L (ref 0–8)
ERYTHROCYTE [SEDIMENTATION RATE] IN BLOOD BY WESTERGREN METHOD: 37 MM/H (ref 0–20)

## 2019-02-26 PROCEDURE — 36415 COLL VENOUS BLD VENIPUNCTURE: CPT | Performed by: INTERNAL MEDICINE

## 2019-02-26 PROCEDURE — 86038 ANTINUCLEAR ANTIBODIES: CPT | Performed by: INTERNAL MEDICINE

## 2019-02-26 PROCEDURE — G0463 HOSPITAL OUTPT CLINIC VISIT: HCPCS

## 2019-02-26 PROCEDURE — 86140 C-REACTIVE PROTEIN: CPT | Performed by: INTERNAL MEDICINE

## 2019-02-26 PROCEDURE — 99215 OFFICE O/P EST HI 40 MIN: CPT | Mod: ZP | Performed by: INTERNAL MEDICINE

## 2019-02-26 PROCEDURE — 85652 RBC SED RATE AUTOMATED: CPT | Performed by: INTERNAL MEDICINE

## 2019-02-26 PROCEDURE — 86039 ANTINUCLEAR ANTIBODIES (ANA): CPT | Performed by: INTERNAL MEDICINE

## 2019-02-26 SDOH — HEALTH STABILITY: MENTAL HEALTH: HOW OFTEN DO YOU HAVE A DRINK CONTAINING ALCOHOL?: NEVER

## 2019-02-26 ASSESSMENT — MIFFLIN-ST. JEOR: SCORE: 1772.73

## 2019-02-26 NOTE — PROGRESS NOTES
"Edison Saldivar is a 78 year old male who presents for:  Chief Complaint   Patient presents with     RECHECK     Follow up- new-for snedding syndrome        Vitals:    Vitals:    02/26/19 0937 02/26/19 0940   BP: 150/74 129/81   BP Location: Right arm Left arm   Patient Position: Chair Chair   Cuff Size: Adult Large Adult Large   Pulse: 84    Resp: 18    SpO2: 96%    Weight: 239 lb (108.4 kg)    Height: 5' 7\" (1.702 m)        BMI:  Estimated body mass index is 37.43 kg/m  as calculated from the following:    Height as of this encounter: 5' 7\" (1.702 m).    Weight as of this encounter: 239 lb (108.4 kg).    Pain Score:  Data Unavailable        Eliazar Dean MA    "

## 2019-02-26 NOTE — PROGRESS NOTES
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  After brief withdrawal in the hospital then restarted tolerating without any problems.  Seen and evaluated in the hospital by lung specialist and also recently seen after CT chest at [facility].    HPI:    Edison Saldivar is a 78 year old male with past medical history of hypertension, obesity, obstructive sleep apnea, Sneddon syndrome,  Heterozygous factor V Leyden mutation, COPD recently admitted to the [facility] initially to the ICU for hemoptysis and epistaxis, he was getting warfarin which was held and reversed followed by IV heparin drip converted to Lovenox bridging with warfarin tolerating without any problems.  Admission CT left lower lobe consolidation/pneumonia and also small left upper lobe nodules, he was former smoker.  He was seen and evaluated by lung specialist in the hospital then followed by recently seen outpatient visit after CT scan he was told it is better.  No more hemoptysis, completed antibiotics.  Leg swelling improved.  Slowly progressing with the physical therapy and occupational therapy  He was initiated [facility] by pulmonologist  Reviewed recent hospitalization records imaging studies and laboratory data  Only abnormality is borderline positive CELY and elevated ESR and CRP which were improved at the time of discharge but still very high probably due to sickness with pneumonia etc.  HISTORIES:  PROBLEM LIST:   Patient Active Problem List   Diagnosis     Hemoptysis     PAST MEDICAL HISTORY:  Hypertension  Obesity  Obstructive sleep apnea  Sneddon syndrome  Aortic valve stenosis moderate 1.7 cm square area  COPD  PAST SURGICAL HISTORY:  History reviewed. No pertinent surgical history.  CURRENT MEDICATIONS:  Current Outpatient Medications   Medication  Sig Dispense Refill     albuterol (PROAIR HFA/PROVENTIL HFA/VENTOLIN HFA) 108 (90 Base) MCG/ACT inhaler Inhale 2 puffs into the lungs every 4 hours as needed for shortness of breath / dyspnea or wheezing 1 Inhaler 0     amLODIPine (NORVASC) 5 MG tablet Take 1 tablet (5 mg) by mouth every morning 30 tablet 0     brimonidine-timolol (COMBIGAN) 0.2-0.5 % ophthalmic solution Place 1 drop into both eyes every morning       brinzolamide (AZOPT) 1 % ophthalmic suspension Place 1 drop into both eyes every morning       CALCIUM-MAGNESIUM-ZINC PO Take 1 tablet by mouth every morning 100/40/15       co-enzyme Q-10 100 MG CAPS capsule Take 100 mg by mouth every morning       docusate sodium (COLACE) 100 MG capsule Take 100 mg by mouth every evening       esomeprazole (NEXIUM) 40 MG DR capsule Take 40 mg by mouth every morning (before breakfast) Take 30-60 minutes before eating.       Fluticasone-Umeclidin-Vilanterol (TRELEGY ELLIPTA) 100-62.5-25 MCG/INH oral inhaler Inhale 1 puff into the lungs daily       Fluticasone-Umeclidin-Vilanterol (TRELEGY ELLIPTA) 100-62.5-25 MCG/INH oral inhaler Inhale 1 puff into the lungs daily 1 Inhaler 0     latanoprost (XALATAN) 0.005 % ophthalmic solution Place 1 drop into both eyes At Bedtime       Magnesium Oxide 250 MG TABS Take 500 mg by mouth daily       mirabegron (MYRBETRIQ) 50 MG 24 hr tablet Take 50 mg by mouth every morning       multivitamin w/minerals (THERA-VIT-M) tablet Take 1 tablet by mouth every morning       vitamin B-12 (CYANOCOBALAMIN) 1000 MCG tablet Take 1,000 mcg by mouth every morning       vitamin D3 (CHOLECALCIFEROL) 2000 units tablet Take 2,000 Units by mouth daily       furosemide (LASIX) 40 MG tablet Take 1 tablet (40 mg) by mouth daily 30 tablet 3     warfarin (COUMADIN) 7.5 MG tablet Take 1 tablet (7.5 mg) by mouth daily for 2 days       ALLERGIES:  No Known Allergies  SOCIAL HISTORY:  Social History     Socioeconomic History     Marital status:      Spouse  name: Not on file     Number of children: Not on file     Years of education: Not on file     Highest education level: Not on file   Occupational History     Not on file   Social Needs     Financial resource strain: Not on file     Food insecurity:     Worry: Not on file     Inability: Not on file     Transportation needs:     Medical: Not on file     Non-medical: Not on file   Tobacco Use     Smoking status: Never Smoker     Smokeless tobacco: Never Used   Substance and Sexual Activity     Alcohol use: No     Frequency: Never     Drug use: No     Sexual activity: No   Lifestyle     Physical activity:     Days per week: Not on file     Minutes per session: Not on file     Stress: Not on file   Relationships     Social connections:     Talks on phone: Not on file     Gets together: Not on file     Attends [employer] witness service: Not on file     Active member of club or organization: Not on file     Attends meetings of clubs or organizations: Not on file     Relationship status: Not on file     Intimate partner violence:     Fear of current or ex partner: Not on file     Emotionally abused: Not on file     Physically abused: Not on file     Forced sexual activity: Not on file   Other Topics Concern     Not on file   Social History Narrative     Not on file     FAMILY HISTORY:    Reviewed negative  REVIEW OF SYSTEMS:  CONSTITUTIONAL:no malaise, fatigue, or other general symptoms  EYES: no subjective changes in visual acuity, no photophobia  ENT/MOUTH: no complaints of rhinorrhea, nasal congestion, sore throat, hearing changes  RESP:no SOB  CV: no c/o exertional chest pressure or MONTALVO  GI: No abdominal pain, constipation, change in bowel movements, nausea, pyrosis, BRBPR  :no polyuria or polydipsia, no dysuria, no gross hematuria  MUSCULOSKELATAL:no arthalgias or myalgias  INTEGUMENTARY/SKIN: no pruritis, rashes, or moles with recent change in size, shape, or pigmentation  NEURO: no gross sensory or motor symptoms, no  "dizziness, no confusion  ENDOCRINE: no polyuria or polydipsia, no heat or cold intolerance  HEME/ALLERGY/IMMUNE: no fevers, chills, night sweats, or unwanted weight loss  PSYCHIATRIC: no depression, anxiety, or internal stimuli  EXAM:  /80 (BP Location: Right arm, Patient Position: Sitting, Cuff Size: Adult Regular)   Pulse 84   Resp 18   Ht 5' 7\" (1.702 m)   Wt 239 lb (108.4 kg)   SpO2 96%   BMI 37.43 kg/m    BMI: Body mass index is 37.43 kg/m .  GENERAL APPEARANCE:  Pleasant  Healthy appearing male , alert, active, no distress cooperative.  EXAM:  EYES: clear conjunctiva, no cataracts, no obvious fundoscopic abnormalities  HENT: oropharynx, nares, and TMs are WNL  NECK: no JVD, thyromegaly or lymphadenopathy, no cervical bruits  RESP: clear to auscultation without rales, wheezes, or rhonchi  CV: RRR,  2-3/6 Aortic murmur heard, no gallops, or rubs  LYMPH: no cervical , axillary, or inguinal lymphadenopathy appreciated  GI: NABS, ND/NT, no masses or organomegally appreciated  : normal external genitalia and anus, no lumps, masses  MS: no obvoius clinicallly relevant arthropathy, no evidence vasculitis  SKIN: no nevi clinically suspicious for malignancy are noted  NEURO: CN II-XII intact, no localizing sensory or motor abnoramlities noted, DTRs symmetrical bilaterally  PSYCH: Mental status exam reveals the pt to have normal mood and affect. There is no disorder of thought form or content. There is no response to internal stimuli. There is no suicidal or homicidal ideation.    Assessment and plan:    Hemoptysis/epistaxis and recent history of pneumonia:   No more hemoptysis and completed antibiotics doing well no fever no chills.  Repeat CT scan outside system and seen pulmonary clinic, stable continue the same plan  Vasculitis panel negative  CELY borderline elevated,LA  negative  ACLA negative.  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  DASH diet discussed with the patient and avoid NSAIDs    (G47.33) KORY (obstructive sleep apnea)  Comment: He has been using CPAP machine continue the same and lose weight  Plan:     (E66.01) Morbid obesity (H)  Suggested lose weight    (I35.0) Aortic valve stenosis, moderate as with BAYRON 1.7 cm square, mean gradient 27.9 mmHg, peak aortic valve pressure gradient 45.3 mmHg .  Asymptomatic, he will need repeat echocardiogram in 1 year through primary care physician discussed with the patient and family.    (R76.8) Elevated antinuclear antibody (CELY) level  Recent hospitalization secondary to the hemoptysis, epistaxis underwent extensive evaluation mildly elevated CELY, CRP and ESR.  Repeat lab test today  Plan: Erythrocyte sedimentation rate auto, CRP         inflammation, Anti Nuclear Gloria IgG by IFA with         Reflex          I have discussed with patient the risks, benefits, medications, treatment options and modalities.   I have instructed the patient to call or schedule a follow-up appointment if any problems or failure to improve.    This note was dictated by utilizing dragon software    Total patient care time spent today 45 minutes face-to-face and more than 50% of the time spent counseling of the above-mentioned multiple medical issues.  Reviewed recent hospitalization records and outside records.   patient and family had a lot of questions and all of them were answered    Copy of this dictation to primary care  physician    George Jhaveri MD,FS,[facility]  Vascular medicine

## 2019-02-27 LAB
ANA PAT SER IF-IMP: ABNORMAL
ANA SER QL IF: ABNORMAL
ANA TITR SER IF: ABNORMAL {TITER}

## 2019-04-01 ENCOUNTER — DOCUMENTATION ONLY (OUTPATIENT)
Dept: OTHER | Facility: CLINIC | Age: 79
End: 2019-04-01

## 2019-05-22 ENCOUNTER — OFFICE VISIT (OUTPATIENT)
Dept: OTHER | Facility: CLINIC | Age: 79
End: 2019-05-22
Attending: INTERNAL MEDICINE
Payer: COMMERCIAL

## 2019-05-22 VITALS
HEART RATE: 84 BPM | RESPIRATION RATE: 16 BRPM | BODY MASS INDEX: 37.51 KG/M2 | OXYGEN SATURATION: 97 % | SYSTOLIC BLOOD PRESSURE: 131 MMHG | DIASTOLIC BLOOD PRESSURE: 82 MMHG | HEIGHT: 67 IN | WEIGHT: 239 LBS

## 2019-05-22 DIAGNOSIS — D68.51 FACTOR 5 LEIDEN MUTATION, HETEROZYGOUS (H): ICD-10-CM

## 2019-05-22 DIAGNOSIS — E66.01 MORBID OBESITY (H): ICD-10-CM

## 2019-05-22 DIAGNOSIS — I35.0 AORTIC VALVE STENOSIS, ETIOLOGY OF CARDIAC VALVE DISEASE UNSPECIFIED: ICD-10-CM

## 2019-05-22 DIAGNOSIS — R76.8 ELEVATED ANTINUCLEAR ANTIBODY (ANA) LEVEL: ICD-10-CM

## 2019-05-22 DIAGNOSIS — I77.89 SNEDDON SYNDROME (H): ICD-10-CM

## 2019-05-22 DIAGNOSIS — I10 BENIGN ESSENTIAL HYPERTENSION: Primary | ICD-10-CM

## 2019-05-22 DIAGNOSIS — G47.33 OSA (OBSTRUCTIVE SLEEP APNEA): ICD-10-CM

## 2019-05-22 PROCEDURE — G0463 HOSPITAL OUTPT CLINIC VISIT: HCPCS

## 2019-05-22 PROCEDURE — 99214 OFFICE O/P EST MOD 30 MIN: CPT | Mod: ZP | Performed by: INTERNAL MEDICINE

## 2019-05-22 ASSESSMENT — MIFFLIN-ST. JEOR: SCORE: 1772.73

## 2019-05-22 NOTE — PROGRESS NOTES
"Edison Saldivar is a 78 year old male who presents for:  Chief Complaint   Patient presents with     RECHECK     3 month f/u- AK*pt r/s from 05/10/19*jasvir 04/25/19        Vitals:    Vitals:    05/22/19 0923 05/22/19 0925   BP: 157/77 131/82   BP Location: Right arm Left arm   Patient Position: Chair Chair   Cuff Size: Adult Large Adult Large   Pulse: 84    Resp: 16    SpO2: 97%    Weight: 239 lb (108.4 kg)    Height: 5' 7\" (1.702 m)        BMI:  Estimated body mass index is 37.43 kg/m  as calculated from the following:    Height as of this encounter: 5' 7\" (1.702 m).    Weight as of this encounter: 239 lb (108.4 kg).    Pain Score:  Data Unavailable        Eliazar Dean    "

## 2019-05-22 NOTE — PATIENT INSTRUCTIONS
1. Please discuss with primary Dr. Fitzpatrick and consider stopping amlodipine ( probably causing ankle /leg swelling)  and lasix.     Consider starting   ARB with hydrochlorothiazide ( hyzaar 100/25 for better BP control )    2. Repeat CELY lab test at primary ( previously borderline positive)     Continue warfarin and maintain INR good range.

## 2019-05-22 NOTE — PROGRESS NOTES
SUBJECTIVE:  CC:   Follow-up visit accompanied by wife   Few months ago admitted to the hospital with hemoptysis, deconditioning and pneumonia  History of Sneddon syndrome, he was taking warfarin for 30+ years.  Back on warfarin and maintaining INR good range  2/2019 excellent inflammatory markers and normalized.  Seen and evaluated in the hospital by lung specialist and also recently seen after CT chest at [facility].  BP elevated and  Ankle swelling  HPI:    Edison Saldivar is a 78 year old male with past medical history of hypertension, obesity, obstructive sleep apnea, Sneddon syndrome,  Heterozygous factor V Leyden mutation, COPD recently admitted to the [facility] initially to the ICU for hemoptysis and epistaxis, he was getting warfarin which was held and reversed followed by IV heparin drip converted to Lovenox bridging with warfarin tolerating without any problems.  Admission CT left lower lobe consolidation/pneumonia and also small left upper lobe nodules, he was former smoker.  He was seen and evaluated by lung specialist in the hospital then followed by recently seen outpatient visit after CT scan he was told it is better.  No more hemoptysis, completed antibiotics.  Leg swelling improved.  Lives at home with wife.  He was initiated Spiriva by pulmonologist  Reviewed recent hospitalization records imaging studies and laboratory data  Only abnormality is borderline positive CELY     HISTORIES:  PROBLEM LIST:   Patient Active Problem List   Diagnosis     Hemoptysis     PAST MEDICAL HISTORY:  Hypertension  Obesity  Obstructive sleep apnea  Sneddon syndrome  Aortic valve stenosis moderate 1.7 cm square area  COPD  PAST SURGICAL HISTORY:  History reviewed. No pertinent surgical history.  CURRENT MEDICATIONS:  Current Outpatient Medications   Medication Sig Dispense Refill     albuterol (PROAIR HFA/PROVENTIL HFA/VENTOLIN HFA) 108 (90 Base) MCG/ACT inhaler Inhale 2 puffs into the lungs every 4  hours as needed for shortness of breath / dyspnea or wheezing 1 Inhaler 0     amLODIPine (NORVASC) 5 MG tablet Take 1 tablet (5 mg) by mouth every morning 30 tablet 0     brimonidine-timolol (COMBIGAN) 0.2-0.5 % ophthalmic solution Place 1 drop into both eyes every morning       brinzolamide (AZOPT) 1 % ophthalmic suspension Place 1 drop into both eyes every morning       CALCIUM-MAGNESIUM-ZINC PO Take 1 tablet by mouth every morning 100/40/15       co-enzyme Q-10 100 MG CAPS capsule Take 100 mg by mouth every morning       docusate sodium (COLACE) 100 MG capsule Take 100 mg by mouth every evening       esomeprazole (NEXIUM) 40 MG DR capsule Take 40 mg by mouth every morning (before breakfast) Take 30-60 minutes before eating.       Fluticasone-Umeclidin-Vilanterol (TRELEGY ELLIPTA) 100-62.5-25 MCG/INH oral inhaler Inhale 1 puff into the lungs daily       Fluticasone-Umeclidin-Vilanterol (TRELEGY ELLIPTA) 100-62.5-25 MCG/INH oral inhaler Inhale 1 puff into the lungs daily 1 Inhaler 0     furosemide (LASIX) 40 MG tablet Take 1 tablet (40 mg) by mouth daily 30 tablet 3     latanoprost (XALATAN) 0.005 % ophthalmic solution Place 1 drop into both eyes At Bedtime       Magnesium Oxide 250 MG TABS Take 500 mg by mouth daily       mirabegron (MYRBETRIQ) 50 MG 24 hr tablet Take 50 mg by mouth every morning       multivitamin w/minerals (THERA-VIT-M) tablet Take 1 tablet by mouth every morning       vitamin B-12 (CYANOCOBALAMIN) 1000 MCG tablet Take 1,000 mcg by mouth every morning       vitamin D3 (CHOLECALCIFEROL) 2000 units tablet Take 2,000 Units by mouth daily       warfarin (COUMADIN) 7.5 MG tablet Take 1 tablet (7.5 mg) by mouth daily for 2 days       ALLERGIES:  No Known Allergies  SOCIAL HISTORY:  Social History     Socioeconomic History     Marital status:      Spouse name: Not on file     Number of children: Not on file     Years of education: Not on file     Highest education level: Not on file    Occupational History     Not on file   Social Needs     Financial resource strain: Not on file     Food insecurity:     Worry: Not on file     Inability: Not on file     Transportation needs:     Medical: Not on file     Non-medical: Not on file   Tobacco Use     Smoking status: Never Smoker     Smokeless tobacco: Never Used   Substance and Sexual Activity     Alcohol use: No     Frequency: Never     Drug use: No     Sexual activity: Never   Lifestyle     Physical activity:     Days per week: Not on file     Minutes per session: Not on file     Stress: Not on file   Relationships     Social connections:     Talks on phone: Not on file     Gets together: Not on file     Attends [employer] service: Not on file     Active member of club or organization: Not on file     Attends meetings of clubs or organizations: Not on file     Relationship status: Not on file     Intimate partner violence:     Fear of current or ex partner: Not on file     Emotionally abused: Not on file     Physically abused: Not on file     Forced sexual activity: Not on file   Other Topics Concern     Not on file   Social History Narrative     Not on file     FAMILY HISTORY:    Reviewed negative  REVIEW OF SYSTEMS:  CONSTITUTIONAL:no malaise, fatigue, or other general symptoms  EYES: no subjective changes in visual acuity, no photophobia  ENT/MOUTH: no complaints of rhinorrhea, nasal congestion, sore throat, hearing changes  RESP:no SOB  CV: no c/o exertional chest pressure or MONTALVO  GI: No abdominal pain, constipation, change in bowel movements, nausea, pyrosis, BRBPR  :no polyuria or polydipsia, no dysuria, no gross hematuria  MUSCULOSKELATAL:no arthalgias or myalgias  INTEGUMENTARY/SKIN: no pruritis, rashes, or moles with recent change in size, shape, or pigmentation  NEURO: no gross sensory or motor symptoms, no dizziness, no confusion  ENDOCRINE: no polyuria or polydipsia, no heat or cold intolerance  HEME/ALLERGY/IMMUNE: no fevers, chills,  "night sweats, or unwanted weight loss  PSYCHIATRIC: no depression, anxiety, or internal stimuli  EXAM:  /82 (BP Location: Left arm, Patient Position: Chair, Cuff Size: Adult Large)   Pulse 84   Resp 16   Ht 5' 7\" (1.702 m)   Wt 239 lb (108.4 kg)   SpO2 97%   BMI 37.43 kg/m    BMI: Body mass index is 37.43 kg/m .  GENERAL APPEARANCE:  Pleasant  Healthy appearing male , alert, active, no distress cooperative.  EXAM:  EYES: clear conjunctiva, no cataracts, no obvious fundoscopic abnormalities  HENT: oropharynx, nares, and TMs are WNL  NECK: no JVD, thyromegaly or lymphadenopathy, no cervical bruits  RESP: clear to auscultation without rales, wheezes, or rhonchi  CV: RRR,  2-3/6 Aortic murmur heard, no gallops, or rubs  LYMPH: no cervical , axillary, or inguinal lymphadenopathy appreciated  GI: NABS, ND/NT, no masses or organomegally appreciated  : normal external genitalia and anus, no lumps, masses  MS: no obvoius clinicallly relevant arthropathy, no evidence vasculitis  SKIN: no nevi clinically suspicious for malignancy are noted  NEURO: CN II-XII intact, no localizing sensory or motor abnoramlities noted, DTRs symmetrical bilaterally  PSYCH: Mental status exam reveals the pt to have normal mood and affect. There is no disorder of thought form or content. There is no response to internal stimuli. There is no suicidal or homicidal ideation.    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  Lose weight.  DASH diet discussed with the " patient and avoid NSAIDs    (G47.33) KORY (obstructive sleep apnea)  Comment: He has been using CPAP machine continue the same and lose weight  Plan:     (E66.01) Morbid obesity (H)  Suggested lose weight    (I35.0) Aortic valve stenosis, moderate as with BAYRON 1.7 cm square, mean gradient 27.9 mmHg, peak aortic valve pressure gradient 45.3 mmHg .  Asymptomatic, he will need repeat echocardiogram in 1 year through primary care physician discussed with the patient and family.    (R76.8) Elevated antinuclear antibody (CELY) level  Recent hospitalization secondary to the hemoptysis, epistaxis underwent extensive evaluation mildly elevated CELY, CRP and ESR.  Recent ESR and CRP normal.  CELY borderline positive , repeat at primary           I have discussed with patient the risks, benefits, medications, treatment options and modalities.   I have instructed the patient to call or schedule a follow-up appointment if any problems or failure to improve.    This note was dictated by utilizing dragon software    Total patient care time spent today 30 minutes face-to-face and more than 50% of the time spent counseling of the above-mentioned multiple medical issues.  Reviewed recent hospitalization records and outside records.   patient and his wife  had a lot of questions and all of them were answered    Copy of this dictation to primary care physician    George Jhaveri MD,[facility]  Vascular medicine

## 2019-09-27 ENCOUNTER — HOSPITAL ENCOUNTER (INPATIENT)
Facility: CLINIC | Age: 79
LOS: 4 days | Discharge: NURSING, REHAB OR LONG-TERM CARE | DRG: 439 | End: 2019-10-01
Attending: EMERGENCY MEDICINE | Admitting: HOSPITALIST
Payer: COMMERCIAL

## 2019-09-27 ENCOUNTER — APPOINTMENT (OUTPATIENT)
Dept: CT IMAGING | Facility: CLINIC | Age: 79
DRG: 439 | End: 2019-09-27
Attending: EMERGENCY MEDICINE
Payer: COMMERCIAL

## 2019-09-27 ENCOUNTER — APPOINTMENT (OUTPATIENT)
Dept: GENERAL RADIOLOGY | Facility: CLINIC | Age: 79
DRG: 439 | End: 2019-09-27
Attending: EMERGENCY MEDICINE
Payer: COMMERCIAL

## 2019-09-27 DIAGNOSIS — R50.82 POST-PROCEDURAL FEVER: ICD-10-CM

## 2019-09-27 DIAGNOSIS — B99.9 INTRA-ABDOMINAL INFECTION: Primary | ICD-10-CM

## 2019-09-27 DIAGNOSIS — R10.13 ABDOMINAL PAIN, EPIGASTRIC: ICD-10-CM

## 2019-09-27 DIAGNOSIS — E86.0 DEHYDRATION: ICD-10-CM

## 2019-09-27 DIAGNOSIS — K85.90 ACUTE PANCREATITIS, UNSPECIFIED COMPLICATION STATUS, UNSPECIFIED PANCREATITIS TYPE: ICD-10-CM

## 2019-09-27 LAB
ALBUMIN SERPL-MCNC: 2.8 G/DL (ref 3.4–5)
ALBUMIN UR-MCNC: 10 MG/DL
ALP SERPL-CCNC: 89 U/L (ref 40–150)
ALT SERPL W P-5'-P-CCNC: 25 U/L (ref 0–70)
ANION GAP SERPL CALCULATED.3IONS-SCNC: 5 MMOL/L (ref 3–14)
APPEARANCE UR: CLEAR
AST SERPL W P-5'-P-CCNC: 23 U/L (ref 0–45)
BASOPHILS # BLD AUTO: 0 10E9/L (ref 0–0.2)
BASOPHILS NFR BLD AUTO: 0.2 %
BILIRUB SERPL-MCNC: 0.6 MG/DL (ref 0.2–1.3)
BILIRUB UR QL STRIP: NEGATIVE
BUN SERPL-MCNC: 19 MG/DL (ref 7–30)
CALCIUM SERPL-MCNC: 8.9 MG/DL (ref 8.5–10.1)
CHLORIDE SERPL-SCNC: 100 MMOL/L (ref 94–109)
CO2 BLDCOV-SCNC: 33 MMOL/L (ref 21–28)
CO2 SERPL-SCNC: 33 MMOL/L (ref 20–32)
COLOR UR AUTO: YELLOW
CREAT BLD-MCNC: 1.1 MG/DL (ref 0.66–1.25)
CREAT SERPL-MCNC: 1.03 MG/DL (ref 0.66–1.25)
DIFFERENTIAL METHOD BLD: ABNORMAL
EOSINOPHIL # BLD AUTO: 0.9 10E9/L (ref 0–0.7)
EOSINOPHIL NFR BLD AUTO: 4.8 %
ERYTHROCYTE [DISTWIDTH] IN BLOOD BY AUTOMATED COUNT: 16.9 % (ref 10–15)
GFR SERPL CREATININE-BSD FRML MDRD: 65 ML/MIN/{1.73_M2}
GFR SERPL CREATININE-BSD FRML MDRD: 70 ML/MIN/{1.73_M2}
GLUCOSE SERPL-MCNC: 90 MG/DL (ref 70–99)
GLUCOSE UR STRIP-MCNC: NEGATIVE MG/DL
HCT VFR BLD AUTO: 37.4 % (ref 40–53)
HGB BLD-MCNC: 11.9 G/DL (ref 13.3–17.7)
HGB UR QL STRIP: NEGATIVE
HYALINE CASTS #/AREA URNS LPF: 2 /LPF (ref 0–2)
IMM GRANULOCYTES # BLD: 0.1 10E9/L (ref 0–0.4)
IMM GRANULOCYTES NFR BLD: 0.4 %
INR PPP: 1.48 (ref 0.86–1.14)
KETONES UR STRIP-MCNC: NEGATIVE MG/DL
LACTATE BLD-SCNC: 1.2 MMOL/L (ref 0.7–2.1)
LEUKOCYTE ESTERASE UR QL STRIP: NEGATIVE
LIPASE SERPL-CCNC: 63 U/L (ref 73–393)
LYMPHOCYTES # BLD AUTO: 1.6 10E9/L (ref 0.8–5.3)
LYMPHOCYTES NFR BLD AUTO: 8.6 %
MCH RBC QN AUTO: 26.1 PG (ref 26.5–33)
MCHC RBC AUTO-ENTMCNC: 31.8 G/DL (ref 31.5–36.5)
MCV RBC AUTO: 82 FL (ref 78–100)
MONOCYTES # BLD AUTO: 1.4 10E9/L (ref 0–1.3)
MONOCYTES NFR BLD AUTO: 7.9 %
NEUTROPHILS # BLD AUTO: 14.3 10E9/L (ref 1.6–8.3)
NEUTROPHILS NFR BLD AUTO: 78.1 %
NITRATE UR QL: NEGATIVE
NRBC # BLD AUTO: 0 10*3/UL
NRBC BLD AUTO-RTO: 0 /100
PCO2 BLDV: 50 MM HG (ref 40–50)
PH BLDV: 7.43 PH (ref 7.32–7.43)
PH UR STRIP: 6.5 PH (ref 5–7)
PLATELET # BLD AUTO: 207 10E9/L (ref 150–450)
PO2 BLDV: 20 MM HG (ref 25–47)
POTASSIUM SERPL-SCNC: 3 MMOL/L (ref 3.4–5.3)
PROT SERPL-MCNC: 7.6 G/DL (ref 6.8–8.8)
RBC # BLD AUTO: 4.56 10E12/L (ref 4.4–5.9)
RBC #/AREA URNS AUTO: 1 /HPF (ref 0–2)
SAO2 % BLDV FROM PO2: 31 %
SODIUM SERPL-SCNC: 138 MMOL/L (ref 133–144)
SOURCE: ABNORMAL
SP GR UR STRIP: 1.02 (ref 1–1.03)
UROBILINOGEN UR STRIP-MCNC: NORMAL MG/DL (ref 0–2)
WBC # BLD AUTO: 18.3 10E9/L (ref 4–11)
WBC #/AREA URNS AUTO: 0 /HPF (ref 0–5)

## 2019-09-27 PROCEDURE — 96365 THER/PROPH/DIAG IV INF INIT: CPT | Mod: 59

## 2019-09-27 PROCEDURE — 12000000 ZZH R&B MED SURG/OB

## 2019-09-27 PROCEDURE — 99285 EMERGENCY DEPT VISIT HI MDM: CPT | Mod: 25

## 2019-09-27 PROCEDURE — 25000125 ZZHC RX 250: Performed by: EMERGENCY MEDICINE

## 2019-09-27 PROCEDURE — 83605 ASSAY OF LACTIC ACID: CPT

## 2019-09-27 PROCEDURE — 99223 1ST HOSP IP/OBS HIGH 75: CPT | Mod: AI | Performed by: HOSPITALIST

## 2019-09-27 PROCEDURE — 80053 COMPREHEN METABOLIC PANEL: CPT | Performed by: EMERGENCY MEDICINE

## 2019-09-27 PROCEDURE — 82565 ASSAY OF CREATININE: CPT

## 2019-09-27 PROCEDURE — 71046 X-RAY EXAM CHEST 2 VIEWS: CPT

## 2019-09-27 PROCEDURE — 87040 BLOOD CULTURE FOR BACTERIA: CPT | Performed by: EMERGENCY MEDICINE

## 2019-09-27 PROCEDURE — 85025 COMPLETE CBC W/AUTO DIFF WBC: CPT | Performed by: EMERGENCY MEDICINE

## 2019-09-27 PROCEDURE — 81001 URINALYSIS AUTO W/SCOPE: CPT | Performed by: EMERGENCY MEDICINE

## 2019-09-27 PROCEDURE — 25000128 H RX IP 250 OP 636: Performed by: HOSPITALIST

## 2019-09-27 PROCEDURE — 82803 BLOOD GASES ANY COMBINATION: CPT

## 2019-09-27 PROCEDURE — 85610 PROTHROMBIN TIME: CPT | Performed by: EMERGENCY MEDICINE

## 2019-09-27 PROCEDURE — 25000132 ZZH RX MED GY IP 250 OP 250 PS 637: Performed by: HOSPITALIST

## 2019-09-27 PROCEDURE — 25000128 H RX IP 250 OP 636: Performed by: EMERGENCY MEDICINE

## 2019-09-27 PROCEDURE — 25800030 ZZH RX IP 258 OP 636: Performed by: HOSPITALIST

## 2019-09-27 PROCEDURE — 25000132 ZZH RX MED GY IP 250 OP 250 PS 637

## 2019-09-27 PROCEDURE — 74177 CT ABD & PELVIS W/CONTRAST: CPT

## 2019-09-27 PROCEDURE — 83690 ASSAY OF LIPASE: CPT | Performed by: EMERGENCY MEDICINE

## 2019-09-27 RX ORDER — IOPAMIDOL 755 MG/ML
120 INJECTION, SOLUTION INTRAVASCULAR ONCE
Status: COMPLETED | OUTPATIENT
Start: 2019-09-27 | End: 2019-09-27

## 2019-09-27 RX ORDER — POTASSIUM CHLORIDE 1.5 G/1.58G
20-40 POWDER, FOR SOLUTION ORAL
Status: DISCONTINUED | OUTPATIENT
Start: 2019-09-27 | End: 2019-10-01 | Stop reason: HOSPADM

## 2019-09-27 RX ORDER — ALBUTEROL SULFATE 90 UG/1
2 AEROSOL, METERED RESPIRATORY (INHALATION) EVERY 4 HOURS PRN
COMMUNITY

## 2019-09-27 RX ORDER — MIRABEGRON 50 MG/1
50 TABLET, EXTENDED RELEASE ORAL EVERY MORNING
Status: DISCONTINUED | OUTPATIENT
Start: 2019-09-28 | End: 2019-10-01 | Stop reason: HOSPADM

## 2019-09-27 RX ORDER — BISACODYL 10 MG
10 SUPPOSITORY, RECTAL RECTAL DAILY PRN
Status: DISCONTINUED | OUTPATIENT
Start: 2019-09-27 | End: 2019-10-01 | Stop reason: HOSPADM

## 2019-09-27 RX ORDER — LATANOPROST 50 UG/ML
1 SOLUTION/ DROPS OPHTHALMIC AT BEDTIME
Status: DISCONTINUED | OUTPATIENT
Start: 2019-09-27 | End: 2019-10-01 | Stop reason: HOSPADM

## 2019-09-27 RX ORDER — WARFARIN SODIUM 7.5 MG/1
7.5 TABLET ORAL
Status: COMPLETED | OUTPATIENT
Start: 2019-09-27 | End: 2019-09-27

## 2019-09-27 RX ORDER — PIPERACILLIN SODIUM, TAZOBACTAM SODIUM 3; .375 G/15ML; G/15ML
3.38 INJECTION, POWDER, LYOPHILIZED, FOR SOLUTION INTRAVENOUS ONCE
Status: COMPLETED | OUTPATIENT
Start: 2019-09-27 | End: 2019-09-27

## 2019-09-27 RX ORDER — PIPERACILLIN SODIUM, TAZOBACTAM SODIUM 3; .375 G/15ML; G/15ML
3.38 INJECTION, POWDER, LYOPHILIZED, FOR SOLUTION INTRAVENOUS EVERY 6 HOURS
Status: DISCONTINUED | OUTPATIENT
Start: 2019-09-27 | End: 2019-10-01 | Stop reason: HOSPADM

## 2019-09-27 RX ORDER — ONDANSETRON 2 MG/ML
4 INJECTION INTRAMUSCULAR; INTRAVENOUS EVERY 6 HOURS PRN
Status: DISCONTINUED | OUTPATIENT
Start: 2019-09-27 | End: 2019-10-01 | Stop reason: HOSPADM

## 2019-09-27 RX ORDER — ONDANSETRON 4 MG/1
4 TABLET, ORALLY DISINTEGRATING ORAL EVERY 6 HOURS PRN
Status: DISCONTINUED | OUTPATIENT
Start: 2019-09-27 | End: 2019-10-01 | Stop reason: HOSPADM

## 2019-09-27 RX ORDER — ACETAMINOPHEN 650 MG/1
650 SUPPOSITORY RECTAL EVERY 4 HOURS PRN
Status: DISCONTINUED | OUTPATIENT
Start: 2019-09-27 | End: 2019-10-01 | Stop reason: HOSPADM

## 2019-09-27 RX ORDER — BRINZOLAMIDE 10 MG/ML
1 SUSPENSION/ DROPS OPHTHALMIC EVERY MORNING
Status: DISCONTINUED | OUTPATIENT
Start: 2019-09-28 | End: 2019-10-01 | Stop reason: HOSPADM

## 2019-09-27 RX ORDER — FUROSEMIDE 40 MG
80 TABLET ORAL DAILY
COMMUNITY

## 2019-09-27 RX ORDER — PROCHLORPERAZINE 25 MG
12.5 SUPPOSITORY, RECTAL RECTAL EVERY 12 HOURS PRN
Status: DISCONTINUED | OUTPATIENT
Start: 2019-09-27 | End: 2019-10-01 | Stop reason: HOSPADM

## 2019-09-27 RX ORDER — POTASSIUM CHLORIDE 7.45 MG/ML
10 INJECTION INTRAVENOUS
Status: DISCONTINUED | OUTPATIENT
Start: 2019-09-27 | End: 2019-10-01 | Stop reason: HOSPADM

## 2019-09-27 RX ORDER — WARFARIN SODIUM 5 MG/1
5 TABLET ORAL SEE ADMIN INSTRUCTIONS
Status: ON HOLD | COMMUNITY
End: 2021-01-01

## 2019-09-27 RX ORDER — LIDOCAINE HYDROCHLORIDE 20 MG/ML
JELLY TOPICAL
Status: DISCONTINUED
Start: 2019-09-27 | End: 2019-09-27 | Stop reason: HOSPADM

## 2019-09-27 RX ORDER — WARFARIN SODIUM 5 MG/1
7.5 TABLET ORAL SEE ADMIN INSTRUCTIONS
Status: ON HOLD | COMMUNITY
End: 2021-01-01

## 2019-09-27 RX ORDER — ACETAMINOPHEN 325 MG/1
650 TABLET ORAL EVERY 4 HOURS PRN
Status: DISCONTINUED | OUTPATIENT
Start: 2019-09-27 | End: 2019-10-01 | Stop reason: HOSPADM

## 2019-09-27 RX ORDER — POTASSIUM CHLORIDE 29.8 MG/ML
20 INJECTION INTRAVENOUS
Status: DISCONTINUED | OUTPATIENT
Start: 2019-09-27 | End: 2019-10-01 | Stop reason: HOSPADM

## 2019-09-27 RX ORDER — NALOXONE HYDROCHLORIDE 0.4 MG/ML
.1-.4 INJECTION, SOLUTION INTRAMUSCULAR; INTRAVENOUS; SUBCUTANEOUS
Status: DISCONTINUED | OUTPATIENT
Start: 2019-09-27 | End: 2019-10-01 | Stop reason: HOSPADM

## 2019-09-27 RX ORDER — POTASSIUM CL/LIDO/0.9 % NACL 10MEQ/0.1L
10 INTRAVENOUS SOLUTION, PIGGYBACK (ML) INTRAVENOUS
Status: DISCONTINUED | OUTPATIENT
Start: 2019-09-27 | End: 2019-10-01 | Stop reason: HOSPADM

## 2019-09-27 RX ORDER — AMLODIPINE BESYLATE 5 MG/1
5 TABLET ORAL EVERY MORNING
Status: ON HOLD | COMMUNITY
End: 2021-01-01

## 2019-09-27 RX ORDER — AMOXICILLIN 250 MG
2 CAPSULE ORAL 2 TIMES DAILY PRN
Status: DISCONTINUED | OUTPATIENT
Start: 2019-09-27 | End: 2019-10-01 | Stop reason: HOSPADM

## 2019-09-27 RX ORDER — PROCHLORPERAZINE MALEATE 5 MG
5 TABLET ORAL EVERY 6 HOURS PRN
Status: DISCONTINUED | OUTPATIENT
Start: 2019-09-27 | End: 2019-10-01 | Stop reason: HOSPADM

## 2019-09-27 RX ORDER — AMLODIPINE BESYLATE 5 MG/1
5 TABLET ORAL EVERY MORNING
Status: DISCONTINUED | OUTPATIENT
Start: 2019-09-28 | End: 2019-10-01 | Stop reason: HOSPADM

## 2019-09-27 RX ORDER — AMOXICILLIN 250 MG
1 CAPSULE ORAL 2 TIMES DAILY PRN
Status: DISCONTINUED | OUTPATIENT
Start: 2019-09-27 | End: 2019-10-01 | Stop reason: HOSPADM

## 2019-09-27 RX ORDER — LIDOCAINE 40 MG/G
CREAM TOPICAL
Status: DISCONTINUED | OUTPATIENT
Start: 2019-09-27 | End: 2019-10-01 | Stop reason: HOSPADM

## 2019-09-27 RX ORDER — BRIMONIDINE TARTRATE AND TIMOLOL MALEATE 2; 5 MG/ML; MG/ML
1 SOLUTION OPHTHALMIC EVERY MORNING
Status: DISCONTINUED | OUTPATIENT
Start: 2019-09-28 | End: 2019-10-01 | Stop reason: HOSPADM

## 2019-09-27 RX ORDER — IPRATROPIUM BROMIDE AND ALBUTEROL SULFATE 2.5; .5 MG/3ML; MG/3ML
3 SOLUTION RESPIRATORY (INHALATION) EVERY 4 HOURS PRN
Status: DISCONTINUED | OUTPATIENT
Start: 2019-09-27 | End: 2019-10-01 | Stop reason: HOSPADM

## 2019-09-27 RX ORDER — POLYETHYLENE GLYCOL 3350 17 G/17G
17 POWDER, FOR SOLUTION ORAL DAILY PRN
Status: DISCONTINUED | OUTPATIENT
Start: 2019-09-27 | End: 2019-10-01 | Stop reason: HOSPADM

## 2019-09-27 RX ORDER — PANTOPRAZOLE SODIUM 20 MG/1
40 TABLET, DELAYED RELEASE ORAL
Status: DISCONTINUED | OUTPATIENT
Start: 2019-09-28 | End: 2019-10-01 | Stop reason: HOSPADM

## 2019-09-27 RX ORDER — POTASSIUM CHLORIDE 1500 MG/1
20-40 TABLET, EXTENDED RELEASE ORAL
Status: DISCONTINUED | OUTPATIENT
Start: 2019-09-27 | End: 2019-10-01 | Stop reason: HOSPADM

## 2019-09-27 RX ORDER — SODIUM CHLORIDE 9 MG/ML
INJECTION, SOLUTION INTRAVENOUS CONTINUOUS
Status: DISCONTINUED | OUTPATIENT
Start: 2019-09-27 | End: 2019-09-29

## 2019-09-27 RX ADMIN — SODIUM CHLORIDE 1000 ML: 9 INJECTION, SOLUTION INTRAVENOUS at 16:57

## 2019-09-27 RX ADMIN — WARFARIN SODIUM 7.5 MG: 7.5 TABLET ORAL at 21:09

## 2019-09-27 RX ADMIN — OXYCODONE HYDROCHLORIDE 2.5 MG: 5 TABLET ORAL at 21:09

## 2019-09-27 RX ADMIN — IOPAMIDOL 120 ML: 755 INJECTION, SOLUTION INTRAVENOUS at 14:28

## 2019-09-27 RX ADMIN — SODIUM CHLORIDE 76 ML: 9 INJECTION, SOLUTION INTRAVENOUS at 14:28

## 2019-09-27 RX ADMIN — ENOXAPARIN SODIUM 80 MG: 80 INJECTION SUBCUTANEOUS at 21:41

## 2019-09-27 RX ADMIN — POTASSIUM CHLORIDE 40 MEQ: 1.5 POWDER, FOR SOLUTION ORAL at 21:10

## 2019-09-27 RX ADMIN — PIPERACILLIN SODIUM,TAZOBACTAM SODIUM 3.38 G: 3; .375 INJECTION, POWDER, FOR SOLUTION INTRAVENOUS at 16:57

## 2019-09-27 RX ADMIN — PIPERACILLIN SODIUM,TAZOBACTAM SODIUM 3.38 G: 3; .375 INJECTION, POWDER, FOR SOLUTION INTRAVENOUS at 23:31

## 2019-09-27 RX ADMIN — LATANOPROST 1 DROP: 50 SOLUTION/ DROPS OPHTHALMIC at 21:42

## 2019-09-27 RX ADMIN — SODIUM CHLORIDE: 9 INJECTION, SOLUTION INTRAVENOUS at 21:26

## 2019-09-27 ASSESSMENT — ENCOUNTER SYMPTOMS
BLOOD IN STOOL: 0
DIAPHORESIS: 0
VOMITING: 0
APPETITE CHANGE: 1
FEVER: 1
LIGHT-HEADEDNESS: 1
CONSTIPATION: 1
NAUSEA: 1
ABDOMINAL PAIN: 1

## 2019-09-27 ASSESSMENT — ACTIVITIES OF DAILY LIVING (ADL)
ADLS_ACUITY_SCORE: 27
RETIRED_EATING: 2-->ASSISTIVE PERSON
TRANSFERRING: 1-->ASSISTIVE EQUIPMENT
AMBULATION: 1-->ASSISTIVE EQUIPMENT
COGNITION: 2 - DIFFICULTY WITH ORGANIZING THOUGHTS
DRESS: 2-->ASSISTIVE PERSON
BATHING: 2-->ASSISTIVE PERSON
TOILETING: 1-->ASSISTIVE EQUIPMENT
FALL_HISTORY_WITHIN_LAST_SIX_MONTHS: NO
SWALLOWING: 0-->SWALLOWS FOODS/LIQUIDS WITHOUT DIFFICULTY
RETIRED_COMMUNICATION: 2-->DIFFICULTY UNDERSTANDING AND SPEAKING (NOT RELATED TO LANGUAGE BARRIER)
WHICH_OF_THE_ABOVE_FUNCTIONAL_RISKS_HAD_A_RECENT_ONSET_OR_CHANGE?: DRESSING;EATING

## 2019-09-27 ASSESSMENT — MIFFLIN-ST. JEOR: SCORE: 1768.63

## 2019-09-27 NOTE — ED TRIAGE NOTES
Pt had surgery to remove a duodenal polyp on Monday.  Since Monday (9/16/19) he hasn't had much of an appetite and has had dull abd pain.  Has not had BM since 9/15/19 and has had some urinary difficulties.

## 2019-09-27 NOTE — PROGRESS NOTES
RECEIVING UNIT ED HANDOFF REVIEW    ED Nurse Handoff Report was reviewed by: Noy Mcnair RN on September 27, 2019 at 5:13 PM

## 2019-09-27 NOTE — H&P
[facility]    History and Physical - Hospitalist Service       Date of Admission:  9/27/2019    Assessment & Plan   Edison Saldivar is a 79 year old male admitted on 9/27/2019.  Past history of dementia, CVA, HTN, KORY, COPD among other chronic medical issues who underwent duodenal polypectomy at [place] 9/23/19 who presents with progressive epigastric pain with nausea and now fever.      Probable intra-abdominal infection  Recent duodenal polypectomy (at major papilla) with sphincterotomy and pancreatic duct stenting 9/23/19 at [place]  Presents with progressive epigastric pain with nausea and anorexia following procedure above.  Fever to 101 at home with leukocytosis of 18 upon arrival.  CT abd/pelvis showing hazy infiltration around pancreas, however, lipase is low.  Case was discussed with Dr. Kennedy at [place] (GI on call) by ED physician who recommends antibiotics and IVF.  - continue zosyn  - follow fever curve and CBC  - monitor blood cultures  -  ml/hr  - clear liquids, advance as tolerated  - prn tylenol and prn oxycodone (avoid if able due to dementia)  - will ask GI to follow along    Vascular dementia  Sneddon syndrome with prior CVA on coumadin anticoagulation  Heterozygous factor V leiden mutation  Sneddon syndrome is a rare genetic disorder characterized by widespread livedo reticularis in association with stroke.  No focal deficits per wife.  Reports increased confusion over the past several days.  - frequent re-orientation, maintain sleep/wake cycle, avoid narcotics as able  - PharmD to dose coumadin    KORY  - continue CPAP with home settings    HTN  - continue PTA amlodipine  - hold lasix in favor of IVF    COPD/asthma  - continue PTA inhalers once verified  - prn Duonebs    Hx Topete's esophagus  - continue PTA Nexium once verified    Sarcoidosis  Is not currently on treatment.    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    Glaucoma  - continue PTA eye drops         Diet: clear liquids, advance  as tolerated  DVT Prophylaxis: Warfarin  Torres Catheter: not present  Code Status: DNR per patient and wife    Disposition Plan   Expected discharge: 2 - 3 days, recommended to prior living arrangement once pain improved, tolerating diet, antibiotic plan in place.  Entered: Simba Porter MD 09/27/2019, 5:08 PM     The patient's care was discussed with the Patient and Patient's Family.    Simba Porter MD  [facility]    ______________________________________________________________________    Chief Complaint   Epigastric pain    History is obtained from the patient,, his wife who was at bedside, discussion with emergency room provider and chart review.    History of Present Illness   Edison Saldivar is a 79 year old male who presents with epigastric pain.  He underwent ERCP on 9/23/2019 at [place] where a duodenal polyp over the major papilla was resected, underwent biliary sphincterotomy with plastic stent to the pancreatic duct.  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  Since the procedure, he reports ongoing, progressive epigastric pain he describes as a constant dull pain.  He notes no palliative or provocative factors.  This has been associated with nausea and poor appetite.  His wife has noted some mild abdominal distention today, unsteady gait with generalized weakness, increased lethargy and increased confusion.  He endorses some orthostatic lightheadedness.  He also reports some chronic shortness of breath, though his wife feels that this has worsened recently.  He denies any chest pain or pressure.  His wife reports he has not had a bowel movement since 9/22.  Remainder of review of systems otherwise negative.    Review of Systems    The 10 point Review of Systems is negative other than noted in the HPI or here.     Past Medical History    Vascular dementia  Sneddon syndrome  Hypertension  Obstructive sleep apnea  Heterozygous factor 5 Leiden  mutation  COPD  Asthma  Glaucoma  Sarcoidosis  NPH    Past Surgical History   Cholecystectomy  Prior left knee surgery    Social History   Reports quitting tobacco more than 20 years ago.  He has no alcohol use.  He ambulates with a walker at home and resides with his wife.  They currently have no home health care, but she is very interested in getting this.    Family History   Reviewed and noncontributory.    Prior to Admission Medications   Prior to Admission Medications   Prescriptions Last Dose Informant Patient Reported? Taking?   CALCIUM-MAGNESIUM-ZINC PO 9/27/2019 at Unknown time Spouse/Significant Other Yes Yes   Sig: Take 1 tablet by mouth every morning 100/40/15   Magnesium Oxide 250 MG TABS 9/27/2019 at Unknown time Spouse/Significant Other Yes Yes   Sig: Take 500 mg by mouth daily   UNKNOWN TO PATIENT   Yes Yes   Sig: Patient is currently restarted on Warfarin with Lovenox bridging on 9/24/19 - unknown doses   albuterol (PROAIR HFA/PROVENTIL HFA/VENTOLIN HFA) 108 (90 Base) MCG/ACT inhaler   Yes Yes   Sig: Inhale 2 puffs into the lungs every 4 hours as needed for shortness of breath / dyspnea or wheezing   amLODIPine (NORVASC) 5 MG tablet 9/27/2019 at Unknown time  Yes Yes   Sig: Take 5 mg by mouth every morning   brimonidine-timolol (COMBIGAN) 0.2-0.5 % ophthalmic solution 9/27/2019 at Unknown time Spouse/Significant Other Yes Yes   Sig: Place 1 drop into both eyes every morning   brinzolamide (AZOPT) 1 % ophthalmic suspension 9/27/2019 at Unknown time Spouse/Significant Other Yes Yes   Sig: Place 1 drop into both eyes every morning   co-enzyme Q-10 100 MG CAPS capsule 9/27/2019 at Unknown time Spouse/Significant Other Yes Yes   Sig: Take 100 mg by mouth every morning   docusate sodium (COLACE) 100 MG capsule 9/26/2019 at Unknown time Spouse/Significant Other Yes Yes   Sig: Take 100 mg by mouth every evening   esomeprazole (NEXIUM) 40 MG DR capsule 9/27/2019 at Unknown time Spouse/Significant Other Yes  Yes   Sig: Take 40 mg by mouth every morning (before breakfast) Take 30-60 minutes before eating.   furosemide (LASIX) 40 MG tablet 9/27/2019 at Unknown time  Yes Yes   Sig: Take 80 mg by mouth daily   latanoprost (XALATAN) 0.005 % ophthalmic solution 9/26/2019 at Unknown time Spouse/Significant Other Yes Yes   Sig: Place 1 drop into both eyes At Bedtime   mirabegron (MYRBETRIQ) 50 MG 24 hr tablet 9/27/2019 at Unknown time Spouse/Significant Other Yes Yes   Sig: Take 50 mg by mouth every morning   multivitamin w/minerals (THERA-VIT-M) tablet 9/27/2019 at Unknown time Spouse/Significant Other Yes Yes   Sig: Take 1 tablet by mouth every morning   vitamin B-12 (CYANOCOBALAMIN) 1000 MCG tablet 9/27/2019 at Unknown time Spouse/Significant Other Yes Yes   Sig: Take 1,000 mcg by mouth every morning   vitamin D3 (CHOLECALCIFEROL) 2000 units tablet 9/27/2019 at Unknown time Spouse/Significant Other Yes Yes   Sig: Take 2,000 Units by mouth daily      Facility-Administered Medications: None     Allergies   No Known Allergies    Physical Exam   Vital Signs: Temp: 98.5  F (36.9  C) Temp src: Oral BP: (!) 141/89 Pulse: 81 Heart Rate: 81 Resp: 25 SpO2: 94 % O2 Device: None (Room air)    Weight: 0 lbs 0 oz    General Appearance: Well-developed, well nourished male in no acute distress  Eyes: Pupils equal, round and reactive to light, sclera anicteric  HEENT: Mucous membranes moist, no neck lymphadenopathy  Respiratory: Lungs clear to auscultation bilaterally, no wheeze or crackles, no tachypnea  Cardiovascular: Regular rate and rhythm, normal S1/S2, no murmurs, rubs or gallops  GI: Abdomen soft, denies any tenderness, no distention, normal bowel sounds  Lymph/Hematologic: No peripheral edema  Skin: No rash or bruising  Musculoskeletal: Extremities warm and well-perfused  Neurologic: Alert and appropriate, cranial nerves grossly intact  Psychiatric: Normal affect    Data   Data reviewed today: I reviewed all medications, new labs  and imaging results over the last 24 hours. I personally reviewed the chest x-ray image(s) showing Clear lungs without edema or infiltrate.    Recent Labs   Lab 09/27/19  1346   WBC 18.3*   HGB 11.9*   MCV 82         POTASSIUM 3.0*   CHLORIDE 100   CO2 33*   BUN 19   CR 1.03   ANIONGAP 5   JULIANNE 8.9   GLC 90   ALBUMIN 2.8*   PROTTOTAL 7.6   BILITOTAL 0.6   ALKPHOS 89   ALT 25   AST 23   LIPASE 63*     Recent Results (from the past 24 hour(s))   Chest XR,  PA & LAT    Narrative    CHEST TWO VIEWS 9/27/2019 2:23 PM     HISTORY: Fever, nausea, postoperative.    COMPARISON: 1/17/2019    FINDINGS: Heart size and pulmonary vascularity are within normal  limits. The lungs are clear. No pneumothorax or pleural effusion.       Impression    IMPRESSION: No radiographic evidence of acute chest abnormality.     MARIO LOPEZ MD   CT Abdomen Pelvis w Contrast    Narrative    CT ABDOMEN AND PELVIS WITH CONTRAST   9/27/2019 2:33 PM     HISTORY: Epigastric abdominal pain, status post endoscopic duodenal  polypectomy 4 days ago, nausea, lightheaded.    TECHNIQUE: 120 mL Isovue-370. CT images of the abdomen and pelvis  following nonionic intravenous contrast. Radiation dose for this scan  was reduced using automated exposure control, adjustment of the mA  and/or kV according to patient size, or iterative reconstruction  technique.    COMPARISON: None.    FINDINGS: No free intra-abdominal air or fluid. There is hazy  infiltration around the pancreas. A pancreatic duct stent is in place.  The gallbladder has been removed. The liver, spleen, and adrenal  glands demonstrate no worrisome abnormalities. Multiple bilateral  renal cysts. No hydronephrosis or solid renal mass. No abdominal or  retroperitoneal lymphadenopathy. Nonaneurysmal aortic atherosclerosis.  Dense calcifications at the origins of the visceral branches of the  aorta, particularly the right renal artery.    No evidence of bowel obstruction. No enlarged  abdominal,  retroperitoneal, or pelvic lymph nodes. No lytic or blastic bone  lesions.      Impression    IMPRESSION:  1. Abnormal hazy infiltration around the pancreas compatible with  pancreatitis. No evidence of organized peripancreatic fluid  collection.  2. Visceral branch atherosclerosis, particularly the origin of the  right renal artery.  3. Prior cholecystectomy.    MARIO LOPEZ MD

## 2019-09-27 NOTE — ED NOTES
Bed: ED29  Expected date:   Expected time:   Means of arrival:   Comments:  naya - 79 M post op complications eta 1312

## 2019-09-27 NOTE — ED NOTES
"[facility]  ED Nurse Handoff Report    ED Chief complaint: Abdominal Pain      ED Diagnosis:   Final diagnoses:   None       Code Status: Full Code    Allergies: No Known Allergies    Activity level - Baseline/Home:  Independent  Activity Level - Current:   Stand with Assist of 2    Patient's Preferred language: English   Needed?: No    Isolation: No  Infection: Not Applicable  Bariatric?: Yes    Vital Signs:   Vitals:    09/27/19 1326 09/27/19 1450 09/27/19 1458 09/27/19 1601   BP: 129/84  (!) 150/77    Pulse:   81    Resp: 18 15 20 19   Temp: 98.5  F (36.9  C)      TempSrc: Oral      SpO2: 94%   94%       Cardiac Rhythm: ,        Pain level:      Is this patient confused?: Yes   Does this patient have a guardian?  Pt wife states she is POA         If yes, is there guardianship documents in the Epic \"Code/ACP\" activity?  No         Guardian Notified?  Yes  [place] - Suicide Severity Rating Scale Completed?  Yes  If yes, what color did the patient score?  White    Patient Report: Initial Complaint: C/O abd pain  Focused Assessment: Alert to self.  VSS.  C/O some dull abdominal pain and loss of appetite since surgery Monday to remove a duodenal polyp .  Pt has had some urinary difficulties as well as hasn't had a BM since 9/22.  Pt was straight cathed for urine;  Was incontinent of urine as well.  Wife states that he has some vascular dementia and has seemed more confused since the surgery.  Wife states that there have been fevers at home; pt afebrile here and nml limits of lactic acid.  Tests Performed: labs, UA, CT, xray  Abnormal Results:   Results for orders placed or performed during the hospital encounter of 09/27/19 (from the past 24 hour(s))   Comprehensive metabolic panel   Result Value Ref Range    Sodium 138 133 - 144 mmol/L    Potassium 3.0 (L) 3.4 - 5.3 mmol/L    Chloride 100 94 - 109 mmol/L    Carbon Dioxide 33 (H) 20 - 32 mmol/L    Anion Gap 5 3 - 14 mmol/L    Glucose 90 70 " - 99 mg/dL    Urea Nitrogen 19 7 - 30 mg/dL    Creatinine 1.03 0.66 - 1.25 mg/dL    GFR Estimate 70 >60 mL/min/[1.73_m2]    GFR Estimate If Black 81 >60 mL/min/[1.73_m2]    Calcium 8.9 8.5 - 10.1 mg/dL    Bilirubin Total 0.6 0.2 - 1.3 mg/dL    Albumin 2.8 (L) 3.4 - 5.0 g/dL    Protein Total 7.6 6.8 - 8.8 g/dL    Alkaline Phosphatase 89 40 - 150 U/L    ALT 25 0 - 70 U/L    AST 23 0 - 45 U/L   CBC with platelets differential   Result Value Ref Range    WBC 18.3 (H) 4.0 - 11.0 10e9/L    RBC Count 4.56 4.4 - 5.9 10e12/L    Hemoglobin 11.9 (L) 13.3 - 17.7 g/dL    Hematocrit 37.4 (L) 40.0 - 53.0 %    MCV 82 78 - 100 fl    MCH 26.1 (L) 26.5 - 33.0 pg    MCHC 31.8 31.5 - 36.5 g/dL    RDW 16.9 (H) 10.0 - 15.0 %    Platelet Count 207 150 - 450 10e9/L    Diff Method Automated Method     % Neutrophils 78.1 %    % Lymphocytes 8.6 %    % Monocytes 7.9 %    % Eosinophils 4.8 %    % Basophils 0.2 %    % Immature Granulocytes 0.4 %    Nucleated RBCs 0 0 /100    Absolute Neutrophil 14.3 (H) 1.6 - 8.3 10e9/L    Absolute Lymphocytes 1.6 0.8 - 5.3 10e9/L    Absolute Monocytes 1.4 (H) 0.0 - 1.3 10e9/L    Absolute Eosinophils 0.9 (H) 0.0 - 0.7 10e9/L    Absolute Basophils 0.0 0.0 - 0.2 10e9/L    Abs Immature Granulocytes 0.1 0 - 0.4 10e9/L    Absolute Nucleated RBC 0.0    Lipase   Result Value Ref Range    Lipase 63 (L) 73 - 393 U/L   ISTAT gases lactate shazia POCT   Result Value Ref Range    Ph Venous 7.43 7.32 - 7.43 pH    PCO2 Venous 50 40 - 50 mm Hg    PO2 Venous 20 (L) 25 - 47 mm Hg    Bicarbonate Venous 33 (H) 21 - 28 mmol/L    O2 Sat Venous 31 %    Lactic Acid 1.2 0.7 - 2.1 mmol/L   Creatinine POCT   Result Value Ref Range    Creatinine 1.1 0.66 - 1.25 mg/dL    GFR Estimate 65 >60 mL/min/[1.73_m2]    GFR Estimate If Black 79 >60 mL/min/[1.73_m2]   Chest XR,  PA & LAT    Narrative    CHEST TWO VIEWS 9/27/2019 2:23 PM     HISTORY: Fever, nausea, postoperative.    COMPARISON: 1/17/2019    FINDINGS: Heart size and pulmonary vascularity  are within normal  limits. The lungs are clear. No pneumothorax or pleural effusion.       Impression    IMPRESSION: No radiographic evidence of acute chest abnormality.     MARIO LOPEZ MD   CT Abdomen Pelvis w Contrast    Narrative    CT ABDOMEN AND PELVIS WITH CONTRAST   9/27/2019 2:33 PM     HISTORY: Epigastric abdominal pain, status post endoscopic duodenal  polypectomy 4 days ago, nausea, lightheaded.    TECHNIQUE: 120 mL Isovue-370. CT images of the abdomen and pelvis  following nonionic intravenous contrast. Radiation dose for this scan  was reduced using automated exposure control, adjustment of the mA  and/or kV according to patient size, or iterative reconstruction  technique.    COMPARISON: None.    FINDINGS: No free intra-abdominal air or fluid. There is hazy  infiltration around the pancreas. A pancreatic duct stent is in place.  The gallbladder has been removed. The liver, spleen, and adrenal  glands demonstrate no worrisome abnormalities. Multiple bilateral  renal cysts. No hydronephrosis or solid renal mass. No abdominal or  retroperitoneal lymphadenopathy. Nonaneurysmal aortic atherosclerosis.  Dense calcifications at the origins of the visceral branches of the  aorta, particularly the right renal artery.    No evidence of bowel obstruction. No enlarged abdominal,  retroperitoneal, or pelvic lymph nodes. No lytic or blastic bone  lesions.      Impression    IMPRESSION:  1. Abnormal hazy infiltration around the pancreas compatible with  pancreatitis. No evidence of organized peripancreatic fluid  collection.  2. Visceral branch atherosclerosis, particularly the origin of the  right renal artery.  3. Prior cholecystectomy.    MARIO LOPEZ MD   UA with Microscopic   Result Value Ref Range    Color Urine Yellow     Appearance Urine Clear     Glucose Urine Negative NEG^Negative mg/dL    Bilirubin Urine Negative NEG^Negative    Ketones Urine Negative NEG^Negative mg/dL    Specific Gravity Urine  1.022 1.003 - 1.035    Blood Urine Negative NEG^Negative    pH Urine 6.5 5.0 - 7.0 pH    Protein Albumin Urine 10 (A) NEG^Negative mg/dL    Urobilinogen mg/dL Normal 0.0 - 2.0 mg/dL    Nitrite Urine Negative NEG^Negative    Leukocyte Esterase Urine Negative NEG^Negative    Source Catheterized Urine     WBC Urine 0 0 - 5 /HPF    RBC Urine 1 0 - 2 /HPF    Hyaline Casts 2 0 - 2 /LPF     Treatments provided: abx. 1L bolus    Family Comments: wife @ bedside.    OBS brochure/video discussed/provided to patient/family: N/A              Name of person given brochure if not patient: NA              Relationship to patient: NA    ED Medications:   Medications   iohexol (OMNIPAQUE) solution 25 mL (25 mLs Oral Given 9/27/19 1404)   lidocaine (XYLOCAINE) 2 % external gel (has no administration in time range)   iopamidol (ISOVUE-370) solution 120 mL (120 mLs Intravenous Given 9/27/19 1428)   Saline flush (76 mLs Intravenous Given 9/27/19 1428)       Drips infusing?:  No    For the majority of the shift this patient was Green.   Interventions performed were NA.    Severe Sepsis OR Septic Shock Diagnosis Present: No    To be done/followed up on inpatient unit:  See [facility]    ED NURSE PHONE NUMBER: [id number]

## 2019-09-27 NOTE — ED PROVIDER NOTES
"  History     Chief Complaint:  Abdominal Pain    HPI   Edison Saldivar is a 79 year old anticoagulated male on Coumadin with a history of COPD, hypertension, dementia, and stroke who presents to the emergency department with his wife for evaluation of abdominal pain. The patient reports that since his duodenal polypectomy on 9/23 at [facility] he has been having a mild, \"nagging\" abdominal pain, significant appetite loss, nausea, lightheadedness, and constipation; his last bowel movement was five days ago. His wife also notes that he had a fever of 100.4 earlier today and has been more confused in the last few days. He has difficulty urinating at baseline but his wife notes that he has been missing the toilet more often lately. He has been taking antiemetics with improvement but has not taken any pain medications. Patient denies black or bloody stools, syncope, diaphoresis, vomiting, or chest pain.    Allergies:  No Known Drug Allergies     Medications:    Albuterol  Norvasc  Combigan  Azopt  Co-enzyme Q10  Colace  Nexium  Lasix  Xalatan  Magnesium oxide  Mirabegron  Coumadin      Past Medical History:    Hemoptysis   Asthma  COPD  Pneumonia  Hernia hiatal  Hypertension   Dementia   Stroke     Past Surgical History:    Bronchoscopy    Family History:    History reviewed. No pertinent family history.    Social History:  Tobacco Use: Never  Alcohol Use: No  PCP: Merlin Emery Brown  Marital Status:       Review of Systems   Constitutional: Positive for appetite change and fever. Negative for diaphoresis.   Cardiovascular: Negative for chest pain.   Gastrointestinal: Positive for abdominal pain, constipation and nausea. Negative for blood in stool and vomiting.   Neurological: Positive for light-headedness. Negative for syncope.     Physical Exam     Patient Vitals for the past 24 hrs:   BP Temp Temp src Pulse Heart Rate Resp SpO2 Height Weight   09/27/19 1823 (!) 149/88 97.5  F (36.4  C) Oral -- 85 22 96 % 1.702 m " "(5' 7\") 108.5 kg (239 lb 3.2 oz)   09/27/19 1731 133/74 -- -- 78 85 22 95 % -- --   09/27/19 1702 -- -- -- -- 81 25 94 % -- --   09/27/19 1615 (!) 141/89 -- -- 81 78 17 95 % -- --   09/27/19 1601 -- -- -- -- 82 19 94 % -- --   09/27/19 1458 (!) 150/77 -- -- 81 85 20 -- -- --   09/27/19 1450 -- -- -- -- 81 15 -- -- --   09/27/19 1349 -- -- -- -- 68 -- -- -- --   09/27/19 1326 129/84 98.5  F (36.9  C) Oral -- -- 18 94 % -- --     Physical Exam  General: Well-nourished, appears comfortable on cart  Eyes: PERRL, conjunctivae pink no scleral icterus or conjunctival injection  ENT:  Moist mucus membranes, posterior oropharynx clear without erythema or exudates  Respiratory:  Lungs clear to auscultation bilaterally, no crackles/rubs/wheezes.  Good air movement  CV: Normal rate and rhythm, no murmurs/rubs/gallops  GI:  Abdomen soft and protuberant. Normoactive BS.  Mild epigastric tenderness, no guarding or rebound  Skin: Warm, dry.  No rashes or petechiae  Musculoskeletal: No peripheral edema or calf tenderness  Neuro: Alert and oriented to person/place/time  Psychiatric: Normal affect    Emergency Department Course   Imaging:  Chest X-Ray. 2 Views:   IMPRESSION: No radiographic evidence of acute chest abnormality.   Reading per radiology.     Abdomen/Pelvis CT with IV contrast:  IMPRESSION:  1. Abnormal hazy infiltration around the pancreas compatible with  pancreatitis. No evidence of organized peripancreatic fluid  collection.  2. Visceral branch atherosclerosis, particularly the origin of the  right renal artery.  3. Prior cholecystectomy.  Report per radiology.     Radiographic findings were communicated with the patient who voiced understanding of the findings.    Laboratory:  CBC: WBC: 18.3 (H), HGB: 11.9 (L), PLT: 207  CMP: Potassium: 3.0 (L), Carbon dioxide: 33 (H), Albumin: 2.8 (L), o/w WNL (Creatinine: 1.03)    UA: Clear yellow urine, protein albumin: 10, otherwise WNL    Lipase: 63 (L)    Creatinine POCT: " Creatinine: 1.1, GFR: 65    1402 ISTAT gases lactate venous POCT: pH: 7.43, PCO2: 50, PO2: 20 (L), Bicarb: 33 (H), O2 Sat: 31, Lactic acid: 1.2    1353 Blood culture: In process  1346 Blood culture: In process    Interventions:  1657 0.9% Sodium Chloride BOLUS 1000 mLs IV   1657 Zosyn 3.375 g IV    Emergency Department Course:  1343 Nursing notes and vitals reviewed. I performed an exam of the patient as documented above.     IV inserted. Medicine administered as documented above. Blood drawn. This was sent to the lab for further testing, results above.    The patient provided a urine sample here in the emergency department. This was sent for laboratory testing, findings above.     The patient was sent for a chest XR and abdomen/pelvis CT while in the emergency department, findings above.     1621  I consulted with Dr. Porter of the hospitalist services. They are in agreement to accept the patient for admission.    1629 I consulted with Dr. Kennedy, [facility], regarding the patient's history and presentation here in the emergency department.    1643 I rechecked the patient and discussed the results of his workup thus far.     Findings and plan explained to the Patient who consents to admission. Discussed the patient with Dr. Porter, who will admit the patient to an inpatient med bed for further monitoring, evaluation, and treatment.    Impression & Plan    Medical Decision Making:  Edison Saldivar is a 79 year old male who comes with a report of fever, epigastric pain and nausea four days after duodenal polypectomy and pancreatic stent placement at [facility].  He has an elevated white count but his blood pressure is normal and his lactic acid is normal.  No other focal signs of infection. UA is clear, no signs of cellulitis and CXR is normal.  CT abdomen was obtained and shows findings of pancreatitis without perforation.  His lipase is normal.  Given the possibility of a procedural related or stent  related infection, zosyn was given along with IV fluids.  We will admit to the hospital for ongoing IV antibiotics, fluids and monitoring.  Dr. Porter graciously agreed to admit the patient to the medical floor.    Diagnosis:    ICD-10-CM    1. Abdominal pain, epigastric R10.13    2. Dehydration E86.0    3. Post-procedural fever R50.82    4. Acute pancreatitis, unspecified complication status, unspecified pancreatitis type K85.90        Disposition:  Admitted to Dr. Porter    Scribe Disclosure:  Dallas MUIR, am serving as a scribe on 9/27/2019 at 1:43 PM to personally document services performed by Martha Cheek MD based on my observations and the provider's statements to me.     Dallas Mayer  9/27/2019    EMERGENCY DEPARTMENT       Martha Marcelino MD  09/27/19 1959

## 2019-09-28 ENCOUNTER — APPOINTMENT (OUTPATIENT)
Dept: OCCUPATIONAL THERAPY | Facility: CLINIC | Age: 79
DRG: 439 | End: 2019-09-28
Attending: HOSPITALIST
Payer: COMMERCIAL

## 2019-09-28 ENCOUNTER — APPOINTMENT (OUTPATIENT)
Dept: PHYSICAL THERAPY | Facility: CLINIC | Age: 79
DRG: 439 | End: 2019-09-28
Attending: HOSPITALIST
Payer: COMMERCIAL

## 2019-09-28 LAB
ANION GAP SERPL CALCULATED.3IONS-SCNC: 6 MMOL/L (ref 3–14)
BUN SERPL-MCNC: 15 MG/DL (ref 7–30)
CALCIUM SERPL-MCNC: 8.6 MG/DL (ref 8.5–10.1)
CHLORIDE SERPL-SCNC: 104 MMOL/L (ref 94–109)
CO2 SERPL-SCNC: 28 MMOL/L (ref 20–32)
CREAT SERPL-MCNC: 0.91 MG/DL (ref 0.66–1.25)
ERYTHROCYTE [DISTWIDTH] IN BLOOD BY AUTOMATED COUNT: 17.3 % (ref 10–15)
GFR SERPL CREATININE-BSD FRML MDRD: 81 ML/MIN/{1.73_M2}
GLUCOSE SERPL-MCNC: 113 MG/DL (ref 70–99)
HCT VFR BLD AUTO: 39 % (ref 40–53)
HGB BLD-MCNC: 12.2 G/DL (ref 13.3–17.7)
INR PPP: 1.75 (ref 0.86–1.14)
MCH RBC QN AUTO: 26.1 PG (ref 26.5–33)
MCHC RBC AUTO-ENTMCNC: 31.3 G/DL (ref 31.5–36.5)
MCV RBC AUTO: 84 FL (ref 78–100)
PLATELET # BLD AUTO: 177 10E9/L (ref 150–450)
POTASSIUM SERPL-SCNC: 3.2 MMOL/L (ref 3.4–5.3)
POTASSIUM SERPL-SCNC: 3.4 MMOL/L (ref 3.4–5.3)
RBC # BLD AUTO: 4.67 10E12/L (ref 4.4–5.9)
SODIUM SERPL-SCNC: 138 MMOL/L (ref 133–144)
WBC # BLD AUTO: 13 10E9/L (ref 4–11)

## 2019-09-28 PROCEDURE — 97530 THERAPEUTIC ACTIVITIES: CPT | Mod: GP | Performed by: PHYSICAL THERAPIST

## 2019-09-28 PROCEDURE — 80048 BASIC METABOLIC PNL TOTAL CA: CPT | Performed by: HOSPITALIST

## 2019-09-28 PROCEDURE — 25000132 ZZH RX MED GY IP 250 OP 250 PS 637: Performed by: HOSPITALIST

## 2019-09-28 PROCEDURE — 25800030 ZZH RX IP 258 OP 636: Performed by: INTERNAL MEDICINE

## 2019-09-28 PROCEDURE — 85610 PROTHROMBIN TIME: CPT | Performed by: HOSPITALIST

## 2019-09-28 PROCEDURE — 97530 THERAPEUTIC ACTIVITIES: CPT | Mod: GO

## 2019-09-28 PROCEDURE — 36415 COLL VENOUS BLD VENIPUNCTURE: CPT | Performed by: INTERNAL MEDICINE

## 2019-09-28 PROCEDURE — 36415 COLL VENOUS BLD VENIPUNCTURE: CPT | Performed by: HOSPITALIST

## 2019-09-28 PROCEDURE — 25000128 H RX IP 250 OP 636: Performed by: HOSPITALIST

## 2019-09-28 PROCEDURE — 97535 SELF CARE MNGMENT TRAINING: CPT | Mod: GO

## 2019-09-28 PROCEDURE — 12000000 ZZH R&B MED SURG/OB

## 2019-09-28 PROCEDURE — 97165 OT EVAL LOW COMPLEX 30 MIN: CPT | Mod: GO

## 2019-09-28 PROCEDURE — 25000125 ZZHC RX 250: Performed by: HOSPITALIST

## 2019-09-28 PROCEDURE — 97161 PT EVAL LOW COMPLEX 20 MIN: CPT | Mod: GP | Performed by: PHYSICAL THERAPIST

## 2019-09-28 PROCEDURE — 97116 GAIT TRAINING THERAPY: CPT | Mod: GP | Performed by: PHYSICAL THERAPIST

## 2019-09-28 PROCEDURE — 99232 SBSQ HOSP IP/OBS MODERATE 35: CPT | Performed by: INTERNAL MEDICINE

## 2019-09-28 PROCEDURE — 25800030 ZZH RX IP 258 OP 636: Performed by: HOSPITALIST

## 2019-09-28 PROCEDURE — 84132 ASSAY OF SERUM POTASSIUM: CPT | Performed by: INTERNAL MEDICINE

## 2019-09-28 PROCEDURE — 85027 COMPLETE CBC AUTOMATED: CPT | Performed by: HOSPITALIST

## 2019-09-28 RX ORDER — WARFARIN SODIUM 5 MG/1
5 TABLET ORAL
Status: COMPLETED | OUTPATIENT
Start: 2019-09-28 | End: 2019-09-28

## 2019-09-28 RX ADMIN — MIRABEGRON 50 MG: 50 TABLET, FILM COATED, EXTENDED RELEASE ORAL at 09:38

## 2019-09-28 RX ADMIN — POTASSIUM CHLORIDE 20 MEQ: 1500 TABLET, EXTENDED RELEASE ORAL at 13:18

## 2019-09-28 RX ADMIN — BRINZOLAMIDE 1 DROP: 10 SUSPENSION/ DROPS OPHTHALMIC at 09:38

## 2019-09-28 RX ADMIN — ENOXAPARIN SODIUM 80 MG: 80 INJECTION SUBCUTANEOUS at 09:38

## 2019-09-28 RX ADMIN — WARFARIN SODIUM 5 MG: 5 TABLET ORAL at 17:31

## 2019-09-28 RX ADMIN — PANTOPRAZOLE SODIUM 40 MG: 20 TABLET, DELAYED RELEASE ORAL at 06:30

## 2019-09-28 RX ADMIN — SODIUM CHLORIDE: 9 INJECTION, SOLUTION INTRAVENOUS at 09:44

## 2019-09-28 RX ADMIN — PIPERACILLIN SODIUM,TAZOBACTAM SODIUM 3.38 G: 3; .375 INJECTION, POWDER, FOR SOLUTION INTRAVENOUS at 04:15

## 2019-09-28 RX ADMIN — PIPERACILLIN SODIUM,TAZOBACTAM SODIUM 3.38 G: 3; .375 INJECTION, POWDER, FOR SOLUTION INTRAVENOUS at 17:31

## 2019-09-28 RX ADMIN — POTASSIUM CHLORIDE 40 MEQ: 1500 TABLET, EXTENDED RELEASE ORAL at 10:33

## 2019-09-28 RX ADMIN — ACETAMINOPHEN 650 MG: 325 TABLET, FILM COATED ORAL at 06:30

## 2019-09-28 RX ADMIN — LATANOPROST 1 DROP: 50 SOLUTION/ DROPS OPHTHALMIC at 21:19

## 2019-09-28 RX ADMIN — AMLODIPINE BESYLATE 5 MG: 5 TABLET ORAL at 09:37

## 2019-09-28 RX ADMIN — BRIMONIDINE TARTRATE, TIMOLOL MALEATE 1 DROP: 2; 5 SOLUTION/ DROPS OPHTHALMIC at 09:38

## 2019-09-28 RX ADMIN — Medication 10 MEQ: at 06:23

## 2019-09-28 RX ADMIN — PIPERACILLIN SODIUM,TAZOBACTAM SODIUM 3.38 G: 3; .375 INJECTION, POWDER, FOR SOLUTION INTRAVENOUS at 22:57

## 2019-09-28 RX ADMIN — PIPERACILLIN SODIUM,TAZOBACTAM SODIUM 3.38 G: 3; .375 INJECTION, POWDER, FOR SOLUTION INTRAVENOUS at 10:33

## 2019-09-28 RX ADMIN — SODIUM CHLORIDE: 9 INJECTION, SOLUTION INTRAVENOUS at 21:44

## 2019-09-28 RX ADMIN — Medication 10 MEQ: at 05:00

## 2019-09-28 ASSESSMENT — ACTIVITIES OF DAILY LIVING (ADL)
ADLS_ACUITY_SCORE: 27
ADLS_ACUITY_SCORE: 22

## 2019-09-28 NOTE — PROGRESS NOTES
09/28/19 1400   Quick Adds   Type of Visit Initial PT Evaluation   Living Environment   Lives With spouse   Living Arrangements house  ([facility])   Home Accessibility no concerns   Transportation Anticipated family or friend will provide   Living Environment Comment Pt has 2 ABDELRAHMAN, no stairs w/in home. Pt has a walk-in shower w/ grab bar and shower chair.    Self-Care   Usual Activity Tolerance fair   Current Activity Tolerance fair   Regular Exercise No   Equipment Currently Used at Home walker, rolling   Activity/Exercise/Self-Care Comment Pt typically sedentary   Functional Level Prior   Ambulation 1-->assistive equipment   Transferring 1-->assistive equipment   Toileting 1-->assistive equipment   Bathing 3-->assistive equipment and person   Communication 0-->understands/communicates without difficulty   Swallowing 0-->swallows foods/liquids without difficulty   Cognition 1 - attention or memory deficits   Fall history within last six months yes   Number of times patient has fallen within last six months 1   Which of the above functional risks had a recent onset or change? fall history;ambulation;transferring   Prior Functional Level Comment Pt's spouse present for session; reports she is always present and assists for showers, assists him in/out of bed as needed   General Information   Onset of Illness/Injury or Date of Surgery - Date 09/27/19   Pertinent History of Current Problem (include personal factors and/or comorbidities that impact the POC) Edison Saldivar is a 79 year old male who was admitted to [facility] 9/27/19 with fever and epigastric pain.  On 9/23/19 he underwent a duodenal polypectomy at the major papilla with sphincterotomy and pancreatic duct stenting at [place]. Reports felt ok right after the procedure but has been having increasing discomfort and malaise since.   Precautions/Limitations fall precautions   Heart Disease Risk Factors Overweight   General Observations Spouse present; pt seated  "upright in chair   General Info Comments Eval & tx for deconditioning. up with assist   Cognitive Status Examination   Orientation person;place   Level of Consciousness alert;confused   Follows Commands and Answers Questions 100% of the time;able to follow single-step instructions   Personal Safety and Judgment intact   Memory impaired   Pain Assessment   Patient Currently in Pain No   Posture    Posture Forward head position;Protracted shoulders   Range of Motion (ROM)   ROM Comment WFL   Strength   Strength Comments 4/5 grossly LEs   Bed Mobility   Bed Mobility Comments NT; pt up in chair at visit   Transfer Skills   Transfer Comments sit<>stand modA x1, sit<>sstand maxA x1, Jackeline x1 (varies based on height of chair0   Gait   Gait Comments 10ft shuffling gait with FWW and CGA   Sensory Examination   Sensory Perception no deficits were identified   General Therapy Interventions   Planned Therapy Interventions balance training;bed mobility training;gait training;neuromuscular re-education;strengthening;transfer training;risk factor education;home program guidelines;progressive activity/exercise   Clinical Impression   Criteria for Skilled Therapeutic Intervention yes, treatment indicated   PT Diagnosis deconditioning   Influenced by the following impairments weakness, poor balance   Functional limitations due to impairments difficulty walking /transferring   Clinical Presentation Stable/Uncomplicated   Clinical Presentation Rationale clincal judgement   Clinical Decision Making (Complexity) Low complexity   Therapy Frequency Daily   Predicted Duration of Therapy Intervention (days/wks) 3 days   Anticipated Discharge Disposition Transitional Care Facility   Risk & Benefits of therapy have been explained Yes   Patient, Family & other staff in agreement with plan of care Yes   [facility] AM-PAC TM \"6 Clicks\"   2016, Trustees of [facility], under license to [employer].  All rights reserved.   6 Clicks " "Short Forms Basic Mobility Inpatient Short Form   [facility] AM-PAC  \"6 Clicks\" V.2 Basic Mobility Inpatient Short Form   1. Turning from your back to your side while in a flat bed without using bedrails? 4 - None   2. Moving from lying on your back to sitting on the side of a flat bed without using bedrails? 3 - A Little   3. Moving to and from a bed to a chair (including a wheelchair)? 3 - A Little   4. Standing up from a chair using your arms (e.g., wheelchair, or bedside chair)? 3 - A Little   5. To walk in hospital room? 3 - A Little   6. Climbing 3-5 steps with a railing? 2 - A Lot   Basic Mobility Raw Score (Score out of 24.Lower scores equate to lower levels of function) 18   Total Evaluation Time   Total Evaluation Time (Minutes) 7     "

## 2019-09-28 NOTE — PROGRESS NOTES
[facility]    Medicine Progress Note - Hospitalist Service       Date of Admission:  9/27/2019  Assessment & Plan   Edison Saldivar is a 79 year old male admitted on 9/27/2019.  Past history of dementia, CVA, HTN, KORY, COPD among other chronic medical issues who underwent duodenal polypectomy at [place] 9/23/19 who presents with progressive epigastric pain with nausea and now fever.     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  Fever to 101 at home with leukocytosis of 18 upon arrival.  CT abd/pelvis showing hazy infiltration around pancreas, however, lipase is low.  Case was discussed with Dr. Kennedy at [place] (GI on call) by ED physician who recommends antibiotics and IVF.  CT scan was also consistent with acute pancreatitis   - Continue zosyn  - Monitor blood cultures.  NGTD   - Decreased NS to 75 ml/hr and will stop once diet is advanced   - PRN tylenol and prn oxycodone (avoid if able due to dementia)  - [facility] following and appreciate their recommendations.  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  No focal deficits per wife.  Reports increased confusion over the past several days.  - Delirium precautions in place with frequent re-orientation, maintain sleep/wake cycle, avoid narcotics as able  - PharmD to dose coumadin     KORY  - Continue CPAP with home settings     HTN  - Continue PTA amlodipine  - Holding lasix in favor of IVF as above      COPD/asthma  - Continue PTA inhalers once  verified  - PRN Duonebs     Hx Topete's esophagus  - Continue PTA Nexium once verified     Sarcoidosis  Is not currently on treatment.     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     Glaucoma  - Continue PTA eye drops      Diet: Advance Diet as Tolerated: Full Liquid Diet; Full Liquid Diet  Room Service    DVT Prophylaxis: Enoxaparin (Lovenox) SQ and Warfarin  Torres Catheter: not present  Code Status: DNR      Disposition Plan   Expected discharge: 2 - 3 days, recommended to prior living arrangement once GI work up complete, tolerating diet and antibiotics determined.  Entered: Antonio Foster DO 09/28/2019, 10:40 AM       The patient's care was discussed with the Patient.    Antonio Foster DO  Hospitalist Service  [facility]    ______________________________________________________________________    Interval History   Patient seen and examined.  No acute events over night.  Abdominal pain is significantly improved.  No fevers or chills    Data reviewed today: I reviewed all medications, new labs and imaging results over the last 24 hours. I personally reviewed no images or EKG's today.    Physical Exam   Vital Signs: Temp: 98.1  F (36.7  C) Temp src: Oral BP: (!) 154/90 Pulse: 78 Heart Rate: 74 Resp: 18 SpO2: 94 % O2 Device: None (Room air)    Weight: 239 lbs 3.19 oz  General Appearance: Resting comfortably.  NAD   Respiratory: Clear to auscultation.  No respiratory distress  Cardiovascular: RRR.  No obvious murmurs  GI: Bowel sounds present.  Non-tender.  No rigidity.  No guarding  Skin: No obvious rashes.  No cyanosis  Other: No edema     Data   Recent Labs   Lab 09/28/19  0924 09/27/19  1346   WBC 13.0* 18.3*   HGB 12.2* 11.9*   MCV 84 82    207   INR 1.75* 1.48*    138   POTASSIUM 3.2* 3.0*   CHLORIDE 104 100   CO2 28 33*   BUN 15 19   CR 0.91 1.03   ANIONGAP 6 5   JULIANNE 8.6 8.9   * 90   ALBUMIN  --  2.8*   PROTTOTAL  --  7.6   BILITOTAL  --  0.6   ALKPHOS  --  89   ALT  --  25    AST  --  23   LIPASE  --  63*     Recent Results (from the past 24 hour(s))   Chest XR,  PA & LAT    Narrative    CHEST TWO VIEWS 9/27/2019 2:23 PM     HISTORY: Fever, nausea, postoperative.    COMPARISON: 1/17/2019    FINDINGS: Heart size and pulmonary vascularity are within normal  limits. The lungs are clear. No pneumothorax or pleural effusion.       Impression    IMPRESSION: No radiographic evidence of acute chest abnormality.     MARIO LOPEZ MD   CT Abdomen Pelvis w Contrast    Narrative    CT ABDOMEN AND PELVIS WITH CONTRAST   9/27/2019 2:33 PM     HISTORY: Epigastric abdominal pain, status post endoscopic duodenal  polypectomy 4 days ago, nausea, lightheaded.    TECHNIQUE: 120 mL Isovue-370. CT images of the abdomen and pelvis  following nonionic intravenous contrast. Radiation dose for this scan  was reduced using automated exposure control, adjustment of the mA  and/or kV according to patient size, or iterative reconstruction  technique.    COMPARISON: None.    FINDINGS: No free intra-abdominal air or fluid. There is hazy  infiltration around the pancreas. A pancreatic duct stent is in place.  The gallbladder has been removed. The liver, spleen, and adrenal  glands demonstrate no worrisome abnormalities. Multiple bilateral  renal cysts. No hydronephrosis or solid renal mass. No abdominal or  retroperitoneal lymphadenopathy. Nonaneurysmal aortic atherosclerosis.  Dense calcifications at the origins of the visceral branches of the  aorta, particularly the right renal artery.    No evidence of bowel obstruction. No enlarged abdominal,  retroperitoneal, or pelvic lymph nodes. No lytic or blastic bone  lesions.      Impression    IMPRESSION:  1. Abnormal hazy infiltration around the pancreas compatible with  pancreatitis. No evidence of organized peripancreatic fluid  collection.  2. Visceral branch atherosclerosis, particularly the origin of the  right renal artery.  3. Prior cholecystectomy.    MARIO  LAWANDA LOPEZ MD

## 2019-09-28 NOTE — CONSULTS
GASTROENTEROLOGY CONSULTATION     Edison Saldivar  [address] [place]   [facility] [address]  79 year old male    Admission Date/Time: 9/27/2019  Primary Care Provider: Brown, Merlin Emery    We were asked to see the patient in consultation by Dr. Porter for evaluation of abdominal pain, recent duodenal polypectomy at [place].        HPI:  Edison Saldivar is a 79 year old male who was admitted to [facility] 9/27/19 with fever and epigastric pain.  On 9/23/19 he underwent a duodenal polypectomy at the major papilla with sphincterotomy and pancreatic duct stenting at [place]. Reports felt ok right after the procedure but has been having increasing discomfort and malaise since.    CT on admission shows possible pancreatitis.  ER Physician discussed with [place] GI on call Dr. Kennedy, who recommended antibiotics.    Patient reports feeling a little better today, pain has improved.  Tolerating liquids.    ROS: A comprehensive ten point review of systems was negative aside from those in mentioned in the HPI.      MEDICATIONS: No current facility-administered medications on file prior to encounter.   albuterol (PROAIR HFA/PROVENTIL HFA/VENTOLIN HFA) 108 (90 Base) MCG/ACT inhaler, Inhale 2 puffs into the lungs every 4 hours as needed for shortness of breath / dyspnea or wheezing  amLODIPine (NORVASC) 5 MG tablet, Take 5 mg by mouth every morning  brimonidine-timolol (COMBIGAN) 0.2-0.5 % ophthalmic solution, Place 1 drop into both eyes every morning  brinzolamide (AZOPT) 1 % ophthalmic suspension, Place 1 drop into both eyes every morning  CALCIUM-MAGNESIUM-ZINC PO, Take 1 tablet by mouth every morning 100/40/15  co-enzyme Q-10 100 MG CAPS capsule, Take 100 mg by mouth every morning  docusate sodium (COLACE) 100 MG capsule, Take 100 mg by mouth every evening  enoxaparin (LOVENOX) 80 MG/0.8ML syringe, Inject 80 mg Subcutaneous daily  esomeprazole (NEXIUM) 40 MG DR capsule, Take 40 mg by mouth every morning (before breakfast) Take  "30-60 minutes before eating.  furosemide (LASIX) 40 MG tablet, Take 80 mg by mouth daily  latanoprost (XALATAN) 0.005 % ophthalmic solution, Place 1 drop into both eyes At Bedtime  Magnesium Oxide 250 MG TABS, Take 500 mg by mouth daily  mirabegron (MYRBETRIQ) 50 MG 24 hr tablet, Take 50 mg by mouth every morning  multivitamin w/minerals (THERA-VIT-M) tablet, Take 1 tablet by mouth every morning  UNKNOWN TO PATIENT, Patient is currently on Lovenox, unknown dose, daughter will bring in tonight  vitamin B-12 (CYANOCOBALAMIN) 1000 MCG tablet, Take 1,000 mcg by mouth every morning  vitamin D3 (CHOLECALCIFEROL) 2000 units tablet, Take 2,000 Units by mouth daily  warfarin ANTICOAGULANT (COUMADIN) 5 MG tablet, Take 5 mg by mouth See Admin Instructions Tuesday, Wednesday, Thursday, Saturday, Sunday  warfarin ANTICOAGULANT (COUMADIN) 5 MG tablet, Take 7.5 mg by mouth See Admin Instructions Monday and Friday        ALLERGIES: No Known Allergies    Past Medical history: CVA, KORY, Htn, COPD    SOCIAL HISTORY:  Social History     Tobacco Use     Smoking status: Never Smoker     Smokeless tobacco: Never Used   Substance Use Topics     Alcohol use: No     Frequency: Never     Drug use: No       FAMILY HISTORY:  No known family history of GI disease    PHYSICAL EXAM:   /81 (BP Location: Right arm)   Pulse 78   Temp 97.4  F (36.3  C) (Oral)   Resp 18   Ht 1.702 m (5' 7\")   Wt 108.5 kg (239 lb 3.2 oz)   SpO2 99%   BMI 37.46 kg/m      Constitutional: NAD, comfortable  Cardiovascular: RRR  Respiratory: CTAB  Psychiatric: mentation appears normal  Head: Normocephalic. Atraumatic.    Neck: normal size, trachea midline  Eyes:  no icterus  ENT:  hearing adequate, pharynx normal without erythema or exudate  Abdomen:   Auscultation: normal  Appearance: not distended  Palpation: minimally tender  NEURO: grossly negative  SKIN: no suspicious lesions or rashes            ADDITIONAL COMMENTS:   I reviewed the patient's new clinical " lab test results.   Recent Labs   Lab Test 09/28/19  0924 09/27/19  1346 01/28/19  0905 01/27/19  0810   WBC 13.0* 18.3*  --  7.7   HGB 12.2* 11.9*  --  10.2*   MCV 84 82  --  91    207  --  207   INR 1.75* 1.48* 1.69* 1.35*     Recent Labs   Lab Test 09/28/19  0924 09/27/19  1346 01/25/19  0832    138 139   POTASSIUM 3.2* 3.0* 3.4   CHLORIDE 104 100 106   CO2 28 33* 26   BUN 15 19 14   CR 0.91 1.03 1.06   ANIONGAP 6 5 7   JULIANNE 8.6 8.9 8.5   * 90 109*     Recent Labs   Lab Test 09/27/19  1451 09/27/19  1346 01/18/19  0230   ALBUMIN  --  2.8* 2.7*   BILITOTAL  --  0.6 0.4   ALT  --  25 19   AST  --  23 18   ALKPHOS  --  89 55   PROTEIN 10*  --   --    LIPASE  --  63*  --        CT 9/27/19:  No free intra-abdominal air or fluid. There is hazy  infiltration around the pancreas. A pancreatic duct stent is in place.  The gallbladder has been removed. The liver, spleen, and adrenal  glands demonstrate no worrisome abnormalities. Multiple bilateral  renal cysts. No hydronephrosis or solid renal mass. No abdominal or  retroperitoneal lymphadenopathy. Nonaneurysmal aortic atherosclerosis.  Dense calcifications at the origins of the visceral branches of the  aorta, particularly the right renal artery.     No evidence of bowel obstruction. No enlarged abdominal,  retroperitoneal, or pelvic lymph nodes. No lytic or blastic bone  lesions.      .    CONSULTATION ASSESSMENT AND PLAN:    Active Problems:    Intra-abdominal infection    Pancreatitis    Assessment: 80 yo M s/p duodenal polypectomy at major papilla, sphincterotomy and pancreatic duct stenting at [place] 9/23/19. Presents with pain. CT with likely pancreatitis. Lipase ok on admission. Pain and CT consistent with pancreatitis, seems to be improving today. Antibiotics recommended by on call [place] physician.    Plan:   - continue antibiotics per [place] recs  - if doing well tomorrow, advance diet to low fat          Estrella Vela MD  [place]  [facility]  Office:  [phone]  Cell/Pager:  [phone]

## 2019-09-28 NOTE — PROGRESS NOTES
DATE & TIME: 9/28/19 0856-1476        Cognitive Concerns/ Orientation : Disoriented to time and situation, pleasantly confused.   BEHAVIOR & AGGRESSION TOOL COLOR: Green  CIWA SCORE: N/A   ABNL VS/O2: VSS on RA  MOBILITY: SBA with walker  PAIN MANAGMENT: Pt declined any pain meds  DIET: Tolerating full liquids  BOWEL/BLADDER: Continent  ABNL LAB/BG: K 3.2, replaced, recheck 3.4 WBC 13.  INR 1.75, bridging with Lovenox.  DRAIN/DEVICES: PIV infusing  TELEMETRY RHYTHM: N/A  SKIN: Pale  TESTS/PROCEDURES: N/A  D/C DAY/GOALS/PLACE: Pending improvement of infection, possibly Monday/Tuesday.  OTHER IMPORTANT INFO: CPAP overnight.  Reports SOB, though no noted dyspnea.  2+ edema to bilateral legs, ankles, feet.

## 2019-09-28 NOTE — PLAN OF CARE
DATE & TIME: 9/27/19 0374-4135                Cognitive Concerns/ Orientation : Disoriented to time and situation, pleasantly confused.   BEHAVIOR & AGGRESSION TOOL COLOR: Green  CIWA SCORE: N/A   ABNL VS/O2: VSS on RA  MOBILITY: SBA with walker  PAIN MANAGMENT: PRN Oxycodone x1 for right-sided abdominal pain, effective.  PRN Tylenol for the same pain at end of shift.  DIET: Tolerating clears, advanced to full liquids for AM  BOWEL/BLADDER: Continent  ABNL LAB/BG: K 3.0, replaced throughout the night.  WBC 18.3.  INR 1.48, bridging with Lovenox.  DRAIN/DEVICES: PIV infusing  TELEMETRY RHYTHM: N/A  SKIN: Pale  TESTS/PROCEDURES: N/A  D/C DAY/GOALS/PLACE: Pending improvement in infection, possibly Monday/Tuesday.  OTHER IMPORTANT INFO: Wore home CPAP overnight.  Reports SOB, though no noted dyspnea.  2+ edema to bilateral legs, ankles, feet.  Ongoing abdominal pain, round and distended.  Intermittent nausea, though denies active nausea.    Admission    Inpatient nursing criteria listed below were met:    PCD's Documented: NA, on Coumadin and Lovenox  Skin issues/needs documented: NA  Isolation education started/completed: NA  Patient allergies verified with patient: With family  Verified completion of [place] Risk Assessment Tool: Yes  Verified completion of Guardianship screening tool: Yes  Fall Prevention: Care plan updated, Education given and documented: Yes  Care Plan initiated: Yes  Home medications documented in belongings flowsheet: NA  Patient belongings documented in belongings flowsheet: Yes  Reminder note (belongings/ medications) placed in discharge instructions:Yes  Admission profile/ required documentation complete: Yes  Bedside Report Letter given and explained to patient: Yes, patient and family

## 2019-09-28 NOTE — PLAN OF CARE
Discharge Planner OT   Patient plan for discharge: pt unsure, hoping home  Current status: OT eval/tx initiated. Unsure of validity of PLOF info provided by pt d/t pt's vascular dementia. Pt lives w/ his wife, Dinora and is typically mod I w/ mobility using 4WW. Pt states he is indep w/ ADL's but has supervision from wife for showering. Has A from wife for IADL's.    Pt does state his balance feels much worse than normal. Pt follows all commands and is alert. Oriented to self and knows he is at a hospital but not specific hospital/city.    Currently, pt SBA for bed mobility. CGA and max cueing for safety w/ transfers. CGA and cueing for mobility w/in room using 2WW. CGA for toileting and g/h standing at sink. Continued reinforcement for safety (chart states pt has vascular dementia). Pt seated in chair w/ chair alarm activated at end of session.   Barriers to return to prior living situation: Impaired balance, functional act tolerance, cognition  Recommendations for discharge: TCU  Rationale for recommendations: Pt will benefit from continued therapy at TCU to improve balance and strength for improved safety w/ mobility and ADL's at home. Pending LOS and improvement in these deficits pt may be safe to return home w/ A from wife for mobility and ADL's. Will continue to assess.       Entered by: Vivien Delarosa 09/28/2019 9:45 AM

## 2019-09-28 NOTE — PROGRESS NOTES
SPIRITUAL HEALTH SERVICES Progress Note  [facility]    Initial visit per pt request. SH offered spiritual and emotional support. Pt declined visit. SH will be available if needs arise.      Sis Booth   Intern

## 2019-09-28 NOTE — PROGRESS NOTES
Admission    Patient arrives to room 605-1 via cart from ED.  Care plan note: oriented to self, A1 and walker to BR. Denies pain. IVF running. Family present.    Inpatient nursing criteria listed below were met:    PCD's Documented: NA  Skin issues/needs documented :Yes  Isolation education started/completed NA  Patient allergies verified with patient: No  Verified completion of [place] Risk Assessment Tool:  Yes  Verified completion of Guardianship screening tool: Yes  Fall Prevention: Care plan updated, Education given and documented No  Care Plan initiated: No  Home medications documented in belongings flowsheet: NA  Patient belongings documented in belongings flowsheet: Yes  Reminder note (belongings/ medications) placed in discharge instructions:NA  Admission profile/ required documentation complete: No  Bedside Report Letter given and explained to patient No

## 2019-09-28 NOTE — PROGRESS NOTES
09/28/19 0853   Quick Adds   Type of Visit Initial Occupational Therapy Evaluation   Living Environment   Lives With spouse   Living Arrangements house  ([facility])   Transportation Anticipated family or friend will provide   Living Environment Comment Pt has 2 [address]RA[address], no stairs w/in home. Pt has a walk-in shower w/ grab bar and shower chair.    Self-Care   Equipment Currently Used at Home   (4WW)   Functional Level   Ambulation 1-->assistive equipment   Transferring 1-->assistive equipment   Toileting 1-->assistive equipment   Bathing 3-->assistive equipment and person   Dressing 0-->independent   Eating 0-->independent   Communication 0-->understands/communicates without difficulty   Swallowing 0-->swallows foods/liquids without difficulty   Cognition 1 - attention or memory deficits  (vascular dementia)   Fall history within last six months no   Which of the above functional risks had a recent onset or change? ambulation;transferring;toileting;bathing;dressing   Prior Functional Level Comment Pt uses 4WW for mobility. Pt typically indep w/ BADL's, states wife supervises for showers. Pt has A for IADL's   General Information   Onset of Illness/Injury or Date of Surgery - Date 09/27/19   Referring Physician Simba Porter MD   Patient/Family Goals Statement Return home   Additional Occupational Profile Info/Pertinent History of Current Problem Pt presentsed with progressive epigastric pain with nausea and now fever (likely abdominal infection per chart). Pt had recent duodenal polypectomy (at major papilla) with sphincterotomy and pancreatic duct stenting 9/23/19 at [place]   Precautions/Limitations fall precautions   General Info Comments Pt is very pleasant and agreeable to therapy   Cognitive Status Examination   Orientation person   Level of Consciousness alert   Follows Commands (Cognition) WFL   Memory impaired   Cognitive Comment Pt oriented to self and knows he's in the hospital but doesn't know which one. Pt  stated it was January 2016 when asked month and year. Chart indicates pt has vascular dementia   Visual Perception   Visual Perception Comments WFL but states he has glaucoma   Sensory Examination   Sensory Comments denies numbness/tingling   Pain Assessment   Patient Currently in Pain No   Range of Motion (ROM)   ROM Comment BUE WFL   Strength   Strength Comments BUE strength WFL but L shoulder flex/ext weaker on L side ~ 4-/5   Hand Strength   Hand Strength Comments WFL   Coordination   Coordination Comments WFL    Mobility   Bed Mobility Comments SBA   Transfer Skill: Sit to Stand   Level of [place]: Sit/Stand contact guard   Balance   Balance Comments decreased sitting balance at EOB w/ UE ROM   Bathing   Level of [place] minimum assist (75% patients effort)   Upper Body Dressing   Level of [place]: Dress Upper Body stand-by assist   Lower Body Dressing   Level of [place]: Dress Lower Body contact guard   Toileting   Level of [place]: Toilet contact guard   Grooming   Level of [place]: Grooming stand-by assist   Eating/Self Feeding   Level of [place]: Eating independent   Activities of Daily Living Analysis   Impairments Contributing to Impaired Activities of Daily Living balance impaired;strength decreased   General Therapy Interventions   Planned Therapy Interventions ADL retraining;progressive activity/exercise;strengthening   Clinical Impression   Criteria for Skilled Therapeutic Interventions Met yes, treatment indicated   OT Diagnosis Decreased safety and indep w/ functional mobility and ADL's   Influenced by the following impairments Decreased balance, functional act tolerance, cognition, overall strength   Assessment of Occupational Performance 1-3 Performance Deficits   Identified Performance Deficits Decreased safety and indep w/ functional mobility, dressing, bathing, g/h, toileting   Clinical Decision Making (Complexity) Low complexity   Therapy Frequency Daily  "  Predicted Duration of Therapy Intervention (days/wks) 5 days   Anticipated Discharge Disposition Transitional Care Facility  (if pt progresses, possibly home w/ A from wife)   Risks and Benefits of Treatment have been explained. Yes   Patient, Family & other staff in agreement with plan of care Yes   [facility] TM \"6 Clicks\"   2016, Trustees of [facility], under license to [place].  All rights reserved.   6 Clicks Short Forms Daily Activity Inpatient Short Form   [facility]  \"6 Clicks\" Daily Activity Inpatient Short Form   1. Putting on and taking off regular lower body clothing? 3 - A Little   2. Bathing (including washing, rinsing, drying)? 3 - A Little   3. Toileting, which includes using toilet, bedpan or urinal? 3 - A Little   4. Putting on and taking off regular upper body clothing? 3 - A Little   5. Taking care of personal grooming such as brushing teeth? 3 - A Little   6. Eating meals? 4 - None   Daily Activity Raw Score (Score out of 24.Lower scores equate to lower levels of function) 19   Total Evaluation Time   Total Evaluation Time (Minutes) 8     "

## 2019-09-28 NOTE — PLAN OF CARE
"Discharge Planner PT   Patient plan for discharge: did not discuss  Current status: PT evaluation completed & tx initiated. Pt's spouse present and providing information; reports that pt has been more \"disoriented\" lately and his balance is \"declining\". She states that if pt is with her, he is alright but that \"most people don't realize how much I'm doing for him\".   Pt does state his balance feels much worse than normal. Pt follows all commands and is alert.   Pt transfers Jackeline sit<>Stand x3 with FWW, and CGA with FWW during short distance ambulation 20ft x2. CGA for toileting and g/h standing at sink.   Barriers to return to prior living situation: Impaired balance, functional act tolerance, cognition  Recommendations for discharge: TCU  Rationale for recommendations: Pt will benefit from continued therapy at TCU to improve balance and strength for improved safety w/ mobility and ADL's at home. Pending LOS and improvement in these deficits pt may be safe to return home w/ A from wife for mobility and ADL's. Will continue to assess.       Entered by: Alva Cespedes 09/28/2019 2:38 PM       "

## 2019-09-28 NOTE — PHARMACY-ANTICOAGULATION SERVICE
Clinical Pharmacy - Warfarin Dosing Consult     Pharmacy has been consulted to manage this patient s warfarin therapy.  Indication: Hx CVA/Sneddon syndrome  Therapy Goal: INR 2-3  Warfarin Prior to Admission: Yes  Warfarin PTA Regimen: 5 mg on Tuesday, Wednesday, Thursday, Saturday, Sunday & 7.5 mg Monday and Friday  Recent documented change in oral intake/nutrition: Unknown    INR   Date Value Ref Range Status   09/27/2019 1.48 (H) 0.86 - 1.14 Final   01/28/2019 1.69 (H) 0.86 - 1.14 Final       Recommend warfarin 7.5 mg today.  Pharmacy will monitor Edison Saldivar daily and order warfarin doses to achieve specified goal.      Please contact pharmacy as soon as possible if the warfarin needs to be held for a procedure or if the warfarin goals change.

## 2019-09-29 ENCOUNTER — APPOINTMENT (OUTPATIENT)
Dept: OCCUPATIONAL THERAPY | Facility: CLINIC | Age: 79
DRG: 439 | End: 2019-09-29
Payer: COMMERCIAL

## 2019-09-29 ENCOUNTER — APPOINTMENT (OUTPATIENT)
Dept: PHYSICAL THERAPY | Facility: CLINIC | Age: 79
DRG: 439 | End: 2019-09-29
Payer: COMMERCIAL

## 2019-09-29 LAB
ALBUMIN SERPL-MCNC: 2.4 G/DL (ref 3.4–5)
ALP SERPL-CCNC: 77 U/L (ref 40–150)
ALT SERPL W P-5'-P-CCNC: 24 U/L (ref 0–70)
AST SERPL W P-5'-P-CCNC: 16 U/L (ref 0–45)
BILIRUB DIRECT SERPL-MCNC: 0.1 MG/DL (ref 0–0.2)
BILIRUB SERPL-MCNC: 0.6 MG/DL (ref 0.2–1.3)
INR PPP: 2.09 (ref 0.86–1.14)
LIPASE SERPL-CCNC: 46 U/L (ref 73–393)
POTASSIUM SERPL-SCNC: 3.3 MMOL/L (ref 3.4–5.3)
POTASSIUM SERPL-SCNC: 3.7 MMOL/L (ref 3.4–5.3)
PROT SERPL-MCNC: 6.7 G/DL (ref 6.8–8.8)

## 2019-09-29 PROCEDURE — 85610 PROTHROMBIN TIME: CPT | Performed by: HOSPITALIST

## 2019-09-29 PROCEDURE — 99232 SBSQ HOSP IP/OBS MODERATE 35: CPT | Performed by: INTERNAL MEDICINE

## 2019-09-29 PROCEDURE — 12000000 ZZH R&B MED SURG/OB

## 2019-09-29 PROCEDURE — 36415 COLL VENOUS BLD VENIPUNCTURE: CPT | Performed by: INTERNAL MEDICINE

## 2019-09-29 PROCEDURE — 80076 HEPATIC FUNCTION PANEL: CPT | Performed by: HOSPITALIST

## 2019-09-29 PROCEDURE — 97530 THERAPEUTIC ACTIVITIES: CPT | Mod: GO | Performed by: OCCUPATIONAL THERAPY ASSISTANT

## 2019-09-29 PROCEDURE — 84132 ASSAY OF SERUM POTASSIUM: CPT | Performed by: INTERNAL MEDICINE

## 2019-09-29 PROCEDURE — 97535 SELF CARE MNGMENT TRAINING: CPT | Mod: GO | Performed by: OCCUPATIONAL THERAPY ASSISTANT

## 2019-09-29 PROCEDURE — 25000132 ZZH RX MED GY IP 250 OP 250 PS 637

## 2019-09-29 PROCEDURE — 84132 ASSAY OF SERUM POTASSIUM: CPT | Performed by: HOSPITALIST

## 2019-09-29 PROCEDURE — 36415 COLL VENOUS BLD VENIPUNCTURE: CPT | Performed by: HOSPITALIST

## 2019-09-29 PROCEDURE — 25000132 ZZH RX MED GY IP 250 OP 250 PS 637: Performed by: HOSPITALIST

## 2019-09-29 PROCEDURE — 25000128 H RX IP 250 OP 636: Performed by: HOSPITALIST

## 2019-09-29 PROCEDURE — 97116 GAIT TRAINING THERAPY: CPT | Mod: GP

## 2019-09-29 PROCEDURE — 83690 ASSAY OF LIPASE: CPT | Performed by: HOSPITALIST

## 2019-09-29 RX ORDER — WARFARIN SODIUM 5 MG/1
5 TABLET ORAL
Status: COMPLETED | OUTPATIENT
Start: 2019-09-29 | End: 2019-09-29

## 2019-09-29 RX ADMIN — ENOXAPARIN SODIUM 80 MG: 80 INJECTION SUBCUTANEOUS at 09:05

## 2019-09-29 RX ADMIN — PANTOPRAZOLE SODIUM 40 MG: 20 TABLET, DELAYED RELEASE ORAL at 06:17

## 2019-09-29 RX ADMIN — PIPERACILLIN SODIUM,TAZOBACTAM SODIUM 3.38 G: 3; .375 INJECTION, POWDER, FOR SOLUTION INTRAVENOUS at 06:14

## 2019-09-29 RX ADMIN — PIPERACILLIN SODIUM,TAZOBACTAM SODIUM 3.38 G: 3; .375 INJECTION, POWDER, FOR SOLUTION INTRAVENOUS at 23:31

## 2019-09-29 RX ADMIN — PIPERACILLIN SODIUM,TAZOBACTAM SODIUM 3.38 G: 3; .375 INJECTION, POWDER, FOR SOLUTION INTRAVENOUS at 17:20

## 2019-09-29 RX ADMIN — WARFARIN SODIUM 5 MG: 5 TABLET ORAL at 17:20

## 2019-09-29 RX ADMIN — PIPERACILLIN SODIUM,TAZOBACTAM SODIUM 3.38 G: 3; .375 INJECTION, POWDER, FOR SOLUTION INTRAVENOUS at 11:45

## 2019-09-29 RX ADMIN — LATANOPROST 1 DROP: 50 SOLUTION/ DROPS OPHTHALMIC at 23:36

## 2019-09-29 RX ADMIN — POTASSIUM CHLORIDE 20 MEQ: 1500 TABLET, EXTENDED RELEASE ORAL at 13:32

## 2019-09-29 RX ADMIN — POTASSIUM CHLORIDE 40 MEQ: 1500 TABLET, EXTENDED RELEASE ORAL at 11:47

## 2019-09-29 RX ADMIN — AMLODIPINE BESYLATE 5 MG: 5 TABLET ORAL at 09:05

## 2019-09-29 RX ADMIN — MIRABEGRON 50 MG: 50 TABLET, FILM COATED, EXTENDED RELEASE ORAL at 09:05

## 2019-09-29 RX ADMIN — BRIMONIDINE TARTRATE, TIMOLOL MALEATE 1 DROP: 2; 5 SOLUTION/ DROPS OPHTHALMIC at 09:08

## 2019-09-29 RX ADMIN — BRINZOLAMIDE 1 DROP: 10 SUSPENSION/ DROPS OPHTHALMIC at 09:08

## 2019-09-29 ASSESSMENT — ACTIVITIES OF DAILY LIVING (ADL)
ADLS_ACUITY_SCORE: 22

## 2019-09-29 NOTE — PLAN OF CARE
Discharge Planner OT   Patient plan for discharge: none stated from pt however spouse stated TCU  Current status: cues for hand placement and Jackeline sit to stand from chair, amb with FWW CGA/min A for safety to/from bathroom, toilet transfer with cues to utilize grab bar and min/mod A for transfer to/from lower toilet, cues for clothing management and Jackeline. Stood at sink with CGA for ADLS cues needed to sequence through and complete task.    Barriers to return to prior living situation: Impaired balance, functional act tolerance, cognition  Recommendations for discharge: TCU per plan established by the Occupational Therapist  Rationale for recommendations: Pt will benefit from continued therapy at TCU to improve balance and strength for improved safety w/ mobility and ADL's at home. Pt spouse stated she is hoping for TCU to improve strength as pt is too hard to care for at this time.        Entered by: Whitney Joiner 09/29/2019 2:02 PM

## 2019-09-29 NOTE — PROGRESS NOTES
"GASTROENTEROLOGY PROGRESS NOTE    SUBJECTIVE:  Feeling much better today, much more alert.    OBJECTIVE:    BP (!) 169/87 (BP Location: Right arm)   Pulse 69   Temp 97.3  F (36.3  C) (Oral)   Resp 18   Ht 1.702 m (5' 7\")   Wt 108.5 kg (239 lb 3.2 oz)   SpO2 95%   BMI 37.46 kg/m    Temp (24hrs), Av.7  F (36.5  C), Min:97.3  F (36.3  C), Max:98.3  F (36.8  C)    Patient Vitals for the past 72 hrs:   Weight   19 1823 108.5 kg (239 lb 3.2 oz)       Intake/Output Summary (Last 24 hours) at 2019 1451  Last data filed at 2019 1229  Gross per 24 hour   Intake 1682 ml   Output --   Net 1682 ml         PHYSICAL EXAM    Constitutional: NAD, comfortable  Abdomen: soft, non-tender, nondistended  Skin: normal        Additional Comments:  ROS, FH, SH: See initial GI consult for details.    I have reviewed the patient's new clinical lab results:    Recent Labs   Lab Test 19  0919  1346  19  0810   WBC  --  13.0* 18.3*  --  7.7   HGB  --  12.2* 11.9*  --  10.2*   MCV  --  84 82  --  91   PLT  --  177 207  --  207   INR 2.09* 1.75* 1.48*   < > 1.35*    < > = values in this interval not displayed.     Recent Labs   Lab Test 19  1535 19  0924 19  1346 19  0832   NA  --   --  138 138 139   POTASSIUM 3.3* 3.4 3.2* 3.0* 3.4   CHLORIDE  --   --  104 100 106   CO2  --   --  28 33* 26   BUN  --   --  15 19 14   CR  --   --  0.91 1.03 1.06   ANIONGAP  --   --  6 5 7   JULIANNE  --   --  8.6 8.9 8.5     Recent Labs   Lab Test 1919  1451 19  1346 19  0230   ALBUMIN 2.4*  --  2.8* 2.7*   BILITOTAL 0.6  --  0.6 0.4   ALT 24  --  25 19   AST 16  --  23 18   ALKPHOS 77  --  89 55   PROTEIN  --  10*  --   --    LIPASE 46*  --  63*  --          Active Problems:  Intra-abdominal infection    Pancreatitis    Assessment: 80 yo M s/p 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 Admitted to [facility] 9/27/19 with pain. CT with likely pancreatitis. Lipase ok on admission. Pain and CT consistent with pancreatitis, seems to be improving today. Antibiotics recommended by on call [facility] physician.    Plan:   - continue antibiotics per [facility] recs  - low fat diet today    Likely discharge tomorrow.        Estrella Vela  [facility]  Office:  [phone]  Cell/Pager:  [phone]

## 2019-09-29 NOTE — CONSULTS
"BRIEF NUTRITION ASSESSMENT      REASON FOR ASSESSMENT:  Edison Saldivar is a 79 year old male seen by Registered Dietitian for Admission Nutrition Risk Screen for reduced oral intake over the last month    NUTRITION HISTORY:  Information obtained from patient and Epic records.  His wife does the shopping and cooking at home and her normally has a good appetite and oral intake.  However, since his duodenal polypectomy with sphincterotomy and pancreatic duct stenting 9/23/19 at [facility], he has experienced abdominal pain, decreased appetite, nausea, and cramping.  No supplements taken at home.  No food allergies/intolerances.       CURRENT DIET AND INTAKE:  Diet:  Low Fiber              Pt has had limited nutritional oral intake for the past 6 days, but now appetite and intake is much improved.  He ate 100% lunch.    ANTHROPOMETRICS:  Height: 5' 7\"  Weight:  108.5 kg (239 lbs 3.19 oz)  Body mass index is 37.46 kg/m .   Weight Status: Obesity Grade II BMI 35-39.9  IBW:   67.3 kg  %IBW:  161%  Weight History: Weight has been stable    Wt Readings from Last 20 Encounters:   09/27/19 108.5 kg (239 lb 3.2 oz)   05/22/19 108.4 kg (239 lb)   02/26/19 108.4 kg (239 lb)   01/28/19 108.8 kg (239 lb 13.8 oz)       LABS:  Labs noted  K 3.3 (L)    MALNUTRITION:  Patient does not meet two of the following criteria necessary for diagnosing malnutrition: significant weight loss, reduced intake, subcutaneous fat loss, muscle loss or fluid retention. Nutrition Focused Physical Assessment (NFPA) not appropriate at this time.    NUTRITION INTERVENTION:  Nutrition Diagnosis:  No nutrition diagnosis at this time.    Implementation:  Nutrition Education:  Per Provider order if indicated    FOLLOW UP/MONITORING:   Will re-evaluate in 7 - 10 days, or sooner, if re-consulted.      Libby Gamez, RD, LD, CNSC  "

## 2019-09-29 NOTE — CONSULTS
Care Transition Initial Assessment - SW     Met with: Patient and wife    Active Problems:    Intra-abdominal infection       DATA  Lives With: spouse   Living Arrangements: house([facility])   Identified issues/concerns regarding health management: Need for TCU   Resources List: Skilled Nursing Facility  Other Resources: Transportation Services     Transportation Anticipated: agency    ASSESSMENT  Cognitive Status:  awake, alert and disoriented  Concerns to be addressed: Need for TCU.  SW consulted to assist with discharge planning, emotional support and transportation arrangements.  Pt is a 79 yr old male who admitted on 9/27/19 for abdominal infection. Patient previously lived at home with his wife who is his caregiver. Therapy recommendations are for TCU at discharge, of which patient and wife are in agreement with. TCU choices provided as well as transportation options.   PLAN  Financial costs for the patient includes Transportation costs .  Patient given options and choices for discharge SNF list provided .  Patient/family is agreeable to the plan?  Yes  Transportation/person available to transport on day of discharge  is HD and have they been notified/set up TBD  Patient Goals and Preferences: TCU via  .  Patient anticipates discharging to:  TCU .  SW sent TCU referrals via [facility].  SW to continue to follow and assist with discharge planning.

## 2019-09-29 NOTE — PLAN OF CARE
Discharge Planner PT   Patient plan for discharge: did not discuss  Current status:  Pt transferred sit to/from stand with CGA.  Gait training 140' x2 with FWW and CGA. Pt needed on extended sitting break due to increased fatigue, no noted SOB. Pt tends to forget to place hands back prior to sitting and needs tactile cues to do so. Pt left sitting in chair with alarm on and needs in reach.  Barriers to return to prior living situation: Impaired balance, functional act tolerance, cognition  Recommendations for discharge: TCU per plan established by the PT.  Rationale for recommendations: Pt will benefit from continued therapy at TCU to improve balance and strength for improved safety w/ mobility and ADL's at home. Pending LOS and improvement in these deficits pt may be safe to return home w/ A from wife for mobility and ADL's. Will continue to assess.       Entered by: Mima Mayberry 09/29/2019 2:53 PM

## 2019-09-29 NOTE — PROGRESS NOTES
[facility]    Medicine Progress Note - Hospitalist Service       Date of Admission:  9/27/2019  Assessment & Plan   Edison Saldivar is a 79 year old male admitted on 9/27/2019.  Past history of dementia, CVA, HTN, KORY, COPD among other chronic medical issues who underwent duodenal polypectomy at [facility] 9/23/19 who presents with progressive epigastric pain with nausea and now fever.     Acute pancreatitis   Possible intra-abdominal infection w/sepsis   Recent duodenal polypectomy (at major papilla) with sphincterotomy and pancreatic duct stenting 9/23/19 at [facility]  Presented with progressive epigastric pain with nausea and anorexia following procedure above.  Fever to 101 at home with leukocytosis of 18 upon arrival.  CT abd/pelvis showing hazy infiltration around pancreas, however, lipase is low.  Case was discussed with Dr. Kennedy at [facility] (GI on call) by ED physician who recommends antibiotics and IVF.  CT scan was also consistent with acute pancreatitis   Patient's symptoms have been improving with conservative management   - Continue zosyn  - Monitor blood cultures.  NGTD   - Stopped IVF   - PRN tylenol and prn oxycodone (avoid if able due to dementia)  - Advanced to low fiber diet today   - [facility] following and appreciate their recommendations.  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  No focal deficits per wife.  Reports increased confusion over the past several days.  - Delirium precautions in place with frequent re-orientation, maintain sleep/wake cycle, avoid narcotics as able  - PharmD to dose coumadin  - PT/OT consulted.  Recommending TCU     KORY  - Continue CPAP with home settings     HTN  - Continue PTA amlodipine  - Likely restart Lasix  tomorrow      COPD/asthma  - Continue PTA inhalers once verified  - PRN Duonebs     Hx Topete's esophagus  - Continue PTA Nexium once verified     Sarcoidosis  Is not currently on treatment.     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     Glaucoma  - Continue PTA eye drops      Diet: Advance Diet as Tolerated: Full Liquid Diet; Full Liquid Diet  Room Service    DVT Prophylaxis: Warfarin  Torres Catheter: not present  Code Status: DNR      Disposition Plan   Expected discharge: 1-2 days, recommended to transitional care unit once safe disposition plan/ TCU bed available and GI evaluation complete.  Entered: Antonio Foster DO 09/29/2019, 10:42 AM       The patient's care was discussed with the Care Coordinator/, Patient and Patient's Family.    Antonio Foster DO  Hospitalist Service  [facility]    ______________________________________________________________________    Interval History   Patient seen and examined.  No acute events over night.  Abdominal pain continues to improve.  No nausea or vomiting.  Tolerated full liquid diet last night and this AM.  No chest pain or SOB.      Data reviewed today: I reviewed all medications, new labs and imaging results over the last 24 hours. I personally reviewed no images or EKG's today.    Physical Exam   Vital Signs: Temp: 97.3  F (36.3  C) Temp src: Oral BP: (!) 169/87 Pulse: 69 Heart Rate: 86 Resp: 18 SpO2: 95 % O2 Device: None (Room air)    Weight: 239 lbs 3.19 oz  General Appearance: Resting comfortably in chair.  NAD   Respiratory: Clear to auscultation.  No respiratory distress  Cardiovascular: RRR.  No obvious murmurs  GI: Bowel sounds present.  Non-tender.  Non-distended  Skin: No obvious rashes.  No cyanosis  Other: Trace lower extremity edema.  No calf tenderness      Data   Recent Labs   Lab 09/29/19  0823 09/28/19  1535 09/28/19  0924 09/27/19  1346   WBC  --   --  13.0* 18.3*   HGB  --   --  12.2* 11.9*   MCV  --   --  84 82   PLT  --    --  177 207   INR 2.09*  --  1.75* 1.48*   NA  --   --  138 138   POTASSIUM 3.3* 3.4 3.2* 3.0*   CHLORIDE  --   --  104 100   CO2  --   --  28 33*   BUN  --   --  15 19   CR  --   --  0.91 1.03   ANIONGAP  --   --  6 5   JULIANNE  --   --  8.6 8.9   GLC  --   --  113* 90   ALBUMIN 2.4*  --   --  2.8*   PROTTOTAL 6.7*  --   --  7.6   BILITOTAL 0.6  --   --  0.6   ALKPHOS 77  --   --  89   ALT 24  --   --  25   AST 16  --   --  23   LIPASE 46*  --   --  63*     No results found for this or any previous visit (from the past 24 hour(s)).

## 2019-09-29 NOTE — PLAN OF CARE
DATE & TIME: 9/28/19 3215-8312                Cognitive Concerns/ Orientation : Disoriented to place and time, pleasantly confused.   BEHAVIOR & AGGRESSION TOOL COLOR: Green  CIWA SCORE: N/A   ABNL VS/O2: VSS on RA  MOBILITY: SBA with walker  PAIN MANAGMENT: Reports right-sided abdominal discomfort, but not painful.  Declines intervention.  DIET: Full liquid.  GI may advance to low fat Sunday.  BOWEL/BLADDER: Continent  ABNL LAB/BG: WBC 13.0  DRAIN/DEVICES: PIV infusing  TELEMETRY RHYTHM: N/A  SKIN: Pale  TESTS/PROCEDURES: N/A  D/C DAY/GOALS/PLACE: Possibly Monday/Tuesday pending GI recommendations, toleration of diet advancement, and an antibiotic plan.  OTHER IMPORTANT INFO: Wore home CPAP overnight.  Reports SOB, though no noted dyspnea.  Trace edema to bilateral legs, ankles, feet.  Heart murmur, patient states he has known about this for some time.  Abdomen round and distended.

## 2019-09-29 NOTE — PLAN OF CARE
DATE & TIME: 9/29/19 0307-3103            Cognitive Concerns/ Orientation : Disoriented to situation, pleasantly confused.   BEHAVIOR & AGGRESSION TOOL COLOR: Green  CIWA SCORE: N/A   ABNL VS/O2: VSS; MONTALVO but O2sats 90's RA  MOBILITY: SBA with walker/GB  PAIN MANAGMENT: denies  DIET: Advanced to low fiber today - tolerating well  BOWEL/BLADDER: Continent  ABNL LAB/BG: K+ 3.3 - replaced - recheck 3.7; INR 2.09 - on coumadin and lovenox  DRAIN/DEVICES: PIV SL'd  TELEMETRY RHYTHM: N/A  SKIN: Pale  TESTS/PROCEDURES: N/A  D/C DAY/GOALS/PLACE: Likely tomorrow to TCU pending placement.    OTHER IMPORTANT INFO: CPAP at HS;  2+ generalized edema; heart murmur - patient states he has known about this for some time.  Abdomen round and distended; BS/flatus (+); had BM x2 today.  DNR.

## 2019-09-30 ENCOUNTER — APPOINTMENT (OUTPATIENT)
Dept: OCCUPATIONAL THERAPY | Facility: CLINIC | Age: 79
DRG: 439 | End: 2019-09-30
Payer: COMMERCIAL

## 2019-09-30 ENCOUNTER — APPOINTMENT (OUTPATIENT)
Dept: PHYSICAL THERAPY | Facility: CLINIC | Age: 79
DRG: 439 | End: 2019-09-30
Payer: COMMERCIAL

## 2019-09-30 LAB
CREAT SERPL-MCNC: 0.91 MG/DL (ref 0.66–1.25)
GFR SERPL CREATININE-BSD FRML MDRD: 81 ML/MIN/{1.73_M2}
INR PPP: 2.13 (ref 0.86–1.14)
PLATELET # BLD AUTO: 174 10E9/L (ref 150–450)
POTASSIUM SERPL-SCNC: 3.4 MMOL/L (ref 3.4–5.3)

## 2019-09-30 PROCEDURE — 85049 AUTOMATED PLATELET COUNT: CPT | Performed by: HOSPITALIST

## 2019-09-30 PROCEDURE — 97530 THERAPEUTIC ACTIVITIES: CPT | Mod: GO | Performed by: OCCUPATIONAL THERAPY ASSISTANT

## 2019-09-30 PROCEDURE — 36415 COLL VENOUS BLD VENIPUNCTURE: CPT | Performed by: HOSPITALIST

## 2019-09-30 PROCEDURE — 90662 IIV NO PRSV INCREASED AG IM: CPT | Performed by: HOSPITALIST

## 2019-09-30 PROCEDURE — 85610 PROTHROMBIN TIME: CPT | Performed by: HOSPITALIST

## 2019-09-30 PROCEDURE — 99232 SBSQ HOSP IP/OBS MODERATE 35: CPT | Performed by: INTERNAL MEDICINE

## 2019-09-30 PROCEDURE — 84132 ASSAY OF SERUM POTASSIUM: CPT | Performed by: HOSPITALIST

## 2019-09-30 PROCEDURE — 82565 ASSAY OF CREATININE: CPT | Performed by: HOSPITALIST

## 2019-09-30 PROCEDURE — 99207 ZZC CDG-MDM COMPONENT: MEETS MODERATE - UP CODED: CPT | Performed by: INTERNAL MEDICINE

## 2019-09-30 PROCEDURE — 25000132 ZZH RX MED GY IP 250 OP 250 PS 637: Performed by: HOSPITALIST

## 2019-09-30 PROCEDURE — 97535 SELF CARE MNGMENT TRAINING: CPT | Mod: GO | Performed by: OCCUPATIONAL THERAPY ASSISTANT

## 2019-09-30 PROCEDURE — 97116 GAIT TRAINING THERAPY: CPT | Mod: GP | Performed by: PHYSICAL THERAPY ASSISTANT

## 2019-09-30 PROCEDURE — 25000128 H RX IP 250 OP 636: Performed by: HOSPITALIST

## 2019-09-30 PROCEDURE — 12000000 ZZH R&B MED SURG/OB

## 2019-09-30 RX ORDER — WARFARIN SODIUM 5 MG/1
5 TABLET ORAL
Status: COMPLETED | OUTPATIENT
Start: 2019-09-30 | End: 2019-09-30

## 2019-09-30 RX ORDER — ACETAMINOPHEN 325 MG/1
650 TABLET ORAL EVERY 4 HOURS PRN
Status: ON HOLD | DISCHARGE
Start: 2019-09-30 | End: 2021-01-01

## 2019-09-30 RX ADMIN — MIRABEGRON 50 MG: 50 TABLET, FILM COATED, EXTENDED RELEASE ORAL at 08:22

## 2019-09-30 RX ADMIN — PANTOPRAZOLE SODIUM 40 MG: 20 TABLET, DELAYED RELEASE ORAL at 06:00

## 2019-09-30 RX ADMIN — WARFARIN SODIUM 5 MG: 5 TABLET ORAL at 18:20

## 2019-09-30 RX ADMIN — PIPERACILLIN SODIUM,TAZOBACTAM SODIUM 3.38 G: 3; .375 INJECTION, POWDER, FOR SOLUTION INTRAVENOUS at 10:23

## 2019-09-30 RX ADMIN — ENOXAPARIN SODIUM 80 MG: 80 INJECTION SUBCUTANEOUS at 08:57

## 2019-09-30 RX ADMIN — INFLUENZA A VIRUS A/MICHIGAN/45/2015 X-275 (H1N1) ANTIGEN (FORMALDEHYDE INACTIVATED), INFLUENZA A VIRUS A/SINGAPORE/INFIMH-16-0019/2016 IVR-186 (H3N2) ANTIGEN (FORMALDEHYDE INACTIVATED), AND INFLUENZA B VIRUS B/MARYLAND/15/2016 BX-69A (A B/COLORADO/6/2017-LIKE VIRUS) ANTIGEN (FORMALDEHYDE INACTIVATED) 0.5 ML: 60; 60; 60 INJECTION, SUSPENSION INTRAMUSCULAR at 09:37

## 2019-09-30 RX ADMIN — AMLODIPINE BESYLATE 5 MG: 5 TABLET ORAL at 08:22

## 2019-09-30 RX ADMIN — PIPERACILLIN SODIUM,TAZOBACTAM SODIUM 3.38 G: 3; .375 INJECTION, POWDER, FOR SOLUTION INTRAVENOUS at 16:53

## 2019-09-30 RX ADMIN — SENNOSIDES AND DOCUSATE SODIUM 2 TABLET: 8.6; 5 TABLET ORAL at 22:07

## 2019-09-30 RX ADMIN — BRIMONIDINE TARTRATE, TIMOLOL MALEATE 1 DROP: 2; 5 SOLUTION/ DROPS OPHTHALMIC at 08:58

## 2019-09-30 RX ADMIN — LATANOPROST 1 DROP: 50 SOLUTION/ DROPS OPHTHALMIC at 22:08

## 2019-09-30 RX ADMIN — PIPERACILLIN SODIUM,TAZOBACTAM SODIUM 3.38 G: 3; .375 INJECTION, POWDER, FOR SOLUTION INTRAVENOUS at 22:13

## 2019-09-30 RX ADMIN — BRINZOLAMIDE 1 DROP: 10 SUSPENSION/ DROPS OPHTHALMIC at 08:56

## 2019-09-30 RX ADMIN — PIPERACILLIN SODIUM,TAZOBACTAM SODIUM 3.38 G: 3; .375 INJECTION, POWDER, FOR SOLUTION INTRAVENOUS at 04:54

## 2019-09-30 ASSESSMENT — ACTIVITIES OF DAILY LIVING (ADL)
ADLS_ACUITY_SCORE: 22
ADLS_ACUITY_SCORE: 23
ADLS_ACUITY_SCORE: 22
ADLS_ACUITY_SCORE: 23

## 2019-09-30 NOTE — PROGRESS NOTES
SW:  D:  Per care coordinator, patient is medically stable for discharge today.  TCU is recommended discharge plan.  Referrals were made yesterday to multiple facilities.   Today, wife approached writer,stating their preference is [facility].  A referral was sent to [facility] yesterday.  Today, writer has left a phone call to follow up with the referral.  Patient has a [employer] Medicare Advantage plan which will require prior authorization before admission to any TCU.  This may be a barrier to discharge today.    1500 Update:    Contacted the facilities in which referrals were made yesterday.  Of those referrals, [facility] is able to offer patient a room and they are in the [employer] network.  [facility] has started the pre-authorization process.    Updated wife and patient.  [facility]    PLAN:  discharge to [facility] once [facility] responds.

## 2019-09-30 NOTE — PROGRESS NOTES
"[facility]  [facility]  Gastroenterology Progress note    Interval History:    Continued abdominal discomfort.  Tolerating low fiber diet.  Plan to discharge to TCU today.      Vital Signs:      /85 (BP Location: Right arm)   Pulse 69   Temp 97.9  F (36.6  C) (Oral)   Resp 20   Ht 1.702 m (5' 7\")   Wt 108.5 kg (239 lb 3.2 oz)   SpO2 95%   BMI 37.46 kg/m    Temp (24hrs), Av.9  F (36.6  C), Min:97.3  F (36.3  C), Max:98.2  F (36.8  C)    Patient Vitals for the past 72 hrs:   Weight   19 1823 108.5 kg (239 lb 3.2 oz)       Intake/Output Summary (Last 24 hours) at 2019 0949  Last data filed at 2019 0833  Gross per 24 hour   Intake 745 ml   Output --   Net 745 ml         Constitutional: NAD, comfortable  Cardiovascular: RRR, normal S1, S2   Respiratory: CTAB  Abdomen: soft, non-tender, nondistended    Additional Comments:  ROS, FH, SH: See initial GI consult for details.    Laboratory Data:  Recent Labs   Lab Test 19  0749 19  0823 19  0924 19  1346  19  0810   WBC  --   --  13.0* 18.3*  --  7.7   HGB  --   --  12.2* 11.9*  --  10.2*   MCV  --   --  84 82  --  91     --  177 207  --  207   INR 2.13* 2.09* 1.75* 1.48*   < > 1.35*    < > = values in this interval not displayed.     Recent Labs   Lab Test 19  0749 19  1759 19  0823  19  0924 19  1346 19  0832   NA  --   --   --   --  138 138 139   POTASSIUM 3.4 3.7 3.3*   < > 3.2* 3.0* 3.4   CHLORIDE  --   --   --   --  104 100 106   CO2  --   --   --   --  28 33* 26   BUN  --   --   --   --  15 19 14   CR 0.91  --   --   --  0.91 1.03 1.06   ANIONGAP  --   --   --   --  6 5 7   JULIANNE  --   --   --   --  8.6 8.9 8.5    < > = values in this interval not displayed.     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  Admitted to [facility] 9/27 with abdominal pain.  CT showed likely pancreatitis.  Lipase normal on admission.  Pain and CT consistent with pancreatitis.  Patient with steady improvement.  Antibiotics started on admission per [facility] on call physician.  Plan:  -  Continue antibiotics per [facility] recommendations.  -  Low fat diet as tolerated.  Will change from low fiber diet.  -  Plan for discharge to TCU today.      RENETTA Jacobsen  [facility]  Office:  [phone] call if needed after 5PM  Cell:  [phone], not available after 5PM at this number

## 2019-09-30 NOTE — PLAN OF CARE
DATE & TIME: 9/29/19 1355-1723                Cognitive Concerns/ Orientation : Disoriented to time, pleasantly confused. Mentation has improved.  BEHAVIOR & AGGRESSION TOOL COLOR: Green  CIWA SCORE: N/A   ABNL VS/O2: VSS on RA  MOBILITY: SBA with walker  PAIN MANAGMENT: Abdominal discomfort, but declines pain.  DIET: Low fiber  BOWEL/BLADDER: Continent  ABNL LAB/BG: INR 2.09.  K 3.7 following replacement.  DRAIN/DEVICES: PIV saline locked  TELEMETRY RHYTHM: N/A  SKIN: Pale  TESTS/PROCEDURES: N/A  D/C DAY/GOALS/PLACE: Monday to TCU, awaiting placement  OTHER IMPORTANT INFO: Wore home CPAP overnight.  Some MONTALVO.  Trace edema to bilateral legs, ankles, feet.  Heart murmur.  Abdomen round and distended.

## 2019-09-30 NOTE — PLAN OF CARE
Discharge Planner PT   Patient plan for discharge: TCU  Current status:  Pt transferred sit to stand with min assist.  Pt braces LE's up against chair for balance during transfer.  Gait training x 170 ft using wheeled walker and CGA.  Slow pace.  Increased fatigue noted in LE's toward end of gait.  Declined further distance.    Barriers to return to prior living situation: Impaired balance, functional act tolerance, cognition  Recommendations for discharge: TCU per plan established by the PT.  Rationale for recommendations: Pt will benefit from continued therapy at TCU to improve balance and strength for improved safety w/ mobility and ADL's at home. Pending LOS and improvement in these deficits pt may be safe to return home w/ A from wife for mobility and ADL's. Will continue to assess.       Entered by: sEha Iniguez 09/30/2019 3:49 PM

## 2019-09-30 NOTE — PROGRESS NOTES
[facility]    Medicine Progress Note - Hospitalist Service       Date of Admission:  9/27/2019  Assessment & Plan   Edison Saldivar is a 79 year old male admitted on 9/27/2019.  Past history of dementia, CVA, HTN, KORY, COPD among other chronic medical issues who underwent duodenal polypectomy at [facility] 9/23/19 who presents with progressive epigastric pain with nausea and now fever.     Acute pancreatitis   Possible intra-abdominal infection w/sepsis   Recent duodenal polypectomy (at major papilla) with sphincterotomy and pancreatic duct stenting 9/23/19 at [facility]  Presented with progressive epigastric pain with nausea and anorexia following procedure above.  Fever to 101 at home with leukocytosis of 18 upon arrival.  CT abd/pelvis showing hazy infiltration around pancreas, however, lipase is low.  Case was discussed with Dr. Kennedy at [facility] (GI on call) by ED physician who recommends antibiotics and IVF.  CT scan was also consistent with acute pancreatitis   Patient's symptoms have been improving with conservative management   - Continue zosyn  - Monitor blood cultures.  NGTD   - Stopped IVF   - PRN tylenol and prn oxycodone (avoid if able due to dementia)  - Advanced to low fiber diet today   - [facility] following and appreciate their recommendations.  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  No focal deficits per wife.  Reports increased confusion over the past several days.  - Delirium precautions in place with frequent re-orientation, maintain sleep/wake cycle, avoid narcotics as able  - PharmD to dose coumadin  - PT/OT consulted.  Recommending TCU     KORY  - Continue CPAP with home settings     HTN  - Continue PTA amlodipine  - Likely restart Lasix  tomorrow      COPD/asthma  - Continue PTA inhalers once verified  - PRN Duonebs     Hx Topete's esophagus  - Continue PTA Nexium once verified     Sarcoidosis  Is not currently on treatment.     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     Glaucoma  - Continue PTA eye drops      Diet: Advance Diet as Tolerated: Full Liquid Diet; Full Liquid Diet  Room Service    DVT Prophylaxis: Warfarin  Torres Catheter: not present  Code Status: DNR         Diet: Room Service  Low Fat Diet  Advance Diet as Tolerated    DVT Prophylaxis: Warfarin  Torres Catheter: not present  Code Status: DNR      Disposition Plan   Expected discharge: TBD, recommended to transitional care unit once safe disposition plan/ TCU bed available.  Entered: Antonio Foster DO 09/30/2019, 2:13 PM       The patient's care was discussed with the Bedside Nurse, Care Coordinator/ and Patient.    Antonio Foster DO  Hospitalist Service  [facility]    ______________________________________________________________________    Interval History   Patient seen and examined.  No acute events over night.  No fevers or chills.  No chest pain or SOB.  Tolerating diet well.     Data reviewed today: I reviewed all medications, new labs and imaging results over the last 24 hours. I personally reviewed no images or EKG's today.    Physical Exam   Vital Signs: Temp: 98  F (36.7  C) Temp src: Oral BP: 117/60 Pulse: 69 Heart Rate: 60 Resp: 20 SpO2: 95 % O2 Device: None (Room air)    Weight: 239 lbs 3.19 oz  General Appearance: Resting comfortably.  NAD   Respiratory: Clear to auscultation.  No respiratory distress  Cardiovascular: RRR.  No murmurs  GI: Bowel sounds present.  Non-tender  Skin: No rashes.  No cyanosis  Other: No edema     Data   Recent Labs   Lab 09/30/19  0749 09/29/19  1759 09/29/19  0823  09/28/19  0924 09/27/19  1346   WBC  --   --   --   --  13.0* 18.3*   HGB  --   --   --   --  12.2* 11.9*   MCV  --   --   --   --  84 82     --    --   --  177 207   INR 2.13*  --  2.09*  --  1.75* 1.48*   NA  --   --   --   --  138 138   POTASSIUM 3.4 3.7 3.3*   < > 3.2* 3.0*   CHLORIDE  --   --   --   --  104 100   CO2  --   --   --   --  28 33*   BUN  --   --   --   --  15 19   CR 0.91  --   --   --  0.91 1.03   ANIONGAP  --   --   --   --  6 5   JULIANNE  --   --   --   --  8.6 8.9   GLC  --   --   --   --  113* 90   ALBUMIN  --   --  2.4*  --   --  2.8*   PROTTOTAL  --   --  6.7*  --   --  7.6   BILITOTAL  --   --  0.6  --   --  0.6   ALKPHOS  --   --  77  --   --  89   ALT  --   --  24  --   --  25   AST  --   --  16  --   --  23   LIPASE  --   --  46*  --   --  63*    < > = values in this interval not displayed.     No results found for this or any previous visit (from the past 24 hour(s)).

## 2019-09-30 NOTE — PLAN OF CARE
Discharge Planner OT   Patient plan for discharge: none stated from pt however spouse stated TCU  Current status: Jackeline sit to stand from chair, amb with FWW CGA to/from bathroom, toilet transfer with RTS CGA, cues and assist needed to problem solve when clothing was twisted and Jackeline to don shirt and pants after correct setup, max A to don sandal shoes as difficulty with straps on shoes.   Barriers to return to prior living situation: Impaired balance, functional act tolerance, cognition  Recommendations for discharge: TCU per plan established by the Occupational Therapist  Rationale for recommendations: Pt will benefit from continued therapy at TCU to improve balance and strength for improved safety w/ mobility and ADL's at home.          Entered by: Whitney Joiner 09/30/2019 12:39 PM

## 2019-09-30 NOTE — PLAN OF CARE
DATE & TIME: 9/30/19  7847-3385   Cognitive Concerns/ Orientation : confused to time  BEHAVIOR & AGGRESSION TOOL COLOR: green  CIWA SCORE: n/a  ABNL VS/O2: VSS,   MOBILITY: SBA with walker/GB, walked cho x 2; up in chair all day, BRP's   PAIN MANAGMENT: denies pain  DIET: low fiber changed to low fat; good intake; no abdominal pain  BOWEL/BLADDER: continent; last BM yesterday  ABNL LAB/BG:    DRAIN/DEVICES: PIV L a/c  TELEMETRY RHYTHM: n/a  SKIN: intact with minor bruising  TESTS/PROCEDURES:   D/C DAY/GOALS/PLACE: ready for discharge to a TCU when bed available  OTHER IMPORTANT INFO: Continues on IV abx.; followed by GI

## 2019-10-01 VITALS
SYSTOLIC BLOOD PRESSURE: 100 MMHG | BODY MASS INDEX: 37.54 KG/M2 | TEMPERATURE: 98 F | HEART RATE: 81 BPM | HEIGHT: 67 IN | RESPIRATION RATE: 18 BRPM | DIASTOLIC BLOOD PRESSURE: 61 MMHG | OXYGEN SATURATION: 97 % | WEIGHT: 239.2 LBS

## 2019-10-01 LAB — INR PPP: 2.33 (ref 0.86–1.14)

## 2019-10-01 PROCEDURE — 36415 COLL VENOUS BLD VENIPUNCTURE: CPT | Performed by: HOSPITALIST

## 2019-10-01 PROCEDURE — 85610 PROTHROMBIN TIME: CPT | Performed by: HOSPITALIST

## 2019-10-01 PROCEDURE — 99239 HOSP IP/OBS DSCHRG MGMT >30: CPT | Performed by: INTERNAL MEDICINE

## 2019-10-01 PROCEDURE — 25000132 ZZH RX MED GY IP 250 OP 250 PS 637: Performed by: HOSPITALIST

## 2019-10-01 PROCEDURE — 25000128 H RX IP 250 OP 636: Performed by: HOSPITALIST

## 2019-10-01 RX ADMIN — SENNOSIDES AND DOCUSATE SODIUM 1 TABLET: 8.6; 5 TABLET ORAL at 09:08

## 2019-10-01 RX ADMIN — MIRABEGRON 50 MG: 50 TABLET, FILM COATED, EXTENDED RELEASE ORAL at 09:07

## 2019-10-01 RX ADMIN — PANTOPRAZOLE SODIUM 40 MG: 20 TABLET, DELAYED RELEASE ORAL at 06:33

## 2019-10-01 RX ADMIN — PIPERACILLIN SODIUM,TAZOBACTAM SODIUM 3.38 G: 3; .375 INJECTION, POWDER, FOR SOLUTION INTRAVENOUS at 11:12

## 2019-10-01 RX ADMIN — AMLODIPINE BESYLATE 5 MG: 5 TABLET ORAL at 09:07

## 2019-10-01 RX ADMIN — PIPERACILLIN SODIUM,TAZOBACTAM SODIUM 3.38 G: 3; .375 INJECTION, POWDER, FOR SOLUTION INTRAVENOUS at 04:25

## 2019-10-01 RX ADMIN — BRINZOLAMIDE 1 DROP: 10 SUSPENSION/ DROPS OPHTHALMIC at 09:06

## 2019-10-01 RX ADMIN — BRIMONIDINE TARTRATE, TIMOLOL MALEATE 1 DROP: 2; 5 SOLUTION/ DROPS OPHTHALMIC at 09:08

## 2019-10-01 ASSESSMENT — ACTIVITIES OF DAILY LIVING (ADL)
ADLS_ACUITY_SCORE: 23
ADLS_ACUITY_SCORE: 23
ADLS_ACUITY_SCORE: 22

## 2019-10-01 NOTE — DISCHARGE SUMMARY
[facility]  Hospitalist Discharge Summary       Date of Admission:  9/27/2019  Date of Discharge:  10/1/2019  Discharging Provider: Antonio Foster DO      Discharge Diagnoses   1. Acute pancreatitis  2. Possible intra-abdominal infection w/sepsis, unclear source. Unable to specify if from recent procedure  3. Recent duodenal polypectomy w/sphincterotomy and pancreatic duct stenting  4. 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 Heterozygous factor V leiden mutation   7. KORY   8. HTN   9. COPD/Asthma  10. H/o Topete's esophagus  11. H/o Sardoidosis  12. NPH   13. Glaucoma     Follow-ups Needed After Discharge   Follow-up Appointments     Follow Up and recommended labs and tests      Follow up with skilled nursing physician.  The following labs/tests are   recommended: BMP on 10/2 and INR check on 10/2.  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  Fever to 101 at home with leukocytosis of 18 upon arrival.  CT abd/pelvis showing hazy infiltration around pancreas, however, lipase is low.  Case was discussed with Dr. Kennedy at [facility] (GI on call) by ED physician who recommends antibiotics and IVF.  CT scan was also consistent with acute pancreatitis   Patient's symptoms have been improving with  conservative management   - Continue zosyn and switched to Augmentin at discharge   - Monitor blood cultures.  NGTD   - Stopped IVF   - PRN tylenol and prn oxycodone (avoid if able due to dementia)  - Advanced to low fiber diet  - [facility] following and appreciate their recommendations.  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  No focal deficits per wife.  Reports increased confusion over the past several days.  - Delirium precautions in place with frequent re-orientation, maintain sleep/wake cycle, avoid narcotics as able  - PharmD to dose coumadin  - PT/OT consulted.  Recommending TCU     KORY  - Continue CPAP with home settings     HTN  - Continue PTA amlodipine  - Restarted Lasix at discharge       Consultations This Hospital Stay   GASTROENTEROLOGY IP CONSULT  OCCUPATIONAL THERAPY ADULT IP CONSULT  PHYSICAL THERAPY ADULT IP CONSULT  PHARMACY TO DOSE WARFARIN  CARE TRANSITION RN/SW IP CONSULT  PHYSICAL THERAPY ADULT IP CONSULT  OCCUPATIONAL THERAPY ADULT IP CONSULT    Code Status   DNR    Time Spent on this Encounter   I, Antonio Foster DO, personally saw the patient today and spent greater than 30 minutes discharging this patient.       Antonio Foster DO  [facility]  ______________________________________________________________________    Physical Exam   Vital Signs: Temp: 98  F (36.7  C) Temp src: Oral BP: 100/61 Pulse: 81 Heart Rate: 61 Resp: 18 SpO2: 97 % O2 Device: None (Room air)    Weight: 239 lbs 3.19 oz  General Appearance: Resting comfortably.  NAD   Respiratory: Clear to auscultation.  No respiratory distress  Cardiovascular: RRR.  No murmurs  GI: Bowel sounds present.  Non-tender  Skin: No rashes.  No cyanosis  Other: No edema.   No calf tenderness         Primary Care Physician   Merlin Emery Brown    Discharge Orders      General info for SNF    Length of Stay Estimate: Short Term Care: Estimated # of Days <30  Condition at Discharge: Improving  Level of care:skilled   Rehabilitation Potential: Good  Admission H&P remains valid and up-to-date: Yes  Recent Chemotherapy: N/A  Use Nursing Home Standing Orders: Yes     Mantoux instructions    Give two-step Mantoux (PPD) Per Facility Policy Yes     Reason for your hospital stay    Pancreatitis     Follow Up and recommended labs and tests    Follow up with senior living physician.  The following labs/tests are recommended: BMP on 10/2 and INR check on 10/2.  Follow up with GI at [facility] as planned  Follow up with PCP 1-2 weeks after TCU discharge     Activity - Up ad lisandro     Physical Therapy Adult Consult    Evaluate and treat as clinically indicated.    Reason:  Deconditioning     Occupational Therapy Adult Consult    Evaluate and treat as clinically indicated.    Reason:  Deconditioning     Advance Diet as Tolerated    Follow this diet upon discharge: Orders Placed This Encounter      Room Service      Low Fat Diet       Significant Results and Procedures   Most Recent 3 CBC's:  Recent Labs   Lab Test 09/30/19  0749 09/28/19  0924 09/27/19  1346 01/27/19  0810   WBC  --  13.0* 18.3* 7.7   HGB  --  12.2* 11.9* 10.2*   MCV  --  84 82 91    177 207 207     Most Recent 3 BMP's:  Recent Labs   Lab Test 09/30/19  0749 09/29/19  1759 09/29/19  0823  09/28/19  0924 09/27/19  1346 01/25/19  0832   NA  --   --   --   --  138 138 139   POTASSIUM 3.4 3.7 3.3*   < > 3.2* 3.0* 3.4   CHLORIDE  --   --   --   --  104 100 106   CO2  --   --   --   --  28 33* 26   BUN  --   --   --   --  15 19 14   CR 0.91  --   --   --  0.91 1.03 1.06   ANIONGAP  --   --   --   --  6 5 7   JULIANNE  --   --   --   --  8.6 8.9 8.5   GLC  --   --   --   --  113* 90 109*    < > = values in this interval not displayed.     Most  Recent 2 LFT's:  Recent Labs   Lab Test 09/29/19  0823 09/27/19  1346   AST 16 23   ALT 24 25   ALKPHOS 77 89   BILITOTAL 0.6 0.6   ,   Results for orders placed or performed during the hospital encounter of 09/27/19   Chest XR,  PA & LAT    Narrative    CHEST TWO VIEWS 9/27/2019 2:23 PM     HISTORY: Fever, nausea, postoperative.    COMPARISON: 1/17/2019    FINDINGS: Heart size and pulmonary vascularity are within normal  limits. The lungs are clear. No pneumothorax or pleural effusion.       Impression    IMPRESSION: No radiographic evidence of acute chest abnormality.     MARIO LOPEZ MD   CT Abdomen Pelvis w Contrast    Narrative    CT ABDOMEN AND PELVIS WITH CONTRAST   9/27/2019 2:33 PM     HISTORY: Epigastric abdominal pain, status post endoscopic duodenal  polypectomy 4 days ago, nausea, lightheaded.    TECHNIQUE: 120 mL Isovue-370. CT images of the abdomen and pelvis  following nonionic intravenous contrast. Radiation dose for this scan  was reduced using automated exposure control, adjustment of the mA  and/or kV according to patient size, or iterative reconstruction  technique.    COMPARISON: None.    FINDINGS: No free intra-abdominal air or fluid. There is hazy  infiltration around the pancreas. A pancreatic duct stent is in place.  The gallbladder has been removed. The liver, spleen, and adrenal  glands demonstrate no worrisome abnormalities. Multiple bilateral  renal cysts. No hydronephrosis or solid renal mass. No abdominal or  retroperitoneal lymphadenopathy. Nonaneurysmal aortic atherosclerosis.  Dense calcifications at the origins of the visceral branches of the  aorta, particularly the right renal artery.    No evidence of bowel obstruction. No enlarged abdominal,  retroperitoneal, or pelvic lymph nodes. No lytic or blastic bone  lesions.      Impression    IMPRESSION:  1. Abnormal hazy infiltration around the pancreas compatible with  pancreatitis. No evidence of organized peripancreatic  fluid  collection.  2. Visceral branch atherosclerosis, particularly the origin of the  right renal artery.  3. Prior cholecystectomy.    MARIO LOPEZ MD       Discharge Medications   Current Discharge Medication List      START taking these medications    Details   acetaminophen (TYLENOL) 325 MG tablet Take 2 tablets (650 mg) by mouth every 4 hours as needed for mild pain    Associated Diagnoses: Intra-abdominal infection      amoxicillin-clavulanate (AUGMENTIN) 875-125 MG tablet Take 1 tablet by mouth 2 times daily for 5 days    Associated Diagnoses: Intra-abdominal infection         CONTINUE these medications which have NOT CHANGED    Details   albuterol (PROAIR HFA/PROVENTIL HFA/VENTOLIN HFA) 108 (90 Base) MCG/ACT inhaler Inhale 2 puffs into the lungs every 4 hours as needed for shortness of breath / dyspnea or wheezing    Comments: Pharmacy may dispense brand covered by insurance (Proair, or proventil or ventolin or generic albuterol inhaler)      amLODIPine (NORVASC) 5 MG tablet Take 5 mg by mouth every morning      brimonidine-timolol (COMBIGAN) 0.2-0.5 % ophthalmic solution Place 1 drop into both eyes every morning      brinzolamide (AZOPT) 1 % ophthalmic suspension Place 1 drop into both eyes every morning      CALCIUM-MAGNESIUM-ZINC PO Take 1 tablet by mouth every morning 100/40/15      co-enzyme Q-10 100 MG CAPS capsule Take 100 mg by mouth every morning      docusate sodium (COLACE) 100 MG capsule Take 100 mg by mouth every evening      esomeprazole (NEXIUM) 40 MG DR capsule Take 40 mg by mouth every morning (before breakfast) Take 30-60 minutes before eating.      furosemide (LASIX) 40 MG tablet Take 80 mg by mouth daily      latanoprost (XALATAN) 0.005 % ophthalmic solution Place 1 drop into both eyes At Bedtime      Magnesium Oxide 250 MG TABS Take 500 mg by mouth daily      mirabegron (MYRBETRIQ) 50 MG 24 hr tablet Take 50 mg by mouth every morning      multivitamin w/minerals (THERA-VIT-M)  tablet Take 1 tablet by mouth every morning      vitamin B-12 (CYANOCOBALAMIN) 1000 MCG tablet Take 1,000 mcg by mouth every morning      vitamin D3 (CHOLECALCIFEROL) 2000 units tablet Take 2,000 Units by mouth daily      !! warfarin ANTICOAGULANT (COUMADIN) 5 MG tablet Take 5 mg by mouth See Admin Instructions Tuesday, Wednesday, Thursday, Saturday, Sunday      !! warfarin ANTICOAGULANT (COUMADIN) 5 MG tablet Take 7.5 mg by mouth See Admin Instructions Monday and Friday       !! - Potential duplicate medications found. Please discuss with provider.      STOP taking these medications       enoxaparin (LOVENOX) 80 MG/0.8ML syringe Comments:   Reason for Stopping:         UNKNOWN TO PATIENT Comments:   Reason for Stopping:             Allergies   No Known Allergies

## 2019-10-01 NOTE — PLAN OF CARE
Occupational Therapy Discharge Summary    Reason for therapy discharge:    Discharged to transitional care facility.    Progress towards therapy goal(s). See goals on Care Plan in [facility] electronic health record for goal details.  Goals not met.  Barriers to achieving goals:   discharge from facility.    Therapy recommendation(s):    Continued therapy is recommended.  Rationale/Recommendations:  at TCU.

## 2019-10-01 NOTE — PLAN OF CARE
DATE & TIME: 09/30/2019 6185-1567                                   Cognitive Concerns/ Orientation : A&O x3, confused about time- pt baseline           BEHAVIOR & AGGRESSION TOOL COLOR: Green  CIWA SCORE: NA    ABNL VS/O2: VSS on RA  MOBILITY: assist 1 w/ walker & gate belt, 3 walks outside room during shift  PAIN MANAGMENT: denies  DIET: low fat diet, good appetite  BOWEL/BLADDER: continent, some hesitancy with urination. Last BM yesterday.   ABNL LAB/BG: INR 2.13, K+ 3.4  DRAIN/DEVICES: PIV L arm SL  TELEMETRY RHYTHM: NA  SKIN: WDL, intact, edema 2+ of bilat lower extremities  TESTS/PROCEDURES: NA  D/C DAY/GOALS/PLACE: accepted to [facility], discharge once [employer] approves.   OTHER IMPORTANT INFO: GI following, OT and PT agree with TCU plan.

## 2019-10-01 NOTE — PLAN OF CARE
"DATE & TIME: 10/1/2019 9650-1835                        Cognitive Concerns/ Orientation : A&O x 3; disoriented to time   BEHAVIOR & AGGRESSION TOOL COLOR: green  CIWA SCORE: na      ABNL VS/O2: ...  MOBILITY: Assist of 1 w/ walker and gait belt; pt states he feels \"unsteady\"  PAIN MANAGMENT: denies  DIET: Low fat diet  BOWEL/BLADDER: Hesitancy when urinating; otherwise continent. No BM on shift.  ABNL LAB/BG: INR 2.13; potassium 3.4  DRAIN/DEVICES: PIV SL  TELEMETRY RHYTHM: na  SKIN: WDL ex edema throughout BLE. Bruised. Otherwise CDI.   TESTS/PROCEDURES: na  D/C DAY/GOALS/PLACE: Possibly today; pending insurance approval to [facility].  OTHER IMPORTANT INFO: OT, PT, GI following. Pt has a murmur. DNR.  "

## 2019-10-01 NOTE — PLAN OF CARE
Discharge    Patient discharged to [facility] via tranportation provided by [facility].   Care plan note VSS on RA.    Listed belongings gathered and returned to patient. Yes  Care Plan and Patient education resolved: Yes  Prescriptions if needed, hard copies sent with patient  Yes  Home and hospital acquired medications returned to patient: Yes  Medication Bin checked and emptied on discharge Yes  Follow up appointment made for patient: Yes

## 2019-10-01 NOTE — PLAN OF CARE
PT: Per chart review, discharge to TCU plan in place and ride set up for 4PM today. Plan to defer further treatment to TCU.     Physical Therapy Discharge Summary    Reason for therapy discharge:    Discharged to transitional care facility.    Progress towards therapy goal(s). See goals on Care Plan in [facility] electronic health record for goal details.  Goals not met.  Barriers to achieving goals:   discharge from facility.    Therapy recommendation(s):    Continued therapy is recommended.  Rationale/Recommendations:  Pt will benefit from continued PT at TCU to progress safety and IND with functional mobility.

## 2019-10-01 NOTE — PROGRESS NOTES
PAS-RR    D: Per DHS regulation, SW completed and submitted PAS-RR to [place] [employer] Direct Connect via the Senior LinkAge Line.  PAS-RR confirmation # is : [id number]    I: SW spoke with pt and they are aware a PAS-RR has been submitted.  MJ reviewed with pt that they may be contacted for a follow up appointment within 10 days of hospital discharge if their SNF stay is < 30 days.  Contact information for Senior LinkAge Line was also provided.    A: Pt verbalized understanding.    P: Further questions may be directed to [facility] LinkAge Line at #[phone], option #4 for PAS-RR staff.    AMBER De La Fuente, [facility]  Daytime (8:00am-4:30pm): [phone]  After-Hours SW Pager (4:30pm-11:30pm): [phone]

## 2019-10-01 NOTE — PROVIDER NOTIFICATION
Text page to Dr. Coombs.  Patient upset that Concerta not included on discharge meds, stating she will not be able to concentrate on treatment without it.  Awaiting return call or new orders.

## 2019-10-01 NOTE — PROGRESS NOTES
Gastroenterology Chart Check    80 yo male s/p duodenal polypectomy at major papilla, sphincterotomy and pancreatic duct stenting at [facility] 9/23.  Admitted to [facility] 9/27 with abdominal pain.  CT showed likely pancreatitis.  Lipase normal on admission.  Pain and CT consistent with pancreatitis.  Patient with steady improvement.  Antibiotics started on admission per [facility] on call physician.    Pending discharge.    -  Continue antibiotics per [facility] recommendations.  -  Continue low fat diet.    RENETTA Jacobsen  [facility]  Office:  [phone] call if needed after 5PM  Cell:  [phone], not available after 5PM at this number

## 2019-10-01 NOTE — PLAN OF CARE
DATE & TIME: 10/1/19  0275-5353    Cognitive Concerns/ Orientation : disoriented to time   BEHAVIOR & AGGRESSION TOOL COLOR: yellow  CIWA SCORE: n/a  ABNL VS/O2: BP elevated this morning; normal this afternoon  MOBILITY: with with assist of 1/walker/GB; ambulated cho, up to BRP, chair  PAIN MANAGMENT: denies pain  DIET: regular, low fat, good intake  BOWEL/BLADDER:   continent  ABNL LAB/BG:  INR 2.33  DRAIN/DEVICES: removed  TELEMETRY RHYTHM: n/a  SKIN: marilyn  TESTS/PROCEDURES:   D/C DAY/GOALS/PLACE:  4pm to [facility]  OTHER IMPORTANT INFO:

## 2019-10-02 ENCOUNTER — HOSPITAL LABORATORY (OUTPATIENT)
Dept: OTHER | Facility: CLINIC | Age: 79
End: 2019-10-02

## 2019-10-02 ENCOUNTER — NURSING HOME VISIT (OUTPATIENT)
Dept: GERIATRICS | Facility: CLINIC | Age: 79
End: 2019-10-02
Payer: COMMERCIAL

## 2019-10-02 VITALS
HEART RATE: 77 BPM | DIASTOLIC BLOOD PRESSURE: 85 MMHG | SYSTOLIC BLOOD PRESSURE: 133 MMHG | OXYGEN SATURATION: 96 % | HEIGHT: 67 IN | BODY MASS INDEX: 38.83 KG/M2 | WEIGHT: 247.4 LBS | TEMPERATURE: 97.5 F | RESPIRATION RATE: 18 BRPM

## 2019-10-02 DIAGNOSIS — G47.33 OSA (OBSTRUCTIVE SLEEP APNEA): ICD-10-CM

## 2019-10-02 DIAGNOSIS — B99.9 INTRA-ABDOMINAL INFECTION: ICD-10-CM

## 2019-10-02 DIAGNOSIS — E87.6 HYPOKALEMIA: ICD-10-CM

## 2019-10-02 DIAGNOSIS — J44.9 CHRONIC OBSTRUCTIVE PULMONARY DISEASE, UNSPECIFIED COPD TYPE (H): ICD-10-CM

## 2019-10-02 DIAGNOSIS — K85.90 ACUTE PANCREATITIS, UNSPECIFIED COMPLICATION STATUS, UNSPECIFIED PANCREATITIS TYPE: Primary | ICD-10-CM

## 2019-10-02 DIAGNOSIS — E66.01 MORBID OBESITY (H): ICD-10-CM

## 2019-10-02 DIAGNOSIS — F01.50 VASCULAR DEMENTIA WITHOUT BEHAVIORAL DISTURBANCE (H): ICD-10-CM

## 2019-10-02 DIAGNOSIS — Z79.01 CHRONIC ANTICOAGULATION: ICD-10-CM

## 2019-10-02 DIAGNOSIS — R53.81 PHYSICAL DECONDITIONING: ICD-10-CM

## 2019-10-02 DIAGNOSIS — D68.51 HETEROZYGOUS FACTOR V LEIDEN MUTATION (H): ICD-10-CM

## 2019-10-02 DIAGNOSIS — I77.89 SNEDDON SYNDROME (H): ICD-10-CM

## 2019-10-02 DIAGNOSIS — Z86.73 HISTORY OF CVA (CEREBROVASCULAR ACCIDENT): ICD-10-CM

## 2019-10-02 DIAGNOSIS — I10 ESSENTIAL HYPERTENSION: ICD-10-CM

## 2019-10-02 DIAGNOSIS — R60.0 BILATERAL LEG EDEMA: ICD-10-CM

## 2019-10-02 DIAGNOSIS — Z71.89 ADVANCED DIRECTIVES, COUNSELING/DISCUSSION: ICD-10-CM

## 2019-10-02 LAB
ANION GAP SERPL CALCULATED.3IONS-SCNC: 4 MMOL/L (ref 3–14)
BUN SERPL-MCNC: 13 MG/DL (ref 7–30)
CALCIUM SERPL-MCNC: 8.7 MG/DL (ref 8.5–10.1)
CHLORIDE SERPL-SCNC: 113 MMOL/L (ref 94–109)
CO2 SERPL-SCNC: 28 MMOL/L (ref 20–32)
CREAT SERPL-MCNC: 0.89 MG/DL (ref 0.66–1.25)
GFR SERPL CREATININE-BSD FRML MDRD: 83 ML/MIN/{1.73_M2}
GLUCOSE SERPL-MCNC: 98 MG/DL (ref 70–99)
POTASSIUM SERPL-SCNC: 3.9 MMOL/L (ref 3.4–5.3)
SODIUM SERPL-SCNC: 145 MMOL/L (ref 133–144)

## 2019-10-02 PROCEDURE — 99310 SBSQ NF CARE HIGH MDM 45: CPT | Performed by: NURSE PRACTITIONER

## 2019-10-02 ASSESSMENT — MIFFLIN-ST. JEOR: SCORE: 1805.83

## 2019-10-02 NOTE — LETTER
10/2/2019        RE: Edison Saldivar  [address]  [facility] [address]        [facility]  PRIMARY CARE PROVIDER AND CLINIC:  Merlin Emery Brown, MD, [facility] [address] / [place]*  Chief Complaint   Patient presents with     Hospital F/U     [facility] Medical Record Number:  [id number]  [facility] of [facility] where encounter took place:  [facility] [[id number]]    Edison Saldivar  is a 79 year old  (1940), admitted to the above facility from  [facility]. Hospital stay 9/27/19 through 10/1/19.  Admitted to this facility for  rehab, medical management and nursing care.    HPI:    HPI information obtained from: facility chart records, facility staff, patient report, [facility] chart review, Care Everywhere Epic chart review and family/first contact wife report.   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 In the ED: WBC 18.3, K 3.0, lipase 63, INR 1.48. CT abdomen showed abnormal hazy infiltration around the pancreas consistent with pancreatitis. Case was discussed with [facility] who recommended antibiotics. GI consulted. He was treated with zosyn and symptoms gradually improved.  Blood cultures no growth to date. Diet was advanced to low fiber.   Discharged on Augmentin through 10/6/2019. Lasix was held and resumed at hospital discharge.       Updates on Status Since Skilled nursing Admission:      Acute pancreatitis, unspecified complication status, unspecified pancreatitis type-fatigued but feeling  fairly well. Afebrile. Appetite improving. Denies nausea, vomiting or abdominal pain. Wife reports that he should pass a green colored stent in his stool and wants to make sure this is closely monitored.   Intra-abdominal infection  Sneddon syndrome (H)-INR 2.6 today. No bleeding.   Usual home coumadin dose is 7.5 mg Mon and Friday, 5 mg the other days of the week.   Heterozygous factor V Leiden mutation (H)  History of CVA (cerebrovascular accident)  Chronic anticoagulation  Vascular dementia without behavioral disturbance (H)-fair historian. Conversant and responds to questions appropriately.   Chronic obstructive pulmonary disease, unspecified COPD type (H)  Morbid obesity (H)  KORY (obstructive sleep apnea)  Essential hypertension-BPs: 176/78, 177/95 last night. This am: 133/85   HR: 68-77  Bilateral leg edema-wife reports edema is worse than usual and he also has edema of both hands. No cough, shortness of breath or chest pain. Denies LE pain.   Hypokalemia  Physical deconditioning-ambulating with walker and stand by assist. Requires stand by to min assist with cares.     CODE STATUS/ADVANCE DIRECTIVES DISCUSSION:   DNR only  Patient's living condition: lives with spouse at [facility] in [place]. Wife manages meds and assists with cares.   ALLERGIES: Patient has no known allergies.  PAST MEDICAL HISTORY:  has a past medical history of Topete's esophagus, COPD (chronic obstructive pulmonary disease) (H), Heterozygous factor V Leiden mutation (H), History of CVA (cerebrovascular accident), HTN (hypertension), KORY on CPAP, Sneddon syndrome (H), and Vascular dementia (H).  PAST SURGICAL HISTORY:   has a past surgical history that includes Duodenal polypectomy with sphincterotomy  (09/23/2019).  FAMILY HISTORY: family history is not on file.  SOCIAL HISTORY:   reports that he has never smoked. He has never used smokeless tobacco. He reports that he does not drink alcohol or use drugs.    Post Discharge  Medication Reconciliation Status: discharge medications reconciled, continue medications without change    Current Outpatient Medications   Medication Sig Dispense Refill     acetaminophen (TYLENOL) 325 MG tablet Take 2 tablets (650 mg) by mouth every 4 hours as needed for mild pain       albuterol (PROAIR HFA/PROVENTIL HFA/VENTOLIN HFA) 108 (90 Base) MCG/ACT inhaler Inhale 2 puffs into the lungs every 4 hours as needed for shortness of breath / dyspnea or wheezing       amLODIPine (NORVASC) 5 MG tablet Take 5 mg by mouth every morning       amoxicillin-clavulanate (AUGMENTIN) 875-125 MG tablet Take 1 tablet by mouth 2 times daily for 5 days       brimonidine-timolol (COMBIGAN) 0.2-0.5 % ophthalmic solution Place 1 drop into both eyes every morning       brinzolamide (AZOPT) 1 % ophthalmic suspension Place 1 drop into both eyes every morning       CALCIUM-MAGNESIUM-ZINC PO Take 1 tablet by mouth every morning 100/40/15       co-enzyme Q-10 100 MG CAPS capsule Take 100 mg by mouth every morning       docusate sodium (COLACE) 100 MG capsule Take 100 mg by mouth every evening       esomeprazole (NEXIUM) 40 MG DR capsule Take 40 mg by mouth every morning (before breakfast) Take 30-60 minutes before eating.       furosemide (LASIX) 40 MG tablet Take 80 mg by mouth daily       latanoprost (XALATAN) 0.005 % ophthalmic solution Place 1 drop into both eyes At Bedtime       Magnesium Oxide 250 MG TABS Take 500 mg by mouth daily       mirabegron (MYRBETRIQ) 50 MG 24 hr tablet Take 50 mg by mouth every morning       multivitamin w/minerals (THERA-VIT-M) tablet Take 1 tablet by mouth every morning       potassium chloride ER (K-TAB/KLOR-CON) 10 MEQ CR tablet Take 20 mEq by mouth daily       vitamin B-12 (CYANOCOBALAMIN) 1000 MCG tablet Take 1,000 mcg by mouth every morning       vitamin D3 (CHOLECALCIFEROL) 2000 units tablet Take 2,000 Units by mouth daily       warfarin ANTICOAGULANT (COUMADIN) 5 MG tablet Take 5 mg by mouth  "See Admin Instructions Tuesday, Wednesday, Thursday, Saturday, Sunday       warfarin ANTICOAGULANT (COUMADIN) 5 MG tablet Take 7.5 mg by mouth See Admin Instructions Monday and Friday         ROS:  10 point ROS of systems including Constitutional, Eyes, Respiratory, Cardiovascular, Gastroenterology, Genitourinary, Integumentary, Musculoskeletal, Psychiatric were all negative except for pertinent positives noted in my HPI.    Vitals:  /85   Pulse 77   Temp 97.5  F (36.4  C)   Resp 18   Ht 1.702 m (5' 7\")   Wt 112.2 kg (247 lb 6.4 oz)   SpO2 96%   BMI 38.75 kg/m     Exam:  GENERAL APPEARANCE:  Alert, in no distress, morbidly obese  ENT:  Mouth and posterior oropharynx normal, moist mucous membranes, [place]  EYES:  EOM normal, conjunctiva and lids normal, PERRL  NECK:  No adenopathy,masses or thyromegaly  RESP:  respiratory effort and palpation of chest normal, lungs clear to auscultation , no respiratory distress  CV:  Palpation and auscultation of heart done , regular rate and rhythm, no murmur, +2 pedal pulses, peripheral edema 2+ in both LE, edema both hands   ABDOMEN:  normal bowel sounds, soft, nontender, no hepatosplenomegaly or other masses  M/S:   up in recliner. KENDALL with good strength. No joint inflammation  SKIN:  no rashes or open areas  PSYCH:  oriented X 3, memory impaired , affect and mood normal    Lab/Diagnostic data:  Recent labs in [facility] reviewed by me today.     ASSESSMENT/PLAN:  ASSESSMENT / PLAN:  (K85.90) Acute pancreatitis, unspecified complication status, unspecified pancreatitis type  (primary encounter diagnosis)  (B99.9) Intra-abdominal infection  Comment: infection following surgery as noted above. Appears to be resolving.   Plan: complete course of Augmentin 10/6/2019. CBC, BMP 10/4/2019. Closely monitor for signs of recurrent infection. Monitor stools for passage of stent. Per wife, he does not need GI follow up at [facility] if he passes the stent.     (I77.89) Sneddon syndrome " (H)  (D68.51) Heterozygous factor V Leiden mutation (H)  (Z86.73) History of CVA (cerebrovascular accident)  (Z79.01) Chronic anticoagulation  Comment: INR therapeutic. They have a home machine to check INRs and wife calls in results.   Plan: coumadin 5 mg daily. INR 10/4/2019    (F01.50) Vascular dementia without behavioral disturbance (H)  Comment: cognition is at baseline, per wife  Plan: cognitive testing per therapies. Staff to assist with cares.     (J44.9) Chronic obstructive pulmonary disease, unspecified COPD type (H)  Comment: no acute respiratory issues  Plan: continue prn albuterol. Closely monitor respiratory status.     (E66.01) Obesity (BMI 35.0-39.9) with comorbidity (H)  (G47.33) KORY (obstructive sleep apnea)  Comment: chronic. Obesity may affect his mobility and progress in therapies   Plan: continue CPAP at usual home settings. He has a sleep study 10/16/2019 to assess if he would benefit from BiPAP. Dietician to consult.     (I10) Essential hypertension  Comment: hypertensive last night, but BP improved this am. Volume overload probably contributing. Lasix was held and resumed at hospital discharge.   Plan: continue amlodipine, lasix. Monitor VS and adjust meds as indicated.     (R60.0) Bilateral leg edema  Comment: acute on chronic LE edema and mild UE edema due to volume overload and impaired mobility. Lasix resumed at hospital discharge.   Plan: continue lasix 80 mg daily, his usual home dose. Compression stockings and elevate legs. Follow weight, BMP.     (E87.6) Hypokalemia  Comment: improved with K 3.4 today. On diuretic, no GI losses.   Plan: start KCl. BMP 10/7/2019.     (R53.81) Physical deconditioning  Comment: due to acute illness, hospitalizations, multiple comorbidities   Plan: PHYSICAL THERAPY/OT. Goal is to return home with his wife and home services if needed.     (Z71.89) Advanced directives, counseling/discussion  Comment: has a healthcare directive and they confirm DNR.   Plan:  POLST completed and orders updated.       Total time spent with patient visit at the skilled nursing facility was 42 mins including patient visit and review of past records. Greater than 50% of total time spent with counseling and coordinating care due to coordinating care with facility staff on admission orders, coordinating care with follow up labs and appointments and counseling patient/resident and family for 22  minutes on: the reason for  hospitalization and the treatment, plan of care and projected length of SNF  stay, current medications (treatments) reconciled from the hospital and recent  lab and imaging results and subsequent treatment plan. Counseling patient and wife re: advanced directive and completing POLST.     Electronically signed by:  NISA Landon CNP                         Sincerely,        NISA Landon CNP

## 2019-10-02 NOTE — PROGRESS NOTES
[facility]  PRIMARY CARE PROVIDER AND CLINIC:  Merlin Emery Brown, MD, [facility] [address] / [place]*  Chief Complaint   Patient presents with     Hospital F/U     [facility] Medical Record Number:  [id number]  [facility] of [facility] where encounter took place[facility] [[id number]]    Edison Saldivar  is a 79 year old  (1940), admitted to the above facility from  [facility]. Hospital stay 9/27/19 through 10/1/19.  Admitted to this facility for  rehab, medical management and nursing care.    HPI:    HPI information obtained from: facility chart records, facility staff, patient report, [facility] chart review, Care Everywhere Epic chart review and family/first contact wife report.   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 In the ED: WBC 18.3, K 3.0, lipase 63, INR 1.48. CT abdomen showed abnormal hazy infiltration around the pancreas consistent with pancreatitis. Case was discussed with [facility] who recommended antibiotics. GI consulted. He was treated with zosyn and symptoms gradually improved.  Blood cultures no growth to date. Diet was advanced to low fiber.   Discharged on Augmentin through 10/6/2019. Lasix was held and resumed at hospital discharge.       Updates on Status Since Skilled nursing Admission:      Acute pancreatitis, unspecified complication status, unspecified pancreatitis type-fatigued but feeling fairly well. Afebrile. Appetite improving. Denies nausea, vomiting or abdominal pain. Wife  reports that he should pass a green colored stent in his stool and wants to make sure this is closely monitored.   Intra-abdominal infection  Sneddon syndrome (H)-INR 2.6 today. No bleeding.   Usual home coumadin dose is 7.5 mg Mon and Friday, 5 mg the other days of the week.   Heterozygous factor V Leiden mutation (H)  History of CVA (cerebrovascular accident)  Chronic anticoagulation  Vascular dementia without behavioral disturbance (H)-fair historian. Conversant and responds to questions appropriately.   Chronic obstructive pulmonary disease, unspecified COPD type (H)  Morbid obesity (H)  KORY (obstructive sleep apnea)  Essential hypertension-BPs: 176/78, 177/95 last night. This am: 133/85   HR: 68-77  Bilateral leg edema-wife reports edema is worse than usual and he also has edema of both hands. No cough, shortness of breath or chest pain. Denies LE pain.   Hypokalemia  Physical deconditioning-ambulating with walker and stand by assist. Requires stand by to min assist with cares.     CODE STATUS/ADVANCE DIRECTIVES DISCUSSION:   DNR only  Patient's living condition: lives with spouse at [facility]. Wife manages meds and assists with cares.   ALLERGIES: Patient has no known allergies.  PAST MEDICAL HISTORY:  has a past medical history of Topete's esophagus, COPD (chronic obstructive pulmonary disease) (H), Heterozygous factor V Leiden mutation (H), History of CVA (cerebrovascular accident), HTN (hypertension), KORY on CPAP, Sneddon syndrome (H), and Vascular dementia (H).  PAST SURGICAL HISTORY:   has a past surgical history that includes Duodenal polypectomy with sphincterotomy  (09/23/2019).  FAMILY HISTORY: family history is not on file.  SOCIAL HISTORY:   reports that he has never smoked. He has never used smokeless tobacco. He reports that he does not drink alcohol or use drugs.    Post Discharge Medication Reconciliation Status: discharge medications reconciled, continue medications without  change    Current Outpatient Medications   Medication Sig Dispense Refill     acetaminophen (TYLENOL) 325 MG tablet Take 2 tablets (650 mg) by mouth every 4 hours as needed for mild pain       albuterol (PROAIR HFA/PROVENTIL HFA/VENTOLIN HFA) 108 (90 Base) MCG/ACT inhaler Inhale 2 puffs into the lungs every 4 hours as needed for shortness of breath / dyspnea or wheezing       amLODIPine (NORVASC) 5 MG tablet Take 5 mg by mouth every morning       amoxicillin-clavulanate (AUGMENTIN) 875-125 MG tablet Take 1 tablet by mouth 2 times daily for 5 days       brimonidine-timolol (COMBIGAN) 0.2-0.5 % ophthalmic solution Place 1 drop into both eyes every morning       brinzolamide (AZOPT) 1 % ophthalmic suspension Place 1 drop into both eyes every morning       CALCIUM-MAGNESIUM-ZINC PO Take 1 tablet by mouth every morning 100/40/15       co-enzyme Q-10 100 MG CAPS capsule Take 100 mg by mouth every morning       docusate sodium (COLACE) 100 MG capsule Take 100 mg by mouth every evening       esomeprazole (NEXIUM) 40 MG DR capsule Take 40 mg by mouth every morning (before breakfast) Take 30-60 minutes before eating.       furosemide (LASIX) 40 MG tablet Take 80 mg by mouth daily       latanoprost (XALATAN) 0.005 % ophthalmic solution Place 1 drop into both eyes At Bedtime       Magnesium Oxide 250 MG TABS Take 500 mg by mouth daily       mirabegron (MYRBETRIQ) 50 MG 24 hr tablet Take 50 mg by mouth every morning       multivitamin w/minerals (THERA-VIT-M) tablet Take 1 tablet by mouth every morning       potassium chloride ER (K-TAB/KLOR-CON) 10 MEQ CR tablet Take 20 mEq by mouth daily       vitamin B-12 (CYANOCOBALAMIN) 1000 MCG tablet Take 1,000 mcg by mouth every morning       vitamin D3 (CHOLECALCIFEROL) 2000 units tablet Take 2,000 Units by mouth daily       warfarin ANTICOAGULANT (COUMADIN) 5 MG tablet Take 5 mg by mouth See Admin Instructions Tuesday, Wednesday, Thursday, Saturday, Sunday       warfarin ANTICOAGULANT  "(COUMADIN) 5 MG tablet Take 7.5 mg by mouth See Admin Instructions Monday and Friday         ROS:  10 point ROS of systems including Constitutional, Eyes, Respiratory, Cardiovascular, Gastroenterology, Genitourinary, Integumentary, Musculoskeletal, Psychiatric were all negative except for pertinent positives noted in my HPI.    Vitals:  /85   Pulse 77   Temp 97.5  F (36.4  C)   Resp 18   Ht 1.702 m (5' 7\")   Wt 112.2 kg (247 lb 6.4 oz)   SpO2 96%   BMI 38.75 kg/m    Exam:  GENERAL APPEARANCE:  Alert, in no distress, morbidly obese  ENT:  Mouth and posterior oropharynx normal, moist mucous membranes, [place]  EYES:  EOM normal, conjunctiva and lids normal, PERRL  NECK:  No adenopathy,masses or thyromegaly  RESP:  respiratory effort and palpation of chest normal, lungs clear to auscultation , no respiratory distress  CV:  Palpation and auscultation of heart done , regular rate and rhythm, no murmur, +2 pedal pulses, peripheral edema 2+ in both LE, edema both hands   ABDOMEN:  normal bowel sounds, soft, nontender, no hepatosplenomegaly or other masses  M/S:   up in recliner. KENDALL with good strength. No joint inflammation  SKIN:  no rashes or open areas  PSYCH:  oriented X 3, memory impaired , affect and mood normal    Lab/Diagnostic data:  Recent labs in [facility] reviewed by me today.     ASSESSMENT/PLAN:  ASSESSMENT / PLAN:  (K85.90) Acute pancreatitis, unspecified complication status, unspecified pancreatitis type  (primary encounter diagnosis)  (B99.9) Intra-abdominal infection  Comment: infection following surgery as noted above. Appears to be resolving.   Plan: complete course of Augmentin 10/6/2019. CBC, BMP 10/4/2019. Closely monitor for signs of recurrent infection. Monitor stools for passage of stent. Per wife, he does not need GI follow up at [facility] if he passes the stent.     (I77.89) Sneddon syndrome (H)  (D68.51) Heterozygous factor V Leiden mutation (H)  (Z86.73) History of CVA (cerebrovascular " accident)  (Z79.01) Chronic anticoagulation  Comment: INR therapeutic. They have a home machine to check INRs and wife calls in results.   Plan: coumadin 5 mg daily. INR 10/4/2019    (F01.50) Vascular dementia without behavioral disturbance (H)  Comment: cognition is at baseline, per wife  Plan: cognitive testing per therapies. Staff to assist with cares.     (J44.9) Chronic obstructive pulmonary disease, unspecified COPD type (H)  Comment: no acute respiratory issues  Plan: continue prn albuterol. Closely monitor respiratory status.     (E66.01) Obesity (BMI 35.0-39.9) with comorbidity (H)  (G47.33) KORY (obstructive sleep apnea)  Comment: chronic. Obesity may affect his mobility and progress in therapies   Plan: continue CPAP at usual home settings. He has a sleep study 10/16/2019 to assess if he would benefit from BiPAP. Dietician to consult.     (I10) Essential hypertension  Comment: hypertensive last night, but BP improved this am. Volume overload probably contributing. Lasix was held and resumed at hospital discharge.   Plan: continue amlodipine, lasix. Monitor VS and adjust meds as indicated.     (R60.0) Bilateral leg edema  Comment: acute on chronic LE edema and mild UE edema due to volume overload and impaired mobility. Lasix resumed at hospital discharge.   Plan: continue lasix 80 mg daily, his usual home dose. Compression stockings and elevate legs. Follow weight, BMP.     (E87.6) Hypokalemia  Comment: improved with K 3.4 today. On diuretic, no GI losses.   Plan: start KCl. BMP 10/7/2019.     (R53.81) Physical deconditioning  Comment: due to acute illness, hospitalizations, multiple comorbidities   Plan: PHYSICAL THERAPY/OT. Goal is to return home with his wife and home services if needed.     (Z71.89) Advanced directives, counseling/discussion  Comment: has a healthcare directive and they confirm DNR.   Plan: POLST completed and orders updated.       Total time spent with patient visit at the skilled  nursing facility was 42 mins including patient visit and review of past records. Greater than 50% of total time spent with counseling and coordinating care due to coordinating care with facility staff on admission orders, coordinating care with follow up labs and appointments and counseling patient/resident and family for 22  minutes on: the reason for  hospitalization and the treatment, plan of care and projected length of SNF  stay, current medications (treatments) reconciled from the hospital and recent  lab and imaging results and subsequent treatment plan. Counseling patient and wife re: advanced directive and completing POLST.     Electronically signed by:  NISA Landon CNP

## 2019-10-03 LAB
BACTERIA SPEC CULT: NO GROWTH
BACTERIA SPEC CULT: NO GROWTH
Lab: NORMAL
Lab: NORMAL
SPECIMEN SOURCE: NORMAL
SPECIMEN SOURCE: NORMAL

## 2019-10-03 RX ORDER — POTASSIUM CHLORIDE 750 MG/1
20 TABLET, EXTENDED RELEASE ORAL DAILY
COMMUNITY

## 2019-10-04 ENCOUNTER — NURSING HOME VISIT (OUTPATIENT)
Dept: GERIATRICS | Facility: CLINIC | Age: 79
End: 2019-10-04
Payer: COMMERCIAL

## 2019-10-04 VITALS
OXYGEN SATURATION: 97 % | WEIGHT: 242.2 LBS | DIASTOLIC BLOOD PRESSURE: 70 MMHG | RESPIRATION RATE: 20 BRPM | BODY MASS INDEX: 38.01 KG/M2 | HEIGHT: 67 IN | HEART RATE: 73 BPM | TEMPERATURE: 98.3 F | SYSTOLIC BLOOD PRESSURE: 120 MMHG

## 2019-10-04 DIAGNOSIS — Z79.01 LONG TERM (CURRENT) USE OF ANTICOAGULANTS: ICD-10-CM

## 2019-10-04 DIAGNOSIS — Z51.81 ENCOUNTER FOR THERAPEUTIC DRUG MONITORING: ICD-10-CM

## 2019-10-04 DIAGNOSIS — D68.51 HETEROZYGOUS FACTOR V LEIDEN MUTATION (H): ICD-10-CM

## 2019-10-04 DIAGNOSIS — Z86.73 HISTORY OF CVA (CEREBROVASCULAR ACCIDENT): ICD-10-CM

## 2019-10-04 DIAGNOSIS — I77.89 SNEDDON SYNDROME (H): Primary | ICD-10-CM

## 2019-10-04 PROCEDURE — 99308 SBSQ NF CARE LOW MDM 20: CPT | Performed by: NURSE PRACTITIONER

## 2019-10-04 ASSESSMENT — MIFFLIN-ST. JEOR: SCORE: 1782.24

## 2019-10-04 NOTE — PROGRESS NOTES
"[facility]  [facility] Medical Record Number:  [id number]  [facility] where encounter took place[facility] [[id number]]    HPI:    Edison Saldivar is a 79 year old  (1940), who is being seen today for an episodic care visit at the above location.   HPI information obtained from: facility chart records, facility staff, patient report and [facility] chart review. Today's concern is INR/Coumadin management for Factor V Leiden and Sneddon syndrome with history of CVA    ROS/Subjective:  Bleeding Signs/Symptoms:  None  Thromboembolic Signs/Symptoms:  None  Medication Changes:  No  Dietary Changes:  No  Activity Changes: No  Bacterial/Viral Infection:  Yes: on Augmentin   Missed Coumadin Doses:  None  On ASA: No  Other Concerns:  No    OBJECTIVE:  /70   Pulse 73   Temp 98.3  F (36.8  C)   Resp 20   Ht 1.702 m (5' 7\")   Wt 109.9 kg (242 lb 3.2 oz)   SpO2 97%   BMI 37.93 kg/m    Last INR: 2.6 on 10/4/2019  INR Today:  2.0  Current Dose:  7.5 mg Mondays and Fridays, 5 mg the other days of the week       ASSESSMENT:     Sneddon syndrome (H)  Heterozygous factor V Leiden mutation (H)  History of CVA (cerebrovascular accident)  Long term (current) use of anticoagulants  Encounter for therapeutic drug monitoring  Therapeutic INR for goal of 2-3  PLAN:   New Dose: No Change    Next INR: 10/7/2019      Electronically signed by:  NISA Landon CNP       "

## 2019-10-07 ENCOUNTER — NURSING HOME VISIT (OUTPATIENT)
Dept: GERIATRICS | Facility: CLINIC | Age: 79
End: 2019-10-07
Payer: COMMERCIAL

## 2019-10-07 ENCOUNTER — HOSPITAL LABORATORY (OUTPATIENT)
Dept: OTHER | Facility: CLINIC | Age: 79
End: 2019-10-07

## 2019-10-07 DIAGNOSIS — Z79.01 CHRONIC ANTICOAGULATION: ICD-10-CM

## 2019-10-07 DIAGNOSIS — G47.33 OSA (OBSTRUCTIVE SLEEP APNEA): ICD-10-CM

## 2019-10-07 DIAGNOSIS — F01.50 VASCULAR DEMENTIA WITHOUT BEHAVIORAL DISTURBANCE (H): ICD-10-CM

## 2019-10-07 DIAGNOSIS — D68.51 HETEROZYGOUS FACTOR V LEIDEN MUTATION (H): ICD-10-CM

## 2019-10-07 DIAGNOSIS — J44.9 CHRONIC OBSTRUCTIVE PULMONARY DISEASE, UNSPECIFIED COPD TYPE (H): ICD-10-CM

## 2019-10-07 DIAGNOSIS — K21.00 GASTROESOPHAGEAL REFLUX DISEASE WITH ESOPHAGITIS: ICD-10-CM

## 2019-10-07 DIAGNOSIS — E66.01 MORBID OBESITY (H): ICD-10-CM

## 2019-10-07 DIAGNOSIS — R60.0 BILATERAL LEG EDEMA: ICD-10-CM

## 2019-10-07 DIAGNOSIS — Z86.73 HISTORY OF CVA (CEREBROVASCULAR ACCIDENT): ICD-10-CM

## 2019-10-07 DIAGNOSIS — K85.90 POST-ERCP ACUTE PANCREATITIS: Primary | ICD-10-CM

## 2019-10-07 DIAGNOSIS — K91.89 POST-ERCP ACUTE PANCREATITIS: Primary | ICD-10-CM

## 2019-10-07 DIAGNOSIS — R53.81 PHYSICAL DECONDITIONING: ICD-10-CM

## 2019-10-07 DIAGNOSIS — I10 ESSENTIAL HYPERTENSION: ICD-10-CM

## 2019-10-07 LAB
ANION GAP SERPL CALCULATED.3IONS-SCNC: 4 MMOL/L (ref 3–14)
BUN SERPL-MCNC: 21 MG/DL (ref 7–30)
CALCIUM SERPL-MCNC: 8.9 MG/DL (ref 8.5–10.1)
CHLORIDE SERPL-SCNC: 111 MMOL/L (ref 94–109)
CO2 SERPL-SCNC: 27 MMOL/L (ref 20–32)
CREAT SERPL-MCNC: 1.08 MG/DL (ref 0.66–1.25)
ERYTHROCYTE [DISTWIDTH] IN BLOOD BY AUTOMATED COUNT: 17.5 % (ref 10–15)
GFR SERPL CREATININE-BSD FRML MDRD: 66 ML/MIN/{1.73_M2}
GLUCOSE SERPL-MCNC: 105 MG/DL (ref 70–99)
HCT VFR BLD AUTO: 35.3 % (ref 40–53)
HGB BLD-MCNC: 11 G/DL (ref 13.3–17.7)
MCH RBC QN AUTO: 26.1 PG (ref 26.5–33)
MCHC RBC AUTO-ENTMCNC: 31.2 G/DL (ref 31.5–36.5)
MCV RBC AUTO: 84 FL (ref 78–100)
PLATELET # BLD AUTO: 319 10E9/L (ref 150–450)
POTASSIUM SERPL-SCNC: 3.8 MMOL/L (ref 3.4–5.3)
RBC # BLD AUTO: 4.21 10E12/L (ref 4.4–5.9)
SODIUM SERPL-SCNC: 142 MMOL/L (ref 133–144)
WBC # BLD AUTO: 8.1 10E9/L (ref 4–11)

## 2019-10-07 PROCEDURE — 99305 1ST NF CARE MODERATE MDM 35: CPT | Mod: AI | Performed by: INTERNAL MEDICINE

## 2019-10-07 NOTE — PROGRESS NOTES
[facility]  INITIAL VISIT NOTE  October 7, 2019    PRIMARY CARE PROVIDER AND CLINIC:  Brown, Merlin Emery [facility] [address] / [place] [address]*    Chief Complaint   Patient presents with     Hospital F/U       HPI:    Edison Saldivar is a 79 year old  (1940) male who was seen at [place] on [facility] on October 7, 2019 for an initial visit. Medical history is notable for CVA, vascular dementia, Sneddon's syndrome, hypertension, obstructive sleep apnea, COPD, obesity, parkinsonism, glaucoma, and recent duodenal polypectomy with sphincterotomy and pancreatic stenting on September 23, 2019 at [facility].  Patient presented to emergency department at [facility] with abdominal pain, nausea, poor oral intake, and fever to 101  F.  Work-up in ED showed white count of 18.3 and  lipase of 63.  CT abdomen revealed abnormal hazy infiltration around the pancreas consistent with pancreatitis.  Patient was hospitalized from September 27 through October 1, 2019 at [facility].  The case was communicated to [facility] and antibiotic therapy was recommended.  He was therefore started on intravenous Zosyn.  Blood cultures did not yield any growth.  Diet was gradually advanced to low fiber.  He was discharged on oral Augmentin which continued through October 6, 2019.    Patient is admitted to this facility for medical management, nursing care, and rehab.     Patient was seen today in his room, while sitting the chair.  The patient is doing well with rehab.  He reports no fever, chills, chest pain, palpitation, dyspnea, nausea, vomiting, abdominal pain, diarrhea, or urinary symptoms.    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  Mother had history of diabetes and  at the age of 79.    SOCIAL HISTORY:  Patient is  and lives with his wife in an apartment.  He does use a walker at all times for ambulation.    Social History     Tobacco Use     Smoking status: Never Smoker     Smokeless tobacco: Never Used   Substance Use Topics     Alcohol use: No     Frequency: Never       MEDICATIONS:  Current Outpatient Medications   Medication Sig Dispense Refill     acetaminophen (TYLENOL) 325 MG tablet Take 2 tablets (650 mg) by mouth every 4 hours as needed for mild pain       albuterol (PROAIR HFA/PROVENTIL HFA/VENTOLIN HFA) 108 (90 Base) MCG/ACT inhaler Inhale 2 puffs into the lungs every 4 hours as needed for shortness of breath / dyspnea or wheezing       amLODIPine (NORVASC) 5 MG tablet Take 5 mg by mouth every morning       brimonidine-timolol (COMBIGAN) 0.2-0.5 % ophthalmic solution Place 1 drop into both eyes every morning       brinzolamide (AZOPT) 1 % ophthalmic suspension Place 1 drop into both eyes every morning       CALCIUM-MAGNESIUM-ZINC PO Take 1 tablet by mouth every morning 100/40/15       co-enzyme Q-10 100 MG CAPS capsule Take 100 mg by mouth every morning       docusate sodium (COLACE) 100 MG capsule Take 100 mg by mouth every evening       esomeprazole (NEXIUM) 40 MG DR capsule Take 40 mg by mouth every morning (before breakfast) Take 30-60 minutes before eating.       furosemide (LASIX) 40 MG tablet Take 80 mg by mouth daily       latanoprost (XALATAN) 0.005 % ophthalmic solution  Place 1 drop into both eyes At Bedtime       Magnesium Oxide 250 MG TABS Take 500 mg by mouth daily       mirabegron (MYRBETRIQ) 50 MG 24 hr tablet Take 50 mg by mouth every morning       multivitamin w/minerals (THERA-VIT-M) tablet Take 1 tablet by mouth every morning       potassium chloride ER (K-TAB/KLOR-CON) 10 MEQ CR tablet Take 20 mEq by mouth daily       vitamin B-12 (CYANOCOBALAMIN) 1000 MCG tablet Take 1,000 mcg by mouth every morning       vitamin D3 (CHOLECALCIFEROL) 2000 units tablet Take 2,000 Units by mouth daily       warfarin ANTICOAGULANT (COUMADIN) 5 MG tablet Take 5 mg by mouth See Admin Instructions Tuesday, Wednesday, Thursday, Saturday, Sunday       warfarin ANTICOAGULANT (COUMADIN) 5 MG tablet Take 7.5 mg by mouth See Admin Instructions Monday and Friday         ALLERGIES:  No Known Allergies    ROS:  10 point ROS neg other than the symptoms noted above in the HPI.    PHYSICAL EXAM:  Vital signs: Blood pressure 133/79, heart rate 75, respiratory rate 16, temperature 97.9  F, oxygen saturation 95%  Gen: Cooperative and in no acute distress  HEENT: Normocephalic; oropharynx clear  Card: Normal S1, S2, RRR, grade 2/6 SM in LUSB  Resp: Lungs clear to auscultation bilaterally  GI: Abdomen soft, obese, not-tender,+BS  Ext: Trace to 1+ B/L LE edema  Neuro: CX II-XII grossly intact; ROM in all four extremities grossly in tact  Psych: Alert and oriented x2-3; normal affect  Skin: No acute rash    LABORATORY/IMAGING DATA:  Lab Results   Component Value Date    WBC 13.0 09/28/2019     Lab Results   Component Value Date    RBC 4.67 09/28/2019     Lab Results   Component Value Date    HGB 12.2 09/28/2019     Lab Results   Component Value Date    HCT 39.0 09/28/2019     Lab Results   Component Value Date    MCV 84 09/28/2019     Lab Results   Component Value Date    MCH 26.1 09/28/2019     Lab Results   Component Value Date    MCHC 31.3 09/28/2019     Lab Results   Component Value Date    RDW 17.3  09/28/2019     Lab Results   Component Value Date     09/30/2019     Last Comprehensive Metabolic Panel:  Sodium   Date Value Ref Range Status   10/02/2019 145 (H) 133 - 144 mmol/L Final     Potassium   Date Value Ref Range Status   10/02/2019 3.9 3.4 - 5.3 mmol/L Final     Chloride   Date Value Ref Range Status   10/02/2019 113 (H) 94 - 109 mmol/L Final     Carbon Dioxide   Date Value Ref Range Status   10/02/2019 28 20 - 32 mmol/L Final     Anion Gap   Date Value Ref Range Status   10/02/2019 4 3 - 14 mmol/L Final     Glucose   Date Value Ref Range Status   10/02/2019 98 70 - 99 mg/dL Final     Urea Nitrogen   Date Value Ref Range Status   10/02/2019 13 7 - 30 mg/dL Final     Creatinine   Date Value Ref Range Status   10/02/2019 0.89 0.66 - 1.25 mg/dL Final     GFR Estimate   Date Value Ref Range Status   10/02/2019 83 >60 mL/min/[1.73_m2] Final     Comment:     Non  GFR Calc  Starting 12/18/2018, serum creatinine based estimated GFR (eGFR) will be   calculated using the Chronic Kidney Disease Epidemiology Collaboration   (CKD-EPI) equation.       Calcium   Date Value Ref Range Status   10/02/2019 8.7 8.5 - 10.1 mg/dL Final         ASSESSMENT/PLAN:  Post ERCP acute pancreatitis,  Recent history of duodenal polypectomy at major papilla with sphincterotomy and pancreatic duct stenting, on September 23, 2019.  Patient completed the course of antibiotic therapy on October 16, 2019, initially with IV Zosyn then oral Augmentin, started empirically in the hospital due to fever.  Plan:  Patient does not need GI follow-up at [place] if he passes the stent  Monitor for recurrence of pancreatitis symptoms    Sneddon syndrome,  Heterozygous factor V Leiden mutation,  History of CVA.  Patient is chronically on warfarin.    Plan:  Continue warfarin.  Adjust dose for INR target of 2-3  Check INR today    Vascular dementia without fever disturbance.  Patient's cognition is at baseline.  Plan:  OT evaluation  and therapy    Hypertension, benign essential,  Lower extremity edema, acute on chronic.  Blood pressures currently controlled.   Plan:  Continue prior to admission amlodipine 5 mg p.o. daily and furosemide 80 mg p.o. daily  Continue compression stockings  Continue to monitor blood pressure  BMP today    COPD.  Stable.  Plan:  Continue PRN albuterol  Continue to monitor respiratory status    Obstructive sleep apnea,  Obesity, BMI 37.93.  Plan:  Continue CPAP at night at home setting  Facility dietitian to see the patient    GERD with Topete's esophagus.  Plan:  Continue prior to admission Nexium 40 mg p.o. daily    Glaucoma.  Plan:  Continue prior to admission Xalatan eyedrop    Physical deconditioning.  Plan:  Continue PT/OT      Electronically signed by:  Henny Ortega MD

## 2019-10-07 NOTE — LETTER
10/7/2019        RE: Edison Saldivar  [address]  [place] [address]        [facility]  INITIAL VISIT NOTE  October 7, 2019    PRIMARY CARE PROVIDER AND CLINIC:  Brown, Merlin Emery [facility] [address] / [place] [address]    Chief Complaint   Patient presents with     Hospital F/U       HPI:    Edison Saldivar is a 79 year old  (1940) male who [facility] [place] [facility] for an initial visit. Medical history is notable for CVA, vascular dementia, Sneddon's syndrome, hypertension, obstructive sleep apnea, COPD, obesity, parkinsonism, glaucoma, and recent duodenal polypectomy with sphincterotomy and pancreatic stenting on September 23, 2019 at [facility].  Patient presented to emergency department at [facility] with abdominal pain, nausea, poor oral intake, and fever to 101  F.  Work-up in ED showed white count of 18.3 and  lipase of 63.  CT abdomen revealed abnormal hazy infiltration around the pancreas consistent with pancreatitis.  Patient was hospitalized from September 27 through October 1, 2019 at [facility].  The case was communicated to [facility] and antibiotic therapy was recommended.  He was therefore started on intravenous Zosyn.  Blood cultures did not yield any growth.  Diet was gradually advanced to low fiber.  He was discharged on oral Augmentin which continued through October 6, 2019.    Patient is admitted to this facility for medical management, nursing care, and rehab.     Patient was seen today in his room, while sitting the chair.  The patient is doing well with rehab.  He reports no fever, chills, chest pain, palpitation, dyspnea, nausea, vomiting, abdominal pain, diarrhea, or urinary symptoms.    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  Mother had history of diabetes and  at the age of 79.    SOCIAL HISTORY:  Patient is  and lives with his wife in an apartment.  He does use a walker at all times for ambulation.    Social History     Tobacco Use     Smoking status: Never Smoker     Smokeless tobacco: Never Used   Substance Use Topics     Alcohol use: No     Frequency: Never       MEDICATIONS:  Current Outpatient Medications   Medication Sig Dispense Refill     acetaminophen (TYLENOL) 325 MG tablet Take 2 tablets (650 mg) by mouth every 4 hours as needed for mild pain       albuterol (PROAIR HFA/PROVENTIL HFA/VENTOLIN HFA) 108 (90 Base) MCG/ACT inhaler Inhale 2 puffs into the lungs every 4 hours as needed for shortness of breath / dyspnea or wheezing       amLODIPine (NORVASC) 5 MG tablet Take 5 mg by mouth every morning       brimonidine-timolol (COMBIGAN) 0.2-0.5 % ophthalmic solution Place 1 drop into both eyes every morning       brinzolamide (AZOPT) 1 % ophthalmic suspension Place 1 drop into both eyes every morning       CALCIUM-MAGNESIUM-ZINC PO Take 1 tablet by mouth every morning 100/40/15       co-enzyme Q-10 100 MG CAPS capsule Take 100 mg by mouth every morning       docusate sodium (COLACE) 100 MG capsule Take 100 mg by mouth every evening       esomeprazole (NEXIUM) 40 MG DR capsule Take 40 mg by mouth every morning (before breakfast) Take 30-60 minutes before eating.       furosemide (LASIX) 40 MG tablet Take  80 mg by mouth daily       latanoprost (XALATAN) 0.005 % ophthalmic solution Place 1 drop into both eyes At Bedtime       Magnesium Oxide 250 MG TABS Take 500 mg by mouth daily       mirabegron (MYRBETRIQ) 50 MG 24 hr tablet Take 50 mg by mouth every morning       multivitamin w/minerals (THERA-VIT-M) tablet Take 1 tablet by mouth every morning       potassium chloride ER (K-TAB/KLOR-CON) 10 MEQ CR tablet Take 20 mEq by mouth daily       vitamin B-12 (CYANOCOBALAMIN) 1000 MCG tablet Take 1,000 mcg by mouth every morning       vitamin D3 (CHOLECALCIFEROL) 2000 units tablet Take 2,000 Units by mouth daily       warfarin ANTICOAGULANT (COUMADIN) 5 MG tablet Take 5 mg by mouth See Admin Instructions Tuesday, Wednesday, Thursday, Saturday, Sunday       warfarin ANTICOAGULANT (COUMADIN) 5 MG tablet Take 7.5 mg by mouth See Admin Instructions Monday and Friday         ALLERGIES:  No Known Allergies    ROS:  10 point ROS neg other than the symptoms noted above in the HPI.    PHYSICAL EXAM:  Vital signs: Blood pressure 133/79, heart rate 75, respiratory rate 16, temperature 97.9  F, oxygen saturation 95%  Gen: Cooperative and in no acute distress  HEENT: Normocephalic; oropharynx clear  Card: Normal S1, S2, RRR, grade 2/6 SM in LUSB  Resp: Lungs clear to auscultation bilaterally  GI: Abdomen soft, obese, not-tender,+BS  Ext: Trace to 1+ B/L LE edema  Neuro: CX II-XII grossly intact; ROM in all four extremities grossly in tact  Psych: Alert and oriented x2-3; normal affect  Skin: No acute rash    LABORATORY/IMAGING DATA:  Lab Results   Component Value Date    WBC 13.0 09/28/2019     Lab Results   Component Value Date    RBC 4.67 09/28/2019     Lab Results   Component Value Date    HGB 12.2 09/28/2019     Lab Results   Component Value Date    HCT 39.0 09/28/2019     Lab Results   Component Value Date    MCV 84 09/28/2019     Lab Results   Component Value Date    MCH 26.1 09/28/2019     Lab Results   Component Value Date     MCHC 31.3 09/28/2019     Lab Results   Component Value Date    RDW 17.3 09/28/2019     Lab Results   Component Value Date     09/30/2019     Last Comprehensive Metabolic Panel:  Sodium   Date Value Ref Range Status   10/02/2019 145 (H) 133 - 144 mmol/L Final     Potassium   Date Value Ref Range Status   10/02/2019 3.9 3.4 - 5.3 mmol/L Final     Chloride   Date Value Ref Range Status   10/02/2019 113 (H) 94 - 109 mmol/L Final     Carbon Dioxide   Date Value Ref Range Status   10/02/2019 28 20 - 32 mmol/L Final     Anion Gap   Date Value Ref Range Status   10/02/2019 4 3 - 14 mmol/L Final     Glucose   Date Value Ref Range Status   10/02/2019 98 70 - 99 mg/dL Final     Urea Nitrogen   Date Value Ref Range Status   10/02/2019 13 7 - 30 mg/dL Final     Creatinine   Date Value Ref Range Status   10/02/2019 0.89 0.66 - 1.25 mg/dL Final     GFR Estimate   Date Value Ref Range Status   10/02/2019 83 >60 mL/min/[1.73_m2] Final     Comment:     Non  GFR Calc  Starting 12/18/2018, serum creatinine based estimated GFR (eGFR) will be   calculated using the Chronic Kidney Disease Epidemiology Collaboration   (CKD-EPI) equation.       Calcium   Date Value Ref Range Status   10/02/2019 8.7 8.5 - 10.1 mg/dL Final         ASSESSMENT/PLAN:  Post ERCP acute pancreatitis,  Recent history of duodenal polypectomy at major papilla with sphincterotomy and pancreatic duct stenting, on September 23, 2019.  Patient completed the course of antibiotic therapy on October 16, 2019, initially with IV Zosyn then oral Augmentin, started empirically in the hospital due to fever.  Plan:  Patient does not need GI follow-up at [place] if he passes the stent  Monitor for recurrence of pancreatitis symptoms    Sneddon syndrome,  Heterozygous factor V Leiden mutation,  History of CVA.  Patient is chronically on warfarin.    Plan:  Continue warfarin.  Adjust dose for INR target of 2-3  Check INR today    Vascular dementia without fever  disturbance.  Patient's cognition is at baseline.  Plan:  OT evaluation and therapy    Hypertension, benign essential,  Lower extremity edema, acute on chronic.  Blood pressures currently controlled.   Plan:  Continue prior to admission amlodipine 5 mg p.o. daily and furosemide 80 mg p.o. daily  Continue compression stockings  Continue to monitor blood pressure  BMP today    COPD.  Stable.  Plan:  Continue PRN albuterol  Continue to monitor respiratory status    Obstructive sleep apnea,  Obesity, BMI 37.93.  Plan:  Continue CPAP at night at home setting  Facility dietitian to see the patient    GERD with Topete's esophagus.  Plan:  Continue prior to admission Nexium 40 mg p.o. daily    Glaucoma.  Plan:  Continue prior to admission Xalatan eyedrop    Physical deconditioning.  Plan:  Continue PT/OT      Electronically signed by:  Henny Ortega MD                      Sincerely,        Henny Ortega MD

## 2019-10-08 ENCOUNTER — NURSING HOME VISIT (OUTPATIENT)
Dept: GERIATRICS | Facility: CLINIC | Age: 79
End: 2019-10-08
Payer: COMMERCIAL

## 2019-10-08 VITALS
SYSTOLIC BLOOD PRESSURE: 133 MMHG | OXYGEN SATURATION: 95 % | DIASTOLIC BLOOD PRESSURE: 79 MMHG | TEMPERATURE: 97.9 F | BODY MASS INDEX: 37.93 KG/M2 | HEIGHT: 67 IN | RESPIRATION RATE: 16 BRPM | HEART RATE: 75 BPM

## 2019-10-08 DIAGNOSIS — K85.90 ACUTE PANCREATITIS, UNSPECIFIED COMPLICATION STATUS, UNSPECIFIED PANCREATITIS TYPE: Primary | ICD-10-CM

## 2019-10-08 DIAGNOSIS — F01.50 VASCULAR DEMENTIA WITHOUT BEHAVIORAL DISTURBANCE (H): ICD-10-CM

## 2019-10-08 DIAGNOSIS — B99.9 INTRA-ABDOMINAL INFECTION: ICD-10-CM

## 2019-10-08 DIAGNOSIS — I10 ESSENTIAL HYPERTENSION: ICD-10-CM

## 2019-10-08 DIAGNOSIS — R60.0 BILATERAL LEG EDEMA: ICD-10-CM

## 2019-10-08 DIAGNOSIS — G47.33 OSA (OBSTRUCTIVE SLEEP APNEA): ICD-10-CM

## 2019-10-08 DIAGNOSIS — E87.6 HYPOKALEMIA: ICD-10-CM

## 2019-10-08 DIAGNOSIS — J44.9 CHRONIC OBSTRUCTIVE PULMONARY DISEASE, UNSPECIFIED COPD TYPE (H): ICD-10-CM

## 2019-10-08 DIAGNOSIS — Z79.01 CHRONIC ANTICOAGULATION: ICD-10-CM

## 2019-10-08 DIAGNOSIS — Z86.73 HISTORY OF CVA (CEREBROVASCULAR ACCIDENT): ICD-10-CM

## 2019-10-08 DIAGNOSIS — R53.81 PHYSICAL DECONDITIONING: ICD-10-CM

## 2019-10-08 DIAGNOSIS — D68.51 HETEROZYGOUS FACTOR V LEIDEN MUTATION (H): ICD-10-CM

## 2019-10-08 DIAGNOSIS — I77.89 SNEDDON SYNDROME (H): ICD-10-CM

## 2019-10-08 DIAGNOSIS — E66.01 MORBID OBESITY (H): ICD-10-CM

## 2019-10-08 PROCEDURE — 99309 SBSQ NF CARE MODERATE MDM 30: CPT | Performed by: NURSE PRACTITIONER

## 2019-10-08 NOTE — PROGRESS NOTES
[facility]  [facility] Record Number:  [id number]  Place of Service where encounter took place:  [facility] [[id number]]  Chief Complaint   Patient presents with     RECHECK       HPI:    Edison Saldivar  is a 79 year old (1940), who is being seen today for an episodic care visit.  HPI information obtained from: facility chart records, facility staff, patient report, [facility] chart review and family/first contact wife report.   He came to this facility 10/1/2019 for short term rehab and medical management following hospitalization after presenting to the ED 9/27/2019 with worsening abdominal pain, nausea, poor intake and fever to 101.  He is status post duodenal polypectomy with sphincterotomy and pancreatic duct stenting 9/23/2019 at [facility]. In  the ED: WBC 18.3, K 3.0, lipase 63, INR 1.48. CT abdomen showed abnormal hazy infiltration around the pancreas consistent with pancreatitis. Case was discussed with [facility] who recommended antibiotics. He was treated with zosyn and symptoms gradually improved.  Blood cultures no growth to date. Diet was advanced to low fiber.   Discharged on Augmentin through 10/6/2019. Lasix was held and resumed at hospital discharge.     Today's concerns are:      Acute pancreatitis, unspecified complication status, unspecified pancreatitis type-feeling well with improved energy and strength. Wife is concerned that he may not have passed the stent and if it hasn't passed in his stool, they need to return to [facility] for endoscopic removal. Weight is down 7 lb from admission. Good appetite.   Intra-abdominal infection  Sneddon syndrome (H)-INR was 1.9 yesterday, down from 2.0 on his usual dose of coumadin 7.5 mg on Mon and Fridays, 5 mg the other days of the week. He received 7.5 mg last night. HR: 71-87   Heterozygous factor V Leiden mutation (H)  History of CVA (cerebrovascular accident)  Chronic anticoagulation  Vascular dementia without  behavioral disturbance (H)-pleasant and talkative. Conversation is appropriate.   Chronic obstructive pulmonary disease, unspecified COPD type (H)  Morbid obesity (H)  KORY (obstructive sleep apnea)  Essential hypertension-BPs: 133/79, 126/72, 149/76, 138/78    Bilateral leg edema  Hypokalemia  Physical deconditioning-ambulating with walker and supervision. Requires stand by to min assist with cares.     Past Medical and Surgical History reviewed in Epic today.    MEDICATIONS:  Current Outpatient Medications   Medication Sig Dispense Refill     acetaminophen (TYLENOL) 325 MG tablet Take 2 tablets (650 mg) by mouth every 4 hours as needed for mild pain       albuterol (PROAIR HFA/PROVENTIL HFA/VENTOLIN HFA) 108 (90 Base) MCG/ACT inhaler Inhale 2 puffs into the lungs every 4 hours as needed for shortness of breath / dyspnea or wheezing       amLODIPine (NORVASC) 5 MG tablet Take 5 mg by mouth every morning       brimonidine-timolol (COMBIGAN) 0.2-0.5 % ophthalmic solution Place 1 drop into both eyes every morning       brinzolamide (AZOPT) 1 % ophthalmic suspension Place 1 drop into both eyes every morning       CALCIUM-MAGNESIUM-ZINC PO Take 1 tablet by mouth every morning 100/40/15       co-enzyme Q-10 100 MG CAPS capsule Take 100 mg by mouth every morning       docusate sodium (COLACE) 100 MG capsule Take 100 mg by mouth every evening       esomeprazole (NEXIUM) 40 MG DR capsule Take 40 mg by mouth every morning (before breakfast) Take 30-60 minutes before eating.       furosemide (LASIX) 40 MG tablet Take 80 mg by mouth daily       latanoprost (XALATAN) 0.005 % ophthalmic solution Place 1 drop into both eyes At Bedtime       Magnesium Oxide 250 MG TABS Take 500 mg by mouth daily       mirabegron (MYRBETRIQ) 50 MG 24 hr tablet Take 50 mg by mouth every morning       multivitamin w/minerals (THERA-VIT-M) tablet Take 1 tablet by mouth every morning       potassium chloride ER (K-TAB/KLOR-CON) 10 MEQ CR tablet Take 20  "mEq by mouth daily       vitamin B-12 (CYANOCOBALAMIN) 1000 MCG tablet Take 1,000 mcg by mouth every morning       vitamin D3 (CHOLECALCIFEROL) 2000 units tablet Take 2,000 Units by mouth daily       warfarin ANTICOAGULANT (COUMADIN) 5 MG tablet Take 5 mg by mouth See Admin Instructions Tuesday, Wednesday, Thursday, Saturday, Sunday       warfarin ANTICOAGULANT (COUMADIN) 5 MG tablet Take 7.5 mg by mouth See Admin Instructions Monday and Friday         REVIEW OF SYSTEMS:  4 point ROS including Respiratory, CV, GI and , other than that noted in the HPI,  is negative    Objective:  /79   Pulse 75   Temp 97.9  F (36.6  C)   Resp 16   Ht 1.702 m (5' 7\")   SpO2 95%   BMI 37.93 kg/m    Exam:  GENERAL APPEARANCE:  Alert, in no distress, morbidly obese  ENT:  Mouth and posterior oropharynx normal, moist mucous membranes, [place]  EYES:  EOM normal, conjunctiva and lids normal   NECK:  No adenopathy,masses or thyromegaly  RESP:  respiratory effort and palpation of chest normal, lungs clear to auscultation , no respiratory distress  CV:  Palpation and auscultation of heart done , regular rate and rhythm, no murmur, +2 pedal pulses, peripheral edema 2+ in both LE   ABDOMEN:  soft, nontender, no hepatosplenomegaly or other masses  M/S:   gait steady with walker. K[place]ALL with good strength. No joint inflammation  SKIN:  no rashes or open areas  PSYCH:  oriented X 3, memory impaired , affect and mood normal    Labs:   Lab Results   Component Value Date    WBC 8.1 10/07/2019     Lab Results   Component Value Date    RBC 4.21 10/07/2019     Lab Results   Component Value Date    HGB 11.0 10/07/2019     Lab Results   Component Value Date    HCT 35.3 10/07/2019     Lab Results   Component Value Date    MCV 84 10/07/2019     Lab Results   Component Value Date    MCH 26.1 10/07/2019     Lab Results   Component Value Date    MCHC 31.2 10/07/2019     Lab Results   Component Value Date    RDW 17.5 10/07/2019     Lab Results "   Component Value Date     10/07/2019     Last Comprehensive Metabolic Panel:  Sodium   Date Value Ref Range Status   10/07/2019 142 133 - 144 mmol/L Final     Potassium   Date Value Ref Range Status   10/07/2019 3.8 3.4 - 5.3 mmol/L Final     Chloride   Date Value Ref Range Status   10/07/2019 111 (H) 94 - 109 mmol/L Final     Carbon Dioxide   Date Value Ref Range Status   10/07/2019 27 20 - 32 mmol/L Final     Anion Gap   Date Value Ref Range Status   10/07/2019 4 3 - 14 mmol/L Final     Glucose   Date Value Ref Range Status   10/07/2019 105 (H) 70 - 99 mg/dL Final     Urea Nitrogen   Date Value Ref Range Status   10/07/2019 21 7 - 30 mg/dL Final     Creatinine   Date Value Ref Range Status   10/07/2019 1.08 0.66 - 1.25 mg/dL Final     GFR Estimate   Date Value Ref Range Status   10/07/2019 66 >60 mL/min/[1.73_m2] Final     Comment:     Non  GFR Calc  Starting 12/18/2018, serum creatinine based estimated GFR (eGFR) will be   calculated using the Chronic Kidney Disease Epidemiology Collaboration   (CKD-EPI) equation.       Calcium   Date Value Ref Range Status   10/07/2019 8.9 8.5 - 10.1 mg/dL Final         ASSESSMENT/PLAN  (K85.90) Acute pancreatitis, unspecified complication status, unspecified pancreatitis type  (primary encounter diagnosis)  (B99.9) Intra-abdominal infection  Comment: infection appears to be resolved. Completed Augmentin 10/6/2019. We are unsure if he has passed the stent. His wife has paperwork from [facility] indicating that he should have KUB 2 weeks after surgery to follow up on the stent.   Plan: XR today to check on stent placement. Follow up with [facility] pending the result.     (I77.89) Sneddon syndrome (H)  (D68.51) Heterozygous factor V Leiden mutation (H)  (Z86.73) History of CVA (cerebrovascular accident)  (Z79.01) Chronic anticoagulation  Comment: INR slightly low yesterday at 1.9. He received 7.5 mg coumadin last night.   Plan: coumadin 7.5 mg tonight. INR in  am.      (F01.50) Vascular dementia without behavioral disturbance (H)  Comment: cognition is at baseline, per wife  Plan: cognitive testing is in progress. Staff to assist with cares.      (J44.9) Chronic obstructive pulmonary disease, unspecified COPD type (H)  Comment: no acute respiratory issues  Plan: continue prn albuterol. Closely monitor respiratory status.      (E66.01) Obesity (BMI 35.0-39.9) with comorbidity (H)  (G47.33) KORY (obstructive sleep apnea)  Comment: chronic.   Plan: continue CPAP at usual home settings. He has a sleep study 10/16/2019 to assess if he would benefit from BiPAP.      (I10) Essential hypertension  Comment: improved control from tcu admission.   Plan: continue amlodipine, lasix. Monitor VS and adjust meds as indicated.      (R60.0) Bilateral leg edema  Comment: acute on chronic LE edema improved. Edema of hands has resolved. Weight down 7 lbs, probably  due to diuresis.   Plan: continue lasix 80 mg daily, his usual home dose. Compression stockings and elevate legs. Follow weight, BMP.      (E87.6) Hypokalemia  Comment: replaced   Plan: continue KCl. Follow BMP     (R53.81) Physical deconditioning  Comment: progressing in therapies   Plan: continue PHYSICAL THERAPY/OT. Goal is to return home with his wife and home services if needed.     ADDENDUM 10/9/2019: Abdominal XR from 10/8/2019 did not show the pancreatic stent, so he most likely passed it. His wife is going to update GI at [facility] for further instructions.       Electronically signed by:  NISA Landon CNP

## 2019-10-09 ENCOUNTER — DOCUMENTATION ONLY (OUTPATIENT)
Dept: OTHER | Facility: CLINIC | Age: 79
End: 2019-10-09

## 2019-10-09 ENCOUNTER — NURSING HOME VISIT (OUTPATIENT)
Dept: GERIATRICS | Facility: CLINIC | Age: 79
End: 2019-10-09
Payer: COMMERCIAL

## 2019-10-09 VITALS
BODY MASS INDEX: 37.93 KG/M2 | HEIGHT: 67 IN | DIASTOLIC BLOOD PRESSURE: 67 MMHG | TEMPERATURE: 98.2 F | SYSTOLIC BLOOD PRESSURE: 123 MMHG | RESPIRATION RATE: 18 BRPM | OXYGEN SATURATION: 97 % | HEART RATE: 66 BPM

## 2019-10-09 DIAGNOSIS — Z51.81 ENCOUNTER FOR THERAPEUTIC DRUG MONITORING: ICD-10-CM

## 2019-10-09 DIAGNOSIS — Z79.01 LONG TERM (CURRENT) USE OF ANTICOAGULANTS: ICD-10-CM

## 2019-10-09 DIAGNOSIS — I77.89 SNEDDON SYNDROME (H): Primary | ICD-10-CM

## 2019-10-09 DIAGNOSIS — D68.51 HETEROZYGOUS FACTOR V LEIDEN MUTATION (H): ICD-10-CM

## 2019-10-09 DIAGNOSIS — Z86.73 HISTORY OF CVA (CEREBROVASCULAR ACCIDENT): ICD-10-CM

## 2019-10-09 PROCEDURE — 99308 SBSQ NF CARE LOW MDM 20: CPT | Performed by: NURSE PRACTITIONER

## 2019-10-09 NOTE — PROGRESS NOTES
"[facility]  [place] [facility] Record Number:  [id number]  Place of Service where encounter took place[facility] [[id number]]    HPI:    Edison Saldivar is a 79 year old  (1940), who is being seen today for an episodic care visit at the above location.   HPI information obtained from: facility chart records, facility staff, patient report, [facility] chart review and family/first contact wife report. Today's concern is INR/Coumadin management for Factor V Leiden and Sneddon syndrome with history of CVA    ROS/Subjective:  Bleeding Signs/Symptoms:  None  Thromboembolic Signs/Symptoms:  None  Medication Changes:  No  Dietary Changes:  No  Activity Changes: No  Bacterial/Viral Infection:  No  Missed Coumadin Doses:  None  On ASA: No  Other Concerns:  No    OBJECTIVE:  /67   Pulse 66   Temp 98.2  F (36.8  C)   Resp 18   Ht 1.702 m (5' 7\")   SpO2 97%   BMI 37.93 kg/m    Last INR: 1.9 on 10/7/2019  INR Today:  2.0   Current Dose:  7.5 mg daily     ASSESSMENT:     Sneddon syndrome (H)  Heterozygous factor V Leiden mutation (H)  History of CVA (cerebrovascular accident)  Long term (current) use of anticoagulants  Encounter for therapeutic drug monitoring  Therapeutic INR for goal of 2-3    PLAN:     New Dose: continue coumadin 7.5 mg daily     (note: his usual home dose is 7.5 mg on Mon and Fri, 5 mg the other days of the week)  Next INR: 10/11/2019       Electronically signed by:  NISA Landon CNP       "

## 2019-10-12 ENCOUNTER — TELEPHONE (OUTPATIENT)
Dept: GERIATRICS | Facility: CLINIC | Age: 79
End: 2019-10-12

## 2019-10-12 NOTE — TELEPHONE ENCOUNTER
INR done today and = 2.6  Yesterday = 2.5 and had coumadin 5mg last night    Orders:  Coumadin 5mg tonight   INR tomorrow 10/13    Electronically signed by Yesenia De León RN, CNP

## 2019-10-13 ENCOUNTER — TELEPHONE (OUTPATIENT)
Dept: GERIATRICS | Facility: CLINIC | Age: 79
End: 2019-10-13

## 2019-10-13 NOTE — TELEPHONE ENCOUNTER
Called re: INR    Usual home dose is 7.5 mg on Mon/Fri, 5 mg rest of the week    10/8  7.5 mg  10/9  7.5 mg  10/10  7.5 mg  10/11 -- INR 2.5  5 mg   10/12 -- INR 2.6  5 mg    10/13 --  INR 2.1    New orders  Warfarin 7.5 mg 10/13, 10/14  Warfarin 5 mg 10/15  INR 10/16    Mahsa García MD

## 2019-10-15 ENCOUNTER — DISCHARGE SUMMARY NURSING HOME (OUTPATIENT)
Dept: GERIATRICS | Facility: CLINIC | Age: 79
End: 2019-10-15
Payer: COMMERCIAL

## 2019-10-15 VITALS
HEART RATE: 81 BPM | WEIGHT: 240.6 LBS | OXYGEN SATURATION: 97 % | DIASTOLIC BLOOD PRESSURE: 81 MMHG | TEMPERATURE: 97.6 F | SYSTOLIC BLOOD PRESSURE: 155 MMHG | RESPIRATION RATE: 18 BRPM | HEIGHT: 67 IN | BODY MASS INDEX: 37.76 KG/M2

## 2019-10-15 VITALS
SYSTOLIC BLOOD PRESSURE: 158 MMHG | HEIGHT: 69 IN | WEIGHT: 240 LBS | TEMPERATURE: 97.6 F | HEART RATE: 81 BPM | BODY MASS INDEX: 35.55 KG/M2 | RESPIRATION RATE: 16 BRPM | DIASTOLIC BLOOD PRESSURE: 81 MMHG

## 2019-10-15 DIAGNOSIS — F01.50 VASCULAR DEMENTIA WITHOUT BEHAVIORAL DISTURBANCE (H): ICD-10-CM

## 2019-10-15 DIAGNOSIS — I77.89 SNEDDON SYNDROME (H): ICD-10-CM

## 2019-10-15 DIAGNOSIS — J44.9 CHRONIC OBSTRUCTIVE PULMONARY DISEASE, UNSPECIFIED COPD TYPE (H): ICD-10-CM

## 2019-10-15 DIAGNOSIS — I10 ESSENTIAL HYPERTENSION: ICD-10-CM

## 2019-10-15 DIAGNOSIS — R53.81 PHYSICAL DECONDITIONING: ICD-10-CM

## 2019-10-15 DIAGNOSIS — D68.51 HETEROZYGOUS FACTOR V LEIDEN MUTATION (H): ICD-10-CM

## 2019-10-15 DIAGNOSIS — B99.9 INTRA-ABDOMINAL INFECTION: Primary | ICD-10-CM

## 2019-10-15 DIAGNOSIS — Z86.73 HISTORY OF CVA (CEREBROVASCULAR ACCIDENT): ICD-10-CM

## 2019-10-15 PROCEDURE — 99316 NF DSCHRG MGMT 30 MIN+: CPT | Performed by: NURSE PRACTITIONER

## 2019-10-15 ASSESSMENT — MIFFLIN-ST. JEOR
SCORE: 1804.01
SCORE: 1774.98

## 2019-10-15 NOTE — PROGRESS NOTES
[facility] DISCHARGE SUMMARY  PATIENT'S NAME: Edison Saldivar  YOB: 1940  MEDICAL RECORD NUMBER:  [id number]  [facility] of Service where encounter took place[facility] [[id number]]    PRIMARY CARE PROVIDER AND CLINIC RESPONSIBLE AFTER TRANSFER:   Merlin Emery Brown, MD, [facility] [address] / [place] [address]    Non-[facility] Provider     Transferring providers: NISA Saha CNP, Rolando Ortega MD  Recent Hospitalization/ED:  [facility] Hospital stay 9/27 to 10/1/2019.  Date of SNF Admission: October / 01 / 2019  Date of SNF (anticipated) Discharge: October / 16 / 2019  Discharged to: previous independent home  Cognitive Scores: SLUMS: 13/30  Physical Function: TUG = 17.61 sec and gait speed = 0.58 both indicate high risk for falls and re-hospitalization.  DME: [facility]    CODE STATUS/ADVANCE DIRECTIVES DISCUSSION:  DNR   ALLERGIES: Patient has no known allergies.    DISCHARGE DIAGNOSIS/NURSING FACILITY COURSE:     Intra-abdominal infection  Mr. Saldivar was admitted to this rehab facility quite deconditioned due to hospitalization for acute pancreatitis s/p duodenal polypectomy with sphincterotomy and pancreatic duct stenting on 9/23/2019 at [facility]. He presented to [facility] with Fever, Leukocytosis, severe abdominal pain.  He was treated with IVF and IV antibiotics and discharged to this facility on oral antibiotics.  He has been on a low fiber diet.  Lasix restarted upon admission to TCU.  Over the course of the past 2 weeks his appetite has improved, pain resolved, and feeling stronger.  He will discharge back to his home with his spouse with home care RN, PT, OT, and HHA.    Heterozygous factor V Leiden mutation (H)  Sneddon syndrome (H)  Vascular dementia without behavioral disturbance (H)  History of CVA (cerebrovascular accident)  His coagulopathy is managed with Coumadin - INRs have been therapeutic between 2-3 receiving  alternating dosing between 5 and 7.5 mg Warfarin.  No over s/sx bleeding noted.    Essential hypertension  Blood pressures have been stable on Furosemide and Amlodipine.  He did have hypokalemia in the hospital and continues on KCl supplement.    Chronic obstructive pulmonary disease, unspecified COPD type (H)  stable    Physical deconditioning  Improved by still not at baseline.  Therapy department recommends assist with all ADLs, meal prep, financial, and medication management.  - HOME CARE NURSING REFERRAL    Past Medical History:  has a past medical history of Topete's esophagus, COPD (chronic obstructive pulmonary disease) (H), Heterozygous factor V Leiden mutation (H), History of CVA (cerebrovascular accident), HTN (hypertension), KORY on CPAP, Sneddon syndrome (H), and Vascular dementia (H).    Discharge Medications:  Current Outpatient Medications   Medication Sig Dispense Refill     acetaminophen (TYLENOL) 325 MG tablet Take 2 tablets (650 mg) by mouth every 4 hours as needed for mild pain       albuterol (PROAIR HFA/PROVENTIL HFA/VENTOLIN HFA) 108 (90 Base) MCG/ACT inhaler Inhale 2 puffs into the lungs every 4 hours as needed for shortness of breath / dyspnea or wheezing       amLODIPine (NORVASC) 5 MG tablet Take 5 mg by mouth every morning       brimonidine-timolol (COMBIGAN) 0.2-0.5 % ophthalmic solution Place 1 drop into both eyes every morning       brinzolamide (AZOPT) 1 % ophthalmic suspension Place 1 drop into both eyes every morning       CALCIUM-MAGNESIUM-ZINC PO Take 1 tablet by mouth every morning 100/40/15       co-enzyme Q-10 100 MG CAPS capsule Take 100 mg by mouth every morning       docusate sodium (COLACE) 100 MG capsule Take 100 mg by mouth every evening       esomeprazole (NEXIUM) 40 MG DR capsule Take 40 mg by mouth every morning (before breakfast) Take 30-60 minutes before eating.       furosemide (LASIX) 40 MG tablet Take 80 mg by mouth daily       latanoprost (XALATAN) 0.005 %  "ophthalmic solution Place 1 drop into both eyes At Bedtime       Magnesium Oxide 250 MG TABS Take 500 mg by mouth daily       mirabegron (MYRBETRIQ) 50 MG 24 hr tablet Take 50 mg by mouth every morning       multivitamin w/minerals (THERA-VIT-M) tablet Take 1 tablet by mouth every morning       potassium chloride ER (K-TAB/KLOR-CON) 10 MEQ CR tablet Take 20 mEq by mouth daily       vitamin B-12 (CYANOCOBALAMIN) 1000 MCG tablet Take 1,000 mcg by mouth every morning       vitamin D3 (CHOLECALCIFEROL) 2000 units tablet Take 2,000 Units by mouth daily       warfarin ANTICOAGULANT (COUMADIN) 5 MG tablet Take 5 mg by mouth See Admin Instructions Tuesday, Wednesday, Thursday, Saturday, Sunday       warfarin ANTICOAGULANT (COUMADIN) 5 MG tablet Take 7.5 mg by mouth See Admin Instructions Monday and Friday         Medication Changes/Rationale:     none    Controlled medications sent with patient:   not applicable/none     ROS:   4 point ROS including Respiratory, CV, GI and , other than that noted in the HPI,  is negative    Physical Exam:   Vitals: BP (!) 158/81   Pulse 81   Temp 97.6  F (36.4  C)   Resp 16   Ht 1.753 m (5' 9\")   Wt 108.9 kg (240 lb)   BMI 35.44 kg/m    BMI= Body mass index is 35.44 kg/m .  GENERAL APPEARANCE:  Alert, in no distress  EYES:  EOM, conjunctivae, lids, pupils and irises normal  RESP:  respiratory effort and palpation of chest normal, lungs clear to auscultation , no respiratory distress  CV:  Palpation and auscultation of heart done , regular rate and rhythm, no murmur, rub, or gallop  ABDOMEN:  normal bowel sounds, soft, nontender, no hepatosplenomegaly or other masses  M/S:   Gait and station abnormal requires 4 WW and assist, high falls risk  Digits and nails normal     SNF labs: Labs done in SNF are in [facility]. Please refer to them using EPIC/Care Everywhere. and Recent labs in [facility] reviewed by me today.       DISCHARGE PLAN:    Follow up labs: INR to be drawn with 1st RN home " care visit    Medical Follow Up:      Follow up with primary care provider in 1-2 weeks    MTM referral needed and placed by this provider: No    Current [place] scheduled appointments:  Next 5 appointments (look out 90 days)    Nov 06, 2019 10:30 AM CST  Return Visit with George Jhaveri MD  [facility] ([facility] [address]  [facility] [id number] [phone]           Discharge Services: Home Care:  Occupational Therapy, Physical Therapy, Registered Nurse, Home Health Aide and From:  [facility]    Discharge Instructions Verbalized to Patient at Discharge:     None      TOTAL DISCHARGE TIME:   Greater than 30 minutes  Electronically signed by:  NISA Saha CNP     Home care Face to Face documentation done in EPIC attached to Home care orders for [facility].

## 2019-10-15 NOTE — LETTER
10/15/2019        RE: Edison Saldivar  [address] [place] [address]  [facility] [address]        [facility] DISCHARGE SUMMARY  PATIENT'S NAME: Edison Saldivar  YOB: 1940  MEDICAL RECORD NUMBER:  [id number]  Place of Service where encounter took place:  [facility] [[id number]]    PRIMARY CARE PROVIDER AND CLINIC RESPONSIBLE AFTER TRANSFER:   Merlin Emery Brown, MD, [facility] [address] / [place]    Non-[facility] Provider     Transferring providers: NISA Saha CNP, Rolando Ortega MD  Recent Hospitalization/ED:  [facility] Hospital stay 9/27 to 10/1/2019.  Date of SNF Admission: October / 01 / 2019  Date of SNF (anticipated) Discharge: October / 16 / 2019  Discharged to: previous independent home  Cognitive Scores: SLUMS: 13/30  Physical Function: TUG = 17.61 sec and gait speed = 0.58 both indicate high risk for falls and re-hospitalization.  DME: [facility]    CODE STATUS/ADVANCE DIRECTIVES DISCUSSION:  DNR   ALLERGIES: Patient has no known allergies.    DISCHARGE DIAGNOSIS/NURSING FACILITY COURSE:     Intra-abdominal infection  Mr. Saldivar was admitted to this rehab facility quite deconditioned due to hospitalization for acute pancreatitis s/p duodenal polypectomy with sphincterotomy and pancreatic duct stenting on 9/23/2019 at [facility]. He presented to [facility] with Fever, Leukocytosis, severe abdominal pain.  He was treated with IVF and IV antibiotics and discharged to this facility on oral antibiotics.  He has been on a low fiber diet.  Lasix restarted upon admission to TCU.  Over the course of the past 2 weeks his appetite has improved, pain resolved, and feeling stronger.  He will discharge back to his home with his spouse with home care RN, PT, OT, and HHA.    Heterozygous factor V Leiden mutation (H)  Sneddon syndrome (H)  Vascular dementia without behavioral disturbance (H)  History of CVA (cerebrovascular accident)  His  coagulopathy is managed with Coumadin - INRs have been therapeutic between 2-3 receiving alternating dosing between 5 and 7.5 mg Warfarin.  No over s/sx bleeding noted.    Essential hypertension  Blood pressures have been stable on Furosemide and Amlodipine.  He did have hypokalemia in the hospital and continues on KCl supplement.    Chronic obstructive pulmonary disease, unspecified COPD type (H)  stable    Physical deconditioning  Improved by still not at baseline.  Therapy department recommends assist with all ADLs, meal prep, financial, and medication management.  - HOME CARE NURSING REFERRAL    Past Medical History:  has a past medical history of Topete's esophagus, COPD (chronic obstructive pulmonary disease) (H), Heterozygous factor V Leiden mutation (H), History of CVA (cerebrovascular accident), HTN (hypertension), KORY on CPAP, Sneddon syndrome (H), and Vascular dementia (H).    Discharge Medications:  Current Outpatient Medications   Medication Sig Dispense Refill     acetaminophen (TYLENOL) 325 MG tablet Take 2 tablets (650 mg) by mouth every 4 hours as needed for mild pain       albuterol (PROAIR HFA/PROVENTIL HFA/VENTOLIN HFA) 108 (90 Base) MCG/ACT inhaler Inhale 2 puffs into the lungs every 4 hours as needed for shortness of breath / dyspnea or wheezing       amLODIPine (NORVASC) 5 MG tablet Take 5 mg by mouth every morning       brimonidine-timolol (COMBIGAN) 0.2-0.5 % ophthalmic solution Place 1 drop into both eyes every morning       brinzolamide (AZOPT) 1 % ophthalmic suspension Place 1 drop into both eyes every morning       CALCIUM-MAGNESIUM-ZINC PO Take 1 tablet by mouth every morning 100/40/15       co-enzyme Q-10 100 MG CAPS capsule Take 100 mg by mouth every morning       docusate sodium (COLACE) 100 MG capsule Take 100 mg by mouth every evening       esomeprazole (NEXIUM) 40 MG DR capsule Take 40 mg by mouth every morning (before breakfast) Take 30-60 minutes before eating.       furosemide  "(LASIX) 40 MG tablet Take 80 mg by mouth daily       latanoprost (XALATAN) 0.005 % ophthalmic solution Place 1 drop into both eyes At Bedtime       Magnesium Oxide 250 MG TABS Take 500 mg by mouth daily       mirabegron (MYRBETRIQ) 50 MG 24 hr tablet Take 50 mg by mouth every morning       multivitamin w/minerals (THERA-VIT-M) tablet Take 1 tablet by mouth every morning       potassium chloride ER (K-TAB/KLOR-CON) 10 MEQ CR tablet Take 20 mEq by mouth daily       vitamin B-12 (CYANOCOBALAMIN) 1000 MCG tablet Take 1,000 mcg by mouth every morning       vitamin D3 (CHOLECALCIFEROL) 2000 units tablet Take 2,000 Units by mouth daily       warfarin ANTICOAGULANT (COUMADIN) 5 MG tablet Take 5 mg by mouth See Admin Instructions Tuesday, Wednesday, Thursday, Saturday, Sunday       warfarin ANTICOAGULANT (COUMADIN) 5 MG tablet Take 7.5 mg by mouth See Admin Instructions Monday and Friday         Medication Changes/Rationale:     none    Controlled medications sent with patient:   not applicable/none     ROS:   4 point ROS including Respiratory, CV, GI and , other than that noted in the HPI,  is negative    Physical Exam:   Vitals: BP (!) 158/81   Pulse 81   Temp 97.6  F (36.4  C)   Resp 16   Ht 1.753 m (5' 9\")   Wt 108.9 kg (240 lb)   BMI 35.44 kg/m     BMI= Body mass index is 35.44 kg/m .  GENERAL APPEARANCE:  Alert, in no distress  EYES:  EOM, conjunctivae, lids, pupils and irises normal  RESP:  respiratory effort and palpation of chest normal, lungs clear to auscultation , no respiratory distress  CV:  Palpation and auscultation of heart done , regular rate and rhythm, no murmur, rub, or gallop  ABDOMEN:  normal bowel sounds, soft, nontender, no hepatosplenomegaly or other masses  M/S:   Gait and station abnormal requires 4 WW and assist, high falls risk  Digits and nails normal     SNF labs: Labs done in SNF are in [facility]. Please refer to them using EPIC/Care Everywhere. and Recent labs in EPIC reviewed by " me today.       DISCHARGE PLAN:    Follow up labs: INR to be drawn with 1st RN home care visit    Medical Follow Up:      Follow up with primary care provider in 1-2 weeks    MTM referral needed and placed by this provider: No    Current [facility] scheduled appointments:  Next 5 appointments (look out 90 days)    Nov 06, 2019 10:30 AM CST  Return Visit with George Jhaveri MD  [facility] ([facility] [address]  [facility] [id number] [phone]           Discharge Services: Home Care:  Occupational Therapy, Physical Therapy, Registered Nurse, Home Health Aide and From:  [facility]    Discharge Instructions Verbalized to Patient at Discharge:     None      TOTAL DISCHARGE TIME:   Greater than 30 minutes  Electronically signed by:  NISA Saha CNP     Home care Face to Face documentation done in EPIC attached to Home care orders for [facility].                 Sincerely,        NISA Saha CNP

## 2019-10-15 NOTE — PROGRESS NOTES
"[facility] [facility]  [place] Medical Record Number:  [id number]  Place of Service where encounter took place: [facility] [[id number]]    HPI:    Edison Saldivar is a 79 year old  (1940), who is being seen today for an episodic care visit at the above location.   HPI information obtained from: facility chart records, facility staff, patient report and [facility] chart review. Today's concern is INR/Coumadin management for Factor V Leiden and Sneddon syndrome     ROS/Subjective:  Bleeding Signs/Symptoms:  None  Thromboembolic Signs/Symptoms:  None  Medication Changes:  No  Dietary Changes:  No  Activity Changes: No  Bacterial/Viral Infection:  No  Missed Coumadin Doses:  None  On ASA: No  Other Concerns:  Yes: discharging home today     OBJECTIVE:  BP (!) 155/81   Pulse 81   Temp 97.6  F (36.4  C)   Resp 18   Ht 1.702 m (5' 7\")   Wt 109.1 kg (240 lb 9.6 oz)   SpO2 97%   BMI 37.68 kg/m    Last INR: 2.1 on 10/13/2019  INR Today:  2.5  Current Dose:  7.5 mg on Mondays and Fridays, 5 mg the other days of the week       ASSESSMENT:     Heterozygous factor V Leiden mutation (H)  Sneddon syndrome (H)  History of CVA (cerebrovascular accident)  Long term (current) use of anticoagulants  Encounter for therapeutic drug monitoring  Therapeutic INR for goal of 2-3    PLAN:  New Dose: No Change    Next INR: 10/18/2019  His wife checks his INR at home and will call  in the result for further orders, per their usual routine.       Electronically signed by:  NISA Landon CNP       "

## 2019-10-16 ENCOUNTER — NURSING HOME VISIT (OUTPATIENT)
Dept: GERIATRICS | Facility: CLINIC | Age: 79
End: 2019-10-16
Payer: COMMERCIAL

## 2019-10-16 DIAGNOSIS — Z86.73 HISTORY OF CVA (CEREBROVASCULAR ACCIDENT): ICD-10-CM

## 2019-10-16 DIAGNOSIS — D68.51 HETEROZYGOUS FACTOR V LEIDEN MUTATION (H): Primary | ICD-10-CM

## 2019-10-16 DIAGNOSIS — Z51.81 ENCOUNTER FOR THERAPEUTIC DRUG MONITORING: ICD-10-CM

## 2019-10-16 DIAGNOSIS — Z79.01 LONG TERM (CURRENT) USE OF ANTICOAGULANTS: ICD-10-CM

## 2019-10-16 DIAGNOSIS — I77.89 SNEDDON SYNDROME (H): ICD-10-CM

## 2019-10-16 PROCEDURE — 99308 SBSQ NF CARE LOW MDM 20: CPT | Performed by: NURSE PRACTITIONER

## 2019-11-06 ENCOUNTER — OFFICE VISIT (OUTPATIENT)
Dept: OTHER | Facility: CLINIC | Age: 79
End: 2019-11-06
Attending: INTERNAL MEDICINE
Payer: COMMERCIAL

## 2019-11-06 VITALS
HEIGHT: 67 IN | SYSTOLIC BLOOD PRESSURE: 125 MMHG | OXYGEN SATURATION: 97 % | BODY MASS INDEX: 37.83 KG/M2 | RESPIRATION RATE: 18 BRPM | DIASTOLIC BLOOD PRESSURE: 76 MMHG | HEART RATE: 103 BPM | WEIGHT: 241 LBS

## 2019-11-06 DIAGNOSIS — I10 BENIGN ESSENTIAL HYPERTENSION: ICD-10-CM

## 2019-11-06 DIAGNOSIS — E66.01 MORBID OBESITY (H): ICD-10-CM

## 2019-11-06 DIAGNOSIS — I77.89 SNEDDON SYNDROME (H): Primary | ICD-10-CM

## 2019-11-06 DIAGNOSIS — R76.8 ELEVATED ANTINUCLEAR ANTIBODY (ANA) LEVEL: ICD-10-CM

## 2019-11-06 DIAGNOSIS — G47.33 OSA (OBSTRUCTIVE SLEEP APNEA): ICD-10-CM

## 2019-11-06 DIAGNOSIS — D68.51 FACTOR 5 LEIDEN MUTATION, HETEROZYGOUS (H): ICD-10-CM

## 2019-11-06 DIAGNOSIS — I35.0 AORTIC VALVE STENOSIS, ETIOLOGY OF CARDIAC VALVE DISEASE UNSPECIFIED: ICD-10-CM

## 2019-11-06 PROCEDURE — 99215 OFFICE O/P EST HI 40 MIN: CPT | Mod: ZP | Performed by: INTERNAL MEDICINE

## 2019-11-06 PROCEDURE — G0463 HOSPITAL OUTPT CLINIC VISIT: HCPCS

## 2019-11-06 ASSESSMENT — MIFFLIN-ST. JEOR: SCORE: 1776.8

## 2019-11-06 NOTE — PROGRESS NOTES
SUBJECTIVE:  CC:   Follow-up visit accompanied by wife and daughter  Last year  admitted to the hospital with hemoptysis, deconditioning and pneumonia, required TCU at discharge and now at home with support   History of Sneddon syndrome, he was taking warfarin for 30+ years.  Back on warfarin and maintaining INR good range  2/2019 excellent inflammatory markers and normalized.  Seen and evaluated in the hospital by lung specialist and also recently seen after CT chest at [facility].  BP better and HR >100 ( drinks excess coffee)   HPI:  Edison Saldivar is a 79 year old male with past medical history of hypertension, obesity, obstructive sleep apnea, Sneddon syndrome,  Heterozygous factor V Leyden mutation, COPD recently admitted to the [facility] initially to the ICU for hemoptysis and epistaxis, he was getting warfarin which was held and reversed followed by IV heparin drip converted to Lovenox bridging with warfarin tolerating without any problems.  Admission CT left lower lobe consolidation/pneumonia and also small left upper lobe nodules, he was former smoker.  He was seen and evaluated by lung specialist in the hospital then followed by recently seen outpatient visit after CT scan he was told it is better.  No more hemoptysis, completed antibiotics.  Leg swelling improved.  Lives at home with wife.  He was initiated Spiriva by pulmonologist  Reviewed recent hospitalization records imaging studies and laboratory data  Only abnormality is borderline positive CELY     HISTORIES:  PROBLEM LIST:   Patient Active Problem List   Diagnosis     Hemoptysis     Intra-abdominal infection     Sneddon syndrome (H)     Heterozygous factor V Leiden mutation (H)     History of CVA (cerebrovascular accident)     Vascular dementia (H)     KORY on CPAP     Obesity (BMI 35.0-39.9) with comorbidity (H)     HTN (hypertension)     COPD (chronic obstructive pulmonary disease) (H)     PAST MEDICAL  HISTORY:  Hypertension  Obesity  Obstructive sleep apnea  Sneddon syndrome  Aortic valve stenosis moderate 1.7 cm square area  COPD  PAST SURGICAL HISTORY:  Past Surgical History:   Procedure Laterality Date     Duodenal polypectomy with sphincterotomy   09/23/2019    at [facility]      CURRENT MEDICATIONS:  Current Outpatient Medications   Medication Sig Dispense Refill     acetaminophen (TYLENOL) 325 MG tablet Take 2 tablets (650 mg) by mouth every 4 hours as needed for mild pain       albuterol (PROAIR HFA/PROVENTIL HFA/VENTOLIN HFA) 108 (90 Base) MCG/ACT inhaler Inhale 2 puffs into the lungs every 4 hours as needed for shortness of breath / dyspnea or wheezing       amLODIPine (NORVASC) 5 MG tablet Take 5 mg by mouth every morning       brimonidine-timolol (COMBIGAN) 0.2-0.5 % ophthalmic solution Place 1 drop into both eyes every morning       brinzolamide (AZOPT) 1 % ophthalmic suspension Place 1 drop into both eyes every morning       CALCIUM-MAGNESIUM-ZINC PO Take 1 tablet by mouth every morning 100/40/15       co-enzyme Q-10 100 MG CAPS capsule Take 100 mg by mouth every morning       docusate sodium (COLACE) 100 MG capsule Take 100 mg by mouth every evening       esomeprazole (NEXIUM) 40 MG DR capsule Take 40 mg by mouth every morning (before breakfast) Take 30-60 minutes before eating.       furosemide (LASIX) 40 MG tablet Take 80 mg by mouth daily       latanoprost (XALATAN) 0.005 % ophthalmic solution Place 1 drop into both eyes At Bedtime       Magnesium Oxide 250 MG TABS Take 500 mg by mouth daily       mirabegron (MYRBETRIQ) 50 MG 24 hr tablet Take 50 mg by mouth every morning       multivitamin w/minerals (THERA-VIT-M) tablet Take 1 tablet by mouth every morning       potassium chloride ER (K-TAB/KLOR-CON) 10 MEQ CR tablet Take 20 mEq by mouth daily       vitamin B-12 (CYANOCOBALAMIN) 1000 MCG tablet Take 1,000 mcg by mouth every morning       vitamin D3 (CHOLECALCIFEROL) 2000 units tablet Take 2,000 Units  by mouth daily       warfarin ANTICOAGULANT (COUMADIN) 5 MG tablet Take 5 mg by mouth See Admin Instructions Tuesday, Wednesday, Thursday, Saturday, Sunday       warfarin ANTICOAGULANT (COUMADIN) 5 MG tablet Take 7.5 mg by mouth See Admin Instructions Monday and Friday       ALLERGIES:  No Known Allergies  SOCIAL HISTORY:  Social History     Socioeconomic History     Marital status:      Spouse name: Not on file     Number of children: Not on file     Years of education: Not on file     Highest education level: Not on file   Occupational History     Occupation: retired    Social Needs     Financial resource strain: Not on file     Food insecurity:     Worry: Not on file     Inability: Not on file     Transportation needs:     Medical: Not on file     Non-medical: Not on file   Tobacco Use     Smoking status: Never Smoker     Smokeless tobacco: Never Used   Substance and Sexual Activity     Alcohol use: No     Frequency: Never     Drug use: No     Sexual activity: Never   Lifestyle     Physical activity:     Days per week: Not on file     Minutes per session: Not on file     Stress: Not on file   Relationships     Social connections:     Talks on phone: Not on file     Gets together: Not on file     Attends Mosque service: Not on file     Active member of club or organization: Not on file     Attends meetings of clubs or organizations: Not on file     Relationship status: Not on file     Intimate partner violence:     Fear of current or ex partner: Not on file     Emotionally abused: Not on file     Physically abused: Not on file     Forced sexual activity: Not on file   Other Topics Concern     Not on file   Social History Narrative     Not on file     FAMILY HISTORY:    Reviewed negative  REVIEW OF SYSTEMS:  CONSTITUTIONAL:no malaise, fatigue, or other general symptoms  EYES: no subjective changes in visual acuity, no photophobia  ENT/MOUTH: no complaints of rhinorrhea, nasal congestion, sore throat,  "hearing changes  RESP:no SOB  CV: no c/o exertional chest pressure or MONTALVO  GI: No abdominal pain, constipation, change in bowel movements, nausea, pyrosis, BRBPR  :no polyuria or polydipsia, no dysuria, no gross hematuria  MUSCULOSKELATAL:no arthalgias or myalgias  INTEGUMENTARY/SKIN: no pruritis, rashes, or moles with recent change in size, shape, or pigmentation  NEURO: no gross sensory or motor symptoms, no dizziness, no confusion  ENDOCRINE: no polyuria or polydipsia, no heat or cold intolerance  HEME/ALLERGY/IMMUNE: no fevers, chills, night sweats, or unwanted weight loss  PSYCHIATRIC: no depression, anxiety, or internal stimuli  EXAM:  /76 (BP Location: Right arm, Patient Position: Chair, Cuff Size: Adult Large)   Pulse 103   Resp 18   Ht 5' 7\" (1.702 m)   Wt 241 lb (109.3 kg)   SpO2 97%   BMI 37.75 kg/m    BMI: Body mass index is 37.75 kg/m .  GENERAL APPEARANCE:  Pleasant  Healthy appearing male , alert, active, no distress cooperative.  EXAM:  EYES: clear conjunctiva, no cataracts, no obvious fundoscopic abnormalities  HENT: oropharynx, nares, and TMs are WNL  NECK: no JVD, thyromegaly or lymphadenopathy, no cervical bruits  RESP: clear to auscultation without rales, wheezes, or rhonchi  CV: RRR,  2-3/6 Aortic murmur heard, no gallops, or rubs  LYMPH: no cervical , axillary, or inguinal lymphadenopathy appreciated  GI: NABS, ND/NT, no masses or organomegally appreciated  : normal external genitalia and anus, no lumps, masses  MS: no obvoius clinicallly relevant arthropathy, no evidence vasculitis  SKIN: no nevi clinically suspicious for malignancy are noted  NEURO: CN II-XII intact, no localizing sensory or motor abnoramlities noted, DTRs symmetrical bilaterally  PSYCH: Mental status exam reveals the pt to have normal mood and affect. There is no disorder of thought form or content. There is no response to internal stimuli. There is no suicidal or homicidal ideation.    Reviewed recent " labs:    Assessment and plan:    Hx of Sneddon syndrome with Livedo reticularis and multiple CVAs ( on warfarin for 30 plus years   (D68.51) Factor 5 Leiden mutation, heterozygous (H)  (primary encounter diagnosis)  Comment: Has been taking warfarin for many years maintaining INR therapeutic range continue the same  Life time candidate for anticoagulation if tolerates.    (I10) Benign essential hypertension  HR high ( drinks excess coffee) , decrease intake of coffee  BP better   Still ankle swelling   Takes amlodipine 5 mg and lasix  If ankle swelling is an issue consider chnaging amlodipine to ARB  woth hydrochlorothiazide  Cont potasium and eat banana and straw berries etc  Lose weight.  DASH diet discussed with the patient and avoid NSAIDs    (G47.33) KORY (obstructive sleep apnea)  Comment: reevaluated at sleep clinic  and suppose to use Bi PAp   Follow their recs  (E66.01) Morbid obesity (H)  Suggested lose weight    (I35.0) Aortic valve stenosis, moderate as with BAYRON 1.7 cm square, mean gradient 27.9 mmHg, peak aortic valve pressure gradient 45.3 mmHg .  Asymptomatic, he will need repeat echocardiogram in 1 year from last test  through primary care physician discussed with the patient and family.    (R76.8) Elevated antinuclear antibody (CELY) level  Recent hospitalization secondary to the hemoptysis, epistaxis underwent extensive evaluation mildly elevated CELY, CRP and ESR.  Recent ESR and CRP normal.  CELY borderline positive ??  Clinical significance  , repeat at primary at next visit           I have discussed with patient the risks, benefits, medications, treatment options and modalities.   I have instructed the patient to call or schedule a follow-up appointment if any problems or failure to improve.    This note was dictated by utilizing dragon software    Total patient care time spent today 40 minutes face-to-face and more than 50% of the time spent counseling of the above-mentioned multiple medical  issues.  Reviewed recent hospitalization records and outside records.   Patient , daughter and  wife  had a lot of questions and all of them were answered    Copy of this dictation to primary care physician    George Jhaveri MD,[facility]  Vascular medicine

## 2019-11-06 NOTE — PROGRESS NOTES
"Edison Saldivar is a 79 year old male who presents for:  Chief Complaint   Patient presents with     RECHECK     Follow up to visit on 5/22/19 *yoli        Vitals:    Vitals:    11/06/19 1031   BP: 125/76   BP Location: Right arm   Patient Position: Chair   Cuff Size: Adult Large   Pulse: 103   Resp: 18   SpO2: 97%   Weight: 241 lb (109.3 kg)   Height: 5' 7\" (1.702 m)       BMI:  Estimated body mass index is 37.75 kg/m  as calculated from the following:    Height as of this encounter: 5' 7\" (1.702 m).    Weight as of this encounter: 241 lb (109.3 kg).    Pain Score:  Data Unavailable        Eliazar Dean CMA    "

## 2019-11-06 NOTE — PATIENT INSTRUCTIONS
If ankle swelling persists consider changing amlodipine to different medication ( losartan with hydrochlorothiazide etc)     Continue warfarin and maintain INR good range    follow sleep clinic recommendations    RTC 6 months    Decrease coffee intake.

## 2019-11-13 ENCOUNTER — DOCUMENTATION ONLY (OUTPATIENT)
Dept: CARE COORDINATION | Facility: CLINIC | Age: 79
End: 2019-11-13

## 2019-11-14 NOTE — PROGRESS NOTES
[facility] now requests orders and shares plan of care/discharge summaries for some patients through [employer]. Thank you for your assistance in improving collaboration for our patients.    NOTIFICATION: Administered Cognitive Performance Test(CPT) on 10/31/19. Total Score:  15.5/24  this score correlates to a level 3.8. Pt presents closest to Mckay's Cognitive Level 3.5.     Level 3.5 indicates moderate functional decline.  Person needs 24 hour care. Persons are still aware of concrete task steps, but need more prompting and cues to complete even simple tasks.  Familiar tasks usually require set-up of supplies by others, and direction during performance to complete steps.  All complex tasks need to be done by others.  Attention span is limited and even simple directions may need to be repeated with frequent redirection of task at hand.     Dionne Griffith OTR/L  Occupational Therapist  [phone]  [email or web address]

## 2020-03-11 ENCOUNTER — HEALTH MAINTENANCE LETTER (OUTPATIENT)
Age: 80
End: 2020-03-11

## 2020-06-04 ENCOUNTER — VIRTUAL VISIT (OUTPATIENT)
Dept: OTHER | Facility: CLINIC | Age: 80
End: 2020-06-04
Attending: INTERNAL MEDICINE
Payer: COMMERCIAL

## 2020-06-04 VITALS
HEIGHT: 67 IN | DIASTOLIC BLOOD PRESSURE: 90 MMHG | BODY MASS INDEX: 37.35 KG/M2 | SYSTOLIC BLOOD PRESSURE: 155 MMHG | WEIGHT: 238 LBS

## 2020-06-04 DIAGNOSIS — I77.89 SNEDDON SYNDROME (H): Primary | ICD-10-CM

## 2020-06-04 DIAGNOSIS — G47.33 OSA (OBSTRUCTIVE SLEEP APNEA): ICD-10-CM

## 2020-06-04 DIAGNOSIS — I10 BENIGN ESSENTIAL HYPERTENSION: ICD-10-CM

## 2020-06-04 DIAGNOSIS — I35.0 AORTIC VALVE STENOSIS, ETIOLOGY OF CARDIAC VALVE DISEASE UNSPECIFIED: ICD-10-CM

## 2020-06-04 DIAGNOSIS — D68.51 FACTOR 5 LEIDEN MUTATION, HETEROZYGOUS (H): ICD-10-CM

## 2020-06-04 DIAGNOSIS — E66.01 MORBID OBESITY (H): ICD-10-CM

## 2020-06-04 PROCEDURE — 99214 OFFICE O/P EST MOD 30 MIN: CPT | Mod: ZP | Performed by: INTERNAL MEDICINE

## 2020-06-04 ASSESSMENT — MIFFLIN-ST. JEOR: SCORE: 1763.19

## 2020-06-04 NOTE — PATIENT INSTRUCTIONS
1.  Continue current medications and please get CBC, CMP lab tests at primary next office visit    2.  You will need repeat echocardiogram once COVID-19 situation improves through your primary office or call us we will arrange in 2 to 3 months.    3.  Avoid falls    4.  Monitor blood pressure    Follow-up with me in 6 months virtual video visit

## 2020-06-04 NOTE — PROGRESS NOTES
"Edison Saldivar is a 79 year old male who is being evaluated via a billable video visit.      The patient has been notified of following:     \"This video visit will be conducted via a call between you and your physician/provider. We have found that certain health care needs can be provided without the need for an in-person physical exam.  This service lets us provide the care you need with a video conversation.  If a prescription is necessary we can send it directly to your pharmacy.  If lab work is needed we can place an order for that and you can then stop by our lab to have the test done at a later time.    Video visits are billed at different rates depending on your insurance coverage.  Please reach out to your insurance provider with any questions.    If during the course of the call the physician/provider feels a video visit is not appropriate, you will not be charged for this service.\"    Patient has given verbal consent for Video visit? Yes, mobile phone number [phone]    How would you like to obtain your AVS? Mailed     Edison Saldivar is a 79 year old male who presents for:  Chief Complaint   Patient presents with     RECHECK     6 mo f/u to 11/6/19. No labs prior.  *vg        Vitals:    Vitals:    06/04/20 1349   BP: (!) 155/90   Weight: 238 lb (108 kg)   Height: 5' 7\" (1.702 m)       BMI:  Estimated body mass index is 37.28 kg/m  as calculated from the following:    Height as of this encounter: 5' 7\" (1.702 m).    Weight as of this encounter: 238 lb (108 kg).    Eliazar Dean, [facility]    Provider visit note:    Chief complaint:  Follow-up visit  Patient's wife and his daughter joined for this video visit  Doing well  Ambulating with walker  Leg swelling/ankle swelling improved  Blood pressure better  Maintaining INR in good range    History of present illness:  Edsion Saldivar is a 79 year old male with past medical history of hypertension, obesity, obstructive sleep apnea, Sneddon syndrome,  Heterozygous " factor V Leyden mutation, COPD last year  admitted to the [facility] initially to the ICU for hemoptysis and epistaxis, he was getting warfarin which was held and reversed followed by IV heparin drip converted to Lovenox bridging with warfarin  Since thentolerating without any problems.  Admission CT left lower lobe consolidation/pneumonia and also small left upper lobe nodules, he was former smoker.  He was seen and evaluated by lung specialist in the hospital then followed by recently seen outpatient visit after CT scan he was told it is better.  No more hemoptysis  Leg swelling improved.  Lives at home with wife.  He was initiated Spiriva by pulmonologist  Reviewed recent hospitalization records imaging studies and laboratory data  Only abnormality is borderline positive CELY   Due for ECHO      Review of systems: Reviewed all 12 point review of systems as per HPI otherwise unremarkable    Physical exam:( minimal visual video exam done, unchanged and unremarkable this is virtual visit)    Reviewed recent laboratory tests, imaging studies in the epic and updated chart    ECHO 1/17/2019    Interpretation Summary     There is moderate concentric left ventricular hypertrophy.  Left ventricular systolic function is normal.  The visual ejection fraction is estimated at 60-65%.  Diastolic Doppler findings (E/E' ratio and/or other parameters) suggest left  ventricular filling pressures are increased.  Moderate valvular aortic stenosis.  There is no comparison study available.    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     (I10) Benign essential hypertension    BP better per  family   Takes amlodipine 5 mg and lasix  If ankle swelling is an issue consider changing amlodipine to ARB  with hydrochlorothiazide  Cont potasium and eat banana and straw berries etc  Lose weight.  DASH diet discussed with the patient and avoid NSAIDs     (G47.33) KORY (obstructive sleep apnea)  Comment: reevaluated at sleep clinic  and suppose to use Bi PAp   Follow their recs  (E66.01) Morbid obesity (H)  Suggested lose weight     (I35.0) Aortic valve stenosis, moderate as with BAYRON 1.7 cm square, mean gradient 27.9 mmHg, peak aortic valve pressure gradient 45.3 mmHg .  Asymptomatic, due for echo but due to COVID-19 situation this can be arranged either through the primary or through our office in 2 to 3 months.    (R76.8) Elevated antinuclear antibody (CELY) level  Recent hospitalization secondary to the hemoptysis, epistaxis underwent extensive evaluation mildly elevated CELY, CRP and ESR.  Recent ESR and CRP normal.  CELY borderline positive ??  Clinical significance  , repeat at primary at next visit              Video Visit Details    Type of Service: Video Visit    Video Start Time: 2:21  Video End Time:2:46     Originating Location (patient location): Home    Distant Location (provider location): [facility] (provider residence equipped with epic, dragon, [employer])    Mode of Communication:  Video Conference via [employer]    This visit is being conducted as a virtual visit due to the emphasis on mitigation of the COVID-19 virus pandemic. The clinician has decided that the risk of an in-office visit outweighs the benefit for this patient.     Virtual visit with me in 6 months    George Jhaveri MD, [place], [facility]  Vascular Medicine

## 2021-01-01 ENCOUNTER — APPOINTMENT (OUTPATIENT)
Dept: SPEECH THERAPY | Facility: CLINIC | Age: 81
DRG: 177 | End: 2021-01-01
Payer: COMMERCIAL

## 2021-01-01 ENCOUNTER — APPOINTMENT (OUTPATIENT)
Dept: PHYSICAL THERAPY | Facility: CLINIC | Age: 81
End: 2021-01-01
Attending: INTERNAL MEDICINE
Payer: MEDICARE

## 2021-01-01 ENCOUNTER — OFFICE VISIT (OUTPATIENT)
Dept: OTHER | Facility: CLINIC | Age: 81
End: 2021-01-01
Attending: INTERNAL MEDICINE
Payer: COMMERCIAL

## 2021-01-01 ENCOUNTER — APPOINTMENT (OUTPATIENT)
Dept: GENERAL RADIOLOGY | Facility: CLINIC | Age: 81
End: 2021-01-01
Attending: PHYSICIAN ASSISTANT
Payer: MEDICARE

## 2021-01-01 ENCOUNTER — LAB REQUISITION (OUTPATIENT)
Dept: LAB | Facility: CLINIC | Age: 81
End: 2021-01-01
Payer: MEDICARE

## 2021-01-01 ENCOUNTER — APPOINTMENT (OUTPATIENT)
Dept: SPEECH THERAPY | Facility: CLINIC | Age: 81
DRG: 177 | End: 2021-01-01
Attending: HOSPITALIST
Payer: COMMERCIAL

## 2021-01-01 ENCOUNTER — HOSPITAL ENCOUNTER (OUTPATIENT)
Dept: CARDIOLOGY | Facility: CLINIC | Age: 81
Discharge: HOME | End: 2021-03-03
Attending: INTERNAL MEDICINE | Admitting: INTERNAL MEDICINE
Payer: MEDICARE

## 2021-01-01 ENCOUNTER — APPOINTMENT (OUTPATIENT)
Dept: GENERAL RADIOLOGY | Facility: CLINIC | Age: 81
End: 2021-01-01
Attending: EMERGENCY MEDICINE
Payer: MEDICARE

## 2021-01-01 ENCOUNTER — TELEPHONE (OUTPATIENT)
Dept: OTHER | Facility: CLINIC | Age: 81
End: 2021-01-01

## 2021-01-01 ENCOUNTER — TRANSITIONAL CARE UNIT VISIT (OUTPATIENT)
Dept: GERIATRICS | Facility: CLINIC | Age: 81
End: 2021-01-01
Payer: MEDICARE

## 2021-01-01 ENCOUNTER — TELEPHONE (OUTPATIENT)
Dept: GERIATRICS | Facility: CLINIC | Age: 81
End: 2021-01-01

## 2021-01-01 ENCOUNTER — APPOINTMENT (OUTPATIENT)
Dept: CT IMAGING | Facility: CLINIC | Age: 81
End: 2021-01-01
Attending: EMERGENCY MEDICINE
Payer: MEDICARE

## 2021-01-01 ENCOUNTER — TELEPHONE (OUTPATIENT)
Dept: GERIATRICS | Facility: CLINIC | Age: 81
End: 2021-01-01
Payer: MEDICARE

## 2021-01-01 ENCOUNTER — HOSPITAL ENCOUNTER (OUTPATIENT)
Facility: CLINIC | Age: 81
Setting detail: OBSERVATION
Discharge: NURSING, REHAB OR LONG-TERM CARE | End: 2021-11-26
Attending: EMERGENCY MEDICINE | Admitting: INTERNAL MEDICINE
Payer: MEDICARE

## 2021-01-01 ENCOUNTER — MEDICAL CORRESPONDENCE (OUTPATIENT)
Dept: HEALTH INFORMATION MANAGEMENT | Facility: CLINIC | Age: 81
End: 2021-01-01
Payer: COMMERCIAL

## 2021-01-01 ENCOUNTER — HOSPITAL ENCOUNTER (INPATIENT)
Facility: CLINIC | Age: 81
LOS: 10 days | Discharge: HOSPICE | DRG: 177 | End: 2021-12-13
Attending: EMERGENCY MEDICINE | Admitting: HOSPITALIST
Payer: COMMERCIAL

## 2021-01-01 ENCOUNTER — HOSPITAL ENCOUNTER (OUTPATIENT)
Dept: LAB | Facility: CLINIC | Age: 81
End: 2021-01-20
Attending: INTERNAL MEDICINE
Payer: COMMERCIAL

## 2021-01-01 ENCOUNTER — APPOINTMENT (OUTPATIENT)
Dept: GENERAL RADIOLOGY | Facility: CLINIC | Age: 81
DRG: 177 | End: 2021-01-01
Attending: HOSPITALIST
Payer: COMMERCIAL

## 2021-01-01 ENCOUNTER — APPOINTMENT (OUTPATIENT)
Dept: GENERAL RADIOLOGY | Facility: CLINIC | Age: 81
DRG: 177 | End: 2021-01-01
Attending: EMERGENCY MEDICINE
Payer: COMMERCIAL

## 2021-01-01 VITALS
DIASTOLIC BLOOD PRESSURE: 80 MMHG | HEIGHT: 68 IN | RESPIRATION RATE: 18 BRPM | RESPIRATION RATE: 18 BRPM | BODY MASS INDEX: 36.07 KG/M2 | OXYGEN SATURATION: 98 % | HEART RATE: 91 BPM | SYSTOLIC BLOOD PRESSURE: 147 MMHG | BODY MASS INDEX: 36.07 KG/M2 | WEIGHT: 238 LBS | SYSTOLIC BLOOD PRESSURE: 142 MMHG | WEIGHT: 238 LBS | HEART RATE: 89 BPM | OXYGEN SATURATION: 95 % | DIASTOLIC BLOOD PRESSURE: 73 MMHG | TEMPERATURE: 98.3 F | HEIGHT: 68 IN

## 2021-01-01 VITALS
HEIGHT: 68 IN | RESPIRATION RATE: 26 BRPM | OXYGEN SATURATION: 96 % | WEIGHT: 210 LBS | SYSTOLIC BLOOD PRESSURE: 130 MMHG | TEMPERATURE: 99.2 F | HEART RATE: 84 BPM | BODY MASS INDEX: 31.83 KG/M2 | DIASTOLIC BLOOD PRESSURE: 60 MMHG

## 2021-01-01 VITALS
DIASTOLIC BLOOD PRESSURE: 68 MMHG | TEMPERATURE: 97.5 F | BODY MASS INDEX: 32.37 KG/M2 | SYSTOLIC BLOOD PRESSURE: 145 MMHG | HEART RATE: 88 BPM | WEIGHT: 213.6 LBS | HEIGHT: 68 IN | OXYGEN SATURATION: 93 % | RESPIRATION RATE: 18 BRPM

## 2021-01-01 VITALS
SYSTOLIC BLOOD PRESSURE: 123 MMHG | OXYGEN SATURATION: 94 % | BODY MASS INDEX: 36.09 KG/M2 | TEMPERATURE: 98 F | WEIGHT: 238.1 LBS | HEIGHT: 68 IN | RESPIRATION RATE: 18 BRPM | HEART RATE: 92 BPM | DIASTOLIC BLOOD PRESSURE: 65 MMHG

## 2021-01-01 DIAGNOSIS — I10 PRIMARY HYPERTENSION: ICD-10-CM

## 2021-01-01 DIAGNOSIS — F01.50 VASCULAR DEMENTIA WITHOUT BEHAVIORAL DISTURBANCE (H): ICD-10-CM

## 2021-01-01 DIAGNOSIS — S42.321A CLOSED DISPLACED TRANSVERSE FRACTURE OF SHAFT OF RIGHT HUMERUS, INITIAL ENCOUNTER: ICD-10-CM

## 2021-01-01 DIAGNOSIS — Z86.73 HISTORY OF CVA (CEREBROVASCULAR ACCIDENT): ICD-10-CM

## 2021-01-01 DIAGNOSIS — S42.321A CLOSED DISPLACED TRANSVERSE FRACTURE OF SHAFT OF RIGHT HUMERUS, INITIAL ENCOUNTER: Primary | ICD-10-CM

## 2021-01-01 DIAGNOSIS — I35.0 AORTIC VALVE STENOSIS, ETIOLOGY OF CARDIAC VALVE DISEASE UNSPECIFIED: ICD-10-CM

## 2021-01-01 DIAGNOSIS — J96.01 ACUTE RESPIRATORY FAILURE WITH HYPOXIA (H): ICD-10-CM

## 2021-01-01 DIAGNOSIS — D68.51 HETEROZYGOUS FACTOR V LEIDEN MUTATION (H): ICD-10-CM

## 2021-01-01 DIAGNOSIS — J98.11 ATELECTASIS: ICD-10-CM

## 2021-01-01 DIAGNOSIS — G47.33 OSA ON CPAP: ICD-10-CM

## 2021-01-01 DIAGNOSIS — D68.51 HETEROZYGOUS FACTOR V LEIDEN MUTATION (H): Primary | ICD-10-CM

## 2021-01-01 DIAGNOSIS — R41.3 MEMORY DIFFICULTIES: ICD-10-CM

## 2021-01-01 DIAGNOSIS — I10 BENIGN ESSENTIAL HYPERTENSION: ICD-10-CM

## 2021-01-01 DIAGNOSIS — J96.21 ACUTE AND CHRONIC RESPIRATORY FAILURE WITH HYPOXIA (H): ICD-10-CM
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
## 2021-01-01 DIAGNOSIS — J44.9 CHRONIC OBSTRUCTIVE PULMONARY DISEASE, UNSPECIFIED COPD TYPE (H): ICD-10-CM

## 2021-01-01 DIAGNOSIS — E66.01 MORBID OBESITY (H): Primary | ICD-10-CM

## 2021-01-01 DIAGNOSIS — R04.2 HEMOPTYSIS: ICD-10-CM

## 2021-01-01 DIAGNOSIS — J44.1 COPD EXACERBATION (H): ICD-10-CM
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
## 2021-01-01 DIAGNOSIS — I35.0 AORTIC VALVE STENOSIS, ETIOLOGY OF CARDIAC VALVE DISEASE UNSPECIFIED: Primary | ICD-10-CM

## 2021-01-01 DIAGNOSIS — R53.81 PHYSICAL DECONDITIONING: ICD-10-CM

## 2021-01-01 DIAGNOSIS — R41.3 MEMORY DIFFICULTIES: Primary | ICD-10-CM

## 2021-01-01 DIAGNOSIS — G47.33 OSA (OBSTRUCTIVE SLEEP APNEA): ICD-10-CM

## 2021-01-01 DIAGNOSIS — K59.00 CONSTIPATION, UNSPECIFIED CONSTIPATION TYPE: ICD-10-CM

## 2021-01-01 DIAGNOSIS — B99.9 INTRA-ABDOMINAL INFECTION: ICD-10-CM

## 2021-01-01 DIAGNOSIS — S42.321D DISPLACED TRANSVERSE FRACTURE OF SHAFT OF HUMERUS, RIGHT ARM, SUBSEQUENT ENCOUNTER FOR FRACTURE WITH ROUTINE HEALING: ICD-10-CM

## 2021-01-01 DIAGNOSIS — J69.0 ASPIRATION PNEUMONIA, UNSPECIFIED ASPIRATION PNEUMONIA TYPE, UNSPECIFIED LATERALITY, UNSPECIFIED PART OF LUNG (H): ICD-10-CM

## 2021-01-01 DIAGNOSIS — I10 HYPERTENSION, UNSPECIFIED TYPE: ICD-10-CM

## 2021-01-01 DIAGNOSIS — I77.89 SNEDDON SYNDROME (H): Primary | ICD-10-CM

## 2021-01-01 DIAGNOSIS — E66.01 MORBID OBESITY (H): ICD-10-CM

## 2021-01-01 DIAGNOSIS — D68.51 FACTOR 5 LEIDEN MUTATION, HETEROZYGOUS (H): ICD-10-CM

## 2021-01-01 LAB
ALBUMIN SERPL-MCNC: 1.9 G/DL (ref 3.4–5)
ALBUMIN SERPL-MCNC: 2 G/DL (ref 3.4–5)
ALBUMIN SERPL-MCNC: 2.1 G/DL (ref 3.4–5)
ALBUMIN SERPL-MCNC: 2.1 G/DL (ref 3.4–5)
ALBUMIN SERPL-MCNC: 2.2 G/DL (ref 3.5–5)
ALBUMIN SERPL-MCNC: 2.4 G/DL (ref 3.4–5)
ALBUMIN SERPL-MCNC: 2.4 G/DL (ref 3.4–5)
ALBUMIN SERPL-MCNC: 3 G/DL (ref 3.4–5)
ALBUMIN SERPL-MCNC: 3.9 G/DL (ref 3.4–5)
ALBUMIN UR-MCNC: 10 MG/DL
ALP SERPL-CCNC: 141 U/L (ref 40–150)
ALP SERPL-CCNC: 150 U/L (ref 40–150)
ALP SERPL-CCNC: 170 U/L (ref 40–150)
ALP SERPL-CCNC: 243 U/L (ref 45–120)
ALP SERPL-CCNC: 272 U/L (ref 40–150)
ALP SERPL-CCNC: 294 U/L (ref 40–150)
ALP SERPL-CCNC: 299 U/L (ref 40–150)
ALP SERPL-CCNC: 307 U/L (ref 40–150)
ALP SERPL-CCNC: 98 U/L (ref 40–150)
ALT SERPL W P-5'-P-CCNC: 131 U/L (ref 0–70)
ALT SERPL W P-5'-P-CCNC: 178 U/L (ref 0–70)
ALT SERPL W P-5'-P-CCNC: 26 U/L (ref 0–70)
ALT SERPL W P-5'-P-CCNC: 32 U/L (ref 0–45)
ALT SERPL W P-5'-P-CCNC: 33 U/L (ref 0–70)
ALT SERPL W P-5'-P-CCNC: 34 U/L (ref 0–70)
ALT SERPL W P-5'-P-CCNC: 35 U/L (ref 0–70)
ALT SERPL W P-5'-P-CCNC: 54 U/L (ref 0–70)
ALT SERPL W P-5'-P-CCNC: 94 U/L (ref 0–70)
ANION GAP SERPL CALCULATED.3IONS-SCNC: 10 MMOL/L (ref 3–14)
ANION GAP SERPL CALCULATED.3IONS-SCNC: 12 MMOL/L (ref 5–18)
ANION GAP SERPL CALCULATED.3IONS-SCNC: 4 MMOL/L (ref 3–14)
ANION GAP SERPL CALCULATED.3IONS-SCNC: 5 MMOL/L (ref 3–14)
ANION GAP SERPL CALCULATED.3IONS-SCNC: 6 MMOL/L (ref 3–14)
ANION GAP SERPL CALCULATED.3IONS-SCNC: 6 MMOL/L (ref 3–14)
ANION GAP SERPL CALCULATED.3IONS-SCNC: 7 MMOL/L (ref 3–14)
ANION GAP SERPL CALCULATED.3IONS-SCNC: 8 MMOL/L (ref 3–14)
APPEARANCE UR: CLEAR
AST SERPL W P-5'-P-CCNC: 115 U/L (ref 0–45)
AST SERPL W P-5'-P-CCNC: 116 U/L (ref 0–45)
AST SERPL W P-5'-P-CCNC: 122 U/L (ref 0–45)
AST SERPL W P-5'-P-CCNC: 126 U/L (ref 0–45)
AST SERPL W P-5'-P-CCNC: 158 U/L (ref 0–45)
AST SERPL W P-5'-P-CCNC: 21 U/L (ref 0–45)
AST SERPL W P-5'-P-CCNC: 233 U/L (ref 0–45)
AST SERPL W P-5'-P-CCNC: 94 U/L (ref 0–40)
AST SERPL W P-5'-P-CCNC: 95 U/L (ref 0–45)
ATRIAL RATE - MUSE: 81 BPM
BASE EXCESS BLDV CALC-SCNC: 1.9 MMOL/L (ref -7.7–1.9)
BASOPHILS # BLD AUTO: 0.1 10E3/UL (ref 0–0.2)
BASOPHILS # BLD AUTO: 0.1 10E9/L (ref 0–0.2)
BASOPHILS # BLD MANUAL: 0 10E3/UL (ref 0–0.2)
BASOPHILS NFR BLD AUTO: 1 %
BASOPHILS NFR BLD MANUAL: 0 %
BILIRUB DIRECT SERPL-MCNC: 0.2 MG/DL (ref 0–0.2)
BILIRUB DIRECT SERPL-MCNC: 0.3 MG/DL (ref 0–0.2)
BILIRUB SERPL-MCNC: 0.5 MG/DL (ref 0.2–1.3)
BILIRUB SERPL-MCNC: 0.6 MG/DL (ref 0.2–1.3)
BILIRUB SERPL-MCNC: 0.6 MG/DL (ref 0.2–1.3)
BILIRUB SERPL-MCNC: 0.7 MG/DL (ref 0.2–1.3)
BILIRUB SERPL-MCNC: 0.8 MG/DL (ref 0.2–1.3)
BILIRUB SERPL-MCNC: 0.8 MG/DL (ref 0.2–1.3)
BILIRUB SERPL-MCNC: 0.8 MG/DL (ref 0–1)
BILIRUB UR QL STRIP: NEGATIVE
BUN SERPL-MCNC: 11 MG/DL (ref 7–30)
BUN SERPL-MCNC: 14 MG/DL (ref 7–30)
BUN SERPL-MCNC: 14 MG/DL (ref 7–30)
BUN SERPL-MCNC: 15 MG/DL (ref 7–30)
BUN SERPL-MCNC: 19 MG/DL (ref 8–28)
BUN SERPL-MCNC: 23 MG/DL (ref 7–30)
BUN SERPL-MCNC: 24 MG/DL (ref 7–30)
BUN SERPL-MCNC: 25 MG/DL (ref 7–30)
BUN SERPL-MCNC: 26 MG/DL (ref 7–30)
BUN SERPL-MCNC: 27 MG/DL (ref 7–30)
BUN SERPL-MCNC: 31 MG/DL (ref 7–30)
BUN SERPL-MCNC: 32 MG/DL (ref 7–30)
CALCIUM SERPL-MCNC: 8.5 MG/DL (ref 8.5–10.1)
CALCIUM SERPL-MCNC: 8.5 MG/DL (ref 8.5–10.1)
CALCIUM SERPL-MCNC: 8.6 MG/DL (ref 8.5–10.1)
CALCIUM SERPL-MCNC: 8.6 MG/DL (ref 8.5–10.1)
CALCIUM SERPL-MCNC: 8.7 MG/DL (ref 8.5–10.1)
CALCIUM SERPL-MCNC: 8.8 MG/DL (ref 8.5–10.1)
CALCIUM SERPL-MCNC: 8.9 MG/DL (ref 8.5–10.1)
CALCIUM SERPL-MCNC: 9 MG/DL (ref 8.5–10.1)
CALCIUM SERPL-MCNC: 9 MG/DL (ref 8.5–10.1)
CALCIUM SERPL-MCNC: 9.2 MG/DL (ref 8.5–10.5)
CALCIUM SERPL-MCNC: 9.5 MG/DL (ref 8.5–10.1)
CAOX CRY #/AREA URNS HPF: ABNORMAL /HPF
CHLORIDE BLD-SCNC: 105 MMOL/L (ref 94–109)
CHLORIDE BLD-SCNC: 105 MMOL/L (ref 98–107)
CHLORIDE BLD-SCNC: 107 MMOL/L (ref 94–109)
CHLORIDE BLD-SCNC: 109 MMOL/L (ref 94–109)
CHLORIDE BLD-SCNC: 112 MMOL/L (ref 94–109)
CHLORIDE BLD-SCNC: 113 MMOL/L (ref 94–109)
CHLORIDE BLD-SCNC: 114 MMOL/L (ref 94–109)
CHLORIDE BLD-SCNC: 115 MMOL/L (ref 94–109)
CHLORIDE SERPL-SCNC: 107 MMOL/L (ref 94–109)
CO2 SERPL-SCNC: 22 MMOL/L (ref 20–32)
CO2 SERPL-SCNC: 23 MMOL/L (ref 20–32)
CO2 SERPL-SCNC: 24 MMOL/L (ref 20–32)
CO2 SERPL-SCNC: 24 MMOL/L (ref 22–31)
CO2 SERPL-SCNC: 26 MMOL/L (ref 20–32)
CO2 SERPL-SCNC: 27 MMOL/L (ref 20–32)
CO2 SERPL-SCNC: 28 MMOL/L (ref 20–32)
CO2 SERPL-SCNC: 29 MMOL/L (ref 20–32)
CO2 SERPL-SCNC: 30 MMOL/L (ref 20–32)
COLOR UR AUTO: YELLOW
CREAT SERPL-MCNC: 0.76 MG/DL (ref 0.66–1.25)
CREAT SERPL-MCNC: 0.77 MG/DL (ref 0.66–1.25)
CREAT SERPL-MCNC: 0.78 MG/DL (ref 0.66–1.25)
CREAT SERPL-MCNC: 0.8 MG/DL (ref 0.66–1.25)
CREAT SERPL-MCNC: 0.83 MG/DL (ref 0.66–1.25)
CREAT SERPL-MCNC: 0.83 MG/DL (ref 0.66–1.25)
CREAT SERPL-MCNC: 0.87 MG/DL (ref 0.66–1.25)
CREAT SERPL-MCNC: 0.87 MG/DL (ref 0.66–1.25)
CREAT SERPL-MCNC: 0.89 MG/DL (ref 0.66–1.25)
CREAT SERPL-MCNC: 0.91 MG/DL (ref 0.66–1.25)
CREAT SERPL-MCNC: 0.94 MG/DL (ref 0.7–1.3)
CREAT SERPL-MCNC: 0.95 MG/DL (ref 0.66–1.25)
CREAT SERPL-MCNC: 0.96 MG/DL (ref 0.66–1.25)
CREAT SERPL-MCNC: 0.96 MG/DL (ref 0.66–1.25)
CREAT SERPL-MCNC: 1.03 MG/DL (ref 0.66–1.25)
CREAT SERPL-MCNC: 1.04 MG/DL (ref 0.66–1.25)
CRP SERPL-MCNC: 108 MG/L (ref 0–8)
CRP SERPL-MCNC: 214 MG/L (ref 0–8)
CRP SERPL-MCNC: 36.9 MG/L (ref 0–8)
CRP SERPL-MCNC: 39.7 MG/L (ref 0–8)
CRP SERPL-MCNC: 55.2 MG/L (ref 0–8)
CRP SERPL-MCNC: 70.3 MG/L (ref 0–8)
CRP SERPL-MCNC: 71.8 MG/L (ref 0–8)
CRP SERPL-MCNC: 75.5 MG/L (ref 0–8)
D DIMER PPP FEU-MCNC: 11.09 UG/ML FEU (ref 0–0.5)
D DIMER PPP FEU-MCNC: 4.75 UG/ML FEU (ref 0–0.5)
D DIMER PPP FEU-MCNC: 6.2 UG/ML FEU (ref 0–0.5)
D DIMER PPP FEU-MCNC: 6.86 UG/ML FEU (ref 0–0.5)
D DIMER PPP FEU-MCNC: 7.88 UG/ML FEU (ref 0–0.5)
DIASTOLIC BLOOD PRESSURE - MUSE: NORMAL MMHG
DIFFERENTIAL METHOD BLD: NORMAL
EOSINOPHIL # BLD AUTO: 0.1 10E3/UL (ref 0–0.7)
EOSINOPHIL # BLD AUTO: 0.2 10E3/UL (ref 0–0.7)
EOSINOPHIL # BLD AUTO: 0.2 10E3/UL (ref 0–0.7)
EOSINOPHIL # BLD AUTO: 0.2 10E9/L (ref 0–0.7)
EOSINOPHIL # BLD AUTO: 0.4 10E3/UL (ref 0–0.7)
EOSINOPHIL # BLD MANUAL: 0 10E3/UL (ref 0–0.7)
EOSINOPHIL NFR BLD AUTO: 1 %
EOSINOPHIL NFR BLD AUTO: 1 %
EOSINOPHIL NFR BLD AUTO: 2 %
EOSINOPHIL NFR BLD AUTO: 2 %
EOSINOPHIL NFR BLD AUTO: 2.6 %
EOSINOPHIL NFR BLD MANUAL: 0 %
ERYTHROCYTE [DISTWIDTH] IN BLOOD BY AUTOMATED COUNT: 14.3 % (ref 10–15)
ERYTHROCYTE [DISTWIDTH] IN BLOOD BY AUTOMATED COUNT: 14.4 % (ref 10–15)
ERYTHROCYTE [DISTWIDTH] IN BLOOD BY AUTOMATED COUNT: 14.6 % (ref 10–15)
ERYTHROCYTE [DISTWIDTH] IN BLOOD BY AUTOMATED COUNT: 14.8 % (ref 10–15)
ERYTHROCYTE [DISTWIDTH] IN BLOOD BY AUTOMATED COUNT: 14.9 % (ref 10–15)
ERYTHROCYTE [DISTWIDTH] IN BLOOD BY AUTOMATED COUNT: 14.9 % (ref 10–15)
ERYTHROCYTE [DISTWIDTH] IN BLOOD BY AUTOMATED COUNT: 15 % (ref 10–15)
ERYTHROCYTE [DISTWIDTH] IN BLOOD BY AUTOMATED COUNT: 15.2 % (ref 10–15)
FIBRINOGEN PPP-MCNC: 658 MG/DL (ref 170–490)
FIBRINOGEN PPP-MCNC: 718 MG/DL (ref 170–490)
FIBRINOGEN PPP-MCNC: 729 MG/DL (ref 170–490)
FIBRINOGEN PPP-MCNC: 860 MG/DL (ref 170–490)
FLUAV RNA SPEC QL NAA+PROBE: NEGATIVE
FLUBV RNA RESP QL NAA+PROBE: NEGATIVE
FOLATE SERPL-MCNC: NORMAL NG/ML
GFR SERPL CREATININE-BSD FRML MDRD: 68 ML/MIN/1.73M2
GFR SERPL CREATININE-BSD FRML MDRD: 69 ML/MIN/1.73M2
GFR SERPL CREATININE-BSD FRML MDRD: 75 ML/MIN/1.73M2
GFR SERPL CREATININE-BSD FRML MDRD: 75 ML/MIN/1.73M2
GFR SERPL CREATININE-BSD FRML MDRD: 76 ML/MIN/1.73M2
GFR SERPL CREATININE-BSD FRML MDRD: 77 ML/MIN/1.73M2
GFR SERPL CREATININE-BSD FRML MDRD: 80 ML/MIN/{1.73_M2}
GFR SERPL CREATININE-BSD FRML MDRD: 81 ML/MIN/1.73M2
GFR SERPL CREATININE-BSD FRML MDRD: 82 ML/MIN/1.73M2
GFR SERPL CREATININE-BSD FRML MDRD: 82 ML/MIN/1.73M2
GFR SERPL CREATININE-BSD FRML MDRD: 84 ML/MIN/1.73M2
GFR SERPL CREATININE-BSD FRML MDRD: 84 ML/MIN/1.73M2
GFR SERPL CREATININE-BSD FRML MDRD: 85 ML/MIN/1.73M2
GFR SERPL CREATININE-BSD FRML MDRD: 86 ML/MIN/1.73M2
GFR SERPL CREATININE-BSD FRML MDRD: 86 ML/MIN/1.73M2
GFR SERPL CREATININE-BSD FRML MDRD: 87 ML/MIN/1.73M2
GLUCOSE BLD-MCNC: 107 MG/DL (ref 70–99)
GLUCOSE BLD-MCNC: 108 MG/DL (ref 70–99)
GLUCOSE BLD-MCNC: 111 MG/DL (ref 70–99)
GLUCOSE BLD-MCNC: 111 MG/DL (ref 70–99)
GLUCOSE BLD-MCNC: 113 MG/DL (ref 70–99)
GLUCOSE BLD-MCNC: 113 MG/DL (ref 70–99)
GLUCOSE BLD-MCNC: 114 MG/DL (ref 70–99)
GLUCOSE BLD-MCNC: 114 MG/DL (ref 70–99)
GLUCOSE BLD-MCNC: 119 MG/DL (ref 70–99)
GLUCOSE BLD-MCNC: 149 MG/DL (ref 70–99)
GLUCOSE BLD-MCNC: 179 MG/DL (ref 70–99)
GLUCOSE BLD-MCNC: 194 MG/DL (ref 70–125)
GLUCOSE BLD-MCNC: 215 MG/DL (ref 70–99)
GLUCOSE SERPL-MCNC: 118 MG/DL (ref 70–99)
GLUCOSE UR STRIP-MCNC: NEGATIVE MG/DL
HCO3 BLDV-SCNC: 25 MMOL/L (ref 21–28)
HCT VFR BLD AUTO: 36.7 % (ref 40–53)
HCT VFR BLD AUTO: 38 % (ref 40–53)
HCT VFR BLD AUTO: 38 % (ref 40–53)
HCT VFR BLD AUTO: 38.5 % (ref 40–53)
HCT VFR BLD AUTO: 38.9 % (ref 40–53)
HCT VFR BLD AUTO: 39 % (ref 40–53)
HCT VFR BLD AUTO: 39 % (ref 40–53)
HCT VFR BLD AUTO: 39.5 % (ref 40–53)
HCT VFR BLD AUTO: 40 % (ref 40–53)
HCT VFR BLD AUTO: 40.3 % (ref 40–53)
HCT VFR BLD AUTO: 44.6 % (ref 40–53)
HCT VFR BLD AUTO: 48.7 % (ref 40–53)
HGB BLD-MCNC: 11.2 G/DL (ref 13.3–17.7)
HGB BLD-MCNC: 11.7 G/DL (ref 13.3–17.7)
HGB BLD-MCNC: 11.8 G/DL (ref 13.3–17.7)
HGB BLD-MCNC: 11.8 G/DL (ref 13.3–17.7)
HGB BLD-MCNC: 11.9 G/DL (ref 13.3–17.7)
HGB BLD-MCNC: 12.1 G/DL (ref 13.3–17.7)
HGB BLD-MCNC: 12.1 G/DL (ref 13.3–17.7)
HGB BLD-MCNC: 12.3 G/DL (ref 13.3–17.7)
HGB BLD-MCNC: 12.5 G/DL (ref 13.3–17.7)
HGB BLD-MCNC: 12.6 G/DL (ref 13.3–17.7)
HGB BLD-MCNC: 13.9 G/DL (ref 13.3–17.7)
HGB BLD-MCNC: 15.8 G/DL (ref 13.3–17.7)
HGB UR QL STRIP: NEGATIVE
HOLD SPECIMEN: NORMAL
IMM GRANULOCYTES # BLD: 0 10E9/L (ref 0–0.4)
IMM GRANULOCYTES # BLD: 0.2 10E3/UL
IMM GRANULOCYTES # BLD: 0.4 10E3/UL
IMM GRANULOCYTES # BLD: 0.5 10E3/UL
IMM GRANULOCYTES # BLD: 0.7 10E3/UL
IMM GRANULOCYTES NFR BLD: 0.5 %
IMM GRANULOCYTES NFR BLD: 2 %
IMM GRANULOCYTES NFR BLD: 3 %
IMM GRANULOCYTES NFR BLD: 3 %
IMM GRANULOCYTES NFR BLD: 4 %
INR PPP: 1.85 (ref 0.85–1.15)
INR PPP: 1.93 (ref 0.85–1.15)
INR PPP: 2.36 (ref 0.85–1.15)
INR PPP: 2.37 (ref 0.85–1.15)
INR PPP: 2.41 (ref 0.85–1.15)
INR PPP: 2.46 (ref 0.85–1.15)
INR PPP: 2.55 (ref 0.85–1.15)
INR PPP: 2.69 (ref 0.85–1.15)
INR PPP: 2.71 (ref 0.85–1.15)
INR PPP: 2.8 (ref 0.85–1.15)
INR PPP: 2.8 (ref 0.85–1.15)
INR PPP: 3.06 (ref 0.85–1.15)
INR PPP: 3.36 (ref 0.85–1.15)
INR PPP: 3.51 (ref 0.85–1.15)
INR PPP: 3.66 (ref 0.85–1.15)
INTERPRETATION ECG - MUSE: NORMAL
KETONES UR STRIP-MCNC: NEGATIVE MG/DL
LACTATE SERPL-SCNC: 1.6 MMOL/L (ref 0.7–2)
LACTATE SERPL-SCNC: 1.9 MMOL/L (ref 0.7–2)
LACTATE SERPL-SCNC: 1.9 MMOL/L (ref 0.7–2)
LACTATE SERPL-SCNC: 2.5 MMOL/L (ref 0.7–2)
LACTATE SERPL-SCNC: 2.6 MMOL/L (ref 0.7–2)
LACTATE SERPL-SCNC: 2.6 MMOL/L (ref 0.7–2)
LEUKOCYTE ESTERASE UR QL STRIP: NEGATIVE
LYMPHOCYTES # BLD AUTO: 0.9 10E3/UL (ref 0.8–5.3)
LYMPHOCYTES # BLD AUTO: 1 10E3/UL (ref 0.8–5.3)
LYMPHOCYTES # BLD AUTO: 1.1 10E3/UL (ref 0.8–5.3)
LYMPHOCYTES # BLD AUTO: 1.1 10E3/UL (ref 0.8–5.3)
LYMPHOCYTES # BLD AUTO: 1.1 10E9/L (ref 0.8–5.3)
LYMPHOCYTES # BLD MANUAL: 0.9 10E3/UL (ref 0.8–5.3)
LYMPHOCYTES NFR BLD AUTO: 10 %
LYMPHOCYTES NFR BLD AUTO: 14.3 %
LYMPHOCYTES NFR BLD AUTO: 5 %
LYMPHOCYTES NFR BLD AUTO: 7 %
LYMPHOCYTES NFR BLD AUTO: 7 %
LYMPHOCYTES NFR BLD MANUAL: 4 %
MAGNESIUM SERPL-MCNC: 2.3 MG/DL (ref 1.6–2.3)
MAGNESIUM SERPL-MCNC: 2.5 MG/DL (ref 1.6–2.3)
MCH RBC QN AUTO: 29.1 PG (ref 26.5–33)
MCH RBC QN AUTO: 29.1 PG (ref 26.5–33)
MCH RBC QN AUTO: 29.4 PG (ref 26.5–33)
MCH RBC QN AUTO: 29.4 PG (ref 26.5–33)
MCH RBC QN AUTO: 29.5 PG (ref 26.5–33)
MCH RBC QN AUTO: 29.6 PG (ref 26.5–33)
MCH RBC QN AUTO: 29.6 PG (ref 26.5–33)
MCH RBC QN AUTO: 29.7 PG (ref 26.5–33)
MCH RBC QN AUTO: 30 PG (ref 26.5–33)
MCH RBC QN AUTO: 30 PG (ref 26.5–33)
MCHC RBC AUTO-ENTMCNC: 29.9 G/DL (ref 31.5–36.5)
MCHC RBC AUTO-ENTMCNC: 30 G/DL (ref 31.5–36.5)
MCHC RBC AUTO-ENTMCNC: 30.5 G/DL (ref 31.5–36.5)
MCHC RBC AUTO-ENTMCNC: 31 G/DL (ref 31.5–36.5)
MCHC RBC AUTO-ENTMCNC: 31.1 G/DL (ref 31.5–36.5)
MCHC RBC AUTO-ENTMCNC: 31.2 G/DL (ref 31.5–36.5)
MCHC RBC AUTO-ENTMCNC: 31.3 G/DL (ref 31.5–36.5)
MCHC RBC AUTO-ENTMCNC: 31.4 G/DL (ref 31.5–36.5)
MCHC RBC AUTO-ENTMCNC: 31.6 G/DL (ref 31.5–36.5)
MCHC RBC AUTO-ENTMCNC: 32.4 G/DL (ref 31.5–36.5)
MCV RBC AUTO: 92 FL (ref 78–100)
MCV RBC AUTO: 93 FL (ref 78–100)
MCV RBC AUTO: 94 FL (ref 78–100)
MCV RBC AUTO: 95 FL (ref 78–100)
MCV RBC AUTO: 96 FL (ref 78–100)
MCV RBC AUTO: 97 FL (ref 78–100)
METAMYELOCYTES # BLD MANUAL: 0.2 10E3/UL
METAMYELOCYTES NFR BLD MANUAL: 1 %
MONOCYTES # BLD AUTO: 0.5 10E3/UL (ref 0–1.3)
MONOCYTES # BLD AUTO: 0.5 10E9/L (ref 0–1.3)
MONOCYTES # BLD AUTO: 0.7 10E3/UL (ref 0–1.3)
MONOCYTES # BLD AUTO: 0.7 10E3/UL (ref 0–1.3)
MONOCYTES # BLD AUTO: 0.8 10E3/UL (ref 0–1.3)
MONOCYTES # BLD MANUAL: 0 10E3/UL (ref 0–1.3)
MONOCYTES NFR BLD AUTO: 3 %
MONOCYTES NFR BLD AUTO: 4 %
MONOCYTES NFR BLD AUTO: 4 %
MONOCYTES NFR BLD AUTO: 6.1 %
MONOCYTES NFR BLD AUTO: 7 %
MONOCYTES NFR BLD MANUAL: 0 %
MRSA DNA SPEC QL NAA+PROBE: NEGATIVE
MUCOUS THREADS #/AREA URNS LPF: PRESENT /LPF
MYELOCYTES # BLD MANUAL: 0.4 10E3/UL
MYELOCYTES NFR BLD MANUAL: 2 %
NEUTROPHILS # BLD AUTO: 13.3 10E3/UL (ref 1.6–8.3)
NEUTROPHILS # BLD AUTO: 13.8 10E3/UL (ref 1.6–8.3)
NEUTROPHILS # BLD AUTO: 16.9 10E3/UL (ref 1.6–8.3)
NEUTROPHILS # BLD AUTO: 5.8 10E9/L (ref 1.6–8.3)
NEUTROPHILS # BLD AUTO: 8.3 10E3/UL (ref 1.6–8.3)
NEUTROPHILS # BLD MANUAL: 20 10E3/UL (ref 1.6–8.3)
NEUTROPHILS NFR BLD AUTO: 75.5 %
NEUTROPHILS NFR BLD AUTO: 78 %
NEUTROPHILS NFR BLD AUTO: 84 %
NEUTROPHILS NFR BLD AUTO: 84 %
NEUTROPHILS NFR BLD AUTO: 85 %
NEUTROPHILS NFR BLD MANUAL: 93 %
NITRATE UR QL: NEGATIVE
NRBC # BLD AUTO: 0 10*3/UL
NRBC # BLD AUTO: 0 10E3/UL
NRBC # BLD AUTO: 0.1 10E3/UL
NRBC # BLD AUTO: 0.2 10E3/UL
NRBC BLD AUTO-RTO: 0 /100
NRBC BLD MANUAL-RTO: 1 %
NT-PROBNP SERPL-MCNC: 730 PG/ML (ref 0–1800)
O2/TOTAL GAS SETTING VFR VENT: 50 %
OXYHGB MFR BLDV: 95 % (ref 70–75)
P AXIS - MUSE: -12 DEGREES
PCO2 BLDV: 34 MM HG (ref 40–50)
PH BLDV: 7.48 [PH] (ref 7.32–7.43)
PH UR STRIP: 6 [PH] (ref 5–7)
PHOSPHATE SERPL-MCNC: 3 MG/DL (ref 2.5–4.5)
PHOSPHATE SERPL-MCNC: 3.4 MG/DL (ref 2.5–4.5)
PLAT MORPH BLD: ABNORMAL
PLATELET # BLD AUTO: 134 10E3/UL (ref 150–450)
PLATELET # BLD AUTO: 166 10E3/UL (ref 150–450)
PLATELET # BLD AUTO: 184 10E3/UL (ref 150–450)
PLATELET # BLD AUTO: 194 10E9/L (ref 150–450)
PLATELET # BLD AUTO: 212 10E3/UL (ref 150–450)
PLATELET # BLD AUTO: 213 10E3/UL (ref 150–450)
PLATELET # BLD AUTO: 224 10E3/UL (ref 150–450)
PLATELET # BLD AUTO: 239 10E3/UL (ref 150–450)
PLATELET # BLD AUTO: 244 10E3/UL (ref 150–450)
PLATELET # BLD AUTO: 255 10E3/UL (ref 150–450)
PLATELET # BLD AUTO: 260 10E3/UL (ref 150–450)
PLATELET # BLD AUTO: 262 10E3/UL (ref 150–450)
PO2 BLDV: 78 MM HG (ref 25–47)
POTASSIUM BLD-SCNC: 3.3 MMOL/L (ref 3.4–5.3)
POTASSIUM BLD-SCNC: 3.5 MMOL/L (ref 3.4–5.3)
POTASSIUM BLD-SCNC: 3.6 MMOL/L (ref 3.4–5.3)
POTASSIUM BLD-SCNC: 3.6 MMOL/L (ref 3.4–5.3)
POTASSIUM BLD-SCNC: 3.7 MMOL/L (ref 3.4–5.3)
POTASSIUM BLD-SCNC: 3.8 MMOL/L (ref 3.4–5.3)
POTASSIUM BLD-SCNC: 3.9 MMOL/L (ref 3.5–5)
POTASSIUM BLD-SCNC: 4.2 MMOL/L (ref 3.4–5.3)
POTASSIUM BLD-SCNC: 4.2 MMOL/L (ref 3.4–5.3)
POTASSIUM BLD-SCNC: 4.3 MMOL/L (ref 3.4–5.3)
POTASSIUM BLD-SCNC: 4.4 MMOL/L (ref 3.4–5.3)
POTASSIUM BLD-SCNC: 4.6 MMOL/L (ref 3.4–5.3)
POTASSIUM SERPL-SCNC: 4.3 MMOL/L (ref 3.4–5.3)
PR INTERVAL - MUSE: 152 MS
PROCALCITONIN SERPL-MCNC: 0.26 NG/ML
PROCALCITONIN SERPL-MCNC: 0.53 NG/ML
PROT SERPL-MCNC: 5.8 G/DL (ref 6.8–8.8)
PROT SERPL-MCNC: 6.2 G/DL (ref 6.8–8.8)
PROT SERPL-MCNC: 6.5 G/DL (ref 6–8)
PROT SERPL-MCNC: 6.6 G/DL (ref 6.8–8.8)
PROT SERPL-MCNC: 6.7 G/DL (ref 6.8–8.8)
PROT SERPL-MCNC: 7.1 G/DL (ref 6.8–8.8)
PROT SERPL-MCNC: 7.1 G/DL (ref 6.8–8.8)
PROT SERPL-MCNC: 7.7 G/DL (ref 6.8–8.8)
PROT SERPL-MCNC: 8.4 G/DL (ref 6.8–8.8)
QRS DURATION - MUSE: 104 MS
QT - MUSE: 392 MS
QTC - MUSE: 455 MS
R AXIS - MUSE: -43 DEGREES
RBC # BLD AUTO: 3.8 10E6/UL (ref 4.4–5.9)
RBC # BLD AUTO: 4 10E6/UL (ref 4.4–5.9)
RBC # BLD AUTO: 4.02 10E6/UL (ref 4.4–5.9)
RBC # BLD AUTO: 4.02 10E6/UL (ref 4.4–5.9)
RBC # BLD AUTO: 4.06 10E6/UL (ref 4.4–5.9)
RBC # BLD AUTO: 4.07 10E6/UL (ref 4.4–5.9)
RBC # BLD AUTO: 4.09 10E6/UL (ref 4.4–5.9)
RBC # BLD AUTO: 4.18 10E6/UL (ref 4.4–5.9)
RBC # BLD AUTO: 4.2 10E6/UL (ref 4.4–5.9)
RBC # BLD AUTO: 4.24 10E6/UL (ref 4.4–5.9)
RBC # BLD AUTO: 4.72 10E6/UL (ref 4.4–5.9)
RBC # BLD AUTO: 5.27 10E12/L (ref 4.4–5.9)
RBC MORPH BLD: ABNORMAL
RBC URINE: 11 /HPF
SA TARGET DNA: POSITIVE
SARS-COV-2 RNA RESP QL NAA+PROBE: NEGATIVE
SARS-COV-2 RNA RESP QL NAA+PROBE: POSITIVE
SODIUM SERPL-SCNC: 139 MMOL/L (ref 133–144)
SODIUM SERPL-SCNC: 139 MMOL/L (ref 133–144)
SODIUM SERPL-SCNC: 140 MMOL/L (ref 133–144)
SODIUM SERPL-SCNC: 141 MMOL/L (ref 133–144)
SODIUM SERPL-SCNC: 141 MMOL/L (ref 136–145)
SODIUM SERPL-SCNC: 142 MMOL/L (ref 133–144)
SODIUM SERPL-SCNC: 143 MMOL/L (ref 133–144)
SODIUM SERPL-SCNC: 145 MMOL/L (ref 133–144)
SODIUM SERPL-SCNC: 146 MMOL/L (ref 133–144)
SODIUM SERPL-SCNC: 146 MMOL/L (ref 133–144)
SODIUM SERPL-SCNC: 147 MMOL/L (ref 133–144)
SODIUM SERPL-SCNC: 148 MMOL/L (ref 133–144)
SP GR UR STRIP: 1.03 (ref 1–1.03)
SYSTOLIC BLOOD PRESSURE - MUSE: NORMAL MMHG
T AXIS - MUSE: 57 DEGREES
TROPONIN I SERPL HS-MCNC: 20 NG/L
TROPONIN I SERPL HS-MCNC: 36 NG/L
TROPONIN I SERPL-MCNC: <0.015 UG/L (ref 0–0.04)
TSH SERPL DL<=0.005 MIU/L-ACNC: 2.92 MU/L (ref 0.4–4)
UROBILINOGEN UR STRIP-MCNC: NORMAL MG/DL
VANCOMYCIN SERPL-MCNC: 11.1 MG/L
VANCOMYCIN SERPL-MCNC: 17.1 MG/L
VENTRICULAR RATE- MUSE: 81 BPM
VIT B12 SERPL-MCNC: 1200 PG/ML (ref 193–986)
WBC # BLD AUTO: 10.4 10E3/UL (ref 4–11)
WBC # BLD AUTO: 10.5 10E3/UL (ref 4–11)
WBC # BLD AUTO: 12.3 10E3/UL (ref 4–11)
WBC # BLD AUTO: 15.8 10E3/UL (ref 4–11)
WBC # BLD AUTO: 16.3 10E3/UL (ref 4–11)
WBC # BLD AUTO: 16.3 10E3/UL (ref 4–11)
WBC # BLD AUTO: 17 10E3/UL (ref 4–11)
WBC # BLD AUTO: 17.2 10E3/UL (ref 4–11)
WBC # BLD AUTO: 19.5 10E3/UL (ref 4–11)
WBC # BLD AUTO: 21.5 10E3/UL (ref 4–11)
WBC # BLD AUTO: 23 10E3/UL (ref 4–11)
WBC # BLD AUTO: 7.7 10E9/L (ref 4–11)
WBC URINE: 2 /HPF

## 2021-01-01 PROCEDURE — 93306 TTE W/DOPPLER COMPLETE: CPT | Mod: 26 | Performed by: INTERNAL MEDICINE

## 2021-01-01 PROCEDURE — 999N000105 HC STATISTIC NO DOCUMENTATION TO SUPPORT CHARGE

## 2021-01-01 PROCEDURE — 85379 FIBRIN DEGRADATION QUANT: CPT | Performed by: HOSPITALIST

## 2021-01-01 PROCEDURE — G0463 HOSPITAL OUTPT CLINIC VISIT: HCPCS

## 2021-01-01 PROCEDURE — 80053 COMPREHEN METABOLIC PANEL: CPT | Performed by: INTERNAL MEDICINE

## 2021-01-01 PROCEDURE — 85610 PROTHROMBIN TIME: CPT | Performed by: HOSPITALIST

## 2021-01-01 PROCEDURE — 85025 COMPLETE CBC W/AUTO DIFF WBC: CPT | Performed by: HOSPITALIST

## 2021-01-01 PROCEDURE — 99207 PR CDG-MDM COMPONENT: MEETS MODERATE - DOWN CODED: CPT | Performed by: HOSPITALIST

## 2021-01-01 PROCEDURE — 82565 ASSAY OF CREATININE: CPT | Performed by: HOSPITALIST

## 2021-01-01 PROCEDURE — 250N000011 HC RX IP 250 OP 636: Performed by: HOSPITALIST

## 2021-01-01 PROCEDURE — 999N000157 HC STATISTIC RCP TIME EA 10 MIN

## 2021-01-01 PROCEDURE — 250N000013 HC RX MED GY IP 250 OP 250 PS 637: Performed by: INTERNAL MEDICINE

## 2021-01-01 PROCEDURE — 96376 TX/PRO/DX INJ SAME DRUG ADON: CPT

## 2021-01-01 PROCEDURE — 85025 COMPLETE CBC W/AUTO DIFF WBC: CPT | Performed by: EMERGENCY MEDICINE

## 2021-01-01 PROCEDURE — 94660 CPAP INITIATION&MGMT: CPT

## 2021-01-01 PROCEDURE — 250N000009 HC RX 250: Performed by: PHYSICIAN ASSISTANT

## 2021-01-01 PROCEDURE — 99225 PR SUBSEQUENT OBSERVATION CARE,LEVEL II: CPT | Mod: GW | Performed by: INTERNAL MEDICINE

## 2021-01-01 PROCEDURE — 99215 OFFICE O/P EST HI 40 MIN: CPT | Mod: GW | Performed by: INTERNAL MEDICINE

## 2021-01-01 PROCEDURE — 84484 ASSAY OF TROPONIN QUANT: CPT | Performed by: EMERGENCY MEDICINE

## 2021-01-01 PROCEDURE — 71045 X-RAY EXAM CHEST 1 VIEW: CPT

## 2021-01-01 PROCEDURE — 70450 CT HEAD/BRAIN W/O DYE: CPT

## 2021-01-01 PROCEDURE — 86140 C-REACTIVE PROTEIN: CPT | Performed by: HOSPITALIST

## 2021-01-01 PROCEDURE — 36415 COLL VENOUS BLD VENIPUNCTURE: CPT | Performed by: HOSPITALIST

## 2021-01-01 PROCEDURE — 250N000013 HC RX MED GY IP 250 OP 250 PS 637: Performed by: HOSPITALIST

## 2021-01-01 PROCEDURE — 94640 AIRWAY INHALATION TREATMENT: CPT

## 2021-01-01 PROCEDURE — 36415 COLL VENOUS BLD VENIPUNCTURE: CPT | Performed by: INTERNAL MEDICINE

## 2021-01-01 PROCEDURE — 120N000001 HC R&B MED SURG/OB

## 2021-01-01 PROCEDURE — 83735 ASSAY OF MAGNESIUM: CPT | Performed by: HOSPITALIST

## 2021-01-01 PROCEDURE — 258N000003 HC RX IP 258 OP 636: Performed by: HOSPITALIST

## 2021-01-01 PROCEDURE — G0378 HOSPITAL OBSERVATION PER HR: HCPCS

## 2021-01-01 PROCEDURE — XW043E5 INTRODUCTION OF REMDESIVIR ANTI-INFECTIVE INTO CENTRAL VEIN, PERCUTANEOUS APPROACH, NEW TECHNOLOGY GROUP 5: ICD-10-PCS | Performed by: HOSPITALIST

## 2021-01-01 PROCEDURE — 85027 COMPLETE CBC AUTOMATED: CPT | Performed by: INTERNAL MEDICINE

## 2021-01-01 PROCEDURE — 250N000009 HC RX 250: Performed by: EMERGENCY MEDICINE

## 2021-01-01 PROCEDURE — 80053 COMPREHEN METABOLIC PANEL: CPT | Performed by: HOSPITALIST

## 2021-01-01 PROCEDURE — 84100 ASSAY OF PHOSPHORUS: CPT | Performed by: HOSPITALIST

## 2021-01-01 PROCEDURE — 82248 BILIRUBIN DIRECT: CPT | Performed by: HOSPITALIST

## 2021-01-01 PROCEDURE — 99232 SBSQ HOSP IP/OBS MODERATE 35: CPT | Performed by: INTERNAL MEDICINE

## 2021-01-01 PROCEDURE — 36415 COLL VENOUS BLD VENIPUNCTURE: CPT | Performed by: EMERGENCY MEDICINE

## 2021-01-01 PROCEDURE — 83605 ASSAY OF LACTIC ACID: CPT | Performed by: EMERGENCY MEDICINE

## 2021-01-01 PROCEDURE — 258N000003 HC RX IP 258 OP 636: Performed by: INTERNAL MEDICINE

## 2021-01-01 PROCEDURE — 85610 PROTHROMBIN TIME: CPT | Performed by: INTERNAL MEDICINE

## 2021-01-01 PROCEDURE — 84145 PROCALCITONIN (PCT): CPT | Performed by: HOSPITALIST

## 2021-01-01 PROCEDURE — 80048 BASIC METABOLIC PNL TOTAL CA: CPT | Performed by: HOSPITALIST

## 2021-01-01 PROCEDURE — C9803 HOPD COVID-19 SPEC COLLECT: HCPCS

## 2021-01-01 PROCEDURE — 99232 SBSQ HOSP IP/OBS MODERATE 35: CPT | Performed by: HOSPITALIST

## 2021-01-01 PROCEDURE — 85027 COMPLETE CBC AUTOMATED: CPT | Performed by: HOSPITALIST

## 2021-01-01 PROCEDURE — 86140 C-REACTIVE PROTEIN: CPT | Performed by: INTERNAL MEDICINE

## 2021-01-01 PROCEDURE — 87641 MR-STAPH DNA AMP PROBE: CPT | Performed by: INTERNAL MEDICINE

## 2021-01-01 PROCEDURE — 82607 VITAMIN B-12: CPT | Performed by: INTERNAL MEDICINE

## 2021-01-01 PROCEDURE — 250N000011 HC RX IP 250 OP 636: Performed by: EMERGENCY MEDICINE

## 2021-01-01 PROCEDURE — 250N000009 HC RX 250: Performed by: HOSPITALIST

## 2021-01-01 PROCEDURE — 82040 ASSAY OF SERUM ALBUMIN: CPT | Performed by: EMERGENCY MEDICINE

## 2021-01-01 PROCEDURE — 92526 ORAL FUNCTION THERAPY: CPT | Mod: GN

## 2021-01-01 PROCEDURE — 87635 SARS-COV-2 COVID-19 AMP PRB: CPT | Performed by: EMERGENCY MEDICINE

## 2021-01-01 PROCEDURE — 80053 COMPREHEN METABOLIC PANEL: CPT | Performed by: EMERGENCY MEDICINE

## 2021-01-01 PROCEDURE — 80202 ASSAY OF VANCOMYCIN: CPT | Performed by: HOSPITALIST

## 2021-01-01 PROCEDURE — 83605 ASSAY OF LACTIC ACID: CPT | Performed by: HOSPITALIST

## 2021-01-01 PROCEDURE — 93005 ELECTROCARDIOGRAM TRACING: CPT

## 2021-01-01 PROCEDURE — 92526 ORAL FUNCTION THERAPY: CPT | Mod: GN | Performed by: SPEECH-LANGUAGE PATHOLOGIST

## 2021-01-01 PROCEDURE — 82310 ASSAY OF CALCIUM: CPT | Performed by: INTERNAL MEDICINE

## 2021-01-01 PROCEDURE — 94640 AIRWAY INHALATION TREATMENT: CPT | Mod: 76

## 2021-01-01 PROCEDURE — 80048 BASIC METABOLIC PNL TOTAL CA: CPT | Performed by: INTERNAL MEDICINE

## 2021-01-01 PROCEDURE — 99309 SBSQ NF CARE MODERATE MDM 30: CPT | Performed by: NURSE PRACTITIONER

## 2021-01-01 PROCEDURE — 250N000013 HC RX MED GY IP 250 OP 250 PS 637: Performed by: PHYSICIAN ASSISTANT

## 2021-01-01 PROCEDURE — 85384 FIBRINOGEN ACTIVITY: CPT | Performed by: HOSPITALIST

## 2021-01-01 PROCEDURE — 99233 SBSQ HOSP IP/OBS HIGH 50: CPT | Performed by: INTERNAL MEDICINE

## 2021-01-01 PROCEDURE — 85379 FIBRIN DEGRADATION QUANT: CPT | Performed by: INTERNAL MEDICINE

## 2021-01-01 PROCEDURE — 99233 SBSQ HOSP IP/OBS HIGH 50: CPT | Performed by: HOSPITALIST

## 2021-01-01 PROCEDURE — 99220 PR INITIAL OBSERVATION CARE,LEVEL III: CPT | Mod: GW | Performed by: INTERNAL MEDICINE

## 2021-01-01 PROCEDURE — 99285 EMERGENCY DEPT VISIT HI MDM: CPT | Mod: 25

## 2021-01-01 PROCEDURE — 84132 ASSAY OF SERUM POTASSIUM: CPT | Mod: 91 | Performed by: INTERNAL MEDICINE

## 2021-01-01 PROCEDURE — 99217 PR OBSERVATION CARE DISCHARGE: CPT | Mod: GW | Performed by: PHYSICIAN ASSISTANT

## 2021-01-01 PROCEDURE — 999N000208 ECHOCARDIOGRAM COMPLETE

## 2021-01-01 PROCEDURE — 84132 ASSAY OF SERUM POTASSIUM: CPT | Performed by: PHYSICIAN ASSISTANT

## 2021-01-01 PROCEDURE — 5A09457 ASSISTANCE WITH RESPIRATORY VENTILATION, 24-96 CONSECUTIVE HOURS, CONTINUOUS POSITIVE AIRWAY PRESSURE: ICD-10-PCS | Performed by: HOSPITALIST

## 2021-01-01 PROCEDURE — 255N000002 HC RX 255 OP 636: Performed by: INTERNAL MEDICINE

## 2021-01-01 PROCEDURE — 99291 CRITICAL CARE FIRST HOUR: CPT | Mod: 25

## 2021-01-01 PROCEDURE — 82310 ASSAY OF CALCIUM: CPT | Performed by: HOSPITALIST

## 2021-01-01 PROCEDURE — 83880 ASSAY OF NATRIURETIC PEPTIDE: CPT | Performed by: EMERGENCY MEDICINE

## 2021-01-01 PROCEDURE — 85610 PROTHROMBIN TIME: CPT | Performed by: EMERGENCY MEDICINE

## 2021-01-01 PROCEDURE — 83605 ASSAY OF LACTIC ACID: CPT | Performed by: INTERNAL MEDICINE

## 2021-01-01 PROCEDURE — 250N000011 HC RX IP 250 OP 636: Performed by: PHYSICIAN ASSISTANT

## 2021-01-01 PROCEDURE — 97530 THERAPEUTIC ACTIVITIES: CPT | Mod: GP

## 2021-01-01 PROCEDURE — 92610 EVALUATE SWALLOWING FUNCTION: CPT | Mod: GN

## 2021-01-01 PROCEDURE — 73030 X-RAY EXAM OF SHOULDER: CPT | Mod: RT

## 2021-01-01 PROCEDURE — 81001 URINALYSIS AUTO W/SCOPE: CPT | Performed by: EMERGENCY MEDICINE

## 2021-01-01 PROCEDURE — P9604 ONE-WAY ALLOW PRORATED TRIP: HCPCS | Performed by: INTERNAL MEDICINE

## 2021-01-01 PROCEDURE — 80048 BASIC METABOLIC PNL TOTAL CA: CPT | Performed by: EMERGENCY MEDICINE

## 2021-01-01 PROCEDURE — 85025 COMPLETE CBC W/AUTO DIFF WBC: CPT | Performed by: INTERNAL MEDICINE

## 2021-01-01 PROCEDURE — 99225 PR SUBSEQUENT OBSERVATION CARE,LEVEL II: CPT | Performed by: PHYSICIAN ASSISTANT

## 2021-01-01 PROCEDURE — 96366 THER/PROPH/DIAG IV INF ADDON: CPT

## 2021-01-01 PROCEDURE — 84443 ASSAY THYROID STIM HORMONE: CPT | Performed by: INTERNAL MEDICINE

## 2021-01-01 PROCEDURE — 97161 PT EVAL LOW COMPLEX 20 MIN: CPT | Mod: GP

## 2021-01-01 PROCEDURE — L3980 UP EXT FX ORTHOS HUMERAL NOS: HCPCS

## 2021-01-01 PROCEDURE — 87636 SARSCOV2 & INF A&B AMP PRB: CPT | Performed by: EMERGENCY MEDICINE

## 2021-01-01 PROCEDURE — 250N000013 HC RX MED GY IP 250 OP 250 PS 637: Performed by: EMERGENCY MEDICINE

## 2021-01-01 PROCEDURE — 99223 1ST HOSP IP/OBS HIGH 75: CPT | Mod: AI | Performed by: HOSPITALIST

## 2021-01-01 PROCEDURE — 80076 HEPATIC FUNCTION PANEL: CPT | Performed by: EMERGENCY MEDICINE

## 2021-01-01 PROCEDURE — 96365 THER/PROPH/DIAG IV INF INIT: CPT

## 2021-01-01 PROCEDURE — 99239 HOSP IP/OBS DSCHRG MGMT >30: CPT | Performed by: INTERNAL MEDICINE

## 2021-01-01 PROCEDURE — 96374 THER/PROPH/DIAG INJ IV PUSH: CPT

## 2021-01-01 PROCEDURE — 82805 BLOOD GASES W/O2 SATURATION: CPT | Performed by: EMERGENCY MEDICINE

## 2021-01-01 PROCEDURE — 73060 X-RAY EXAM OF HUMERUS: CPT | Mod: RT

## 2021-01-01 PROCEDURE — 99223 1ST HOSP IP/OBS HIGH 75: CPT | Performed by: NURSE PRACTITIONER

## 2021-01-01 PROCEDURE — 96375 TX/PRO/DX INJ NEW DRUG ADDON: CPT

## 2021-01-01 PROCEDURE — 250N000012 HC RX MED GY IP 250 OP 636 PS 637: Performed by: INTERNAL MEDICINE

## 2021-01-01 PROCEDURE — 250N000011 HC RX IP 250 OP 636: Performed by: INTERNAL MEDICINE

## 2021-01-01 PROCEDURE — 96367 TX/PROPH/DG ADDL SEQ IV INF: CPT

## 2021-01-01 RX ORDER — IPRATROPIUM BROMIDE AND ALBUTEROL SULFATE 2.5; .5 MG/3ML; MG/3ML
3 SOLUTION RESPIRATORY (INHALATION) 4 TIMES DAILY
Qty: 90 ML | DISCHARGE
Start: 2021-01-01

## 2021-01-01 RX ORDER — QUETIAPINE FUMARATE 25 MG/1
12.5 TABLET, FILM COATED ORAL EVERY 6 HOURS PRN
DISCHARGE
Start: 2021-01-01

## 2021-01-01 RX ORDER — WARFARIN SODIUM 2 MG/1
2 TABLET ORAL
Status: COMPLETED | OUTPATIENT
Start: 2021-01-01 | End: 2021-01-01

## 2021-01-01 RX ORDER — WARFARIN SODIUM 5 MG/1
5 TABLET ORAL
Status: COMPLETED | OUTPATIENT
Start: 2021-01-01 | End: 2021-01-01

## 2021-01-01 RX ORDER — VANCOMYCIN HYDROCHLORIDE 1 G/200ML
1000 INJECTION, SOLUTION INTRAVENOUS EVERY 12 HOURS
Status: DISCONTINUED | OUTPATIENT
Start: 2021-01-01 | End: 2021-01-01

## 2021-01-01 RX ORDER — FUROSEMIDE 10 MG/ML
40 INJECTION INTRAMUSCULAR; INTRAVENOUS
Status: CANCELLED | OUTPATIENT
Start: 2021-01-01

## 2021-01-01 RX ORDER — LANOLIN ALCOHOL/MO/W.PET/CERES
3 CREAM (GRAM) TOPICAL
Status: DISCONTINUED | OUTPATIENT
Start: 2021-01-01 | End: 2021-01-01 | Stop reason: HOSPADM

## 2021-01-01 RX ORDER — ACETAMINOPHEN 650 MG/1
650 SUPPOSITORY RECTAL EVERY 4 HOURS PRN
Qty: 2 SUPPOSITORY | Refills: 0 | Status: SHIPPED | OUTPATIENT
Start: 2021-01-01

## 2021-01-01 RX ORDER — DEXAMETHASONE 6 MG/1
6 TABLET ORAL ONCE
Qty: 1 TABLET | Refills: 0 | DISCHARGE
Start: 2021-01-01 | End: 2021-01-01

## 2021-01-01 RX ORDER — DEXAMETHASONE SODIUM PHOSPHATE 10 MG/ML
6 INJECTION, SOLUTION INTRAMUSCULAR; INTRAVENOUS DAILY
Status: DISCONTINUED | OUTPATIENT
Start: 2021-01-01 | End: 2021-01-01

## 2021-01-01 RX ORDER — ACETAMINOPHEN 325 MG/1
650 TABLET ORAL
DISCHARGE
Start: 2021-01-01

## 2021-01-01 RX ORDER — AMOXICILLIN 250 MG
2 CAPSULE ORAL 2 TIMES DAILY PRN
Status: DISCONTINUED | OUTPATIENT
Start: 2021-01-01 | End: 2021-01-01 | Stop reason: HOSPADM

## 2021-01-01 RX ORDER — LIDOCAINE 40 MG/G
CREAM TOPICAL
Status: DISCONTINUED | OUTPATIENT
Start: 2021-01-01 | End: 2021-01-01 | Stop reason: HOSPADM

## 2021-01-01 RX ORDER — IBUPROFEN 200 MG
200-600 TABLET ORAL EVERY 6 HOURS PRN
Status: DISCONTINUED | OUTPATIENT
Start: 2021-01-01 | End: 2021-01-01 | Stop reason: HOSPADM

## 2021-01-01 RX ORDER — LANOLIN ALCOHOL/MO/W.PET/CERES
3 CREAM (GRAM) TOPICAL
DISCHARGE
Start: 2021-01-01

## 2021-01-01 RX ORDER — ONDANSETRON 2 MG/ML
4 INJECTION INTRAMUSCULAR; INTRAVENOUS EVERY 6 HOURS PRN
Status: DISCONTINUED | OUTPATIENT
Start: 2021-01-01 | End: 2021-01-01 | Stop reason: HOSPADM

## 2021-01-01 RX ORDER — MIRABEGRON 25 MG/1
50 TABLET, FILM COATED, EXTENDED RELEASE ORAL EVERY MORNING
Status: DISCONTINUED | OUTPATIENT
Start: 2021-01-01 | End: 2021-01-01 | Stop reason: HOSPADM

## 2021-01-01 RX ORDER — IPRATROPIUM BROMIDE AND ALBUTEROL SULFATE 2.5; .5 MG/3ML; MG/3ML
6 SOLUTION RESPIRATORY (INHALATION) ONCE
Status: COMPLETED | OUTPATIENT
Start: 2021-01-01 | End: 2021-01-01

## 2021-01-01 RX ORDER — FUROSEMIDE 40 MG
80 TABLET ORAL DAILY
Status: DISCONTINUED | OUTPATIENT
Start: 2021-01-01 | End: 2021-01-01 | Stop reason: HOSPADM

## 2021-01-01 RX ORDER — WARFARIN SODIUM 2.5 MG/1
TABLET ORAL
Status: ON HOLD | DISCHARGE
Start: 2021-01-01 | End: 2021-01-01

## 2021-01-01 RX ORDER — WARFARIN SODIUM 4 MG/1
4 TABLET ORAL
Status: COMPLETED | OUTPATIENT
Start: 2021-01-01 | End: 2021-01-01

## 2021-01-01 RX ORDER — BRIMONIDINE TARTRATE AND TIMOLOL MALEATE 2; 5 MG/ML; MG/ML
1 SOLUTION OPHTHALMIC EVERY MORNING
Status: DISCONTINUED | OUTPATIENT
Start: 2021-01-01 | End: 2021-01-01 | Stop reason: HOSPADM

## 2021-01-01 RX ORDER — ACETAMINOPHEN 325 MG/1
650 TABLET ORAL EVERY 6 HOURS PRN
Status: DISCONTINUED | OUTPATIENT
Start: 2021-01-01 | End: 2021-01-01

## 2021-01-01 RX ORDER — WARFARIN SODIUM 2.5 MG/1
2.5 TABLET ORAL DAILY
DISCHARGE
Start: 2021-01-01

## 2021-01-01 RX ORDER — LATANOPROST 50 UG/ML
1 SOLUTION/ DROPS OPHTHALMIC AT BEDTIME
Status: DISCONTINUED | OUTPATIENT
Start: 2021-01-01 | End: 2021-01-01 | Stop reason: HOSPADM

## 2021-01-01 RX ORDER — ONDANSETRON 4 MG/1
4 TABLET, FILM COATED ORAL EVERY 8 HOURS PRN
COMMUNITY

## 2021-01-01 RX ORDER — ONDANSETRON 4 MG/1
4 TABLET, ORALLY DISINTEGRATING ORAL EVERY 6 HOURS PRN
Status: DISCONTINUED | OUTPATIENT
Start: 2021-01-01 | End: 2021-01-01 | Stop reason: HOSPADM

## 2021-01-01 RX ORDER — NALOXONE HYDROCHLORIDE 0.4 MG/ML
0.4 INJECTION, SOLUTION INTRAMUSCULAR; INTRAVENOUS; SUBCUTANEOUS
Status: DISCONTINUED | OUTPATIENT
Start: 2021-01-01 | End: 2021-01-01 | Stop reason: HOSPADM

## 2021-01-01 RX ORDER — WARFARIN SODIUM 2 MG/1
4 TABLET ORAL
Status: COMPLETED | OUTPATIENT
Start: 2021-01-01 | End: 2021-01-01

## 2021-01-01 RX ORDER — NALOXONE HYDROCHLORIDE 0.4 MG/ML
0.2 INJECTION, SOLUTION INTRAMUSCULAR; INTRAVENOUS; SUBCUTANEOUS
Status: DISCONTINUED | OUTPATIENT
Start: 2021-01-01 | End: 2021-01-01 | Stop reason: HOSPADM

## 2021-01-01 RX ORDER — ACETAMINOPHEN 325 MG/1
650 TABLET ORAL EVERY 4 HOURS PRN
Status: DISCONTINUED | OUTPATIENT
Start: 2021-01-01 | End: 2021-01-01 | Stop reason: HOSPADM

## 2021-01-01 RX ORDER — HYDROMORPHONE HYDROCHLORIDE 2 MG/1
2 TABLET ORAL
Qty: 10 TABLET | Refills: 0 | Status: SHIPPED | OUTPATIENT
Start: 2021-01-01 | End: 2021-01-01

## 2021-01-01 RX ORDER — AMLODIPINE BESYLATE 5 MG/1
5 TABLET ORAL EVERY MORNING
Status: DISCONTINUED | OUTPATIENT
Start: 2021-01-01 | End: 2021-01-01 | Stop reason: HOSPADM

## 2021-01-01 RX ORDER — MORPHINE SULFATE 100 MG/5ML
2.5 SOLUTION ORAL EVERY 6 HOURS
Qty: 30 ML | Refills: 0 | Status: SHIPPED | OUTPATIENT
Start: 2021-01-01 | End: 2021-01-01

## 2021-01-01 RX ORDER — WARFARIN SODIUM 3 MG/1
3 TABLET ORAL
Status: COMPLETED | OUTPATIENT
Start: 2021-01-01 | End: 2021-01-01

## 2021-01-01 RX ORDER — HYDROXYZINE HYDROCHLORIDE 25 MG/1
25 TABLET, FILM COATED ORAL EVERY 6 HOURS PRN
Status: DISCONTINUED | OUTPATIENT
Start: 2021-01-01 | End: 2021-01-01 | Stop reason: HOSPADM

## 2021-01-01 RX ORDER — AMOXICILLIN 250 MG
1 CAPSULE ORAL 2 TIMES DAILY PRN
DISCHARGE
Start: 2021-01-01

## 2021-01-01 RX ORDER — PANTOPRAZOLE SODIUM 40 MG/1
40 TABLET, DELAYED RELEASE ORAL
Status: DISCONTINUED | OUTPATIENT
Start: 2021-01-01 | End: 2021-01-01 | Stop reason: HOSPADM

## 2021-01-01 RX ORDER — POTASSIUM CHLORIDE 750 MG/1
20 TABLET, EXTENDED RELEASE ORAL DAILY
Status: DISCONTINUED | OUTPATIENT
Start: 2021-01-01 | End: 2021-01-01 | Stop reason: HOSPADM

## 2021-01-01 RX ORDER — DOXYCYCLINE 100 MG/10ML
100 INJECTION, POWDER, LYOPHILIZED, FOR SOLUTION INTRAVENOUS ONCE
Status: DISCONTINUED | OUTPATIENT
Start: 2021-01-01 | End: 2021-01-01

## 2021-01-01 RX ORDER — WARFARIN SODIUM 2.5 MG/1
2.5 TABLET ORAL
Status: COMPLETED | OUTPATIENT
Start: 2021-01-01 | End: 2021-01-01

## 2021-01-01 RX ORDER — AMLODIPINE BESYLATE 5 MG/1
5 TABLET ORAL EVERY MORNING
Status: ON HOLD | DISCHARGE
Start: 2021-01-01 | End: 2021-01-01

## 2021-01-01 RX ORDER — GLIPIZIDE 10 MG/1
1 TABLET ORAL
Status: DISCONTINUED | OUTPATIENT
Start: 2021-01-01 | End: 2021-01-01 | Stop reason: HOSPADM

## 2021-01-01 RX ORDER — OXYCODONE HYDROCHLORIDE 5 MG/1
2.5-5 TABLET ORAL EVERY 6 HOURS PRN
Qty: 15 TABLET | Refills: 0 | Status: SHIPPED | OUTPATIENT
Start: 2021-01-01 | End: 2021-01-01

## 2021-01-01 RX ORDER — MULTIVIT WITH MINERALS/LUTEIN
1000 TABLET ORAL DAILY
COMMUNITY

## 2021-01-01 RX ORDER — CEFTRIAXONE 2 G/1
2 INJECTION, POWDER, FOR SOLUTION INTRAMUSCULAR; INTRAVENOUS ONCE
Status: DISCONTINUED | OUTPATIENT
Start: 2021-01-01 | End: 2021-01-01

## 2021-01-01 RX ORDER — MORPHINE SULFATE 100 MG/5ML
2.5 SOLUTION ORAL EVERY 6 HOURS
Qty: 30 ML | Refills: 0 | Status: ON HOLD | OUTPATIENT
Start: 2021-01-01 | End: 2021-01-01

## 2021-01-01 RX ORDER — METHYLPREDNISOLONE SODIUM SUCCINATE 125 MG/2ML
60 INJECTION, POWDER, LYOPHILIZED, FOR SOLUTION INTRAMUSCULAR; INTRAVENOUS EVERY 8 HOURS
Status: DISCONTINUED | OUTPATIENT
Start: 2021-01-01 | End: 2021-01-01

## 2021-01-01 RX ORDER — MULTIVITAMIN,THERAPEUTIC
1 TABLET ORAL DAILY
Status: DISCONTINUED | OUTPATIENT
Start: 2021-01-01 | End: 2021-01-01 | Stop reason: HOSPADM

## 2021-01-01 RX ORDER — IPRATROPIUM BROMIDE AND ALBUTEROL SULFATE 2.5; .5 MG/3ML; MG/3ML
3 SOLUTION RESPIRATORY (INHALATION)
Status: DISCONTINUED | OUTPATIENT
Start: 2021-01-01 | End: 2021-01-01 | Stop reason: HOSPADM

## 2021-01-01 RX ORDER — BISACODYL 10 MG
10 SUPPOSITORY, RECTAL RECTAL DAILY PRN
Qty: 2 SUPPOSITORY | Refills: 0 | Status: SHIPPED | OUTPATIENT
Start: 2021-01-01

## 2021-01-01 RX ORDER — HYDROXYZINE HYDROCHLORIDE 10 MG/1
10 TABLET, FILM COATED ORAL EVERY 6 HOURS PRN
Status: DISCONTINUED | OUTPATIENT
Start: 2021-01-01 | End: 2021-01-01

## 2021-01-01 RX ORDER — AMOXICILLIN 250 MG
1 CAPSULE ORAL 2 TIMES DAILY PRN
Status: DISCONTINUED | OUTPATIENT
Start: 2021-01-01 | End: 2021-01-01 | Stop reason: HOSPADM

## 2021-01-01 RX ORDER — BRINZOLAMIDE 10 MG/ML
1 SUSPENSION/ DROPS OPHTHALMIC EVERY MORNING
Status: DISCONTINUED | OUTPATIENT
Start: 2021-01-01 | End: 2021-01-01 | Stop reason: HOSPADM

## 2021-01-01 RX ORDER — OXYCODONE HYDROCHLORIDE 5 MG/1
5 TABLET ORAL EVERY 4 HOURS PRN
Status: DISCONTINUED | OUTPATIENT
Start: 2021-01-01 | End: 2021-01-01

## 2021-01-01 RX ORDER — CEFAZOLIN SODIUM 1 G/50ML
20 SOLUTION INTRAVENOUS ONCE
Status: COMPLETED | OUTPATIENT
Start: 2021-01-01 | End: 2021-01-01

## 2021-01-01 RX ORDER — IPRATROPIUM BROMIDE AND ALBUTEROL SULFATE 2.5; .5 MG/3ML; MG/3ML
3 SOLUTION RESPIRATORY (INHALATION) 4 TIMES DAILY
Status: DISCONTINUED | OUTPATIENT
Start: 2021-01-01 | End: 2021-01-01

## 2021-01-01 RX ORDER — METHYLPREDNISOLONE SODIUM SUCCINATE 125 MG/2ML
125 INJECTION, POWDER, LYOPHILIZED, FOR SOLUTION INTRAMUSCULAR; INTRAVENOUS ONCE
Status: COMPLETED | OUTPATIENT
Start: 2021-01-01 | End: 2021-01-01

## 2021-01-01 RX ORDER — ALBUTEROL SULFATE 90 UG/1
2 AEROSOL, METERED RESPIRATORY (INHALATION) EVERY 4 HOURS PRN
Status: DISCONTINUED | OUTPATIENT
Start: 2021-01-01 | End: 2021-01-01 | Stop reason: HOSPADM

## 2021-01-01 RX ORDER — IPRATROPIUM BROMIDE AND ALBUTEROL SULFATE 2.5; .5 MG/3ML; MG/3ML
3 SOLUTION RESPIRATORY (INHALATION)
Status: DISCONTINUED | OUTPATIENT
Start: 2021-01-01 | End: 2021-01-01

## 2021-01-01 RX ORDER — ACETAMINOPHEN 650 MG/1
650 SUPPOSITORY RECTAL EVERY 6 HOURS PRN
Status: DISCONTINUED | OUTPATIENT
Start: 2021-01-01 | End: 2021-01-01 | Stop reason: HOSPADM

## 2021-01-01 RX ORDER — HYDROMORPHONE HCL IN WATER/PF 6 MG/30 ML
0.2 PATIENT CONTROLLED ANALGESIA SYRINGE INTRAVENOUS
Status: DISCONTINUED | OUTPATIENT
Start: 2021-01-01 | End: 2021-01-01 | Stop reason: HOSPADM

## 2021-01-01 RX ORDER — POTASSIUM CHLORIDE 1500 MG/1
40 TABLET, EXTENDED RELEASE ORAL ONCE
Status: COMPLETED | OUTPATIENT
Start: 2021-01-01 | End: 2021-01-01

## 2021-01-01 RX ORDER — ATROPINE SULFATE 10 MG/ML
2 SOLUTION/ DROPS OPHTHALMIC 4 TIMES DAILY
Qty: 2 ML | Refills: 0 | Status: SHIPPED | OUTPATIENT
Start: 2021-01-01

## 2021-01-01 RX ORDER — ALBUTEROL SULFATE 0.83 MG/ML
2.5 SOLUTION RESPIRATORY (INHALATION)
Status: DISCONTINUED | OUTPATIENT
Start: 2021-01-01 | End: 2021-01-01 | Stop reason: HOSPADM

## 2021-01-01 RX ORDER — FENTANYL CITRATE 50 UG/ML
50 INJECTION, SOLUTION INTRAMUSCULAR; INTRAVENOUS ONCE
Status: COMPLETED | OUTPATIENT
Start: 2021-01-01 | End: 2021-01-01

## 2021-01-01 RX ORDER — ACETAMINOPHEN 325 MG/1
325-650 TABLET ORAL EVERY 6 HOURS PRN
Status: DISCONTINUED | OUTPATIENT
Start: 2021-01-01 | End: 2021-01-01 | Stop reason: HOSPADM

## 2021-01-01 RX ORDER — LORAZEPAM 0.5 MG/1
0.5 TABLET ORAL EVERY 4 HOURS PRN
Qty: 15 TABLET | Refills: 0 | Status: SHIPPED | OUTPATIENT
Start: 2021-01-01

## 2021-01-01 RX ORDER — MIRABEGRON 50 MG/1
50 TABLET, EXTENDED RELEASE ORAL EVERY MORNING
Status: DISCONTINUED | OUTPATIENT
Start: 2021-01-01 | End: 2021-01-01 | Stop reason: HOSPADM

## 2021-01-01 RX ADMIN — IPRATROPIUM BROMIDE AND ALBUTEROL SULFATE 3 ML: .5; 3 SOLUTION RESPIRATORY (INHALATION) at 09:33

## 2021-01-01 RX ADMIN — ACETAMINOPHEN 650 MG: 325 TABLET, FILM COATED ORAL at 14:00

## 2021-01-01 RX ADMIN — SODIUM CHLORIDE 50 ML: 9 INJECTION, SOLUTION INTRAVENOUS at 17:24

## 2021-01-01 RX ADMIN — WARFARIN SODIUM 4 MG: 4 TABLET ORAL at 17:30

## 2021-01-01 RX ADMIN — WARFARIN SODIUM 0.5 MG: 1 TABLET ORAL at 18:16

## 2021-01-01 RX ADMIN — IPRATROPIUM BROMIDE AND ALBUTEROL SULFATE 3 ML: .5; 3 SOLUTION RESPIRATORY (INHALATION) at 15:40

## 2021-01-01 RX ADMIN — DEXAMETHASONE SODIUM PHOSPHATE 6 MG: 10 INJECTION INTRAMUSCULAR; INTRAVENOUS at 10:03

## 2021-01-01 RX ADMIN — IPRATROPIUM BROMIDE AND ALBUTEROL SULFATE 6 ML: .5; 3 SOLUTION RESPIRATORY (INHALATION) at 21:54

## 2021-01-01 RX ADMIN — OXYCODONE HYDROCHLORIDE 5 MG: 5 TABLET ORAL at 19:51

## 2021-01-01 RX ADMIN — MIRABEGRON 50 MG: 50 TABLET, FILM COATED, EXTENDED RELEASE ORAL at 13:03

## 2021-01-01 RX ADMIN — TAZOBACTAM SODIUM AND PIPERACILLIN SODIUM 3.38 G: 375; 3 INJECTION, SOLUTION INTRAVENOUS at 04:27

## 2021-01-01 RX ADMIN — MIRABEGRON 50 MG: 25 TABLET, FILM COATED, EXTENDED RELEASE ORAL at 10:14

## 2021-01-01 RX ADMIN — OXYCODONE HYDROCHLORIDE 5 MG: 5 TABLET ORAL at 00:00

## 2021-01-01 RX ADMIN — THIAMINE HCL TAB 100 MG 100 MG: 100 TAB at 13:35

## 2021-01-01 RX ADMIN — BRIMONIDINE TARTRATE, TIMOLOL MALEATE 1 DROP: 2; 5 SOLUTION/ DROPS OPHTHALMIC at 08:10

## 2021-01-01 RX ADMIN — ACETAMINOPHEN 650 MG: 325 TABLET, FILM COATED ORAL at 10:03

## 2021-01-01 RX ADMIN — VANCOMYCIN HYDROCHLORIDE 1000 MG: 1 INJECTION, SOLUTION INTRAVENOUS at 01:04

## 2021-01-01 RX ADMIN — LATANOPROST 1 DROP: 50 SOLUTION OPHTHALMIC at 21:51

## 2021-01-01 RX ADMIN — BRINZOLAMIDE 1 DROP: 10 SUSPENSION/ DROPS OPHTHALMIC at 10:02

## 2021-01-01 RX ADMIN — TAZOBACTAM SODIUM AND PIPERACILLIN SODIUM 3.38 G: 375; 3 INJECTION, SOLUTION INTRAVENOUS at 16:06

## 2021-01-01 RX ADMIN — SODIUM CHLORIDE 50 ML: 9 INJECTION, SOLUTION INTRAVENOUS at 20:02

## 2021-01-01 RX ADMIN — TAZOBACTAM SODIUM AND PIPERACILLIN SODIUM 3.38 G: 375; 3 INJECTION, SOLUTION INTRAVENOUS at 09:50

## 2021-01-01 RX ADMIN — MICONAZOLE NITRATE: 20 POWDER TOPICAL at 21:23

## 2021-01-01 RX ADMIN — LATANOPROST 1 DROP: 50 SOLUTION OPHTHALMIC at 21:23

## 2021-01-01 RX ADMIN — TAZOBACTAM SODIUM AND PIPERACILLIN SODIUM 3.38 G: 375; 3 INJECTION, SOLUTION INTRAVENOUS at 16:04

## 2021-01-01 RX ADMIN — THERA TABS 1 TABLET: TAB at 08:28

## 2021-01-01 RX ADMIN — MICONAZOLE NITRATE: 20 POWDER TOPICAL at 22:04

## 2021-01-01 RX ADMIN — IPRATROPIUM BROMIDE AND ALBUTEROL SULFATE 3 ML: .5; 3 SOLUTION RESPIRATORY (INHALATION) at 09:22

## 2021-01-01 RX ADMIN — THERA TABS 1 TABLET: TAB at 09:48

## 2021-01-01 RX ADMIN — MICONAZOLE NITRATE: 20 POWDER TOPICAL at 08:17

## 2021-01-01 RX ADMIN — HYDROMORPHONE HYDROCHLORIDE 1 MG: 2 TABLET ORAL at 19:15

## 2021-01-01 RX ADMIN — BRIMONIDINE TARTRATE, TIMOLOL MALEATE 1 DROP: 2; 5 SOLUTION/ DROPS OPHTHALMIC at 09:13

## 2021-01-01 RX ADMIN — VANCOMYCIN HYDROCHLORIDE 1500 MG: 10 INJECTION, POWDER, LYOPHILIZED, FOR SOLUTION INTRAVENOUS at 00:56

## 2021-01-01 RX ADMIN — MICONAZOLE NITRATE: 20 POWDER TOPICAL at 22:56

## 2021-01-01 RX ADMIN — OXYCODONE HYDROCHLORIDE 5 MG: 5 TABLET ORAL at 14:41

## 2021-01-01 RX ADMIN — IBUPROFEN 600 MG: 200 TABLET, FILM COATED ORAL at 21:54

## 2021-01-01 RX ADMIN — SENNOSIDES AND DOCUSATE SODIUM 1 TABLET: 50; 8.6 TABLET ORAL at 19:26

## 2021-01-01 RX ADMIN — LATANOPROST 1 DROP: 50 SOLUTION OPHTHALMIC at 22:54

## 2021-01-01 RX ADMIN — BRIMONIDINE TARTRATE, TIMOLOL MALEATE 1 DROP: 2; 5 SOLUTION/ DROPS OPHTHALMIC at 10:02

## 2021-01-01 RX ADMIN — OXYCODONE HYDROCHLORIDE 5 MG: 5 TABLET ORAL at 04:11

## 2021-01-01 RX ADMIN — LATANOPROST 1 DROP: 50 SOLUTION OPHTHALMIC at 22:04

## 2021-01-01 RX ADMIN — ACETAMINOPHEN 650 MG: 325 TABLET, FILM COATED ORAL at 17:25

## 2021-01-01 RX ADMIN — SENNOSIDES AND DOCUSATE SODIUM 1 TABLET: 50; 8.6 TABLET ORAL at 13:03

## 2021-01-01 RX ADMIN — WARFARIN SODIUM 4 MG: 2 TABLET ORAL at 18:10

## 2021-01-01 RX ADMIN — WARFARIN SODIUM 0.5 MG: 1 TABLET ORAL at 19:15

## 2021-01-01 RX ADMIN — BRINZOLAMIDE 1 DROP: 10 SUSPENSION/ DROPS OPHTHALMIC at 09:49

## 2021-01-01 RX ADMIN — THIAMINE HCL TAB 100 MG 100 MG: 100 TAB at 09:48

## 2021-01-01 RX ADMIN — TAZOBACTAM SODIUM AND PIPERACILLIN SODIUM 3.38 G: 375; 3 INJECTION, SOLUTION INTRAVENOUS at 21:53

## 2021-01-01 RX ADMIN — REMDESIVIR 100 MG: 100 INJECTION, POWDER, LYOPHILIZED, FOR SOLUTION INTRAVENOUS at 17:21

## 2021-01-01 RX ADMIN — FENTANYL CITRATE 50 MCG: 0.05 INJECTION, SOLUTION INTRAMUSCULAR; INTRAVENOUS at 18:19

## 2021-01-01 RX ADMIN — ACETAMINOPHEN 650 MG: 325 TABLET, FILM COATED ORAL at 16:50

## 2021-01-01 RX ADMIN — ACETAMINOPHEN 650 MG: 325 TABLET, FILM COATED ORAL at 08:42

## 2021-01-01 RX ADMIN — ACETAMINOPHEN 650 MG: 325 TABLET, FILM COATED ORAL at 17:34

## 2021-01-01 RX ADMIN — PANTOPRAZOLE SODIUM 40 MG: 40 TABLET, DELAYED RELEASE ORAL at 06:33

## 2021-01-01 RX ADMIN — VANCOMYCIN HYDROCHLORIDE 1000 MG: 1 INJECTION, SOLUTION INTRAVENOUS at 00:54

## 2021-01-01 RX ADMIN — OXYCODONE HYDROCHLORIDE 5 MG: 5 TABLET ORAL at 00:20

## 2021-01-01 RX ADMIN — MICONAZOLE NITRATE: 20 POWDER TOPICAL at 21:43

## 2021-01-01 RX ADMIN — TAZOBACTAM SODIUM AND PIPERACILLIN SODIUM 3.38 G: 375; 3 INJECTION, SOLUTION INTRAVENOUS at 22:43

## 2021-01-01 RX ADMIN — BRIMONIDINE TARTRATE, TIMOLOL MALEATE 1 DROP: 2; 5 SOLUTION/ DROPS OPHTHALMIC at 10:23

## 2021-01-01 RX ADMIN — THIAMINE HCL TAB 100 MG 100 MG: 100 TAB at 10:13

## 2021-01-01 RX ADMIN — DEXAMETHASONE SODIUM PHOSPHATE 6 MG: 10 INJECTION INTRAMUSCULAR; INTRAVENOUS at 10:23

## 2021-01-01 RX ADMIN — TAZOBACTAM SODIUM AND PIPERACILLIN SODIUM 3.38 G: 375; 3 INJECTION, SOLUTION INTRAVENOUS at 22:52

## 2021-01-01 RX ADMIN — MICONAZOLE NITRATE: 20 POWDER TOPICAL at 21:21

## 2021-01-01 RX ADMIN — BRINZOLAMIDE 1 DROP: 10 SUSPENSION/ DROPS OPHTHALMIC at 09:13

## 2021-01-01 RX ADMIN — TAZOBACTAM SODIUM AND PIPERACILLIN SODIUM 3.38 G: 375; 3 INJECTION, SOLUTION INTRAVENOUS at 05:01

## 2021-01-01 RX ADMIN — FUROSEMIDE 80 MG: 40 TABLET ORAL at 10:14

## 2021-01-01 RX ADMIN — DEXAMETHASONE SODIUM PHOSPHATE 6 MG: 10 INJECTION INTRAMUSCULAR; INTRAVENOUS at 10:01

## 2021-01-01 RX ADMIN — METHYLPREDNISOLONE SODIUM SUCCINATE 62.5 MG: 125 INJECTION, POWDER, FOR SOLUTION INTRAMUSCULAR; INTRAVENOUS at 07:03

## 2021-01-01 RX ADMIN — TAZOBACTAM SODIUM AND PIPERACILLIN SODIUM 3.38 G: 375; 3 INJECTION, SOLUTION INTRAVENOUS at 10:12

## 2021-01-01 RX ADMIN — OXYCODONE HYDROCHLORIDE 5 MG: 5 TABLET ORAL at 16:34

## 2021-01-01 RX ADMIN — DEXAMETHASONE SODIUM PHOSPHATE 6 MG: 10 INJECTION INTRAMUSCULAR; INTRAVENOUS at 09:47

## 2021-01-01 RX ADMIN — IPRATROPIUM BROMIDE AND ALBUTEROL SULFATE 3 ML: .5; 3 SOLUTION RESPIRATORY (INHALATION) at 20:32

## 2021-01-01 RX ADMIN — VANCOMYCIN HYDROCHLORIDE 1500 MG: 10 INJECTION, POWDER, LYOPHILIZED, FOR SOLUTION INTRAVENOUS at 21:52

## 2021-01-01 RX ADMIN — HYDROXYZINE HYDROCHLORIDE 25 MG: 25 TABLET, FILM COATED ORAL at 21:54

## 2021-01-01 RX ADMIN — SODIUM CHLORIDE 500 ML: 9 INJECTION, SOLUTION INTRAVENOUS at 19:19

## 2021-01-01 RX ADMIN — MICONAZOLE NITRATE: 20 POWDER TOPICAL at 10:13

## 2021-01-01 RX ADMIN — ACETAMINOPHEN 650 MG: 325 TABLET, FILM COATED ORAL at 01:22

## 2021-01-01 RX ADMIN — TAZOBACTAM SODIUM AND PIPERACILLIN SODIUM 3.38 G: 375; 3 INJECTION, SOLUTION INTRAVENOUS at 17:56

## 2021-01-01 RX ADMIN — MICONAZOLE NITRATE: 20 POWDER TOPICAL at 09:55

## 2021-01-01 RX ADMIN — TAZOBACTAM SODIUM AND PIPERACILLIN SODIUM 3.38 G: 375; 3 INJECTION, SOLUTION INTRAVENOUS at 19:24

## 2021-01-01 RX ADMIN — THERA TABS 1 TABLET: TAB at 13:35

## 2021-01-01 RX ADMIN — TAZOBACTAM SODIUM AND PIPERACILLIN SODIUM 3.38 G: 375; 3 INJECTION, SOLUTION INTRAVENOUS at 03:37

## 2021-01-01 RX ADMIN — REMDESIVIR 200 MG: 100 INJECTION, POWDER, LYOPHILIZED, FOR SOLUTION INTRAVENOUS at 14:59

## 2021-01-01 RX ADMIN — BRINZOLAMIDE 1 DROP: 10 SUSPENSION/ DROPS OPHTHALMIC at 12:01

## 2021-01-01 RX ADMIN — VANCOMYCIN HYDROCHLORIDE 750 MG: 1 INJECTION, POWDER, LYOPHILIZED, FOR SOLUTION INTRAVENOUS at 02:32

## 2021-01-01 RX ADMIN — THERA TABS 1 TABLET: TAB at 10:23

## 2021-01-01 RX ADMIN — HYDROMORPHONE HYDROCHLORIDE 0.2 MG: 0.2 INJECTION, SOLUTION INTRAMUSCULAR; INTRAVENOUS; SUBCUTANEOUS at 03:51

## 2021-01-01 RX ADMIN — OXYCODONE HYDROCHLORIDE 5 MG: 5 TABLET ORAL at 04:32

## 2021-01-01 RX ADMIN — FUROSEMIDE 80 MG: 40 TABLET ORAL at 14:44

## 2021-01-01 RX ADMIN — HYDROXYZINE HYDROCHLORIDE 10 MG: 10 TABLET ORAL at 00:20

## 2021-01-01 RX ADMIN — TAZOBACTAM SODIUM AND PIPERACILLIN SODIUM 3.38 G: 375; 3 INJECTION, SOLUTION INTRAVENOUS at 03:47

## 2021-01-01 RX ADMIN — TAZOBACTAM SODIUM AND PIPERACILLIN SODIUM 3.38 G: 375; 3 INJECTION, SOLUTION INTRAVENOUS at 16:56

## 2021-01-01 RX ADMIN — ACETAMINOPHEN 650 MG: 325 TABLET, FILM COATED ORAL at 12:57

## 2021-01-01 RX ADMIN — MICONAZOLE NITRATE: 20 POWDER TOPICAL at 22:34

## 2021-01-01 RX ADMIN — FENTANYL CITRATE 50 MCG: 0.05 INJECTION, SOLUTION INTRAMUSCULAR; INTRAVENOUS at 22:11

## 2021-01-01 RX ADMIN — OXYCODONE HYDROCHLORIDE 5 MG: 5 TABLET ORAL at 10:14

## 2021-01-01 RX ADMIN — TAZOBACTAM SODIUM AND PIPERACILLIN SODIUM 3.38 G: 375; 3 INJECTION, SOLUTION INTRAVENOUS at 12:13

## 2021-01-01 RX ADMIN — TAZOBACTAM SODIUM AND PIPERACILLIN SODIUM 3.38 G: 375; 3 INJECTION, SOLUTION INTRAVENOUS at 17:36

## 2021-01-01 RX ADMIN — VANCOMYCIN HYDROCHLORIDE 1000 MG: 1 INJECTION, SOLUTION INTRAVENOUS at 13:12

## 2021-01-01 RX ADMIN — BRIMONIDINE TARTRATE, TIMOLOL MALEATE 1 DROP: 2; 5 SOLUTION/ DROPS OPHTHALMIC at 15:02

## 2021-01-01 RX ADMIN — TAZOBACTAM SODIUM AND PIPERACILLIN SODIUM 3.38 G: 375; 3 INJECTION, SOLUTION INTRAVENOUS at 05:19

## 2021-01-01 RX ADMIN — TAZOBACTAM SODIUM AND PIPERACILLIN SODIUM 3.38 G: 375; 3 INJECTION, SOLUTION INTRAVENOUS at 04:07

## 2021-01-01 RX ADMIN — TAZOBACTAM SODIUM AND PIPERACILLIN SODIUM 3.38 G: 375; 3 INJECTION, SOLUTION INTRAVENOUS at 10:39

## 2021-01-01 RX ADMIN — TAZOBACTAM SODIUM AND PIPERACILLIN SODIUM 3.38 G: 375; 3 INJECTION, SOLUTION INTRAVENOUS at 17:13

## 2021-01-01 RX ADMIN — THIAMINE HCL TAB 100 MG 100 MG: 100 TAB at 10:23

## 2021-01-01 RX ADMIN — BRIMONIDINE TARTRATE, TIMOLOL MALEATE 1 DROP: 2; 5 SOLUTION/ DROPS OPHTHALMIC at 09:38

## 2021-01-01 RX ADMIN — MIRABEGRON 50 MG: 50 TABLET, FILM COATED, EXTENDED RELEASE ORAL at 13:26

## 2021-01-01 RX ADMIN — BRINZOLAMIDE 1 DROP: 10 SUSPENSION/ DROPS OPHTHALMIC at 10:24

## 2021-01-01 RX ADMIN — THIAMINE HCL TAB 100 MG 100 MG: 100 TAB at 08:28

## 2021-01-01 RX ADMIN — DEXAMETHASONE SODIUM PHOSPHATE 6 MG: 10 INJECTION INTRAMUSCULAR; INTRAVENOUS at 09:38

## 2021-01-01 RX ADMIN — BRIMONIDINE TARTRATE, TIMOLOL MALEATE 1 DROP: 2; 5 SOLUTION/ DROPS OPHTHALMIC at 08:28

## 2021-01-01 RX ADMIN — TAZOBACTAM SODIUM AND PIPERACILLIN SODIUM 3.38 G: 375; 3 INJECTION, SOLUTION INTRAVENOUS at 03:33

## 2021-01-01 RX ADMIN — DEXAMETHASONE SODIUM PHOSPHATE 6 MG: 10 INJECTION INTRAMUSCULAR; INTRAVENOUS at 08:00

## 2021-01-01 RX ADMIN — HYDROXYZINE HYDROCHLORIDE 10 MG: 10 TABLET ORAL at 00:00

## 2021-01-01 RX ADMIN — ACETAMINOPHEN 650 MG: 325 TABLET, FILM COATED ORAL at 01:48

## 2021-01-01 RX ADMIN — IPRATROPIUM BROMIDE AND ALBUTEROL SULFATE 3 ML: .5; 3 SOLUTION RESPIRATORY (INHALATION) at 16:27

## 2021-01-01 RX ADMIN — MIRABEGRON 50 MG: 50 TABLET, FILM COATED, EXTENDED RELEASE ORAL at 11:16

## 2021-01-01 RX ADMIN — MICONAZOLE NITRATE: 20 POWDER TOPICAL at 21:42

## 2021-01-01 RX ADMIN — IBUPROFEN 600 MG: 200 TABLET, FILM COATED ORAL at 10:14

## 2021-01-01 RX ADMIN — BRINZOLAMIDE 1 DROP: 10 SUSPENSION/ DROPS OPHTHALMIC at 09:38

## 2021-01-01 RX ADMIN — MICONAZOLE NITRATE: 20 POWDER TOPICAL at 10:12

## 2021-01-01 RX ADMIN — MIRABEGRON 50 MG: 50 TABLET, FILM COATED, EXTENDED RELEASE ORAL at 12:15

## 2021-01-01 RX ADMIN — VANCOMYCIN HYDROCHLORIDE 1000 MG: 1 INJECTION, SOLUTION INTRAVENOUS at 12:57

## 2021-01-01 RX ADMIN — PANTOPRAZOLE SODIUM 40 MG: 40 TABLET, DELAYED RELEASE ORAL at 07:04

## 2021-01-01 RX ADMIN — POTASSIUM CHLORIDE 40 MEQ: 1500 TABLET, EXTENDED RELEASE ORAL at 10:11

## 2021-01-01 RX ADMIN — THERA TABS 1 TABLET: TAB at 09:38

## 2021-01-01 RX ADMIN — LATANOPROST 1 DROP: 50 SOLUTION OPHTHALMIC at 22:43

## 2021-01-01 RX ADMIN — MICONAZOLE NITRATE: 20 POWDER TOPICAL at 10:05

## 2021-01-01 RX ADMIN — REMDESIVIR 100 MG: 100 INJECTION, POWDER, LYOPHILIZED, FOR SOLUTION INTRAVENOUS at 17:29

## 2021-01-01 RX ADMIN — OXYCODONE HYDROCHLORIDE 5 MG: 5 TABLET ORAL at 08:38

## 2021-01-01 RX ADMIN — POTASSIUM CHLORIDE 20 MEQ: 750 TABLET, FILM COATED, EXTENDED RELEASE ORAL at 10:14

## 2021-01-01 RX ADMIN — MIRABEGRON 50 MG: 50 TABLET, FILM COATED, EXTENDED RELEASE ORAL at 13:38

## 2021-01-01 RX ADMIN — REMDESIVIR 100 MG: 100 INJECTION, POWDER, LYOPHILIZED, FOR SOLUTION INTRAVENOUS at 19:55

## 2021-01-01 RX ADMIN — THIAMINE HCL TAB 100 MG 100 MG: 100 TAB at 09:58

## 2021-01-01 RX ADMIN — THERA TABS 1 TABLET: TAB at 10:03

## 2021-01-01 RX ADMIN — IPRATROPIUM BROMIDE AND ALBUTEROL SULFATE 3 ML: .5; 3 SOLUTION RESPIRATORY (INHALATION) at 11:13

## 2021-01-01 RX ADMIN — IPRATROPIUM BROMIDE AND ALBUTEROL SULFATE 6 ML: .5; 3 SOLUTION RESPIRATORY (INHALATION) at 01:03

## 2021-01-01 RX ADMIN — SODIUM CHLORIDE 50 ML: 9 INJECTION, SOLUTION INTRAVENOUS at 14:59

## 2021-01-01 RX ADMIN — ACETAMINOPHEN 650 MG: 325 TABLET, FILM COATED ORAL at 10:19

## 2021-01-01 RX ADMIN — TAZOBACTAM SODIUM AND PIPERACILLIN SODIUM 4.5 G: 500; 4 INJECTION, SOLUTION INTRAVENOUS at 22:56

## 2021-01-01 RX ADMIN — MIRABEGRON 50 MG: 25 TABLET, FILM COATED, EXTENDED RELEASE ORAL at 08:35

## 2021-01-01 RX ADMIN — WARFARIN SODIUM 5 MG: 5 TABLET ORAL at 17:36

## 2021-01-01 RX ADMIN — ACETAMINOPHEN 650 MG: 325 TABLET, FILM COATED ORAL at 09:48

## 2021-01-01 RX ADMIN — DEXAMETHASONE SODIUM PHOSPHATE 6 MG: 10 INJECTION INTRAMUSCULAR; INTRAVENOUS at 08:27

## 2021-01-01 RX ADMIN — LATANOPROST 1 DROP: 50 SOLUTION OPHTHALMIC at 21:44

## 2021-01-01 RX ADMIN — TAZOBACTAM SODIUM AND PIPERACILLIN SODIUM 3.38 G: 375; 3 INJECTION, SOLUTION INTRAVENOUS at 21:42

## 2021-01-01 RX ADMIN — TAZOBACTAM SODIUM AND PIPERACILLIN SODIUM 3.38 G: 375; 3 INJECTION, SOLUTION INTRAVENOUS at 23:23

## 2021-01-01 RX ADMIN — MICONAZOLE NITRATE: 20 POWDER TOPICAL at 21:55

## 2021-01-01 RX ADMIN — LATANOPROST 1 DROP: 50 SOLUTION OPHTHALMIC at 21:22

## 2021-01-01 RX ADMIN — METHYLPREDNISOLONE SODIUM SUCCINATE 125 MG: 125 INJECTION, POWDER, FOR SOLUTION INTRAMUSCULAR; INTRAVENOUS at 21:52

## 2021-01-01 RX ADMIN — WARFARIN SODIUM 2.5 MG: 2.5 TABLET ORAL at 17:03

## 2021-01-01 RX ADMIN — ACETAMINOPHEN 650 MG: 325 TABLET, FILM COATED ORAL at 21:53

## 2021-01-01 RX ADMIN — TAZOBACTAM SODIUM AND PIPERACILLIN SODIUM 3.38 G: 375; 3 INJECTION, SOLUTION INTRAVENOUS at 11:11

## 2021-01-01 RX ADMIN — TAZOBACTAM SODIUM AND PIPERACILLIN SODIUM 3.38 G: 375; 3 INJECTION, SOLUTION INTRAVENOUS at 01:48

## 2021-01-01 RX ADMIN — DEXAMETHASONE 6 MG: 2 TABLET ORAL at 10:20

## 2021-01-01 RX ADMIN — SODIUM CHLORIDE 50 ML: 9 INJECTION, SOLUTION INTRAVENOUS at 17:31

## 2021-01-01 RX ADMIN — TAZOBACTAM SODIUM AND PIPERACILLIN SODIUM 3.38 G: 375; 3 INJECTION, SOLUTION INTRAVENOUS at 17:02

## 2021-01-01 RX ADMIN — SODIUM CHLORIDE 50 ML: 9 INJECTION, SOLUTION INTRAVENOUS at 17:35

## 2021-01-01 RX ADMIN — POTASSIUM CHLORIDE 20 MEQ: 750 TABLET, FILM COATED, EXTENDED RELEASE ORAL at 08:35

## 2021-01-01 RX ADMIN — MICONAZOLE NITRATE: 20 POWDER TOPICAL at 08:27

## 2021-01-01 RX ADMIN — BRIMONIDINE TARTRATE, TIMOLOL MALEATE 1 DROP: 2; 5 SOLUTION/ DROPS OPHTHALMIC at 10:01

## 2021-01-01 RX ADMIN — MIRABEGRON 50 MG: 50 TABLET, FILM COATED, EXTENDED RELEASE ORAL at 12:57

## 2021-01-01 RX ADMIN — OXYCODONE HYDROCHLORIDE 5 MG: 5 TABLET ORAL at 04:08

## 2021-01-01 RX ADMIN — MIRABEGRON 50 MG: 50 TABLET, FILM COATED, EXTENDED RELEASE ORAL at 11:12

## 2021-01-01 RX ADMIN — AMLODIPINE BESYLATE 5 MG: 5 TABLET ORAL at 10:14

## 2021-01-01 RX ADMIN — MICONAZOLE NITRATE: 20 POWDER TOPICAL at 10:16

## 2021-01-01 RX ADMIN — BRINZOLAMIDE 1 DROP: 10 SUSPENSION/ DROPS OPHTHALMIC at 10:01

## 2021-01-01 RX ADMIN — AMLODIPINE BESYLATE 5 MG: 5 TABLET ORAL at 08:35

## 2021-01-01 RX ADMIN — HYDROMORPHONE HYDROCHLORIDE 0.2 MG: 0.2 INJECTION, SOLUTION INTRAMUSCULAR; INTRAVENOUS; SUBCUTANEOUS at 00:31

## 2021-01-01 RX ADMIN — WARFARIN SODIUM 4 MG: 4 TABLET ORAL at 17:25

## 2021-01-01 RX ADMIN — HYDROXYZINE HYDROCHLORIDE 10 MG: 10 TABLET ORAL at 14:41

## 2021-01-01 RX ADMIN — MIRABEGRON 50 MG: 50 TABLET, FILM COATED, EXTENDED RELEASE ORAL at 12:12

## 2021-01-01 RX ADMIN — OXYCODONE HYDROCHLORIDE 5 MG: 5 TABLET ORAL at 18:51

## 2021-01-01 RX ADMIN — OXYCODONE HYDROCHLORIDE 5 MG: 5 TABLET ORAL at 14:44

## 2021-01-01 RX ADMIN — TAZOBACTAM SODIUM AND PIPERACILLIN SODIUM 3.38 G: 375; 3 INJECTION, SOLUTION INTRAVENOUS at 10:03

## 2021-01-01 RX ADMIN — HYDROMORPHONE HYDROCHLORIDE 0.2 MG: 0.2 INJECTION, SOLUTION INTRAMUSCULAR; INTRAVENOUS; SUBCUTANEOUS at 21:57

## 2021-01-01 RX ADMIN — HYDROXYZINE HYDROCHLORIDE 10 MG: 10 TABLET ORAL at 13:04

## 2021-01-01 RX ADMIN — THIAMINE HCL TAB 100 MG 100 MG: 100 TAB at 09:38

## 2021-01-01 RX ADMIN — MIRABEGRON 50 MG: 50 TABLET, FILM COATED, EXTENDED RELEASE ORAL at 13:11

## 2021-01-01 RX ADMIN — OXYCODONE HYDROCHLORIDE 5 MG: 5 TABLET ORAL at 10:12

## 2021-01-01 RX ADMIN — WARFARIN SODIUM 2 MG: 2 TABLET ORAL at 17:25

## 2021-01-01 RX ADMIN — POTASSIUM CHLORIDE 20 MEQ: 750 TABLET, FILM COATED, EXTENDED RELEASE ORAL at 14:44

## 2021-01-01 RX ADMIN — ACETAMINOPHEN 650 MG: 325 TABLET, FILM COATED ORAL at 10:13

## 2021-01-01 RX ADMIN — TAZOBACTAM SODIUM AND PIPERACILLIN SODIUM 3.38 G: 375; 3 INJECTION, SOLUTION INTRAVENOUS at 10:22

## 2021-01-01 RX ADMIN — THIAMINE HCL TAB 100 MG 100 MG: 100 TAB at 10:03

## 2021-01-01 RX ADMIN — MICONAZOLE NITRATE: 20 POWDER TOPICAL at 09:54

## 2021-01-01 RX ADMIN — BRIMONIDINE TARTRATE, TIMOLOL MALEATE 1 DROP: 2; 5 SOLUTION/ DROPS OPHTHALMIC at 10:03

## 2021-01-01 RX ADMIN — ACETAMINOPHEN 650 MG: 325 TABLET, FILM COATED ORAL at 18:10

## 2021-01-01 RX ADMIN — BRINZOLAMIDE 1 DROP: 10 SUSPENSION/ DROPS OPHTHALMIC at 08:28

## 2021-01-01 RX ADMIN — HUMAN ALBUMIN MICROSPHERES AND PERFLUTREN 9 ML: 10; .22 INJECTION, SOLUTION INTRAVENOUS at 15:52

## 2021-01-01 RX ADMIN — IPRATROPIUM BROMIDE AND ALBUTEROL SULFATE 3 ML: .5; 3 SOLUTION RESPIRATORY (INHALATION) at 13:02

## 2021-01-01 RX ADMIN — THERA TABS 1 TABLET: TAB at 10:13

## 2021-01-01 RX ADMIN — DEXAMETHASONE SODIUM PHOSPHATE 6 MG: 10 INJECTION INTRAMUSCULAR; INTRAVENOUS at 10:11

## 2021-01-01 RX ADMIN — HYDROMORPHONE HYDROCHLORIDE 0.2 MG: 0.2 INJECTION, SOLUTION INTRAMUSCULAR; INTRAVENOUS; SUBCUTANEOUS at 13:03

## 2021-01-01 RX ADMIN — TAZOBACTAM SODIUM AND PIPERACILLIN SODIUM 3.38 G: 375; 3 INJECTION, SOLUTION INTRAVENOUS at 22:48

## 2021-01-01 RX ADMIN — THERA TABS 1 TABLET: TAB at 09:58

## 2021-01-01 RX ADMIN — FUROSEMIDE 80 MG: 40 TABLET ORAL at 08:35

## 2021-01-01 RX ADMIN — WARFARIN SODIUM 2 MG: 2 TABLET ORAL at 17:34

## 2021-01-01 RX ADMIN — BRIMONIDINE TARTRATE, TIMOLOL MALEATE 1 DROP: 2; 5 SOLUTION/ DROPS OPHTHALMIC at 09:49

## 2021-01-01 RX ADMIN — TAZOBACTAM SODIUM AND PIPERACILLIN SODIUM 3.38 G: 375; 3 INJECTION, SOLUTION INTRAVENOUS at 10:01

## 2021-01-01 RX ADMIN — REMDESIVIR 100 MG: 100 INJECTION, POWDER, LYOPHILIZED, FOR SOLUTION INTRAVENOUS at 17:35

## 2021-01-01 RX ADMIN — VANCOMYCIN HYDROCHLORIDE 750 MG: 1 INJECTION, POWDER, LYOPHILIZED, FOR SOLUTION INTRAVENOUS at 15:00

## 2021-01-01 RX ADMIN — WARFARIN SODIUM 3 MG: 3 TABLET ORAL at 17:21

## 2021-01-01 RX ADMIN — VANCOMYCIN HYDROCHLORIDE 2000 MG: 1 INJECTION, POWDER, LYOPHILIZED, FOR SOLUTION INTRAVENOUS at 01:44

## 2021-01-01 ASSESSMENT — ACTIVITIES OF DAILY LIVING (ADL)
ADLS_ACUITY_SCORE: 32
ADLS_ACUITY_SCORE: 30
ADLS_ACUITY_SCORE: 30
ADLS_ACUITY_SCORE: 22
ADLS_ACUITY_SCORE: 30
ADLS_ACUITY_SCORE: 34
ADLS_ACUITY_SCORE: 36
ADLS_ACUITY_SCORE: 36
ADLS_ACUITY_SCORE: 4
ADLS_ACUITY_SCORE: 32
ADLS_ACUITY_SCORE: 38
ADLS_ACUITY_SCORE: 8
ADLS_ACUITY_SCORE: 28
ADLS_ACUITY_SCORE: 36
ADLS_ACUITY_SCORE: 32
ADLS_ACUITY_SCORE: 8
ADLS_ACUITY_SCORE: 32
ADLS_ACUITY_SCORE: 34
ADLS_ACUITY_SCORE: 22
ADLS_ACUITY_SCORE: 30
ADLS_ACUITY_SCORE: 36
ADLS_ACUITY_SCORE: 8
ADLS_ACUITY_SCORE: 8
ADLS_ACUITY_SCORE: 40
ADLS_ACUITY_SCORE: 32
ADLS_ACUITY_SCORE: 22
ADLS_ACUITY_SCORE: 32
ADLS_ACUITY_SCORE: 32
ADLS_ACUITY_SCORE: 28
ADLS_ACUITY_SCORE: 30
ADLS_ACUITY_SCORE: 38
ADLS_ACUITY_SCORE: 36
ADLS_ACUITY_SCORE: 22
ADLS_ACUITY_SCORE: 26
ADLS_ACUITY_SCORE: 28
ADLS_ACUITY_SCORE: 36
ADLS_ACUITY_SCORE: 34
ADLS_ACUITY_SCORE: 36
ADLS_ACUITY_SCORE: 28
ADLS_ACUITY_SCORE: 38
ADLS_ACUITY_SCORE: 40
ADLS_ACUITY_SCORE: 32
ADLS_ACUITY_SCORE: 36
ADLS_ACUITY_SCORE: 36
ADLS_ACUITY_SCORE: 40
ADLS_ACUITY_SCORE: 32
ADLS_ACUITY_SCORE: 38
ADLS_ACUITY_SCORE: 36
ADLS_ACUITY_SCORE: 26
ADLS_ACUITY_SCORE: 36
ADLS_ACUITY_SCORE: 28
ADLS_ACUITY_SCORE: 22
ADLS_ACUITY_SCORE: 8
ADLS_ACUITY_SCORE: 36
ADLS_ACUITY_SCORE: 24
ADLS_ACUITY_SCORE: 30
ADLS_ACUITY_SCORE: 36
ADLS_ACUITY_SCORE: 22
ADLS_ACUITY_SCORE: 8
ADLS_ACUITY_SCORE: 32
ADLS_ACUITY_SCORE: 4
ADLS_ACUITY_SCORE: 32
ADLS_ACUITY_SCORE: 34
ADLS_ACUITY_SCORE: 32
ADLS_ACUITY_SCORE: 34
ADLS_ACUITY_SCORE: 22
ADLS_ACUITY_SCORE: 26
ADLS_ACUITY_SCORE: 32
ADLS_ACUITY_SCORE: 22
ADLS_ACUITY_SCORE: 32
ADLS_ACUITY_SCORE: 36
ADLS_ACUITY_SCORE: 34
ADLS_ACUITY_SCORE: 36
ADLS_ACUITY_SCORE: 32
ADLS_ACUITY_SCORE: 4
ADLS_ACUITY_SCORE: 30
ADLS_ACUITY_SCORE: 30
DEPENDENT_IADLS:: TRANSPORTATION;LAUNDRY;CLEANING;SHOPPING
ADLS_ACUITY_SCORE: 36
ADLS_ACUITY_SCORE: 38
ADLS_ACUITY_SCORE: 36
ADLS_ACUITY_SCORE: 22
ADLS_ACUITY_SCORE: 4
ADLS_ACUITY_SCORE: 34
ADLS_ACUITY_SCORE: 28
ADLS_ACUITY_SCORE: 22
ADLS_ACUITY_SCORE: 34
ADLS_ACUITY_SCORE: 32
ADLS_ACUITY_SCORE: 34
ADLS_ACUITY_SCORE: 8
ADLS_ACUITY_SCORE: 32
ADLS_ACUITY_SCORE: 30
ADLS_ACUITY_SCORE: 4
ADLS_ACUITY_SCORE: 36
ADLS_ACUITY_SCORE: 38
ADLS_ACUITY_SCORE: 32
ADLS_ACUITY_SCORE: 36
ADLS_ACUITY_SCORE: 32
ADLS_ACUITY_SCORE: 38
ADLS_ACUITY_SCORE: 36
ADLS_ACUITY_SCORE: 34
ADLS_ACUITY_SCORE: 28
ADLS_ACUITY_SCORE: 32
ADLS_ACUITY_SCORE: 30
ADLS_ACUITY_SCORE: 40
ADLS_ACUITY_SCORE: 34
ADLS_ACUITY_SCORE: 34
ADLS_ACUITY_SCORE: 28
ADLS_ACUITY_SCORE: 36
ADLS_ACUITY_SCORE: 36
ADLS_ACUITY_SCORE: 34
ADLS_ACUITY_SCORE: 28
ADLS_ACUITY_SCORE: 4
ADLS_ACUITY_SCORE: 32
ADLS_ACUITY_SCORE: 36
ADLS_ACUITY_SCORE: 32
ADLS_ACUITY_SCORE: 40
ADLS_ACUITY_SCORE: 8
ADLS_ACUITY_SCORE: 22
ADLS_ACUITY_SCORE: 30
ADLS_ACUITY_SCORE: 26
ADLS_ACUITY_SCORE: 34
ADLS_ACUITY_SCORE: 38
ADLS_ACUITY_SCORE: 30
ADLS_ACUITY_SCORE: 34
ADLS_ACUITY_SCORE: 38
ADLS_ACUITY_SCORE: 38
ADLS_ACUITY_SCORE: 36
ADLS_ACUITY_SCORE: 36
ADLS_ACUITY_SCORE: 32
ADLS_ACUITY_SCORE: 40
ADLS_ACUITY_SCORE: 38
ADLS_ACUITY_SCORE: 36
ADLS_ACUITY_SCORE: 32
ADLS_ACUITY_SCORE: 28
ADLS_ACUITY_SCORE: 22
ADLS_ACUITY_SCORE: 40
ADLS_ACUITY_SCORE: 34
ADLS_ACUITY_SCORE: 28
ADLS_ACUITY_SCORE: 34
ADLS_ACUITY_SCORE: 32
ADLS_ACUITY_SCORE: 8
ADLS_ACUITY_SCORE: 28
ADLS_ACUITY_SCORE: 12
ADLS_ACUITY_SCORE: 22
ADLS_ACUITY_SCORE: 32
ADLS_ACUITY_SCORE: 36
ADLS_ACUITY_SCORE: 4
ADLS_ACUITY_SCORE: 32
ADLS_ACUITY_SCORE: 4
ADLS_ACUITY_SCORE: 28
ADLS_ACUITY_SCORE: 32
ADLS_ACUITY_SCORE: 38
ADLS_ACUITY_SCORE: 34
ADLS_ACUITY_SCORE: 28
ADLS_ACUITY_SCORE: 38
ADLS_ACUITY_SCORE: 34
ADLS_ACUITY_SCORE: 32
ADLS_ACUITY_SCORE: 22
ADLS_ACUITY_SCORE: 36
ADLS_ACUITY_SCORE: 34
ADLS_ACUITY_SCORE: 32
ADLS_ACUITY_SCORE: 34
ADLS_ACUITY_SCORE: 22
ADLS_ACUITY_SCORE: 34
ADLS_ACUITY_SCORE: 36
ADLS_ACUITY_SCORE: 28
ADLS_ACUITY_SCORE: 32
ADLS_ACUITY_SCORE: 38
ADLS_ACUITY_SCORE: 32
ADLS_ACUITY_SCORE: 30
ADLS_ACUITY_SCORE: 36
ADLS_ACUITY_SCORE: 32
ADLS_ACUITY_SCORE: 40
ADLS_ACUITY_SCORE: 36
ADLS_ACUITY_SCORE: 28
ADLS_ACUITY_SCORE: 32
ADLS_ACUITY_SCORE: 30
ADLS_ACUITY_SCORE: 34
ADLS_ACUITY_SCORE: 28
ADLS_ACUITY_SCORE: 36
ADLS_ACUITY_SCORE: 40
ADLS_ACUITY_SCORE: 36
ADLS_ACUITY_SCORE: 32
ADLS_ACUITY_SCORE: 34
ADLS_ACUITY_SCORE: 36
ADLS_ACUITY_SCORE: 32
ADLS_ACUITY_SCORE: 30
ADLS_ACUITY_SCORE: 28
ADLS_ACUITY_SCORE: 34
ADLS_ACUITY_SCORE: 36
ADLS_ACUITY_SCORE: 34
ADLS_ACUITY_SCORE: 32
ADLS_ACUITY_SCORE: 28
ADLS_ACUITY_SCORE: 34
ADLS_ACUITY_SCORE: 32
ADLS_ACUITY_SCORE: 34
ADLS_ACUITY_SCORE: 32
ADLS_ACUITY_SCORE: 34
ADLS_ACUITY_SCORE: 4
ADLS_ACUITY_SCORE: 4
ADLS_ACUITY_SCORE: 34
ADLS_ACUITY_SCORE: 36
ADLS_ACUITY_SCORE: 28
ADLS_ACUITY_SCORE: 28
ADLS_ACUITY_SCORE: 30
ADLS_ACUITY_SCORE: 22
ADLS_ACUITY_SCORE: 34
ADLS_ACUITY_SCORE: 32
ADLS_ACUITY_SCORE: 36
ADLS_ACUITY_SCORE: 36
ADLS_ACUITY_SCORE: 22
ADLS_ACUITY_SCORE: 30
ADLS_ACUITY_SCORE: 38
ADLS_ACUITY_SCORE: 28
ADLS_ACUITY_SCORE: 28
ADLS_ACUITY_SCORE: 32
ADLS_ACUITY_SCORE: 22
ADLS_ACUITY_SCORE: 34
ADLS_ACUITY_SCORE: 36
ADLS_ACUITY_SCORE: 36
ADLS_ACUITY_SCORE: 28
ADLS_ACUITY_SCORE: 32
ADLS_ACUITY_SCORE: 36

## 2021-01-01 ASSESSMENT — MIFFLIN-ST. JEOR
SCORE: 1658.38
SCORE: 1650.67
SCORE: 1774.06
SCORE: 1639.33
SCORE: 1632.07
SCORE: 1642.05
SCORE: 1688.77
SCORE: 1769.06
SCORE: 1635.25
SCORE: 1769.37
SCORE: 1663.37
SCORE: 1642.05
SCORE: 1639.79

## 2021-01-01 ASSESSMENT — ENCOUNTER SYMPTOMS
SHORTNESS OF BREATH: 1
NUMBNESS: 0
FEVER: 0
RHINORRHEA: 0
COUGH: 0

## 2021-01-20 NOTE — CONFIDENTIAL NOTE
Follow-up to to 01/20/21    Please arrange for:      Echocardiogram at next available.  Results will be communicated by RN via telephone.    Nyla Sweeney RN BSN  [facility] Vascular [facility]  [phone]

## 2021-01-20 NOTE — PATIENT INSTRUCTIONS
Please go for labs today     Echo next available appointment    Follow up with Dr. Fitzpatrick , Primary MD    Avoid falls.    Maintain INR 2-3 Range.

## 2021-01-20 NOTE — PROGRESS NOTES
"Edison Saldivar is a 80 year old male who presents for:  Chief Complaint   Patient presents with     RECHECK      *pts wife to be  allowed to appt due to mobility*  Follow up to 6/4/20 visit - Pt's wife requesting in clinic visit -  No labs prior.*LMB 12/08/20        Vitals:    Vitals:    01/20/21 1053 01/20/21 1057   BP: (!) 166/83 (!) 146/91   BP Location: Right arm Left arm   Patient Position: Chair Chair   Cuff Size: Adult Large Adult Large   Pulse: 91    Resp: 18    SpO2: 98%    Weight: 238 lb (108 kg)    Height: 5' 8\" (1.727 m)        BMI:  Estimated body mass index is 36.19 kg/m  as calculated from the following:    Height as of this encounter: 5' 8\" (1.727 m).    Weight as of this encounter: 238 lb (108 kg).    Pain Score:  Data Unavailable        Eliazar Dean CMA    "

## 2021-01-20 NOTE — PROGRESS NOTES
SUBJECTIVE:  CC:   Follow up visit  Memory issues  Tired  Sob, fatigue  No recent labs or ECHO  Seen sleep clinic got new machine   Multiple other issues  Taking warfarin INR good range per wife   HPI:  Edison Saldivar is a 80 year old very pleasant male with past medical history of Sneddon syndrome, heterozygous factor V Leyden mutation, history of CVA, vascular dementia, obstructive sleep apnea on CPAP, obesity, hypertension and COPD accompanied by his wife today for follow-up visit.  He is having more memory issues and frequently falling but fortunately no injuries.  He is wheelchair-bound.  He has been taking warfarin for many years and maintaining INR therapeutic range.  Recently seen and evaluated by sleep clinic and have given a new machine  He also complaining of some shortness of breath and chest pain intermittently  Known history of aortic valve stenosis and supposed to undergo echocardiogram but due to Covid situation he put it off.  No recent labs.  Reviewed previous imaging studies, notes and previous hospitalization records in the epic and updated chart.    HISTORIES:  PROBLEM LIST:   Patient Active Problem List   Diagnosis     Hemoptysis     Intra-abdominal infection     Sneddon syndrome (H)     Heterozygous factor V Leiden mutation (H)     History of CVA (cerebrovascular accident)     Vascular dementia (H)     KORY on CPAP     Obesity (BMI 35.0-39.9) with comorbidity (H)     HTN (hypertension)     COPD (chronic obstructive pulmonary disease) (H)     PAST MEDICAL HISTORY:  Past Medical History:   Diagnosis Date     Topete's esophagus      COPD (chronic obstructive pulmonary disease) (H)      Heterozygous factor V Leiden mutation (H)      History of CVA (cerebrovascular accident)      HTN (hypertension)      KORY on CPAP      Sneddon syndrome (H)      Vascular dementia (H)      PAST SURGICAL HISTORY:  Past Surgical History:   Procedure Laterality Date     Duodenal polypectomy with sphincterotomy    09/23/2019    at [place]      CURRENT MEDICATIONS:  Current Outpatient Medications   Medication Sig Dispense Refill     acetaminophen (TYLENOL) 325 MG tablet Take 2 tablets (650 mg) by mouth every 4 hours as needed for mild pain       albuterol (PROAIR HFA/PROVENTIL HFA/VENTOLIN HFA) 108 (90 Base) MCG/ACT inhaler Inhale 2 puffs into the lungs every 4 hours as needed for shortness of breath / dyspnea or wheezing       amLODIPine (NORVASC) 5 MG tablet Take 5 mg by mouth every morning       brimonidine-timolol (COMBIGAN) 0.2-0.5 % ophthalmic solution Place 1 drop into both eyes every morning       brinzolamide (AZOPT) 1 % ophthalmic suspension Place 1 drop into both eyes every morning       CALCIUM-MAGNESIUM-ZINC PO Take 1 tablet by mouth every morning 100/40/15       co-enzyme Q-10 100 MG CAPS capsule Take 100 mg by mouth every morning       docusate sodium (COLACE) 100 MG capsule Take 100 mg by mouth every evening       esomeprazole (NEXIUM) 40 MG DR capsule Take 40 mg by mouth every morning (before breakfast) Take 30-60 minutes before eating.       furosemide (LASIX) 40 MG tablet Take 80 mg by mouth daily       latanoprost (XALATAN) 0.005 % ophthalmic solution Place 1 drop into both eyes At Bedtime       Magnesium Oxide 250 MG TABS Take 500 mg by mouth daily       mirabegron (MYRBETRIQ) 50 MG 24 hr tablet Take 50 mg by mouth every morning       multivitamin w/minerals (THERA-VIT-M) tablet Take 1 tablet by mouth every morning       potassium chloride ER (K-TAB/KLOR-CON) 10 MEQ CR tablet Take 20 mEq by mouth daily       vitamin B-12 (CYANOCOBALAMIN) 1000 MCG tablet Take 1,000 mcg by mouth every morning       vitamin D3 (CHOLECALCIFEROL) 2000 units tablet Take 2,000 Units by mouth daily       warfarin ANTICOAGULANT (COUMADIN) 5 MG tablet Take 5 mg by mouth See Admin Instructions Tuesday, Wednesday, Thursday, Saturday, Sunday       warfarin ANTICOAGULANT (COUMADIN) 5 MG tablet Take 7.5 mg by mouth See Admin Instructions  Monday and Friday       ALLERGIES:  No Known Allergies  SOCIAL HISTORY:  Social History     Socioeconomic History     Marital status:      Spouse name: Not on file     Number of children: Not on file     Years of education: Not on file     Highest education level: Not on file   Occupational History     Occupation: retired    Social Needs     Financial resource strain: Not on file     Food insecurity     Worry: Not on file     Inability: Not on file     Transportation needs     Medical: Not on file     Non-medical: Not on file   Tobacco Use     Smoking status: Never Smoker     Smokeless tobacco: Never Used   Substance and Sexual Activity     Alcohol use: No     Frequency: Never     Drug use: No     Sexual activity: Never   Lifestyle     Physical activity     Days per week: Not on file     Minutes per session: Not on file     Stress: Not on file   Relationships     Social connections     Talks on phone: Not on file     Gets together: Not on file     Attends [employer] service: Not on file     Active member of club or organization: Not on file     Attends meetings of clubs or organizations: Not on file     Relationship status: Not on file     Intimate partner violence     Fear of current or ex partner: Not on file     Emotionally abused: Not on file     Physically abused: Not on file     Forced sexual activity: Not on file   Other Topics Concern     Not on file   Social History Narrative     Not on file     FAMILY HISTORY:  History reviewed. No pertinent family history.  REVIEW OF SYSTEMS:  CONSTITUTIONAL: Ongoing fatigue, tiredness  EYES: no subjective changes in visual acuity, no photophobia  ENT/MOUTH: no complaints of rhinorrhea, nasal congestion, sore throat, hearing changes  RESP:no SOB  CV: Ongoing shortness of breath and intermittent chest pain  GI: No abdominal pain, constipation, change in bowel movements, nausea, pyrosis, BRBPR  :no polyuria or polydipsia, no dysuria, no gross  "hematuria  MUSCULOSKELATAL:no arthalgias or myalgias  INTEGUMENTARY/SKIN: no pruritis, rashes, or moles with recent change in size, shape, or pigmentation  NEURO: Memory issues and frequent falls  ENDOCRINE: no polyuria or polydipsia, no heat or cold intolerance  HEME/ALLERGY/IMMUNE: no fevers, chills, night sweats, or unwanted weight loss  PSYCHIATRIC: no depression, anxiety, or internal stimuli  EXAM:  BP (!) 142/80 (BP Location: Left arm, Patient Position: Chair, Cuff Size: Adult Large)   Pulse 91   Resp 18   Ht 5' 8\" (1.727 m)   Wt 238 lb (108 kg)   SpO2 98%   BMI 36.19 kg/m    BMI: Body mass index is 36.19 kg/m .  GENERAL APPEARANCE: He was accompanied by his wife today in a wheelchair looks tired  EXAM:  EYES: clear conjunctiva, no cataracts, no obvious fundoscopic abnormalities  HENT: oropharynx, nares, and TMs are WNL  NECK: no JVD, thyromegaly or lymphadenopathy, no cervical bruits  RESP: clear to auscultation without rales, wheezes, or rhonchi  CV: 2-3/6 aortic stenotic murmur heard in the right second intercostal space  LYMPH: no cervical , axillary, or inguinal lymphadenopathy appreciated  GI: NABS, ND/NT, no masses or organomegally appreciated  : normal external genitalia and anus, no lumps, masses  MS: no obvoius clinicallly relevant arthropathy, no evidence vasculitis  SKIN: no nevi clinically suspicious for malignancy are noted  NEURO: CN II-XII intact, no localizing sensory or motor abnoramlities noted, DTRs symmetrical bilaterally  PSYCH: Mental status exam reveals the pt to have normal mood and affect. There is no disorder of thought form or content. There is no response to internal stimuli. There is no suicidal or homicidal ideation.    A/P:  (I77.89) Sneddon syndrome (H)  (primary encounter diagnosis)  This was diagnosed many years ago, now experiencing memory issues  Taking anticoagulation tolerating but unfortunately having frequent falls and no injuries    (I35.0) Aortic valve " stenosis, etiology of cardiac valve disease unspecified  (D68.51) Factor 5 Leiden mutation, heterozygous (H)  Intermittent chest pain and shortness of breath  Blood pressure is well controlled  Due for echocardiogram order placed if any signs of worsening aortic stenosis he will benefit seeing structural cardiology for options of fixing the AS  (R41.3) Memory difficulties  Comment: Known history of vascular dementia will check the B12, folate and thyroid function tests and look for any reversible causes  Optimize risk factors  Plan: Vitamin B12, Folate        (I10) Benign essential hypertension  Comment: Well-controlled with current medications continue the same  Avoid NSAIDs.  DASH diet discussed with the patient  Plan: CBC with platelets differential, Comprehensive         metabolic panel, TSH with free T4 reflex            (G47.33) KORY (obstructive sleep apnea)  (E66.01) Morbid obesity (H)  Per patient's wife he was reevaluated and got a new machine and scheduled to see sleep doctor next month  Continue the same plan  Lose weight    Return to clinic in 6 months  Follow-up with the primary MD    Total visit time spent more than 40 minutes including review of chart, previous imaging studies and documentation etc.    I have discussed with patient the risks, benefits, medications, treatment options and modalities.   I have instructed the patient to call or schedule a follow-up appointment if any problems or failure to improve.    George Jhaveri MD, [place], [facility]  Vascular Medicine

## 2021-03-09 NOTE — TELEPHONE ENCOUNTER
Relayed to patient and spouse.  Nyla Sweeney RN BSN  [facility] Vascular [facility]  [phone]

## 2021-03-09 NOTE — TELEPHONE ENCOUNTER
----- Message from George Jhaveri MD sent at 3/3/2021  4:57 PM CST -----  Echo was technically difficult  This time, Aortic stenosis moderate but valve area narrowed compared to 2019 ( 1.7cm  before and now  1.2 cm . )  Overall heart function is good and  about the same   Repeat ECHO in one year   Continue same medications
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
## 2021-11-23 PROBLEM — S42.321A CLOSED DISPLACED TRANSVERSE FRACTURE OF SHAFT OF RIGHT HUMERUS, INITIAL ENCOUNTER: Status: ACTIVE | Noted: 2021-01-01

## 2021-11-23 NOTE — ED TRIAGE NOTES
Patient presents to the ED reporting right upper arm pain following a fall. Patient states his legs gave out and he fell down. On blood thinners, denies head injury. Some blood noted to patient's scalp. States is not from fall, that he scratched his head earlier today.

## 2021-11-24 NOTE — PLAN OF CARE
[facility]      OUTPATIENT PHYSICAL THERAPY EVALUATION  PLAN OF TREATMENT FOR OUTPATIENT REHABILITATION  (COMPLETE FOR INITIAL CLAIMS ONLY)  Patient's Last Name, First Name, M.I.  YOB: 1940  JannaEdison                        Provider's Name  [facility] Medical Record No.  [id number]                               Onset Date:  11/23/21   Start of Care Date:      11/24/21   Type:     _X_PT   ___OT   ___SLP Medical Diagnosis:                        Fall with Right Humerus Frdacture   PT Diagnosis:  decreased independence with mobility   Visits from SOC:  3   _________________________________________________________________________________  Plan of Treatment/Functional Goals    Planned Interventions: balance training,bed mobility training,gait training,home exercise program,patient/family education,strengthening,transfer training,progressive activity/exercise     Goals: See Physical Therapy Goals on Care Plan in [facility] electronic health record.    Therapy Frequency: 5x/week  Predicted Duration of Therapy Intervention: 3 days  _________________________________________________________________________________    I CERTIFY THE NEED FOR THESE SERVICES FURNISHED UNDER        THIS PLAN OF TREATMENT AND WHILE UNDER MY CARE     (Physician co-signature of this document indicates review and certification of the therapy plan).                 ,      Referring Physician: Hazel Chapman MD            Initial Assessment        See Physical Therapy evaluation dated   in Epic electronic health record.

## 2021-11-24 NOTE — PROGRESS NOTES
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     Problem List/Assessment and Plan:     Humeral fracture: 2/2 traumatic fall. Reports that his legs gave out while using his walker and he fell to the ground.    -oxycodone 5 mg q 4 hours prn with low dose hydroxyzine for pain adjuvant  -orthopedic consultation for sling/follow up  -No Tylenol now given transaminitis     Right thigh pain   Falls/balance issues  Requested PT consultation to assist with transitioning to alternative gait assist device.  As well as to help determine safe discharge plan.  Thigh pain has been present for at least a few weeks.  Daughter was wondering if this should be imaged but he has no tenderness to palpation or pain with passive ROM so I do not think that xray needed.  -PT recommending TCU, SW consulted     Elevated LFTs AST/ALT  233/178 on admission, improved to 131/158 today.  Only medication PTA is PRN Tylenol.  Has no abdominal pain, nausea or emesis.  Tolerating a regular diet.  Will repeat tomorrow, if he does develop abdominal pain needs an abdominal US.     KORY: Resume CPAP per home settings.     HTN: PTA on Amlodipine and Lasix, resume.    Hypokalemia: Likely due to Lasix.  On PTA potassium which has resumed.  Also order protocol.      Memory loss  Vascular dementia   Patient is calm and cooperative at this time.  Certainly at risk for delirium.      FV Leiden on chronic warfarin: INR therapeutic, Pharmacy consult for Coumadin dosing.      COVID 19 negative  -completed 2 shot moderna series in 3/2021     Discharge Planning: Patient's  "daughter reports Tong and his wife moved into [facility] in early October.  Currently they are in independent living but could increase services without moving apartments.  They are hoping for TCU upon discharge so Tong could get stronger and ultimately get back home.  - PT and SW consulted    Diet: Regular Diet Adult    DVT Prophylaxis: Warfarin  Torres Catheter: Not present  Code Status: No CPR- Do NOT Intubate      Disposition Plan   Expected discharge: TCU, once bed available    Entered: Sarah García MD 11/24/2021, 10:41 AM       The patient's care was discussed with the Bedside Nurse, Patient and Patient's Family.    Hospitalist [facility]          Interval History (Subjective):      Patient was seen with his bedside nurse as well as his daughter at the bedside.  I also spoke with his daughter outside of the room.  She was concerned that he may need imaging of his right leg as he is complained of pain.  When I saw the patient he states he is doing okay with the exception of pain in the right shoulder and upper arm.  He currently denies pain in his right leg.  He denies chest pain.  He does have chronic shortness of breath but this is stable from his baseline.    I spoke with his daughter and her goal would be to potentially try TCU to get him stronger so that he could go back to [facility] where he and his wife moved in early October.  She notes that he needs to be stronger to go back there as his wife is very small and does have memory issues so will not be able to help him.                  Physical Exam:      Last Vital Signs:  BP (!) 157/87 (BP Location: Left arm)   Pulse 78   Temp 97.7  F (36.5  C) (Oral)   Resp 20   Ht 1.727 m (5' 7.99\")   Wt 108 kg (238 lb 1.6 oz)   SpO2 98%   BMI 36.21 kg/m      General: Alert, awake, no acute distress.  HEENT: Normocephalic and atraumatic, eyes anicteric and without scleral injection, EOMI, face symmetric, MMM.  Cardiac: RRR, " normal S1, S2. II/VI AXEL heard best at RUSB, no g/r, 1+  LE edema.  Pulmonary: Normal chest rise, normal work of breathing.  Lungs CTAB without crackles or wheezing.  Abdomen: soft, non-tender, non-distended.  Normoactive bowel sounds, no guarding or rebound tenderness.  Extremities: RUE in brace, can wiggle fingers and move wrist.  Warm, well perfused.  Skin: no rashes or lesions.  Warm and Dry.  Neuro: No focal deficits.  Speech clear.  Able to move extremities in bed. No pain to palpation of his right leg, no pain with passive motion.  Psych: Alert and oriented x3. Appropriate affect.         Medications:      All current medications were reviewed with changes reflected in problem list.         Data:      All new lab and imaging data was reviewed.   Labs:  Recent Labs   Lab 11/24/21  0648 11/23/21  1800    141   POTASSIUM 3.3* 3.5   CHLORIDE 109 107   CO2 28 28   ANIONGAP 5 6   * 149*   BUN 14 15   CR 0.76 0.96   GFRESTIMATED 87 75   JULIANNE 8.6 9.0     Recent Labs   Lab 11/23/21  1800   WBC 10.5   HGB 13.9   HCT 44.6   MCV 95        Recent Labs   Lab 11/23/21  1800   INR 3.06*      Imaging:   Recent Results (from the past 24 hour(s))   Head CT w/o contrast    Narrative    EXAM: CT HEAD W/O CONTRAST  LOCATION: [facility]  DATE/TIME: 11/23/2021 6:48 PM    INDICATION: Fall, on Coumadin.  COMPARISON: None.  TECHNIQUE: Routine CT Head without IV contrast. Multiplanar reformats. Dose reduction techniques were used.    FINDINGS:  INTRACRANIAL CONTENTS: No intracranial hemorrhage, extraaxial collection, or mass effect.  No CT evidence of acute infarct. Mild to moderate presumed chronic small vessel ischemic changes. Moderate generalized volume loss. No hydrocephalus. Cerebellar   tonsillar position is satisfactory. Nothing for subarachnoid, subdural, or epidural hemorrhage is identified. History of trauma and anticoagulation usage. Corpus callosum is satisfactory. Sella is normal.  Vascular calcification.    VISUALIZED ORBITS/SINUSES/MASTOIDS: Prior bilateral cataract surgery. Visualized portions of the orbits are otherwise unremarkable. Mild to moderate mucosal thickening scattered about the paranasal sinuses. No air-fluid levels. Benign density may be   dentigerous associated with the lateral right maxillary sinus wall series 5 image 4 and should be of no clinical significance. Scattered fluid/membrane thickening in the mastoid air cells bilaterally. Debris/cerumen in the EACs. Middle ear regions are   clear.    BONES/SOFT TISSUES: Demineralization of the skull base. Degenerative changes at the odontoid process and both TMJs. No evidence for fracture of the calvarium or skull base noted. I do not see evidence for significant swelling of the facial or scalp soft   tissues with some exclusion of the posterior occipital and suboccipital scalp due to positioning.      Impression    IMPRESSION:  1.  No CT evidence for acute intracranial process.  2.  Brain atrophy and presumed chronic microvascular ischemic changes as above.  3.  No fractures.   4.  Additional details above.   XR Shoulder Right 2 Views    Narrative    EXAM: XR SHOULDER 2 VIEW RIGHT  LOCATION: [facility]  DATE/TIME: 11/23/2021 6:47 PM    INDICATION: Right shoulder pain after a fall.  COMPARISON: None.      Impression    IMPRESSION: Acute transverse fracture of the proximal humeral shaft, located 16 cm distal to the glenohumeral joint. 1.3 cm fracture displacement. No aggressive bone lesion visualized. Normal glenohumeral and acromioclavicular joint alignment. Mild   glenohumeral and coracoclavicular degenerative arthritis.    XR Humerus Right G/E 2 Views    Narrative    EXAM: XR HUMERUS RIGHT G/E 2 VIEWS  LOCATION: [facility]  DATE/TIME: 11/23/2021 7:01 PM    INDICATION: Arm pain.  COMPARISON: Shoulder radiographs, same date.      Impression    IMPRESSION: Acute transverse midshaft  fracture of the right humerus with 1.3 cm displacement. Normal glenohumeral and elbow joint alignment.       Sarah García MD

## 2021-11-24 NOTE — CONSULTS
Ortho    Imaging and history reviewed demonstrating a right diaphyseal humerus fracture.  Give the overall alignment, it would be reasonable to attempt treatment with use of a russ brace.  Follow-up in clinic in 1 week to assess possible need for operative fixation depending on his progress and repeat imaging.     Jose David Xiong MD  [phone]

## 2021-11-24 NOTE — CONSULTS
Care Management Follow Up    Length of Stay (days): 0    Expected Discharge Date:  11/25/21     Concerns to be Addressed:  Discharge planning     Patient plan of care discussed at interdisciplinary rounds: No    Anticipated Discharge Disposition:  TCU     Anticipated Discharge Services:  Was on Hospice (revoked for admission and TCU stay)    Patient/Family in Agreement with the Plan:  Yes    Referrals Placed by CM/SW:  Skilled nursing facility  Private pay costs discussed: Not applicable    Additional Information:  Initial SW consult completed in ED. Dtr requesting referrals be sent to: [facility], [place] [facility], [facility], and  [facility]. Referrals sent. SW will continue to follow.     AMBER Escobar

## 2021-11-24 NOTE — ED PROVIDER NOTES
ECG  ECG taken at 0014, ECG read at 0014  Normal sinus rhythm. Left axis deviation. Minimal voltage criteria for LVH, may be normal variant. Abnormal ECG.   Rate 81 bpm. SC interval 152 ms. QRS duration 104 ms. QT/QTc 392/455 ms. P-R-T axes -12 -43 57.         Scribe Disclosure:  Roberto MUIR, am serving as a scribe at 12:22 AM on 11/24/2021 to document services personally performed by Kim Oleary DO based on my observations and the provider's statements to me.

## 2021-11-24 NOTE — PHARMACY-ANTICOAGULATION SERVICE
Clinical Pharmacy - Warfarin Dosing Consult     Pharmacy has been consulted to manage this patient s warfarin therapy.  Indication: DVT/ PE Treatment (FV Leiden)  Therapy Goal: INR 2-3  Warfarin Prior to Admission: Yes  Warfarin PTA Regimen: 7.5mg Mon and Fri, 5mg ROW    INR   Date Value Ref Range Status   11/24/2021 3.36 (H) 0.85 - 1.15 Final   11/23/2021 3.06 (H) 0.85 - 1.15 Final       Recommend warfarin 4 mg today.  Pharmacy will monitor Edison Saldivar daily and order warfarin doses to achieve specified goal.      Please contact pharmacy as soon as possible if the warfarin needs to be held for a procedure or if the warfarin goals change.

## 2021-11-24 NOTE — ED NOTES
[facility]  ED Nurse Handoff Report    Edison Saldivar is a 81 year old male   ED Chief complaint: Fall  . ED Diagnosis:   Final diagnoses:   Fall, initial encounter   Closed displaced transverse fracture of shaft of right humerus, initial encounter     Allergies: No Known Allergies    Code Status: DNR  Activity level - Baseline/Home:  Stand by Assist. Activity Level - Current:   Assist X 2. Lift room needed: No. Bariatric: No   Needed: No   Isolation: No. Infection: Not Applicable.     Vital Signs:   Vitals:    11/23/21 1748 11/23/21 1800 11/23/21 1815 11/23/21 1830   BP: (!) 171/100 (!) 150/85 (!) 140/78 135/73   Pulse: 85 81 83 80   Resp: 16 11  16   Temp: 97.1  F (36.2  C)      SpO2: 96% 95% 95% 96%       Cardiac Rhythm:  ,      Pain level:    Patient confused: No. Patient Falls Risk: Yes.   Elimination Status: Has voided   Patient Report - Initial Complaint: fall. Focused Assessment:   18:05 Trauma Assessment Airway WDL - Airway WDL: WDL   Respiratory WDL - Respiratory WDL: cough (chronic cough r/t asthma, denies CP/SOB.) Cough Frequency: infrequent   Peripheral/Neurovascular WDL - Peripheral Neurovascular WDL: WDL   Howell Coma Scale - Best Eye Response: 4-->(E4) spontaneous  Best Motor Response: 6-->(M6) obeys commands  Best Verbal Response: 4-->(V4) confused (unable to report current year/president. answers all other questions correctly. ) Howell Coma Scale Score: 14   Cognitive/Neuro/Behavioral WDL - Cognitive/Neuro/Behavioral WDL: orientation  Orientation: disoriented to; time   Head/Face WDL - Head/Face WDL: .WDL except (abrasion to R upper scalp, reports its from scratching head earlier, not from fall. )  Neck WDL - Neck WDL: WDL   Skin WDL - Skin WDL: WDL (small abrasion to R knee)  Musculoskeletal WDL - Musculoskeletal WDL: .WDL except (R shoulder pain and R knee pain. )  Cardiac WDL - Cardiac WDL: WDL        Tests Performed:   Labs Ordered and Resulted from Time of ED Arrival  to Time of ED Departure   COMPREHENSIVE METABOLIC PANEL - Abnormal       Result Value    Sodium 141      Potassium 3.5      Chloride 107      Carbon Dioxide (CO2) 28      Anion Gap 6      Urea Nitrogen 15      Creatinine 0.96      Calcium 9.0      Glucose 149 (*)     Alkaline Phosphatase 170 (*)      (*)      (*)     Protein Total 7.7      Albumin 3.0 (*)     Bilirubin Total 0.5      GFR Estimate 75     INR - Abnormal    INR 3.06 (*)    CBC WITH PLATELETS AND DIFFERENTIAL - Abnormal    WBC Count 10.5      RBC Count 4.72      Hemoglobin 13.9      Hematocrit 44.6      MCV 95      MCH 29.4      MCHC 31.2 (*)     RDW 14.3      Platelet Count 166      % Neutrophils 78      % Lymphocytes 10      % Monocytes 7      % Eosinophils 2      % Basophils 1      % Immature Granulocytes 2      NRBCs per 100 WBC 0      Absolute Neutrophils 8.3      Absolute Lymphocytes 1.0      Absolute Monocytes 0.8      Absolute Eosinophils 0.2      Absolute Basophils 0.1      Absolute Immature Granulocytes 0.2 (*)     Absolute NRBCs 0.0     TROPONIN I - Normal    Troponin I S 20     TROPONIN I - Normal    Troponin I <0.015     ROUTINE UA WITH MICROSCOPIC REFLEX TO CULTURE   COVID-19 VIRUS (CORONAVIRUS) BY PCR     XR Humerus Right G/E 2 Views   Final Result   IMPRESSION: Acute transverse midshaft fracture of the right humerus with 1.3 cm displacement. Normal glenohumeral and elbow joint alignment.      XR Shoulder Right 2 Views   Final Result   IMPRESSION: Acute transverse fracture of the proximal humeral shaft, located 16 cm distal to the glenohumeral joint. 1.3 cm fracture displacement. No aggressive bone lesion visualized. Normal glenohumeral and acromioclavicular joint alignment. Mild    glenohumeral and coracoclavicular degenerative arthritis.       Head CT w/o contrast   Final Result   IMPRESSION:   1.  No CT evidence for acute intracranial process.   2.  Brain atrophy and presumed chronic microvascular ischemic changes as  above.   3.  No fractures.    4.  Additional details above.      Abnormal Results: see above  Treatments provided: see mar  Family Comments:   OBS brochure/video discussed/provided to patient:  Yes  ED Medications:   Medications   fentaNYL (PF) (SUBLIMAZE) injection 50 mcg (50 mcg Intravenous Given 11/23/21 1819)     Drips infusing:  No  For the majority of the shift, the patient's behavior Green. Interventions performed were n/a  .    Sepsis treatment initiated: No     Patient tested for COVID 19 prior to admission: YES    ED Nurse Name/Phone Number: Bird Ratliff RN,   7:57 PM  RECEIVING UNIT ED HANDOFF REVIEW    Above ED Nurse Handoff Report was reviewed: Yes  Reviewed by: Jayna Maharaj RN on November 24, 2021 at 11:57 AM

## 2021-11-24 NOTE — ED PROVIDER NOTES
History   Chief Complaint:  Fall    The history is provided by the patient. History limited by: dementia.      Edison Saldivar is a 81 year old male with history of hypertension, Factor V Leiden mutation, dementia, Parkinsonism, Sneddon syndrome, and stroke, currently anticoagulated on Coumadin, who presents after a fall. The patient reports that he was standing by his sink just prior to arrival when his legs gave out, causing him to fall to the ground. His wife and neighbors helped him back up and called EMS. He notes that he has balance problems and has had similar falls in the past. He uses a walker. He denies loss of consciousness or head injury but does endorse right shoulder pain. No recent fever, cough, or runny nose.    Review of Systems   Constitutional: Negative for fever.   HENT: Negative for rhinorrhea.    Respiratory: Negative for cough.    Musculoskeletal:        Right arm pain   Neurological: Negative for syncope and numbness.   All other systems reviewed and are negative.      Allergies:  No Known Allergies    Medications:  Norvasc  Colace  Nexium  Lasix  Myrbetriq  Potassium Chloride  Coumadin    Past Medical History:     Topete's esophagus  COPD  Factor V Leiden mutation  Stroke  Hypertension  OKRY  Sneddon syndrome  Vascular dementia  Hemoptysis  Obesity     Parkinsonism     Past Surgical History:    Duodenal polypectomy with sphincterectomy     Family History:    The patient denies past family history.     Social History:  Presents to ED alone.  Lives with wife in apartment complex.    Physical Exam     Patient Vitals for the past 24 hrs:   BP Temp Pulse Resp SpO2   11/23/21 1830 135/73 -- 80 16 96 %   11/23/21 1815 (!) 140/78 -- 83 -- 95 %   11/23/21 1800 (!) 150/85 -- 81 11 95 %   11/23/21 1748 (!) 171/100 97.1  F (36.2  C) 85 16 96 %       Physical Exam    Nursing note and vitals reviewed.  HENT:   Mouth/Throat: Moist mucous membranes.   Eyes: EOMI, nonicteric sclera  Cardiovascular: Normal  rate, regular rhythm, no murmurs, rubs, or gallops  Pulmonary/Chest: Effort normal and breath sounds normal. No respiratory distress. No wheezes. No rales.   Abdominal: Soft. Nontender, nondistended, no guarding or rigidity.   Musculoskeletal: Painful ROM right shoulder.   Neurological: Alert. Moves all extremities spontaneously. Normal sensation radial, median, and ulnar nerve distributions. Pt able to wiggle all fingers.   Skin: Skin is warm and dry. No rash noted.   Psychiatric: Normal mood and affect.      Emergency Department Course     Imaging:  XR Humerus Right G/E 2 Views   Final Result   IMPRESSION: Acute transverse midshaft fracture of the right humerus with 1.3 cm displacement. Normal glenohumeral and elbow joint alignment.      XR Shoulder Right 2 Views   Final Result   IMPRESSION: Acute transverse fracture of the proximal humeral shaft, located 16 cm distal to the glenohumeral joint. 1.3 cm fracture displacement. No aggressive bone lesion visualized. Normal glenohumeral and acromioclavicular joint alignment. Mild    glenohumeral and coracoclavicular degenerative arthritis.       Head CT w/o contrast   Final Result   IMPRESSION:   1.  No CT evidence for acute intracranial process.   2.  Brain atrophy and presumed chronic microvascular ischemic changes as above.   3.  No fractures.    4.  Additional details above.        Report per radiology    Laboratory:  Labs Ordered and Resulted from Time of ED Arrival to Time of ED Departure   COMPREHENSIVE METABOLIC PANEL - Abnormal       Result Value    Sodium 141      Potassium 3.5      Chloride 107      Carbon Dioxide (CO2) 28      Anion Gap 6      Urea Nitrogen 15      Creatinine 0.96      Calcium 9.0      Glucose 149 (*)     Alkaline Phosphatase 170 (*)      (*)      (*)     Protein Total 7.7      Albumin 3.0 (*)     Bilirubin Total 0.5      GFR Estimate 75     INR - Abnormal    INR 3.06 (*)    CBC WITH PLATELETS AND DIFFERENTIAL - Abnormal    WBC  Count 10.5      RBC Count 4.72      Hemoglobin 13.9      Hematocrit 44.6      MCV 95      MCH 29.4      MCHC 31.2 (*)     RDW 14.3      Platelet Count 166      % Neutrophils 78      % Lymphocytes 10      % Monocytes 7      % Eosinophils 2      % Basophils 1      % Immature Granulocytes 2      NRBCs per 100 WBC 0      Absolute Neutrophils 8.3      Absolute Lymphocytes 1.0      Absolute Monocytes 0.8      Absolute Eosinophils 0.2      Absolute Basophils 0.1      Absolute Immature Granulocytes 0.2 (*)     Absolute NRBCs 0.0     TROPONIN I - Normal    Troponin I S 20     TROPONIN I - Normal    Troponin I <0.015     ROUTINE UA WITH MICROSCOPIC REFLEX TO CULTURE      Emergency Department Course:  Reviewed:  I reviewed nursing notes, vitals, past medical history, Care Everywhere and [facility].    Assessments:  1804 I obtained history and examined the patient as noted above.   1933 I rechecked the patient and explained findings.     Consults:  1952  I spoke to Dr. Chapman of the hospitalist service who accepts the patient for admission.     Interventions:  1819 Sublimaze 50 mcg, IV    Disposition:  The patient was admitted to the hospital under the care of Dr. Chapman.     Impression & Plan     Medical Decision Making:     Edison Saldivar is a 81 year old male who presents for evaluation of right arm pain after a fall. CMS is intact distally in the extremity and the median/ulnar/radial nerve function was tested and normal.  X-rays reveal a midshaft humerus fracture that does not need reduction at this time. Arm was immobilized in a shoulder sling. Pain improved after fentanyl IV.  Head CT and blood work were unremarkable. Will admit to hospital under the care of Dr. Chapman for further cares and ortho consultation as pt is walker dependent and unable to function independently.     Diagnosis:    ICD-10-CM    1. Fall, initial encounter  W19.XXXA    2. Closed displaced transverse fracture of shaft of right humerus, initial encounter  S42.321A       Scribe Disclosure:  I, Berenice David, am serving as a scribe at 6:04 PM on 11/23/2021 to document services personally performed by Ambrocio Membreno MD based on my observations and the provider's statements to me.         Ambrocio Membreno MD  11/23/21 3137

## 2021-11-24 NOTE — PHARMACY-ADMISSION MEDICATION HISTORY
Admission medication history interview status for this patient is complete. See [facility] admission navigator for allergy information, prior to admission medications and immunization status.     Medication history interview done, indicate source(s): Patient and daughter  Medication history resources (including written lists, pill bottles, clinic record): daughter's list  Pharmacy: [facility] for discharge meds    Changes made to PTA medication list:  Added: Vitamin C  Changed:   Reported as Not Taking:   Removed:     Actions taken by pharmacist (provider contacted, etc):None     Additional medication history information:None    Medication reconciliation/reorder completed by provider prior to medication history?  No    Prior to Admission medications    Medication Sig Last Dose Taking? Auth Provider   amLODIPine (NORVASC) 5 MG tablet Take 5 mg by mouth every morning 11/23/2021 at Unknown time Yes Unknown, Entered By History   brimonidine-timolol (COMBIGAN) 0.2-0.5 % ophthalmic solution Place 1 drop into both eyes every morning 11/23/2021 at Unknown time Yes Unknown, Entered By History   brinzolamide (AZOPT) 1 % ophthalmic suspension Place 1 drop into both eyes every morning 11/23/2021 at Unknown time Yes Unknown, Entered By History   CALCIUM-MAGNESIUM-ZINC PO Take 1 tablet by mouth every morning 100/40/15 11/23/2021 at Unknown time Yes Unknown, Entered By History   co-enzyme Q-10 100 MG CAPS capsule Take 100 mg by mouth every morning 11/23/2021 at Unknown time Yes Unknown, Entered By History   docusate sodium (COLACE) 100 MG capsule Take 100 mg by mouth every evening 11/23/2021 at Unknown time Yes Unknown, Entered By History   esomeprazole (NEXIUM) 40 MG DR capsule Take 40 mg by mouth every morning (before breakfast) Take 30-60 minutes before eating. 11/23/2021 at Unknown time Yes Unknown, Entered By History   furosemide (LASIX) 40 MG tablet Take 80 mg by mouth daily 11/23/2021 at Unknown time Yes Unknown, Entered By History    Magnesium Oxide 250 MG TABS Take 500 mg by mouth daily 11/23/2021 at Unknown time Yes Unknown, Entered By History   mirabegron (MYRBETRIQ) 50 MG 24 hr tablet Take 50 mg by mouth every morning 11/23/2021 at Unknown time Yes Unknown, Entered By History   multivitamin w/minerals (THERA-VIT-M) tablet Take 1 tablet by mouth every morning 11/23/2021 at Unknown time Yes Unknown, Entered By History   potassium chloride ER (K-TAB/KLOR-CON) 10 MEQ CR tablet Take 20 mEq by mouth daily 11/23/2021 at Unknown time Yes Reported, Patient   vitamin B-12 (CYANOCOBALAMIN) 1000 MCG tablet Take 1,000 mcg by mouth every morning 11/23/2021 at Unknown time Yes Unknown, Entered By History   vitamin C (ASCORBIC ACID) 1000 MG TABS Take 1,000 mg by mouth daily 11/23/2021 at Unknown time Yes Unknown, Entered By History   vitamin D3 (CHOLECALCIFEROL) 2000 units tablet Take 2,000 Units by mouth daily 11/23/2021 at Unknown time Yes Unknown, Entered By History   warfarin ANTICOAGULANT (COUMADIN) 5 MG tablet Take 5 mg by mouth See Admin Instructions Tuesday, Wednesday, Thursday, Saturday, Sunday 11/23/2021 at 5 mg Yes Unknown, Entered By History   acetaminophen (TYLENOL) 325 MG tablet Take 2 tablets (650 mg) by mouth every 4 hours as needed for mild pain   Antonio Foster,    albuterol (PROAIR HFA/PROVENTIL HFA/VENTOLIN HFA) 108 (90 Base) MCG/ACT inhaler Inhale 2 puffs into the lungs every 4 hours as needed for shortness of breath / dyspnea or wheezing   Unknown, Entered By History   latanoprost (XALATAN) 0.005 % ophthalmic solution Place 1 drop into both eyes At Bedtime 11/22/2021  Unknown, Entered By History   warfarin ANTICOAGULANT (COUMADIN) 5 MG tablet Take 7.5 mg by mouth See Admin Instructions Monday and Friday 11/22/2021 at 7.5 mg  Unknown, Entered By History

## 2021-11-24 NOTE — ED NOTES
Care Management Initial Consult    General Information  Assessment completed with: Family,Caregiver, Daughter Juliane and hospice RN Wilver   Type of CM/SW Visit: Initial Assessment    Primary Care Provider verified and updated as needed: Yes   Readmission within the last 30 days: no previous admission in last 30 days   Return Category: New Diagnosis  Reason for Consult: discharge planning  Advance Care Planning:            Communication Assessment  Patient's communication style: spoken language (English or Bilingual)             Cognitive  Cognitive/Neuro/Behavioral: WDL  Level of Consciousness: alert  Arousal Level: opens eyes spontaneously  Orientation: disoriented to,time  Mood/Behavior: calm,cooperative  Best Language: 0 - No aphasia  Speech: clear,spontaneous    Living Environment:   People in home: spouse  Dinora   Current living Arrangements: independent living facility ([facility] [phone] )      Able to return to prior arrangements: other (see comments)       Family/Social Support:  Care provided by: spouse/significant other,child(julian),other (see comments) (Hospice)  Provides care for: spouse  Marital Status:   ,Children,Facility resident(s)/Staff,Other (specify) ([facility] [phone])  Dinora       Description of Support System: Supportive    Support Assessment: Adequate family and caregiver support,Adequate social supports    Current Resources:   Patient receiving home care services: No     Community Resources:    Equipment currently used at home:  Walker, grab bars   Supplies currently used at home:      Employment/Financial:  Employment Status:     Retired      Financial Concerns:     No. Per daughter they have money to increase services. Daughter states she is the pt's POA for financial. Dtr notes the pt and his spouse don't like to spend their money if asked, but daughter will do so if it means better care.     Lifestyle & Psychosocial Needs:  Social  Determinants of Health     Tobacco Use: Low Risk      Smoking Tobacco Use: Never Smoker     Smokeless Tobacco Use: Never Used   Alcohol Use: Not on file   Financial Resource Strain: Not on file   Food Insecurity: Not on file   Transportation Needs: Not on file   Physical Activity: Not on file   Stress: Not on file   Social Connections: Not on file   Intimate Partner Violence: Not on file   Depression: Not on file   Housing Stability: Not on file       Functional Status:  Prior to admission patient needed assistance:   Dependent ADLs:: Ambulation-walker  Dependent IADLs:: Transportation,Laundry,Cleaning,Shopping  Assesssment of Functional Status: Not at baseline with mobility    Mental Health Status/Chemical Dependency Status: no               Values/Beliefs:  Spiritual, Cultural Beliefs, Caodaism Practices, Values that affect care:       Orthodoxy           Additional Information:   unable to assess the patient as patient was sleeping with a CPAP mask on and unable to be aroused. Sammie spoke with daughter, Juliane to complete assessment. Juliane reports patient lives with his spouse at [facility] [phone]. Patient and spouse recently moved into [facility] on 10/04/2021. Daughter could not confirm if patient and spouse live in the independent or assisted living. Daughter states patient's wife has dementia and should not be caring for the patient while he is healing. Daughter notes the pt's spouse still thinks she is fully capable to care for the patient at home with no support. At baseline patient ambulates with a walker with no assist. Pt receives bathing, laundry and cleaning. Family assist with meals. Daughter reports patient has been signed onto hospice with [facility] [phone] for a while now. Daughter's goal is to get patient stronger to where he can ambulate and then back on hospice.    SAMMIE left a message with [facility]. SAMMIE also spoke with YESSI Jang with  [facility] who confirms they have been following the patient for a long time. She could not tell this writer exactly how long as she was not at her desk. Suhail states they can re-enroll the patient after this hospital admission if that is what the family wants.     Juliane would like to be informed with physical therapy updates/recommendations     ADDENDUM 11/24/2021 12:20 PM:  Received a call from Physical therapy who reports patient is currently Assist of 2. Physical therapy is recommend TCU.     RN states patient has a bed assigned upstairs and pt will be transferred soon. Rn also states she received a call from someone from [facility] looking for an update. MJ called YESSI Pettit at [facility] [phone] and updated them that pt is Observation and likely will be looking at TCU placement before returning on hospice.     MJ met with the pt and pt's daughter and provided a TCU list. Requested 3-5 choices and explained another SW will be follow up with them to get choice to send referrals.        Lena Grossman MSW, [facility]  ED    [phone]

## 2021-11-24 NOTE — H&P
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     He thinks he was going to the kitchen when he fell down.  He can't really expand on the event.  His dtr, who accompanies him to the ER states that he stated earlier that his legs collapsed and that is the most frequent way he describes it. He describes being off balance a lot of the time.  He lives in the independent living side of an [facility] with his wife who also has memory issues.  He has been in hospice for about a year, the dtr thinks it's for cardiac issues but he thinks it's due to strokes. They have gone temporarily off hospice for this admission.     He also complains of some right thigh pain that's been present for a few weeks, it's hard to describe, most consistent answer I get is achy.  It does not radiate down from his back.  He cannot tell me if it's worse when he walks on it.     He denies any recent n/v/d, no cp or palpitations, he has chronic sob which seems unchanged.      In the ER his VS are notable for moderate htn in the 170/100's, CMP with moderately elevated LFTs, negative troponin x 2, nl cbc, INR 3.06, right humeral xray positive for fracture.  NCHCT without ICH or SDH    He's been placed in a sling.  I've been asked to admit him, as he is dependent on a walker for ambulation and currently there is not a safe option for discharge     Problem List:   Humeral fracture  2/2 traumatic fall.  Typically I would put him on apap 1 g tid but his LFTs make me leary of that,  -oxycodone 5 mg q 4 hours prn with low dose hydroxyzine to boost narc  effect  -orthopedic consultation for sling/follow up    Right thigh pain   Falls/balance issues  Requested PT consultation to assist with transitioning to alternative gait assist device.  As well as to help determine safe discharge plan    Elevated LFTs AST/ALT  233/178  No recent viral illnesses, I don't see that he's on a statin but if he is it should be held.  They were normal as of 3 days ago.   -recheck in am, seems quite unusual to me.  If still elevated would recommend getting an US    KORY    -CPAP as at home    htn  Looks to be on a combination of amlodipine and furosemide  -resume when med rec complete      Memory loss  Vascular dementia   At risk for delirium in the setting of pain/pain medications/new environment  -prn haldol if EKG without e/o prolonged Qtc      FV Leiden on chronic warfarin   Will ask for pharmacy to manage      COVID 19 negative  -completed 2 shot moderna series in 3/2021       DVT Prophylaxis: Warfarin  Code Status: DNR / DNI  Functional Status: walks with a walker  Torres:  Not needed but will bladder scan   Access:  PIV        Please call dtr Juliane with daily updates    We are operating under sub optimal conditions in the setting of a world wide pandemic, hospitals are running at full capacity with limited ICU bed availability. We are providing the best possible patient care with limited resources.           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     He thinks he was going to the kitchen when he fell down.  He can't really expand on the event.  His dtr, who accompanies him to the ER states that he stated earlier that his legs collapsed and that is the most frequent way he describes it. He describes being off balance a lot of the time.  He  lives in the independent living side of an [facility] with his wife who also has memory issues.  He has been in hospice for about a year, the dtr thinks it's for cardiac issues but he thinks it's due to strokes. They have gone temporarily off hospice for this admission.     He also complains of some right thigh pain that's been present for a few weeks, it's hard to describe, most consistent answer I get is achy.  It does not radiate down from his back.  He cannot tell me if it's worse when he walks on it.     He denies any recent n/v/d, no cp or palpitations, he has chronic sob which seems unchanged.      In the ER his VS are notable for moderate htn in the 170/100's, CMP with moderately elevated LFTs, negative troponin x 2, nl cbc, INR 3.06, right humeral xray positive for fracture.  NCHCT without ICH or SDH    He's been placed in a sling.  I've been asked to admit him, as he is dependent on a walker for ambulation and currently there is not a safe option for discharge       The history is obtained in discussion with the ER provider  Dr Garcia, the patient who is a poor historian and his dtr at the bedside        Past Medical History:     Past Medical History:   Diagnosis Date     Topete's esophagus      COPD (chronic obstructive pulmonary disease) (H)      Heterozygous factor V Leiden mutation (H)      History of CVA (cerebrovascular accident)      HTN (hypertension)      KORY on CPAP      Sneddon syndrome (H)      Vascular dementia (H)              Past Surgical History:     Past Surgical History:   Procedure Laterality Date     Duodenal polypectomy with sphincterotomy   09/23/2019    at [place]              Social History:     Social History     Tobacco Use     Smoking status: Never Smoker     Smokeless tobacco: Never Used   Substance Use Topics     Alcohol use: No   lives with his wife in independent living at a [facility].  He ambulates with a walker.  He has  Been on hospice for about a year, not clear           Family  History:   I have reviewed this patient's family history         Allergies:   No Known Allergies          Medications:     Await formal med rec but per discussion with dtr  Amlodipine 5 mg  Furosemide 40  Warfarin    Amongst others           Review of Systems:     A Comprehensive greater than 10 system review of systems was carried out.  Pertinent positives and negatives are noted above.  Otherwise negative for contributory information.           Physical Exam:   Blood pressure (!) 159/88, pulse 78, temperature 97.1  F (36.2  C), resp. rate 30, SpO2 96 %.  Exam:    General:  Pleasant nad looks stated age  HEENT:  Head nc/at sclera clear PERRL O/P:  Dry mm no posterior pharyngeal exudate, diffuse mild erythema  Neck is supple  Lungs: cta b nl effort   CV:  RRR no m/r/g no le edema  Abd:  obese S/nt/nd no r/g  Neuro:  Cn 2-12 grossly intact and haynes x right arm   Alert and pleasant and cooperative affect appropriate   Skin:  W/d no c/c               Data:          Lab Results   Component Value Date     11/23/2021     01/20/2021    Lab Results   Component Value Date    CHLORIDE 107 11/23/2021    CHLORIDE 107 01/20/2021    Lab Results   Component Value Date    BUN 15 11/23/2021    BUN 11 01/20/2021      Lab Results   Component Value Date    POTASSIUM 3.5 11/23/2021    POTASSIUM 4.3 01/20/2021    Lab Results   Component Value Date    CO2 28 11/23/2021    CO2 30 01/20/2021    Lab Results   Component Value Date    CR 0.96 11/23/2021    CR 0.91 01/20/2021        Lab Results   Component Value Date    WBC 10.5 11/23/2021    HGB 13.9 11/23/2021    HCT 44.6 11/23/2021    MCV 95 11/23/2021     11/23/2021     Lab Results   Component Value Date     (H) 11/23/2021     Lab Results   Component Value Date     (H) 11/23/2021     (H) 11/23/2021    ALKPHOS 170 (H) 11/23/2021    BILITOTAL 0.5 11/23/2021     Lab Results   Component Value Date    TROPONIN <0.015 11/23/2021    TROPI <0.015 01/17/2019             Imaging:     Recent Results (from the past 24 hour(s))   Head CT w/o contrast    Narrative    EXAM: CT HEAD W/O CONTRAST  LOCATION: [facility]  DATE/TIME: 11/23/2021 6:48 PM    INDICATION: Fall, on Coumadin.  COMPARISON: None.  TECHNIQUE: Routine CT Head without IV contrast. Multiplanar reformats. Dose reduction techniques were used.    FINDINGS:  INTRACRANIAL CONTENTS: No intracranial hemorrhage, extraaxial collection, or mass effect.  No CT evidence of acute infarct. Mild to moderate presumed chronic small vessel ischemic changes. Moderate generalized volume loss. No hydrocephalus. Cerebellar   tonsillar position is satisfactory. Nothing for subarachnoid, subdural, or epidural hemorrhage is identified. History of trauma and anticoagulation usage. Corpus callosum is satisfactory. Sella is normal. Vascular calcification.    VISUALIZED ORBITS/SINUSES/MASTOIDS: Prior bilateral cataract surgery. Visualized portions of the orbits are otherwise unremarkable. Mild to moderate mucosal thickening scattered about the paranasal sinuses. No air-fluid levels. Benign density may be   dentigerous associated with the lateral right maxillary sinus wall series 5 image 4 and should be of no clinical significance. Scattered fluid/membrane thickening in the mastoid air cells bilaterally. Debris/cerumen in the EACs. Middle ear regions are   clear.    BONES/SOFT TISSUES: Demineralization of the skull base. Degenerative changes at the odontoid process and both TMJs. No evidence for fracture of the calvarium or skull base noted. I do not see evidence for significant swelling of the facial or scalp soft   tissues with some exclusion of the posterior occipital and suboccipital scalp due to positioning.      Impression    IMPRESSION:  1.  No CT evidence for acute intracranial process.  2.  Brain atrophy and presumed chronic microvascular ischemic changes as above.  3.  No fractures.   4.  Additional details  above.   XR Shoulder Right 2 Views    Narrative    EXAM: XR SHOULDER 2 VIEW RIGHT  LOCATION: [facility]  DATE/TIME: 11/23/2021 6:47 PM    INDICATION: Right shoulder pain after a fall.  COMPARISON: None.      Impression    IMPRESSION: Acute transverse fracture of the proximal humeral shaft, located 16 cm distal to the glenohumeral joint. 1.3 cm fracture displacement. No aggressive bone lesion visualized. Normal glenohumeral and acromioclavicular joint alignment. Mild   glenohumeral and coracoclavicular degenerative arthritis.    XR Humerus Right G/E 2 Views    Narrative    EXAM: XR HUMERUS RIGHT G/E 2 VIEWS  LOCATION: [facility]  DATE/TIME: 11/23/2021 7:01 PM    INDICATION: Arm pain.  COMPARISON: Shoulder radiographs, same date.      Impression    IMPRESSION: Acute transverse midshaft fracture of the right humerus with 1.3 cm displacement. Normal glenohumeral and elbow joint alignment.

## 2021-11-24 NOTE — ED NOTES
Bladder scan:250ml pt states he feels as if he needs to void but unable to produce any urine; repositioned pt several times; still unsuccessful.

## 2021-11-24 NOTE — PROGRESS NOTES
Care Management Follow Up             Additional Information:  Handoff given from weekday SW requesting that I f/u with pt's dtr for more TCU choices that accept [employer].  Dtr was agreeable with SW making referrals to [facility], [facility], [facility] and [facility].     Plan: SW will f/u with TCU's tomorrow to find out availability and complete PAS once TCU confirmed.       Sheri CLARK, [facility]  Inpatient Care Coordination   [facility]   [phone]

## 2021-11-24 NOTE — PLAN OF CARE
Patient has a history of retention and enlarged prostate. States that it takes a long time for him to urinate at home and that he usually has to attempt multiple times to be able to void.   Patient has urinated 50mls of sasha urine x2 in the ED for my shift (since 0700). Bladder Scan is under 500.   MD aware. New RN aware.

## 2021-11-24 NOTE — PROGRESS NOTES
"Patient arrived to unit.  Daughter of patient was with and helped bring pt. Belongings. Pain 6/10 in R shoulder/R knee, ice applied and PRN atarax given per orders. Voided small amount (50cc)- will monitor output.  Sling adjusted appropriately-NWB in RUE. Patient's daughter placing lunch order for pt. Will continue to provide supportive cares. BP (!) 153/72 (BP Location: Left arm)   Pulse 78   Temp 97.7  F (36.5  C) (Oral)   Resp 20   Ht 1.727 m (5' 8\")   Wt 108 kg (238 lb 1.6 oz)   SpO2 95%   BMI 36.20 kg/m    "

## 2021-11-24 NOTE — PLAN OF CARE
PRIMARY DIAGNOSIS: Right Humeral fx r/t fall  OUTPATIENT/OBSERVATION GOALS TO BE MET BEFORE DISCHARGE:  1. Pain Status: Improved-controlled with oral pain medications. Oxycodone, Atarax, and Tylenol.     2. Return to near baseline physical activity: No    3. Cleared for discharge by consultants (if involved): No    Discharge Planner Nurse   Safe discharge environment identified: No  Barriers to discharge: Yes       Entered by: Shavon Rust 11/24/2021 5:12 AM     Pt admitted with R humeral fx r/t fall. Sling on. AOx4, forgetful. Room air. BiPap overnight. Pain to R shoulder and R knee. Oxycodone, Atarax, and Tylenol for pain. Assist of 2. Unable to stand up, too painful. Unable to urinate laying in bed. Bladder scanned for 412 mL and 426 mL. Order for straight cath >500 mL. PT, SW, and Ortho consulted.

## 2021-11-24 NOTE — PLAN OF CARE
PRIMARY DIAGNOSIS: Right Humeral fx r/t fall  OUTPATIENT/OBSERVATION GOALS TO BE MET BEFORE DISCHARGE:  1. Pain Status: Improved-controlled with oral pain medications. Oxycodone, Atarax, and Tylenol.     2. Return to near baseline physical activity: No    3. Cleared for discharge by consultants (if involved): No    Discharge Planner Nurse   Safe discharge environment identified: No  Barriers to discharge: Yes       Entered by: Shavon Rust 11/24/2021 5:10 AM     Please review provider order for any additional goals.   Nurse to notify provider when observation goals have been met and patient is ready for discharge.

## 2021-11-24 NOTE — CONSULTS
See ED note for SW assessment   Lena Grossman MSW, [facility]  ED    [phone]

## 2021-11-24 NOTE — PROGRESS NOTES
11/24/21 1141   Quick Adds   Type of Visit Initial PT Evaluation   Living Environment   People in home spouse   Current Living Arrangements independent living facility   Home Accessibility no concerns   Living Environment Comments Patient's daughter present, provided history.  Patient lives in an ILF with spouse (who has some form of dementia per daughter).  Patient is able to receive custodial services in his ILF apartment as the building they are in houses both types of apartments.    Self-Care   Usual Activity Tolerance fair   Current Activity Tolerance poor   Activity/Exercise/Self-Care Comment Patient has electric wheelchair, manual wheelchair, walker and canes for use at home.  Per daughter, patient is unsafe and not able to independently negotiate in motorized scooter or manual wheelchair.   Per daughter, patient uses walker for all mobility at baseline   Disability/Function   Hearing Difficulty or Deaf yes   Use of hearing assistive devices none   Wear Glasses or Blind yes   General Information   Onset of Illness/Injury or Date of Surgery 11/23/21   Referring Physician Hazel Chapman MD   Patient/Family Therapy Goals Statement (PT) Daughter would like patient to discharge to TCU to gain independence with mobility   Pertinent History of Current Problem (include personal factors and/or comorbidities that impact the POC) Edison Saldivar is a 81 year old male with a history of  Htn, vascular dementia, strokes,  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   Existing Precautions/Restrictions fall   Weight-Bearing Status - RUE nonweight-bearing   Cognition   Orientation Status (Cognition) person;place;situation   Cognitive Status Comments Requires repeated cueing to avoid use of right UE   Pain Assessment   Patient Currently in Pain Yes, see Vital Sign flowsheet  (at both R UE and LE)   Integumentary/Edema   Integumentary/Edema  Comments Multiple areas of bruising   Posture    Posture Forward head position;Protracted shoulders   Strength   Strength Comments Decreased global strength.  Also limited by pain, difficult to formally assess strength at this time.   Bed Mobility   Comment (Bed Mobility)  Patient required max A x2 to transfer to sitting at edge of rFlushing.    Transfers   Transfer Safety Comments  No standing attempted with patient at this time secondary to high height of gurFlushing and history of knee buckling (patient would not be able to return to sitting safely on rFlushing if knees buckled in standing).    Gait/Stairs (Locomotion)   Comment (Gait/Stairs)  No standing attempted with patient at this time secondary to high height of gurney and history of knee buckling (patient would not be able to return to sitting safely on gurFlushing if knees buckled in standing).    Balance   Balance Comments CGA provided in sitting secondary to posterior lean   Clinical Impression   Criteria for Skilled Therapeutic Intervention yes, treatment indicated   PT Diagnosis (PT) decreased independence with mobility   Influenced by the following impairments pain, decreased strength, decreased activity tolerance, decreased balance, NWB at right UE   Functional limitations due to impairments difficulties with gait, transfers   Clinical Presentation Stable/Uncomplicated   Clinical Presentation Rationale complex pmh, stable presentation, fair social support   Clinical Decision Making (Complexity) low complexity   Therapy Frequency (PT) 5x/week   Predicted Duration of Therapy Intervention (days/wks) 3 days   Planned Therapy Interventions (PT) balance training;bed mobility training;gait training;home exercise program;patient/family education;strengthening;transfer training;progressive activity/exercise   Risk & Benefits of therapy have been explained evaluation/treatment results reviewed;care plan/treatment goals reviewed;risks/benefits reviewed;current/potential  barriers reviewed;participants voiced agreement with care plan;participants included;patient;daughter   PT Discharge Planning    PT Discharge Recommendation (DC Rec) Transitional Care Facility;home with assist;home with home care physical therapy   PT Rationale for DC Rec Patient presents significantly below baseline for functional mobility.  Patient required max A x2 for bed mobility, unable to stand during evaluation.  Patient unable to demonstrate safe household mobility.  Recommend TCU in order to increase strength, activity tolerance, independence with mobility and ongoing gait training.  If patient does not discharge to TCU, would require increased services upon return to home.  Patient would require AM/PM cares, use of lift for all transfers, use of wheelchair for all mobility, Ax1 for all mobility with wheelchair (is unable to independently negotiate wheelchair with NWB at right UE), assist with bathing, toileting, meals, medication management, Home PT/OT.

## 2021-11-25 NOTE — PLAN OF CARE
PRIMARY DIAGNOSIS: Fall, R humerus fx  OUTPATIENT/OBSERVATION GOALS TO BE MET BEFORE DISCHARGE:  ADLs back to baseline: No    Activity and level of assistance: Up with maximum assistance. Consider SW and/or PT evaluation.     Pain status: Improved-controlled with oral pain medications.    Return to near baseline physical activity: No     Discharge Planner Nurse   Safe discharge environment identified: No  Barriers to discharge: Yes       Entered by: Kiki Barraza 11/25/2021 1:19 AM    VSS, pt is disoriented to situation, tolerating oral pain meds, SL, russ brace to right arm, ortho consult in, PT/SW consults in, will continue to monitor       Please review provider order for any additional goals.   Nurse to notify provider when observation goals have been met and patient is ready for discharge.

## 2021-11-25 NOTE — PLAN OF CARE
"PRIMARY DIAGNOSIS: Fall, R humerus fx  OUTPATIENT/OBSERVATION GOALS TO BE MET BEFORE DISCHARGE:  1. ADLs back to baseline: No    2. Activity and level of assistance: Up with maximum assistance. Consider SW and/or PT evaluation.     3. Pain status: Improved-controlled with oral pain medications.    4. Return to near baseline physical activity: No     Discharge Planner Nurse   Safe discharge environment identified: No  Barriers to discharge: Yes       Entered by: Jayna Maharaj 11/25/2021      Please review provider order for any additional goals.   Nurse to notify provider when observation goals have been met and patient is ready for discharge.    Alert-disoriented to situation and time. Up A2- sravanthi steady.  CMS on RUE intact-fingers cold to touch o/w WNL. russ brace in place-swelling in lower URE- ice applied as pt tolerating. Soft cloths placed under brace where it was rubbing pt's skin. Pain in R shoulder 5/10- pt states \"its not as bad as it was\".  PRN oxy given. Using urinal to void-some incontinence. Tolerated PO. Ortho/PT/SW following for probable placement. /59 (BP Location: Left arm)   Pulse 80   Temp 99.2  F (37.3  C) (Oral)   Resp 16   Ht 1.727 m (5' 8\")   Wt 108 kg (238 lb 1.6 oz)   SpO2 90%   BMI 36.20 kg/m    "

## 2021-11-25 NOTE — PLAN OF CARE
PRIMARY DIAGNOSIS: Fall, R humerus fx  OUTPATIENT/OBSERVATION GOALS TO BE MET BEFORE DISCHARGE:  ADLs back to baseline: No    Activity and level of assistance: Up with maximum assistance. Consider SW and/or PT evaluation.     Pain status: Improved-controlled with oral pain medications.    Return to near baseline physical activity: No     Discharge Planner Nurse   Safe discharge environment identified: No  Barriers to discharge: Yes       Entered by: Kiki Barraza 11/24/2021 8:32 PM    VSS, pt is disoriented to time, tolerating oral pain meds, SL, Ortho following, russ brace to right arm, PT/SW following, will continue to monitor       Please review provider order for any additional goals.   Nurse to notify provider when observation goals have been met and patient is ready for discharge.

## 2021-11-25 NOTE — PROGRESS NOTES
Ortho    Pt comfortable in bed.  CMS intact.  Skin intact.  Brace adjusted to a more tight fit.  No plans for operative intervention at this time.  Follow-up in 1-2 weeks in clinic for repeat imaging.  Depending on tolerance to brace and fracture alignment, there may be a role for operative intervention but will attempt nonop at this time.    Jose David Xiong MD  [phone]

## 2021-11-25 NOTE — PLAN OF CARE
PRIMARY DIAGNOSIS: Fall, R humerus fx  OUTPATIENT/OBSERVATION GOALS TO BE MET BEFORE DISCHARGE:  ADLs back to baseline: No    Activity and level of assistance: Up with maximum assistance. Consider SW and/or PT evaluation.     Pain status: Improved-controlled with oral pain medications.    Return to near baseline physical activity: No     Discharge Planner Nurse   Safe discharge environment identified: No  Barriers to discharge: Yes       Entered by: Kiki Barraza 11/25/2021 4:36 AM    Pt is intermittently confused, incontinent of urine at times, tolerating oral pain meds, ortho consult in, R arm in [facility] brace, PT/SW, will continue to monitor     Please review provider order for any additional goals.   Nurse to notify provider when observation goals have been met and patient is ready for discharge.

## 2021-11-25 NOTE — PLAN OF CARE
"PRIMARY DIAGNOSIS: Fall, R humerus fx  OUTPATIENT/OBSERVATION GOALS TO BE MET BEFORE DISCHARGE:  1. ADLs back to baseline: No    2. Activity and level of assistance: Up with maximum assistance. Consider SW and/or PT evaluation.     3. Pain status: Improved-controlled with oral pain medications.    4. Return to near baseline physical activity: No     Discharge Planner Nurse   Safe discharge environment identified: No  Barriers to discharge: Yes       Entered by: Jayna Maharaj 11/25/2021      Please review provider order for any additional goals.   Nurse to notify provider when observation goals have been met and patient is ready for discharge.    Alert-disoriented to situation and time. Up A2- sravanthi steady.  CMS on RUE intact-fingers cold to touch o/w WNL. russ brace in place-swelling in lower R arm- ice applied as pt tolerating. Soft cloths placed under brace where it was rubbing pt's skin. Pain in R shoulder 7/10- PRN oxy given x2. Using urinal to void-some incontinence. Tolerated PO. Ortho/PT/SW following for probable placement. /59 (BP Location: Left arm)   Pulse 80   Temp 99.2  F (37.3  C) (Oral)   Resp 16   Ht 1.727 m (5' 8\")   Wt 108 kg (238 lb 1.6 oz)   SpO2 90%   BMI 36.20 kg/m    "

## 2021-11-25 NOTE — PLAN OF CARE
"PRIMARY DIAGNOSIS: R humerus fx s/p fall  OUTPATIENT/OBSERVATION GOALS TO BE MET BEFORE DISCHARGE:  1. Pain Status: Improved-controlled with oral pain medications.    2. Return to near baseline physical activity: No    3. Cleared for discharge by consultants (if involved): No    Discharge Planner Nurse   Safe discharge environment identified: No  Barriers to discharge: Yes       Entered by: Jayna Maharaj 11/24/2021      Please review provider order for any additional goals.   Nurse to notify provider when observation goals have been met and patient is ready for discharge.    Alert-disoriented to time. A2- gaitbelt, sravanthi steady-.  When getting patient up to bedside commode it went well with A2 gaitbelt, but was a very heavy A2 to get back to bed so utilizing sravanthi steady going forward. Using urinal for urine-frequency, good output- bladder scan x1 this shift for 134.  Pain 6/10 in R shoulder and R knee, PRN oxy/atarax/tylenol and ice helping. Orthotics fit patient for a russ brace. PT/Ortho/SW following. Plan for probable TCU at discharge. /65 (BP Location: Left arm)   Pulse 79   Temp 97.7  F (36.5  C) (Oral)   Resp 16   Ht 1.727 m (5' 8\")   Wt 108 kg (238 lb 1.6 oz)   SpO2 95%   BMI 36.20 kg/m     "

## 2021-11-25 NOTE — PROGRESS NOTES
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      Problem List/Assessment and Plan:      Right Humeral fracture: 2/2 traumatic fall. Reports that his legs gave out while using his walker and he fell to the ground.    -oxycodone 5 mg q 4 hours prn with low dose hydroxyzine for pain adjuvant  -orthopedic consulted and non operative management for now with placement of a brace and follow up in clinic in 1-2 weeks  -No Tylenol now given mild transaminitis  - PT consulted and recommend TCU  - SW consulted for placement      Right thigh pain, resolved   Falls/balance issues  Requested PT consultation to assist with transitioning to alternative gait assist device.  As well as to help determine safe discharge plan.  Thigh pain has been present for at least a few weeks, currently denies pain.  -PT recommending TCU, SW consulted     Elevated LFTs AST/ALT  233/178 on admission, improved to 94/126 today.  Only medication PTA is PRN Tylenol.  Has no abdominal pain, nausea or emesis.  Tolerating a regular diet.  Will continue to monitor, if he does develop abdominal pain needs an abdominal US.     KORY: continue CPAP per home settings.     HTN: PTA on Amlodipine and Lasix, continue.     Hypokalemia, resolved: Likely due to Lasix.  On PTA potassium which has been resumed.  Also order protocol.      Memory loss  Vascular dementia   Patient is calm and cooperative at this time.  Certainly at risk for delirium.      FV Leiden on chronic warfarin: INR therapeutic, Pharmacy consult for Coumadin dosing.     Asthma - mild wheezing on exam today.   "On room air, normal wbc and afebrile.  CXR with some atelectasis, otherwise negative.  - start duonebs and continue prn Albuterol   - start IS     COVID 19 negative  -completed 2 shot moderna series in 3/2021        Diet: Regular Diet Adult    DVT Prophylaxis: Warfarin  Code Status: No CPR- Do NOT Intubate  Disposition:  TCU      Irasema Queen MS PRIYANK  Hospitalist Physician Assistant  [facility]  Pager: [phone]      Subjective & Interval Hx:    Patient reports pain is controlled.  Denies any cough or shortness of breath.  Has a hx of Asthma with some baseline chronic sob.  Denies any abdominal pain, nausea, emesis.      Last 24 hr care team notes reviewed.   ROS:  4 point ROS including Respiratory, CV, GI and , other than that noted in the HPI, is negative.    Physical Exam:    Blood pressure 133/71, pulse 84, temperature 98.3  F (36.8  C), temperature source Oral, resp. rate 18, height 1.727 m (5' 8\"), weight 108 kg (238 lb 1.6 oz), SpO2 93 %. on room air  General: Alert, interactive, NAD  HEENT: AT/NC  Neck: Supple  Resp: few upper airway wheezes, no crackles, no distress, able to speak in full sentences  Cardiac: regular rate and rhythm, no murmur  Abdomen: Soft, nontender, nondistended. +BS.  Extremities: RUE in a brace.  Good  strength.  Skin: Warm and dry  Neuro: Alert & follows commands    Labs & Images:  Reviewed in Epic   Medications:    Current Facility-Administered Medications   Medication     acetaminophen (TYLENOL) Suppository 650 mg     acetaminophen (TYLENOL) tablet 650 mg     albuterol (PROVENTIL HFA/VENTOLIN HFA) inhaler     amLODIPine (NORVASC) tablet 5 mg     brimonidine-timolol (COMBIGAN) 0.2-0.5 % ophthalmic solution 1 drop     brinzolamide (AZOPT) 1 % ophthalmic susp 1 drop     furosemide (LASIX) tablet 80 mg     hydrOXYzine (ATARAX) tablet 10 mg     latanoprost (XALATAN) 0.005 % ophthalmic solution 1 drop     magnesium hydroxide (MILK OF MAGNESIA) suspension 30 mL     " melatonin tablet 3 mg     mirabegron (MYRBETRIQ) 24 hr tablet 50 mg     ondansetron (ZOFRAN-ODT) ODT tab 4 mg    Or     ondansetron (ZOFRAN) injection 4 mg     oxyCODONE (ROXICODONE) tablet 5 mg     pantoprazole (PROTONIX) EC tablet 40 mg     potassium chloride ER (K-TAB/KLOR-CON) CR tablet 20 mEq     senna-docusate (SENOKOT-S/PERICOLACE) 8.6-50 MG per tablet 1 tablet    Or     senna-docusate (SENOKOT-S/PERICOLACE) 8.6-50 MG per tablet 2 tablet     Warfarin Therapy Reminder (Check START DATE - warfarin may be starting in the FUTURE)

## 2021-11-26 NOTE — UTILIZATION REVIEW
Concurrent stay review; Secondary Review Determination     [facility]          Under the authority of the Utilization Management Committee, the utilization review process indicated a secondary review on the above patient.  The review outcome is based on review of the medical records, discussions with staff, and applying clinical experience noted on the date of the review.          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 orthopedic consulted and non operative management for now with placement of a brace and follow up in clinic in 1-2 weeks    Patient is waiting for placement, there is no clear indication to change patient's status to inpatient. The severity of illness, intensity of service provided, expected LOS and risk for adverse outcome make the care appropriate for observation.      This document was produced using voice recognition software       The information on this document is developed by the utilization review team in order for the business office to ensure compliance.  This only denotes the appropriateness of proper admission status and does not reflect the quality of care rendered.         The definitions of Inpatient Status and Observation Status used in making the determination above are those provided in the CMS Coverage Manual, Chapter 1 and Chapter 6, section 70.4.      Sincerely,     JANETT ABRAHAM MD    System Medical Director  Utilization Management  [facility].

## 2021-11-26 NOTE — PLAN OF CARE
PRIMARY DIAGNOSIS: Right Humerus Fracture r/t Fall   OUTPATIENT/OBSERVATION GOALS TO BE MET BEFORE DISCHARGE:  1. Pain Status: Improved-controlled with oral pain medications.    2. Return to near baseline physical activity: No    3. Cleared for discharge by consultants (if involved): No    Discharge Planner Nurse   Safe discharge environment identified: No  Barriers to discharge: Yes       Entered by: Genoveva Pruitt 11/25/2021 7:28 PM     Please review provider order for any additional goals.   Nurse to notify provider when observation goals have been met and patient is ready for discharge.      VSS, on RA, afebrile. Up heavy Ax2 with sravanthi steady, Ax2 with cares in bed. Disoriented to time. Complains of right extremity/shoulder pain, prn tylenol given with little relief, ice pack in place, prn oxy later given with little relief. Brace in place on right upper extremity. Incontinent. Writer stayed in room while pt ate his dinner, assisted with feeding as well. Pt does cough after drinking fluids. PT recommending TCU. Wheezy- prn albuterol and scheduled duonebs 4 times daily started today. Mepilex placed on coccyx, pea sized pressure sore, barrier applied. Plan- SW following for TCU placement, pain control, ortho following, recheck labs in am.

## 2021-11-26 NOTE — PLAN OF CARE
PRIMARY DIAGNOSIS: Right Humerus fx r/t Fall  OUTPATIENT/OBSERVATION GOALS TO BE MET BEFORE DISCHARGE:  1. Pain Status: Improved-controlled with oral pain medications.    2. Return to near baseline physical activity: No    3. Cleared for discharge by consultants (if involved): No    Discharge Planner Nurse   Safe discharge environment identified: No  Barriers to discharge: Yes    Disoriented to time. Heavy Ax2 w/ sravanthi steady. On CPAP at night. Denies SOB. Saturations in mid 90's.  C/o right shoulder/extremity pain. Dilaudid given with relief. RUE brace on and elevated. Incontinent. Repositioning. Mepilex and barrier cream on coccyx d/t small pressure sore. PT recommending TCU. SW working on placement. Plan: pain control and ortho following.

## 2021-11-26 NOTE — PROGRESS NOTES
Care Management Discharge Note    Discharge Date: 11/29/2021     Discharge Disposition:  [facility]    Discharge Services:  PT/OT    Discharge Transportation: HE WC transport     Private pay costs discussed: Not applicable    PAS Confirmation Code: [id number]  Patient/family educated on Medicare website which has current facility and service quality ratings:  Yes    Education Provided on the Discharge Plan:  Yes  Persons Notified of Discharge Plans: Patient, daughter Juliane  Patient/Family in Agreement with the Plan:  Yes    Handoff Referral Completed: No    Additional Information:  [facility] and [facility]. Bed available today. HE WC transport arranged for 1700. PAS completed.     Dtr Juliane would like [facility], [facility], [facility], [place] or [facility]. They have declined due to being out of network with insurance.     After PA had a long discussion with daughter, she was agreeable to pt discharging to [facility].     AMBER Escobar

## 2021-11-26 NOTE — PROGRESS NOTES
"ORTHOPEDIC UPPER EXTREMITY PROGRESS NOTE    Patient with Right midshaft humerus fracture.  Resting in bed.  No complaints, pain is tolerable brace fits well.  No issues or complaints at this time.    Vitals:   /68 (BP Location: Left arm)   Pulse 77   Temp 97.9  F (36.6  C) (Oral)   Resp 18   Ht 1.727 m (5' 8\")   Wt 108 kg (238 lb 1.6 oz)   SpO2 99%   BMI 36.20 kg/m        EXAM   The patient is sleeping, wakes for questions.   Sensation is intact.  Digital Flexion/Extension maintained.   Brisk cap refill.   The brace is well fitted without evident areas of rub or irritation.     Labs: Recent Labs   Lab Test 11/26/21  0555 11/25/21  0530 11/24/21  1359 11/23/21  1800 11/23/21  1800 01/20/21  1235   HGB  --  12.6*  --   --  13.9 15.8   INR 3.51* 3.66* 3.36*   < > 3.06*  --     < > = values in this interval not displayed.       ASSESSMENT  Right midshaft humerus fracture   PLAN  1. Continue with russ  2. Weight Bearing NWB  3. Pain control  4. Follow up in clinic for repeat imaging in 1-2 weeks with Dr. Holm Kjerstin Foss PA-C [facility]    "

## 2021-11-26 NOTE — PROGRESS NOTES
[facility]  Internal Medicine  Progress Note    Date of Service: 11/26/2021    Patient: Edison Saldivar  MRN: [id number]  Admission Date: 11/23/2021    Assessment & Plan: Edison Saldivar is a 81 year old male on Hospice with history of HTN, vascular dementia, CVA, Heterozygous factor V Leiden on chronic warfarin therapy, and KORY on CPAP who presented to [facility] ED on 11/23/2021 after a fall at [facility] with resulting humerus fracture.  Brought in under Observation for further work-up and management.       Traumatic Right Humeral shaft fracture  Mechanical Fall  Reports that his legs gave out while using his walker causing him to fall to the ground.  CT Head negative.  XR R Humerus revealed an acute transverse midshaft humeral fracture with 1.3cm displacement.  Orthopedics consulted recommended nonoperative management.  They placed him in a Valadez brace with plans for outpatient follow with Dr Xiong in 1-2 weeks.  PT recommending TCU.   -Continue brace  -Pain control with PRN APAP, IBU, oxycodone 2.5-5 mg, and low dose hydroxyzine.  Use caution with sedating medications in setting of dementia  -SW working to obtain TCU admission     Right thigh pain, resolved   Falls/balance issues  Requested PT consultation to assist with transitioning to alternative gait assist device.  As well as to help determine safe discharge plan.  Thigh pain has been present for at least a few weeks, currently denies pain.  -PT recommending TCU, SW consulted      Elevated LFTs:  AST//178 on admission, improving.  Only medication PTA is PRN Tylenol.  Has no abdominal pain, nausea or emesis.  Tolerating a regular diet.       KORY: continue CPAP per home settings.     HTN: PTA on Amlodipine and Lasix, continue.     Hypokalemia, resolved: Likely due to Lasix, resolved with PTA potassium      Memory loss  Vascular dementia   Patient is calm and cooperative at this time.  Certainly at risk for delirium from hospitalization, pain, and  "narcotics.      Heterozygous Factor V Leiden on chronic warfarin  Continue Coumadin    Asthma wheezy yesterday but satting 97% on RA now.  Normal wbc and afebrile.  CXR with some atelectasis, otherwise negative.  - Duonebs and continue prn Albuterol   - Pulm toilet     COVID 19 negative, fully vaccinated       Diet: Regular Diet Adult    DVT Prophylaxis: Warfarin  Code Status: No CPR- Do NOT Intubate  Disposition:  TCU      NARDA Cervantes on 11/26/2021 at 10:27:00 AM        Subjective & Interval Hx:    Patient reports pain is controlled at rest.  Brace has been difficult to adjust - was too low on the arm and precluding ability to range elbow.  Increased swelling of forearm but cap refill OK and fingers warm.  Tolerating PO.  A bit restless this AM but able to orient without difficulty    Last 24 hr care team notes reviewed.   ROS:  4 point ROS including Respiratory, CV, GI and , other than that noted in the HPI, is negative.    Physical Exam:    Blood pressure 138/68, pulse 98, temperature 97.9  F (36.6  C), temperature source Oral, resp. rate 24, height 1.727 m (5' 8\"), weight 108 kg (238 lb 1.6 oz), SpO2 97 %. on room air  GENERAL:  Pleasant, cooperative, alert but disoriented.  WDWN.  Appears older than stated age.   HEENT: Normocephalic, atraumatic.  EOM grossly intact bilaterally.  Sclera clear. PER .    PULMONOLOGY: Clear, scattered coarse sounds at bases.  Moving good air.  No wheeze.  O2 sats stable on RA  CARDIAC: RRR  ABDOMEN: Soft, NTND  MUSCULOSKELETAL:  Moving x 4 spontaneously with CMS intact x4.  Right arm painful; brace in place, adjusted for better fit.  Swelling of right forearm and hand.  Able to raise/elevate hand on to pillow after adjusting brace (previously was too low so couldn't bend elbow)  NEURO: Alert and oriented to self only.  CN II-XII grossly intact and symmetric.  No ataxia or tremor.  Nonfocal exam.      Labs & Images:  Reviewed in Epic   Medications:    Current " Facility-Administered Medications   Medication     acetaminophen (TYLENOL) Suppository 650 mg     acetaminophen (TYLENOL) tablet 650 mg     albuterol (PROVENTIL HFA/VENTOLIN HFA) inhaler     albuterol (PROVENTIL) neb solution 2.5 mg     amLODIPine (NORVASC) tablet 5 mg     brimonidine-timolol (COMBIGAN) 0.2-0.5 % ophthalmic solution 1 drop     brinzolamide (AZOPT) 1 % ophthalmic susp 1 drop     furosemide (LASIX) tablet 80 mg     HYDROmorphone (DILAUDID) injection 0.2 mg     hydrOXYzine (ATARAX) tablet 25 mg     ibuprofen (ADVIL/MOTRIN) tablet 200-600 mg     ipratropium - albuterol 0.5 mg/2.5 mg/3 mL (DUONEB) neb solution 3 mL     latanoprost (XALATAN) 0.005 % ophthalmic solution 1 drop     magnesium hydroxide (MILK OF MAGNESIA) suspension 30 mL     melatonin tablet 3 mg     mirabegron (MYRBETRIQ) 24 hr tablet 50 mg     naloxone (NARCAN) injection 0.2 mg    Or     naloxone (NARCAN) injection 0.4 mg    Or     naloxone (NARCAN) injection 0.2 mg    Or     naloxone (NARCAN) injection 0.4 mg     ondansetron (ZOFRAN-ODT) ODT tab 4 mg    Or     ondansetron (ZOFRAN) injection 4 mg     oxyCODONE (ROXICODONE) tablet 5 mg     pantoprazole (PROTONIX) EC tablet 40 mg     potassium chloride ER (K-TAB/KLOR-CON) CR tablet 20 mEq     senna-docusate (SENOKOT-S/PERICOLACE) 8.6-50 MG per tablet 1 tablet    Or     senna-docusate (SENOKOT-S/PERICOLACE) 8.6-50 MG per tablet 2 tablet     Warfarin Therapy Reminder (Check START DATE - warfarin may be starting in the FUTURE)     warfarin-No DOSE today

## 2021-11-26 NOTE — PLAN OF CARE
PRIMARY DIAGNOSIS: Right Humerus Fracture r/t Fall   OUTPATIENT/OBSERVATION GOALS TO BE MET BEFORE DISCHARGE:  1. Pain Status: Improved-uncontrolled with oral pain medications, prn dilaudid now ordered.     2. Return to near baseline physical activity: No     3. Cleared for discharge by consultants (if involved): No     Discharge Planner Nurse   Safe discharge environment identified: No  Barriers to discharge: Yes       Entered by: Genoveva Pruitt 11/25/2021 7:28 PM  Please review provider order for any additional goals.   Nurse to notify provider when observation goals have been met and patient is ready for discharge.        Temp: 98.2  F (36.8  C) Temp src: Oral BP: 128/61 Pulse: 87   Resp: 16 SpO2: 93 % O2 Device: BiPAP/CPAP    Up heavy Ax2 with sravanthi steady, Ax2 with cares in bed. Disoriented to time. Complains of right extremity/shoulder pain- prn pain meds available not controlling pain- provider notified, prn ibuprofen and atarax given x1, prn dilaudid given x1, ice packs removed per pt request, right upper extremity elevated. Brace in place on right upper extremity, pulse normal, hands cold, moderate amount of swelling in RUE. Incontinent. Writer stayed in room while pt ate his dinner, assisted with feeding as well. Pt does cough after drinking fluids. PT recommending TCU. Wheezy- prn albuterol and scheduled duonebs 4 times daily started today. O2 sat check pt was in the 70s after rolling side to side to get pad changed in bed, hands are colder hard to get an accurate reading, 2L applied brought him up to mid 80s, duoneb completed and cpap applied and pt has been sating in lower 90s pt now on continuous pulse ox monitoring. Mepilex placed on coccyx, pea sized pressure sore, barrier applied. Plan- SW following for TCU placement, pain control, ortho following, recheck labs in am.

## 2021-11-26 NOTE — PROGRESS NOTES
Oxygen Documentation:   I certify that this patient, Edison Saldivar has been under my care (or a nurse practitioner or physican's assistant working with me). This is the face-to-face encounter for oxygen medical necessity.      Edison Saldivar is now in a chronic stable state and continues to require supplemental oxygen. Patient has continued oxygen desaturation due to atelectasis as a result for immobility and poor inspiratory effort/splinting from humeral fracture; Advanced COPD; CHF.    Alternative treatment(s) tried or considered and deemed clinically infective for treatment of atelectasis as a result for immobility and poor inspiratory effort/splinting from humeral fracture; Advanced COPD; CHF.include nebulizers, inhalers, and pulmonary toileting.  If portability is ordered, is the patient mobile within the home? yes    **Patients who qualify for home O2 coverage under the CMS guidelines require ABG tests or O2 sat readings obtained closest to, but no earlier than 2 days prior to the discharge, as evidence of the need for home oxygen therapy. Testing must be performed while patient is in the chronic stable state. See notes for O2 sats.**

## 2021-11-26 NOTE — PLAN OF CARE
Patient's After Visit Summary was reviewed with patient and/or transport tech.   Patient verbalized understanding of After Visit Summary, recommended follow up and was given an opportunity to ask questions.   Discharge medications sent home with patient/family: No -hard scripts sent in packet  Discharged with transport tech      OBSERVATION patient END time: 1700  [facility] W/C tranport-packet sent with for TCU

## 2021-11-26 NOTE — DISCHARGE SUMMARY
[facility]  Discharge Summary  Hospitalist    Date of Admission:  11/23/2021  Date of Discharge:  11/26/2021    Discharging Provider: Marilyn Voss PAC    Discharge Diagnoses   1. Traumatic displaced right humeral shaft fracture being managed non-operatively with Valadez brace and pain control.  NWB RUE.  Should be in brace at all times to promote healing.  Follow-up with [facility] in 1-2 weeks.  2. Mechanical fall in setting of chronic falls, balance issues, deconditioning, and poor endurance  3. Vascular dementia; at risk for delirium due to change in setting, pain, and narcotics  4. History of CVA  5. Heterozygous Factor V Leiden on chronic Coumadin with supratherapeutic INR.  Hold Coumadin x2 nights.  Recheck INR on Sunday 11/28.  Goal INR 2-3.  6. Mild transaminitis of unclear etiology, improving.  Will be on scheduled APAP at TCU for humerus fracture.  Repeat CMP in 2-3 days to ensure normalizing.   7. Acute hypoxic respiratory failure related to atelectasis and splinting, on 2-3L NC.  Inconsistent with IS; has difficulty following directions for IS and other pulmonary toilet due to baseline dementia.  Has been in bed since admission save for when he works with PT which argues for atelectasis.  Appears to be splinting as his right humerus moves when he takes a big breath.  No evidence of acute asthma flare, COPD or CHF exacerbation, PNA, or pleural effusion as exam reveals good breath sounds without wheeze or crackle, no s/sx BLE edema, and CXR from 11/25 demonstrates only atelectasis.  PE unlikely as is supratherapeutic on Coumadin.  Continue scheduled DuoNebs and PRN albuterol inhaler.  Wean O2 as able.  Ambulate q shift.  Should be up in a chair for all meals.   8. KORY on chronic CPAP  9. HTN  10. Moderate AS, asymptomatic.  Echo 3/2021 which revealed mild LVH, preserved EF of 55-60%, possible increased LV filling pressures, normal RVSF, trace MR, BAYRON 1.2 cm^2 with peak AoV gradient  36.2 mmHg and mean AoV gradient 23.9 mmHg.    Discharge Disposition     Discharged to short-term care facility    Condition at Discharge:  Stable    History of Present Illness   Edison Saldivar is a 81 year old male on Hospice with history of HTN, vascular dementia, CVA, Heterozygous factor V Leiden on chronic warfarin therapy, HTN, moderate AS, COPD, and KORY on CPAP who presented to [facility] ED on 11/23/2021 after a fall at [facility] with resulting humerus fracture.  Reported his legs gave out while he was using his walker causing him to fall.  CT Head negative.  XR Right Humerus revealed an acute transverse midshaft humeral fracture with 1.3 cm displacement.  Orthopedics consulted recommended nonoperative management.  They placed him in a Valadez brace with plans for outpatient follow with Dr Xiong in 1-2 weeks.  Unable to discharge home ([facility] with wife) in his current state.  Brought in under Observation for pain control and placement.      On 11/25, patient was noted to be mildly wheezy but satting well on room air.  Normal white count and afebrile status were reassuring.  CXR suggestive of atelectasis - no consolidation, infiltrate, pneumothorax, or effusion.  Patient was empirically started on scheduled DuoNebs, as needed albuterol inhaler, and pulmonary toilet.  Today, he remains on small amount of oxygen, 3L NC, with sats 97%.  Lung exam is clear.  Patient is moving good air when instructed to breathe deeply but otherwise demonstrates shallow breathing.  No wheeze or crackle.  He appears to have some dyspnea and mild hypoxia with movement/surgeon and so he has been left on supplemental O2 for comfort.  He is demonstrating signs of splinting related to his right humeral fracture which has motion when patient is taking deep breaths.  He is having a hard time following directions with IS and pulmonary toilet due to underlying dementia.  He has been receiving scheduled nebulizers and PRN inhalers with some improvement; these  will need to be continued at the rehab facility.  Wean O2 as tolerated.    For pain, patient is receiving scheduled Tylenol and PRN oxycodone.  Ibuprofen contraindicated due to Coumadin use.  Electing against hydroxyzine due to his underlying dementia and concerns for delirium.  Recommend ice, heat, and keeping the right forearm elevated to help with swelling.  Ortho would like patient in the brace at all times possible.  He will need his liver studies rechecked in 2 to 3 days to ensure they are improving despite scheduled Tylenol use.    Coumadin should be held tonight and again Saturday night.  Recheck INR on Sunday 11/28 and Coumadin dose can be adjusted per protocol.    Patient discharging to TCU today.  He has been accepted at a facility in [place] that accepts [facility] and they have a bed today.  Patient medically stable to leave for ongoing convalescence.  Daughter resistant to discharge today as she wishes that he could go to a facility closer to home in [facility] reportedly said they would possibly consider him would need insurance authorization.  Explained to daughter my concern that patient would demonstrate further deconditioning, decompensation, and decrease chance of rehabilitation should he stay in the hospital for several more days because he is not moving much here at all.  He is in dire need of rehab to improve deconditioning, poor endurance, falls, balance impairment, and atelectasis/splinting - this cannot be completed in the hospital.  Also explained that because he is medically stable for discharge and has a safe discharge plan (hasbeen accepted to a facility that is within his network with a bed tonight) recommendation is for patient to discharge to the accepting facility today.  Because patient is under Observation status, discharge cannot be appealed/disputed based on inpatient Medicare guidelines.  Inquired about daughters preference regarding transportation (family transport vs medical  transport) and daughter does not believe she can safely transport him.  Thus, SW to set up transport for patient to go to TCU [facility].    PT, Ortho, RN, SW updated and in agreement.     RENETTA Cervantes    Code Status   DNR / DNI       Primary Care Physician   Merlin Emery Brown    Consultations This Hospital Stay   ORTHOPEDIC SURGERY IP CONSULT  CARE MANAGEMENT / SOCIAL WORK IP CONSULT  PHYSICAL THERAPY ADULT IP CONSULT  PHARMACY TO DOSE WARFARIN  SOCIAL WORK IP CONSULT  ORTHOSIS BRACE IP CONSULT  PHYSICAL THERAPY ADULT IP CONSULT  OCCUPATIONAL THERAPY ADULT IP CONSULT    Time Spent on this Encounter   I, NARDA Cervantes, personally saw the patient today and spent greater than 30 minutes discharging this patient.    Allergies   No Known Allergies    Physical Exam   Temp: 98  F (36.7  C) Temp src: Oral BP: 123/65 Pulse: 92   Resp: 18 SpO2: 94 % O2 Device: Nasal cannula Oxygen Delivery: 3 LPM  Vitals:    11/24/21 0806   Weight: 108 kg (238 lb 1.6 oz)       Discharge Medications   Current Discharge Medication List      START taking these medications    Details   ipratropium - albuterol 0.5 mg/2.5 mg/3 mL (DUONEB) 0.5-2.5 (3) MG/3ML neb solution Take 1 vial (3 mLs) by nebulization 4 times daily  Qty: 90 mL    Associated Diagnoses: Chronic obstructive pulmonary disease, unspecified COPD type (H); Atelectasis      magnesium hydroxide (MILK OF MAGNESIA) 400 MG/5ML suspension Take 30 mLs by mouth daily as needed for constipation    Associated Diagnoses: Closed displaced transverse fracture of shaft of right humerus, initial encounter      oxyCODONE (ROXICODONE) 5 MG tablet Take 0.5-1 tablets (2.5-5 mg) by mouth every 6 hours as needed for moderate to severe pain (Give 2.5 mg for pain 5-7, 5 mg for pain 8-10)  Qty: 15 tablet, Refills: 0    Associated Diagnoses: Closed displaced transverse fracture of shaft of right humerus, initial encounter      QUEtiapine (SEROQUEL) 25 MG tablet Take 0.5 tablets (12.5 mg) by  mouth every 6 hours as needed (agitation, delirium)    Associated Diagnoses: Vascular dementia without behavioral disturbance (H)      senna-docusate (SENOKOT-S/PERICOLACE) 8.6-50 MG tablet Take 1 tablet by mouth 2 times daily as needed for constipation    Associated Diagnoses: Closed displaced transverse fracture of shaft of right humerus, initial encounter         CONTINUE these medications which have CHANGED    Details   acetaminophen (TYLENOL) 325 MG tablet Take 2 tablets (650 mg) by mouth 3 times daily    Associated Diagnoses: Intra-abdominal infection      amLODIPine (NORVASC) 5 MG tablet Take 1 tablet (5 mg) by mouth every morning . Hold for SBP < 110, HR < 60    Associated Diagnoses: Hypertension, unspecified type      warfarin ANTICOAGULANT (COUMADIN) 2.5 MG tablet HOLD 11/26 and 11/27.  Recheck INR 11/28 with dose as per provider.  Take 7.5 mg Monday and 5 mg ROW.    Associated Diagnoses: Heterozygous factor V Leiden mutation (H); History of CVA (cerebrovascular accident)         CONTINUE these medications which have NOT CHANGED    Details   brimonidine-timolol (COMBIGAN) 0.2-0.5 % ophthalmic solution Place 1 drop into both eyes every morning      brinzolamide (AZOPT) 1 % ophthalmic suspension Place 1 drop into both eyes every morning      CALCIUM-MAGNESIUM-ZINC PO Take 1 tablet by mouth every morning 100/40/15      co-enzyme Q-10 100 MG CAPS capsule Take 100 mg by mouth every morning      docusate sodium (COLACE) 100 MG capsule Take 100 mg by mouth every evening      esomeprazole (NEXIUM) 40 MG DR capsule Take 40 mg by mouth every morning (before breakfast) Take 30-60 minutes before eating.      furosemide (LASIX) 40 MG tablet Take 80 mg by mouth daily      Magnesium Oxide 250 MG TABS Take 500 mg by mouth daily      melatonin 3 MG tablet Take 1 tablet (3 mg) by mouth nightly as needed for sleep    Associated Diagnoses: Closed displaced transverse fracture of shaft of right humerus, initial encounter       mirabegron (MYRBETRIQ) 50 MG 24 hr tablet Take 50 mg by mouth every morning      multivitamin w/minerals (THERA-VIT-M) tablet Take 1 tablet by mouth every morning      potassium chloride ER (K-TAB/KLOR-CON) 10 MEQ CR tablet Take 20 mEq by mouth daily      vitamin B-12 (CYANOCOBALAMIN) 1000 MCG tablet Take 1,000 mcg by mouth every morning      vitamin C (ASCORBIC ACID) 1000 MG TABS Take 1,000 mg by mouth daily      vitamin D3 (CHOLECALCIFEROL) 2000 units tablet Take 2,000 Units by mouth daily      albuterol (PROAIR HFA/PROVENTIL HFA/VENTOLIN HFA) 108 (90 Base) MCG/ACT inhaler Inhale 2 puffs into the lungs every 4 hours as needed for shortness of breath / dyspnea or wheezing    Comments: Pharmacy may dispense brand covered by insurance (Proair, or proventil or ventolin or generic albuterol inhaler)      latanoprost (XALATAN) 0.005 % ophthalmic solution Place 1 drop into both eyes At Bedtime             Data   Most Recent 3 CBC's:Recent Labs   Lab Test 11/25/21  0530 11/23/21  1800 01/20/21  1235   WBC 10.4 10.5 7.7   HGB 12.6* 13.9 15.8   MCV 96 95 92   * 166 194      Most Recent 3 BMP's:  Recent Labs   Lab Test 11/26/21  0555 11/25/21  0530 11/24/21  1359 11/24/21  0648 11/23/21  1800   NA  --  139  --  142 141   POTASSIUM 4.2 3.6 3.7 3.3* 3.5   CHLORIDE  --  105  --  109 107   CO2  --  29  --  28 28   BUN  --  14  --  14 15   CR  --  0.77  --  0.76 0.96   ANIONGAP  --  5  --  5 6   JULIANNE  --  8.8  --  8.6 9.0   GLC  --  114*  --  111* 149*     Most Recent 2 LFT's:  Recent Labs   Lab Test 11/25/21  0530 11/24/21  0648   * 158*   ALT 94* 131*   ALKPHOS 150 141   BILITOTAL 0.7 0.7     Most Recent INR's and Anticoagulation Dosing History:  Anticoagulation Dose History     Recent Dosing and Labs Latest Ref Rng & Units 9/29/2019 9/30/2019 10/1/2019 11/23/2021 11/24/2021 11/25/2021 11/26/2021    Warfarin 2 mg - - - - - 4 mg - -    Warfarin 5 mg - 5 mg 5 mg - - - - -    INR 0.85 - 1.15 2.09(H) 2.13(H) 2.33(H)  3.06(H) 3.36(H) 3.66(H) 3.51(H)        Most Recent 3 Troponin's:  Recent Labs   Lab Test 11/23/21  1800 01/17/19  2140   TROPI  --  <0.015   TROPONIN <0.015  --      Most Recent Cholesterol Panel:No lab results found.  Most Recent 6 Bacteria Isolates From Any Culture (See EPIC Reports for Culture Details):  Recent Labs   Lab Test 09/27/19  1353 09/27/19  1346   CULT No growth No growth     Most Recent TSH, T4 and A1c Labs:  Recent Labs   Lab Test 01/20/21  1235   TSH 2.92     Results for orders placed or performed during the hospital encounter of 11/23/21   Head CT w/o contrast    Narrative    EXAM: CT HEAD W/O CONTRAST  LOCATION: [facility]  DATE/TIME: 11/23/2021 6:48 PM    INDICATION: Fall, on Coumadin.  COMPARISON: None.  TECHNIQUE: Routine CT Head without IV contrast. Multiplanar reformats. Dose reduction techniques were used.    FINDINGS:  INTRACRANIAL CONTENTS: No intracranial hemorrhage, extraaxial collection, or mass effect.  No CT evidence of acute infarct. Mild to moderate presumed chronic small vessel ischemic changes. Moderate generalized volume loss. No hydrocephalus. Cerebellar   tonsillar position is satisfactory. Nothing for subarachnoid, subdural, or epidural hemorrhage is identified. History of trauma and anticoagulation usage. Corpus callosum is satisfactory. Sella is normal. Vascular calcification.    VISUALIZED ORBITS/SINUSES/MASTOIDS: Prior bilateral cataract surgery. Visualized portions of the orbits are otherwise unremarkable. Mild to moderate mucosal thickening scattered about the paranasal sinuses. No air-fluid levels. Benign density may be   dentigerous associated with the lateral right maxillary sinus wall series 5 image 4 and should be of no clinical significance. Scattered fluid/membrane thickening in the mastoid air cells bilaterally. Debris/cerumen in the EACs. Middle ear regions are   clear.    BONES/SOFT TISSUES: Demineralization of the skull base.  Degenerative changes at the odontoid process and both TMJs. No evidence for fracture of the calvarium or skull base noted. I do not see evidence for significant swelling of the facial or scalp soft   tissues with some exclusion of the posterior occipital and suboccipital scalp due to positioning.      Impression    IMPRESSION:  1.  No CT evidence for acute intracranial process.  2.  Brain atrophy and presumed chronic microvascular ischemic changes as above.  3.  No fractures.   4.  Additional details above.   XR Shoulder Right 2 Views    Narrative    EXAM: XR SHOULDER 2 VIEW RIGHT  LOCATION: [facility]  DATE/TIME: 11/23/2021 6:47 PM    INDICATION: Right shoulder pain after a fall.  COMPARISON: None.      Impression    IMPRESSION: Acute transverse fracture of the proximal humeral shaft, located 16 cm distal to the glenohumeral joint. 1.3 cm fracture displacement. No aggressive bone lesion visualized. Normal glenohumeral and acromioclavicular joint alignment. Mild   glenohumeral and coracoclavicular degenerative arthritis.    XR Humerus Right G/E 2 Views    Narrative    EXAM: XR HUMERUS RIGHT G/E 2 VIEWS  LOCATION: [facility]  DATE/TIME: 11/23/2021 7:01 PM    INDICATION: Arm pain.  COMPARISON: Shoulder radiographs, same date.      Impression    IMPRESSION: Acute transverse midshaft fracture of the right humerus with 1.3 cm displacement. Normal glenohumeral and elbow joint alignment.   XR Chest Port 1 View    Narrative    CHEST PORTABLE ONE VIEW  November 25, 2021 12:59 PM     HISTORY: Wheezing.    COMPARISON: Chest x-ray on 9/27/2019.      Impression    IMPRESSION: Single AP view of the chest was obtained. Stable  cardiomediastinal silhouette. Mild left basilar pulmonary opacities,  likely atelectasis. No significant pleural effusion or pneumothorax.    DESMOND MCDONNELL MD         SYSTEM ID:  [id number]       Discharge Orders      Follow-up and recommended labs and tests      -F/U in 1 week with Dr Xiong/Mario for repeat imaging ([phone])     Activity    NWB right upper extremity    Continue with Valadez brace at all times.     General info for SNF    Length of Stay Estimate: Short Term Care: Estimated # of Days <30  Condition at Discharge: Stable  Level of care:skilled   Rehabilitation Potential: Fair  Admission H&P remains valid and up-to-date: Yes  Recent Chemotherapy: N/A  Use Nursing Home Standing Orders: Yes     Mantoux instructions    Give two-step Mantoux (PPD) Per Facility Policy Yes     Follow Up and recommended labs and tests    Follow-up with TCU provider in 2-3 days with repeat CMP to assess electrolytes and liver function.  Repeat INR on 11/28 and as needed with dose adjustment of Coumadin as per INR reading.     Intake and output    Every shift     Daily weights    Call Provider for weight gain of more than 2 pounds per day or 5 pounds per week.     Bladder scan    X 2 for post void residual. Straight cath for PVR > 500.  Contact on-call if having to straight-cath more than twice as may need worley     Additional Discharge Instructions    Please use O2 for comfort, keep sats > 89%.  Wean as tolerated.  Incentive spirometer every hour while awake, 10 times in a row.  Flutter valve.  Deep breathe, cough.     Activity - Up with assistive device     Activity - Up with nursing assistance     Weight bearing status    NWB RUE     Encourage PO fluids     Pulmonary toilet    Q Shift     Turn cough deep breathe    Q Shift     Incentive spirometry nursing     Elevate    Elevate right hand/forearm on pillow as much as possible to assist with swelling.     Apply warm moist pack    PRN     Ice to affected area    QID PRN     No CPR- Do NOT Intubate     Physical Therapy Adult Consult    Evaluate and treat as clinically indicated.    Reason:  fall with resulting right humeral shaft fracture being managed non-operatively in brace; advanced dementia; COPD; baseline deconditioning  and poor endurance; unsteady gait     Occupational Therapy Adult Consult    Evaluate and treat as clinically indicated.    Reason:  fall with resulting right humeral shaft fracture being managed non-operatively in brace; advanced dementia; COPD; baseline deconditioning and poor endurance; unsteady gait     CPAP for stable sleep apnea with home equipment settings     Fall precautions     Neck/Shoulder/Back/Abdomen Order    Bracing Documentation:   Patient requires the use of the ordered bracing device due to following medical need/condition:right humerus fracture    I, the undersigned, certify that the above prescribed supplies are medically necessary for this patient and is both reasonable and necessary in reference to accepted standards of medical and necessary in reference to accepted standards of medical practice in the treatment of this patient's condition and is not prescribed as a convenience.     Oxygen Adult/Peds    Oxygen Documentation:   I certify that this patient, Edison Saldivar has been under my care (or a nurse practitioner or physican's assistant working with me). This is the face-to-face encounter for oxygen medical necessity.      Edison Saldivar is now in a chronic stable state and continues to require supplemental oxygen. Patient has continued oxygen desaturation due to atelectasis as a result for immobility and poor inspiratory effort/splinting from humeral fracture; Advanced COPD; CHF.    Alternative treatment(s) tried or considered and deemed clinically infective for treatment of atelectasis as a result for immobility and poor inspiratory effort/splinting from humeral fracture; Advanced COPD; CHF.include nebulizers, inhalers, and pulmonary toileting.  If portability is ordered, is the patient mobile within the home? yes    **Patients who qualify for home O2 coverage under the CMS guidelines require ABG tests or O2 sat readings obtained closest to, but no earlier than 2 days prior to the discharge,  as evidence of the need for home oxygen therapy. Testing must be performed while patient is in the chronic stable state. See notes for O2 sats.**     Diet    Follow this diet upon discharge: Regular

## 2021-11-26 NOTE — PLAN OF CARE
PRIMARY DIAGNOSIS: Right Humerus fx r/t Fall  OUTPATIENT/OBSERVATION GOALS TO BE MET BEFORE DISCHARGE:  1. Pain Status: Improved-controlled with oral pain medications.    2. Return to near baseline physical activity: No    3. Cleared for discharge by consultants (if involved): No    Discharge Planner Nurse   Safe discharge environment identified: No  Barriers to discharge: Yes    Disoriented to time. Heavy Ax2 w/ sravanthi steady. On BiPap during night. Denies SOB. Saturations in mid 90's.  C/o right shoulder pain. Dilaudid given with relief. RUE brace on and elevated. Incontinent. Repositioning. Mepilex and barrier cream on coccyx, small pressure sore. PT recommending TCU. SW working on placement. Plan: pain control and ortho following.

## 2021-11-27 NOTE — PHARMACY-ANTICOAGULATION SERVICE
Clinical Pharmacy- Warfarin Discharge Note  This patient is currently on warfarin for the treatment of DVT/PE (FV Leiden).  INR Goal= 2-3  Warfarin PTA Regimen: 7.5mg Mon and Fri, 5mg ROW      Anticoagulation Dose History     Recent Dosing and Labs Latest Ref Rng & Units 9/29/2019 9/30/2019 10/1/2019 11/23/2021 11/24/2021 11/25/2021 11/26/2021    Warfarin 2 mg - - - - - 4 mg - -    Warfarin 5 mg - 5 mg 5 mg - - - - -    INR 0.85 - 1.15 2.09(H) 2.13(H) 2.33(H) 3.06(H) 3.36(H) 3.66(H) 3.51(H)        Per AVS, patient to hold warfarin dose on 11/26 and 11/27. Recheck INR 11/28 with dose as per provider. Agree with discharge planning.

## 2021-11-27 NOTE — PLAN OF CARE
Physical Therapy Discharge Summary    Reason for therapy discharge:    Discharged to transitional care facility.    Progress towards therapy goal(s). See goals on Care Plan in [facility] electronic health record for goal details.  Goals partially met.  Barriers to achieving goals:   discharge from facility.    Therapy recommendation(s):    Continued therapy is recommended.  Rationale/Recommendations:  TCU recommended to reduce risk of falling and re-hosptalization.

## 2021-11-28 NOTE — TELEPHONE ENCOUNTER
[facility] TRIAGE ENCOUNTER    Chief Complaint   Patient presents with     INR RESULTS       Edison Saldivar is a 81 year old  (1940), Nurse called today to report: INR today 2.8. Coumadin was on hold for the last 2 days. Previous coumadin dosing  7.5 mg Monday and 5 mg all other days.    ASSESSMENT/PLAN    Coumadin 5 mg today    Recheck INR tomorrow    Electronically signed by:   Marilyn Salazar NP

## 2021-11-29 NOTE — PROGRESS NOTES
[facility] GERIATRIC SERVICES    PRIMARY CARE PROVIDER AND CLINIC:  Merlin Emery Brown, MD, [facility] PHYSICIANS [address] / [place]*  Chief Complaint   Patient presents with     Hospital F/U      [place] Medical Record Number:  [id number]  Place of Service where encounter took place:  [facility] ([facility]) [[id number]]    Edison Saldivar  is a 81 year old  (1940), admitted to the above facility from  [facility]. Hospital stay 11/23/21 through 11/26/21..   HPI:        81 y.o male with PMH HTN, CVA, Heterozygous factor V Leiden on chronic warfarin therapy, Asthma, KORY/CPAP who was on hospice admitted to the hospital as above after a fall in A/L and subsequent right humerus fracture. Orthopedics consulted and recommended nonoperative management. Patient was placed in a Samiento brace with NWB and pain management. Also found to have some atelectasis on CXR and requiring O2. Reportedly not noted to have any s/s of COPD, CHF exacerbation. Admitted to [facility] for acute rehab and medical management.     Today is found sitting up in chair. Alert, calm. Reports pain to right arm. Writer assisted patient to elevate it and patient reported this helped relieve discomfort some. Reported had some trouble sleeping 2/2 to the discomfort. Denies SOB, chest pain or dizziness. Reports intermittent wheezing and states has this at times 2/2 h/o of asthma. Reports is not on O2 at home. Reports appetite is ok and denies n/v or abdominal pain. Reports mild constipation (hard stools) and denies issues with voiding. VS reviewed.     CODE STATUS/ADVANCE DIRECTIVES DISCUSSION:  No CPR- Do NOT Intubate  DNR / DNI  ALLERGIES: No Known Allergies   PAST MEDICAL HISTORY:   Past Medical History:   Diagnosis Date     Topete's esophagus      COPD (chronic obstructive pulmonary disease) (H)      Heterozygous factor V Leiden mutation (H)      History of CVA (cerebrovascular accident)      HTN (hypertension)      KORY on CPAP       Sneddon syndrome (H)      Vascular dementia (H)       PAST SURGICAL HISTORY:   has a past surgical history that includes Duodenal polypectomy with sphincterotomy  (09/23/2019).  FAMILY HISTORY: family history is not on file.  SOCIAL HISTORY:   reports that he has never smoked. He has never used smokeless tobacco. He reports that he does not drink alcohol and does not use drugs.  Patient's living condition: lives with spouse in an independent living facility    Post Discharge Medication Reconciliation Status: discharge medications reconciled and changed, per note/orders  No current facility-administered medications for this visit.     Current Outpatient Medications   Medication Sig     morphine sulfate, high concentrate, (ROXANOL-CONCENTRATED) 20 MG/ML concentrated solution Take 0.125 mLs (2.5 mg) by mouth every 6 hours And 2.5 mg q 2 hours prn     acetaminophen (TYLENOL) 325 MG tablet Take 2 tablets (650 mg) by mouth 3 times daily     albuterol (PROAIR HFA/PROVENTIL HFA/VENTOLIN HFA) 108 (90 Base) MCG/ACT inhaler Inhale 2 puffs into the lungs every 4 hours as needed for shortness of breath / dyspnea or wheezing     amLODIPine (NORVASC) 5 MG tablet Take 1 tablet (5 mg) by mouth every morning . Hold for SBP < 110, HR < 60     brimonidine-timolol (COMBIGAN) 0.2-0.5 % ophthalmic solution Place 1 drop into both eyes every morning     brinzolamide (AZOPT) 1 % ophthalmic suspension Place 1 drop into both eyes every morning     CALCIUM-MAGNESIUM-ZINC PO Take 1 tablet by mouth every morning 100/40/15     co-enzyme Q-10 100 MG CAPS capsule Take 100 mg by mouth every morning     docusate sodium (COLACE) 100 MG capsule Take 100 mg by mouth every evening     esomeprazole (NEXIUM) 40 MG DR capsule Take 40 mg by mouth every morning (before breakfast) Take 30-60 minutes before eating.     furosemide (LASIX) 40 MG tablet Take 80 mg by mouth daily     ipratropium - albuterol 0.5 mg/2.5 mg/3 mL (DUONEB) 0.5-2.5 (3) MG/3ML neb  solution Take 1 vial (3 mLs) by nebulization 4 times daily     latanoprost (XALATAN) 0.005 % ophthalmic solution Place 1 drop into both eyes At Bedtime     magnesium hydroxide (MILK OF MAGNESIA) 400 MG/5ML suspension Take 30 mLs by mouth daily as needed for constipation     Magnesium Oxide 250 MG TABS Take 500 mg by mouth daily     melatonin 3 MG tablet Take 1 tablet (3 mg) by mouth nightly as needed for sleep     mirabegron (MYRBETRIQ) 50 MG 24 hr tablet Take 50 mg by mouth every morning     multivitamin w/minerals (THERA-VIT-M) tablet Take 1 tablet by mouth every morning     ondansetron (ZOFRAN) 4 MG tablet Take 4 mg by mouth every 8 hours as needed for nausea     potassium chloride ER (K-TAB/KLOR-CON) 10 MEQ CR tablet Take 20 mEq by mouth daily     QUEtiapine (SEROQUEL) 25 MG tablet Take 0.5 tablets (12.5 mg) by mouth every 6 hours as needed (agitation, delirium)     senna-docusate (SENOKOT-S/PERICOLACE) 8.6-50 MG tablet Take 1 tablet by mouth 2 times daily as needed for constipation     vitamin B-12 (CYANOCOBALAMIN) 1000 MCG tablet Take 1,000 mcg by mouth every morning     vitamin C (ASCORBIC ACID) 1000 MG TABS Take 1,000 mg by mouth daily     vitamin D3 (CHOLECALCIFEROL) 2000 units tablet Take 2,000 Units by mouth daily     warfarin ANTICOAGULANT (COUMADIN) 2.5 MG tablet HOLD 11/26 and 11/27.  Recheck INR 11/28 with dose as per provider.  Take 7.5 mg Monday and 5 mg ROW.     Facility-Administered Medications Ordered in Other Visits   Medication     remdesivir 200 mg in sodium chloride 0.9 % 250 mL intermittent infusion    Followed by     0.9% sodium chloride BOLUS     [START ON 12/5/2021] remdesivir 100 mg in sodium chloride 0.9 % 250 mL intermittent infusion    And     [START ON 12/5/2021] 0.9% sodium chloride BOLUS     artificial tears (GENTEAL) 0.1-0.2-0.3 % ophthalmic solution 1 drop     brimonidine-timolol (COMBIGAN) 0.2-0.5 % ophthalmic solution 1 drop     brinzolamide (AZOPT) 1 % ophthalmic susp 1 drop  "    [START ON 12/5/2021] dexamethasone PF (DECADRON) injection 6 mg     latanoprost (XALATAN) 0.005 % ophthalmic solution 1 drop     lidocaine (LMX4) cream     lidocaine (LMX4) cream     lidocaine 1 % 0.1-1 mL     lidocaine 1 % 0.1-1 mL     Medication instructions: Do NOT use nebulized medications     melatonin tablet 1 mg     mirabegron (MYRBETRIQ) 24 hr tablet 50 mg     No lozenges or gum should be given while patient on BIPAP/AVAPS/AVAPS AE     ondansetron (ZOFRAN-ODT) ODT tab 4 mg    Or     ondansetron (ZOFRAN) injection 4 mg     Patient is already receiving anticoagulation with heparin, enoxaparin (LOVENOX), warfarin (COUMADIN)  or other anticoagulant medication     Patient may continue current oral medications     piperacillin-tazobactam (ZOSYN) infusion 3.375 g     QUEtiapine (SEROquel) half-tab 12.5 mg     senna-docusate (SENOKOT-S/PERICOLACE) 8.6-50 MG per tablet 1 tablet    Or     senna-docusate (SENOKOT-S/PERICOLACE) 8.6-50 MG per tablet 2 tablet     sodium chloride (PF) 0.9% PF flush 3 mL     sodium chloride (PF) 0.9% PF flush 3 mL     sodium chloride (PF) 0.9% PF flush 3 mL     vancomycin (VANCOCIN) 750 mg in sodium chloride 0.9 % 250 mL intermittent infusion     warfarin ANTICOAGULANT (COUMADIN) half-tab 0.5 mg     Warfarin Therapy Reminder (Check START DATE - warfarin may be starting in the FUTURE)       ROS:  10 point ROS of systems including Constitutional, Eyes, Respiratory, Cardiovascular, Gastroenterology, Genitourinary, Integumentary, Musculoskeletal, Psychiatric were all negative except for pertinent positives noted in my HPI.    Vitals:  BP (!) 147/73   Pulse 89   Temp 98.3  F (36.8  C)   Resp 18   Ht 1.727 m (5' 8\")   Wt 108 kg (238 lb)   SpO2 95%   BMI 36.19 kg/m    Exam:    GENERAL APPEARANCE: Alert, in no distress   ENT: Mouth and posterior oropharynx normal, moist mucous membranes   EYES: EOM, conjunctivae, lids, pupils and irises normal   NECK: No adenopathy,masses or thyromegaly "   RESP: respiratory effort and palpation of chest normal, Lungs with scattered expiratory wheeze - O2 on 2LNC  CV:VS as above 2+ edema to right hand= + distal CMS  ABDOMEN: normal bowel sounds, soft, nontender, no hepatosplenomegaly or other masses   : palpation of bladder WNL   M/S: Gait and station normal   Digits and nails normal - KENDALL  SKIN: Inspection of skin and subcutaneous tissue baseline, Palpation of skin and subcutaneous tissue baseline  NEURO: Cranial nerves 2-12 are normal tested and grossly at patient's baseline, Examination of sensation by touch normal   PSYCH: oriented X self and general situation, affect and mood normal             Lab/Diagnostic data:  Recent labs in [facility] reviewed by me today.     ASSESSMENT/PLAN:    Closed displaced transverse fracture of shaft of right humerus, initial encounter  Fall, subsequent encounter  - nonoperative management, Samiento brace, elevate, NWB  - PT/OT  - supportive cares/tx  - q shift CMS checks  - continue scheduled acetaminophen and prn oxycodone  - ice prn  (was in hospice prior to hosp- ongoing ACP- is DNR/DNI)    Vascular dementia without behavioral disturbance (H)  History of CVA (cerebrovascular accident)  - resides in A/L with wife, daughter helps out  - OT cog screen   -supportive cares/tx  - BP management - is on warfarin    Heterozygous factor V Leiden mutation (H)  *- noted, is on warfarin, INR goal 2-3- has had recent supratherapeutic INR    Acute and chronic respiratory failure with hypoxia (H)  - h/o asthma, some intermittent wheezing. Afebrile, NAD.   Nebs helpful  - continue on on duonebs and prn albuterol  - pulmonary toilet/mobilize  - VS per unit protocol  - wean O2 off - keep sats > 90%    Primary hypertension  - chronic, limited data, aggravating factors in play  - continue on PTA amlodipine  - VS per unit protocol    KORY on CPAP  - noted- per home settings/regimen    Constipation, unspecified constipation type  - is moving bowels per  NN  - encourage adequate hydration  - continue prn Senna S  - monitor bowel pattern closely      Physical deconditioning  - 2/2 above, rehab as above    Orders:  Written on the unit.        Electronically signed by:  NISA Gallegos CNP

## 2021-11-29 NOTE — LETTER
11/29/2021        RE: Edison Saldivar  [facility]  [address]  [place] [address]        [facility]    PRIMARY CARE PROVIDER AND CLINIC:  Merlin Emery Brown, MD, [facility] PHYSICIANS [address] / [place] [address]*  Chief Complaint   Patient presents with     Hospital F/U      [place] Medical Record Number:  [id number]  Place of Service where encounter took place:  [facility] ([facility]) [[id number]]    Edison Saldivar  is a 81 year old  (1940), admitted to the above facility from  [facility]. Hospital stay 11/23/21 through 11/26/21..   HPI:        81 y.o male with PMH HTN, CVA, Heterozygous factor V Leiden on chronic warfarin therapy, Asthma, KORY/CPAP who was on hospice admitted to the hospital as above after a fall in A/L and subsequent right humerus fracture. Orthopedics consulted and recommended nonoperative management. Patient was placed in a Samiento brace with NWB and pain management. Also found to have some atelectasis on CXR and requiring O2. Reportedly not noted to have any s/s of COPD, CHF exacerbation. Admitted to TCU for acute rehab and medical management.     Today is found sitting up in chair. Alert, calm. Reports pain to right arm. Writer assisted patient to elevate it and patient reported this helped relieve discomfort some. Reported had some trouble sleeping 2/2 to the discomfort. Denies SOB, chest pain or dizziness. Reports intermittent wheezing and states has this at times 2/2 h/o of asthma. Reports is not on O2 at home. Reports appetite is ok and denies n/v or abdominal pain. Reports mild constipation (hard stools) and denies issues with voiding. VS reviewed.     CODE STATUS/ADVANCE DIRECTIVES DISCUSSION:  No CPR- Do NOT Intubate  DNR / DNI  ALLERGIES: No Known Allergies   PAST MEDICAL HISTORY:   Past Medical History:   Diagnosis Date     Topete's esophagus      COPD (chronic obstructive pulmonary disease) (H)      Heterozygous factor V  Leiden mutation (H)      History of CVA (cerebrovascular accident)      HTN (hypertension)      KORY on CPAP      Sneddon syndrome (H)      Vascular dementia (H)       PAST SURGICAL HISTORY:   has a past surgical history that includes Duodenal polypectomy with sphincterotomy  (09/23/2019).  FAMILY HISTORY: family history is not on file.  SOCIAL HISTORY:   reports that he has never smoked. He has never used smokeless tobacco. He reports that he does not drink alcohol and does not use drugs.  Patient's living condition: lives with spouse in an independent living facility    Post Discharge Medication Reconciliation Status: discharge medications reconciled and changed, per note/orders  No current facility-administered medications for this visit.     Current Outpatient Medications   Medication Sig     morphine sulfate, high concentrate, (ROXANOL-CONCENTRATED) 20 MG/ML concentrated solution Take 0.125 mLs (2.5 mg) by mouth every 6 hours And 2.5 mg q 2 hours prn     acetaminophen (TYLENOL) 325 MG tablet Take 2 tablets (650 mg) by mouth 3 times daily     albuterol (PROAIR HFA/PROVENTIL HFA/VENTOLIN HFA) 108 (90 Base) MCG/ACT inhaler Inhale 2 puffs into the lungs every 4 hours as needed for shortness of breath / dyspnea or wheezing     amLODIPine (NORVASC) 5 MG tablet Take 1 tablet (5 mg) by mouth every morning . Hold for SBP < 110, HR < 60     brimonidine-timolol (COMBIGAN) 0.2-0.5 % ophthalmic solution Place 1 drop into both eyes every morning     brinzolamide (AZOPT) 1 % ophthalmic suspension Place 1 drop into both eyes every morning     CALCIUM-MAGNESIUM-ZINC PO Take 1 tablet by mouth every morning 100/40/15     co-enzyme Q-10 100 MG CAPS capsule Take 100 mg by mouth every morning     docusate sodium (COLACE) 100 MG capsule Take 100 mg by mouth every evening     esomeprazole (NEXIUM) 40 MG DR capsule Take 40 mg by mouth every morning (before breakfast) Take 30-60 minutes before eating.     furosemide (LASIX) 40 MG tablet  Take 80 mg by mouth daily     ipratropium - albuterol 0.5 mg/2.5 mg/3 mL (DUONEB) 0.5-2.5 (3) MG/3ML neb solution Take 1 vial (3 mLs) by nebulization 4 times daily     latanoprost (XALATAN) 0.005 % ophthalmic solution Place 1 drop into both eyes At Bedtime     magnesium hydroxide (MILK OF MAGNESIA) 400 MG/5ML suspension Take 30 mLs by mouth daily as needed for constipation     Magnesium Oxide 250 MG TABS Take 500 mg by mouth daily     melatonin 3 MG tablet Take 1 tablet (3 mg) by mouth nightly as needed for sleep     mirabegron (MYRBETRIQ) 50 MG 24 hr tablet Take 50 mg by mouth every morning     multivitamin w/minerals (THERA-VIT-M) tablet Take 1 tablet by mouth every morning     ondansetron (ZOFRAN) 4 MG tablet Take 4 mg by mouth every 8 hours as needed for nausea     potassium chloride ER (K-TAB/KLOR-CON) 10 MEQ CR tablet Take 20 mEq by mouth daily     QUEtiapine (SEROQUEL) 25 MG tablet Take 0.5 tablets (12.5 mg) by mouth every 6 hours as needed (agitation, delirium)     senna-docusate (SENOKOT-S/PERICOLACE) 8.6-50 MG tablet Take 1 tablet by mouth 2 times daily as needed for constipation     vitamin B-12 (CYANOCOBALAMIN) 1000 MCG tablet Take 1,000 mcg by mouth every morning     vitamin C (ASCORBIC ACID) 1000 MG TABS Take 1,000 mg by mouth daily     vitamin D3 (CHOLECALCIFEROL) 2000 units tablet Take 2,000 Units by mouth daily     warfarin ANTICOAGULANT (COUMADIN) 2.5 MG tablet HOLD 11/26 and 11/27.  Recheck INR 11/28 with dose as per provider.  Take 7.5 mg Monday and 5 mg ROW.     Facility-Administered Medications Ordered in Other Visits   Medication     remdesivir 200 mg in sodium chloride 0.9 % 250 mL intermittent infusion    Followed by     0.9% sodium chloride BOLUS     [START ON 12/5/2021] remdesivir 100 mg in sodium chloride 0.9 % 250 mL intermittent infusion    And     [START ON 12/5/2021] 0.9% sodium chloride BOLUS     artificial tears (GENTEAL) 0.1-0.2-0.3 % ophthalmic solution 1 drop      "brimonidine-timolol (COMBIGAN) 0.2-0.5 % ophthalmic solution 1 drop     brinzolamide (AZOPT) 1 % ophthalmic susp 1 drop     [START ON 12/5/2021] dexamethasone PF (DECADRON) injection 6 mg     latanoprost (XALATAN) 0.005 % ophthalmic solution 1 drop     lidocaine (LMX4) cream     lidocaine (LMX4) cream     lidocaine 1 % 0.1-1 mL     lidocaine 1 % 0.1-1 mL     Medication instructions: Do NOT use nebulized medications     melatonin tablet 1 mg     mirabegron (MYRBETRIQ) 24 hr tablet 50 mg     No lozenges or gum should be given while patient on BIPAP/AVAPS/AVAPS AE     ondansetron (ZOFRAN-ODT) ODT tab 4 mg    Or     ondansetron (ZOFRAN) injection 4 mg     Patient is already receiving anticoagulation with heparin, enoxaparin (LOVENOX), warfarin (COUMADIN)  or other anticoagulant medication     Patient may continue current oral medications     piperacillin-tazobactam (ZOSYN) infusion 3.375 g     QUEtiapine (SEROquel) half-tab 12.5 mg     senna-docusate (SENOKOT-S/PERICOLACE) 8.6-50 MG per tablet 1 tablet    Or     senna-docusate (SENOKOT-S/PERICOLACE) 8.6-50 MG per tablet 2 tablet     sodium chloride (PF) 0.9% PF flush 3 mL     sodium chloride (PF) 0.9% PF flush 3 mL     sodium chloride (PF) 0.9% PF flush 3 mL     vancomycin (VANCOCIN) 750 mg in sodium chloride 0.9 % 250 mL intermittent infusion     warfarin ANTICOAGULANT (COUMADIN) half-tab 0.5 mg     Warfarin Therapy Reminder (Check START DATE - warfarin may be starting in the FUTURE)       ROS:  10 point ROS of systems including Constitutional, Eyes, Respiratory, Cardiovascular, Gastroenterology, Genitourinary, Integumentary, Musculoskeletal, Psychiatric were all negative except for pertinent positives noted in my HPI.    Vitals:  BP (!) 147/73   Pulse 89   Temp 98.3  F (36.8  C)   Resp 18   Ht 1.727 m (5' 8\")   Wt 108 kg (238 lb)   SpO2 95%   BMI 36.19 kg/m    Exam:    GENERAL APPEARANCE: Alert, in no distress   ENT: Mouth and posterior oropharynx normal, moist " mucous membranes   EYES: EOM, conjunctivae, lids, pupils and irises normal   NECK: No adenopathy,masses or thyromegaly   RESP: respiratory effort and palpation of chest normal, Lungs with scattered expiratory wheeze - O2 on 2LNC  CV:VS as above 2+ edema to right hand= + distal CMS  ABDOMEN: normal bowel sounds, soft, nontender, no hepatosplenomegaly or other masses   : palpation of bladder WNL   M/S: Gait and station normal   Digits and nails normal - KENDALL  SKIN: Inspection of skin and subcutaneous tissue baseline, Palpation of skin and subcutaneous tissue baseline  NEURO: Cranial nerves 2-12 are normal tested and grossly at patient's baseline, Examination of sensation by touch normal   PSYCH: oriented X self and general situation, affect and mood normal             Lab/Diagnostic data:  Recent labs in [facility] reviewed by me today.     ASSESSMENT/PLAN:    Closed displaced transverse fracture of shaft of right humerus, initial encounter  Fall, subsequent encounter  - nonoperative management, Samiento brace, elevate, NWB  - PT/OT  - supportive cares/tx  - q shift CMS checks  - continue scheduled acetaminophen and prn oxycodone  - ice prn  (was in hospice prior to hosp- ongoing ACP- is DNR/DNI)    Vascular dementia without behavioral disturbance (H)  History of CVA (cerebrovascular accident)  - resides in A/L with wife, daughter helps out  - OT cog screen   -supportive cares/tx  - BP management - is on warfarin    Heterozygous factor V Leiden mutation (H)  *- noted, is on warfarin, INR goal 2-3- has had recent supratherapeutic INR    Acute and chronic respiratory failure with hypoxia (H)  - h/o asthma, some intermittent wheezing. Afebrile, NAD.   Nebs helpful  - continue on on duonebs and prn albuterol  - pulmonary toilet/mobilize  - VS per unit protocol  - wean O2 off - keep sats > 90%    Primary hypertension  - chronic, limited data, aggravating factors in play  - continue on PTA amlodipine  - VS per unit  protocol    KORY on CPAP  - noted- per home settings/regimen    Constipation, unspecified constipation type  - is moving bowels per NN  - encourage adequate hydration  - continue prn Senna S  - monitor bowel pattern closely      Physical deconditioning  - 2/2 above, rehab as above    Orders:  Written on the unit.        Electronically signed by:  NISA Gallegos CNP                       Sincerely,        NISA Gallegos CNP

## 2021-11-30 NOTE — TELEPHONE ENCOUNTER
[facility] INR Nurse Triage    Provider: NISA Gallegos CNP   Facility: [facility]  Facility Type:  TCU    Caller: Ced  Call Back Number: [phone]  Reason for call: INR  Diagnosis/Goal: A. Fib    Todays INR: 3.0  Last INR 3.02 (11/29), Coumadin 2.5 mg.      Heparin/Lovenox:  No  Currently on ABX?: No  Other interacting medication:  None  Missed or refused doses: No    Verbal Order/Direction given by Provider: Coumadin 2.5 mg po tonight and recheck INR tomorrow.     Provider Giving Order:  NISA Gallegos CNP     Verbal Order given to: Jennifer Oreilly RN

## 2021-12-01 NOTE — PATIENT INSTRUCTIONS
Patient: Edison Saldivar     1940    ORDERS    Discontinue oxycodone    Roxanol 20 mg/ml- give 2.5 mg po/SL q 6 hours scheduled and 2.5 mg po/SL q 6 hours prn for pain.     Nory Amador, NISA CNP on 2021 at 4:45 PM

## 2021-12-01 NOTE — TELEPHONE ENCOUNTER
[facility] INR Nurse Triage    Provider: NISA Gallegos CNP   Facility: [facility]  Facility Type:  TCU    Caller: Juvencio  Call Back Number: [phone]  Reason for call: INR  Diagnosis/Goal: A. Fib    Todays INR: 3.4  Last INR   11/30 3.0 2.5mg x1  11/29 3.02 2.5mg x1  11/28 2.8 5mg x1    PTA dose: 7.5mg M and 5mg AOD    Heparin/Lovenox:  No  Currently on ABX?: No  Other interacting medication:  None  Missed or refused doses: No    Verbal Order/Direction given by Provider: Hold coumadin today. Recheck INR tomorrow 12/2/21    Provider Giving Order:  NISA Gallegos CNP     Verbal Order given to: Walt Reardon RN

## 2021-12-02 NOTE — TELEPHONE ENCOUNTER
[facility] INR Nurse Triage    Provider: NISA Gallegos CNP   Facility: [facility]        Facility Type:  TCU    Caller: Cherry  Call Back Number: [phone]  Reason for call: INR  Diagnosis/Goal: A. Fib, Factor V Leiden    Todays INR: 3.6  No signs of any bleeding  Last INR 12/1/21 INR 3.4 held    Heparin/Lovenox:  No  Currently on ABX?: No  Other interacting medication:  None  Missed or refused doses: Yes, held yesterday    Verbal Order/Direction given by Provider: Hold today, check INR in AM.    Provider Giving Order:  NISA Keene CNP    Verbal Order given to: Liang Kumar RN      No bruits; no thyromegaly or nodules

## 2021-12-03 PROBLEM — J69.0 ASPIRATION PNEUMONIA, UNSPECIFIED ASPIRATION PNEUMONIA TYPE, UNSPECIFIED LATERALITY, UNSPECIFIED PART OF LUNG (H): Status: ACTIVE | Noted: 2021-01-01

## 2021-12-03 PROBLEM — J44.1 COPD EXACERBATION (H): Status: ACTIVE | Noted: 2021-01-01

## 2021-12-03 PROBLEM — J96.01 ACUTE RESPIRATORY FAILURE WITH HYPOXIA (H): Status: ACTIVE | Noted: 2021-01-01

## 2021-12-03 NOTE — LETTER
"    12/3/2021        RE: Edison Saldivar  [facility]  [address] [facility] [address]        [facility]    Chief Complaint   Patient presents with     RECHECK     HPI:  Edison Saldivar is a 81 year old  (1940), who is being seen today for an episodic care visit at: [facility] ([facility]) [[id number]]. Today's concern is:     81 y.o male with PMH HTN, CVA, Heterozygous factor V Leiden on chronic warfarin therapy, Asthma, KORY/CPAP who was on hospice admitted to the hospital as above after a fall in A/L and subsequent right humerus fracture. Orthopedics consulted and recommended nonoperative management. Patient was placed in a Samiento brace with NWB and pain management. Also found to have some atelectasis on CXR and requiring O2. Reportedly not noted to have any s/s of COPD, CHF exacerbation. Admitted to [facility] for acute rehab and medical management. Seen today for recheck.    Nursing tells writer patient was noted coughing with thin liquids today. Nursing gave patient thickened liquid and coughing stopped. Has given pills crushed in pudding/apple sauce and is tolerating. INR today 4.8. Coumadin has been on hold 2 days.     Patient found lying in bed, alert, calm, NAD. Denies SOB, chest pain or dizziness. Reports noting some coughing with thin liquids. Reports h/o asthma and intermittent wheezing. Reports pain to RUE, but states improved pain control since changed to MS. Reports pain approx 5-6/10. Writer repositioned arm and patient reported helpful. Patient denied noting increased sedation or sleepiness with new medication. States appetite ok. Denies n/v or abdominal pain. VS reviewed and are stable. Afebrile.     Allergies, and PMH/PSH reviewed in EPIC today.  REVIEW OF SYSTEMS:  4 point ROS including Respiratory, CV, GI and , other than that noted in the HPI,  is negative    Objective:   BP (!) 145/68   Pulse 88   Temp 97.5  F (36.4  C)   Resp 18   Ht 1.727 m (5' 8\")   Wt 96.9 kg " (213 lb 9.6 oz)   SpO2 93%   BMI 32.48 kg/m      Resp: Effort WNL, LS with scattered wheeze mainly on right. O2 2.5 LNC  CV: VS as above, RUE edema + distal CMS  Abd- soft, nontender, BS +  Musc- KENDALL- Samiento brace on RUE  Psych- alert, calm, pleasant      Recent labs in EPIC reviewed by me today.     Assessment/Plan:      Orders:  Closed displaced transverse fracture of shaft of right humerus, initial encounter  Fall, subsequent encounter  - nonoperative management, Samiento brace, elevate, NWB  - PT/OT  - supportive cares/tx  - q shift CMS checks  - continue scheduled acetaminophen  - changed to scheduled and prn MS as daughter thought patient would do better on scheduled pain medication, seems to have helped- and tolerating.  - ice prn  (was in hospice prior to hosp- ongoing ACP- is DNR/DNI)     Vascular dementia without behavioral disturbance (H)  History of CVA (cerebrovascular accident)  - resides in A/L with wife, daughter helps out  - OT cog screen   -supportive cares/tx  - BP management - is on warfarin     Heterozygous factor V Leiden mutation (H)  *- noted, is on warfarin, INR goal 2-3- has had recent supratherapeutic INR     Acute and chronic respiratory failure with hypoxia (H)  - h/o asthma, some intermittent wheezing. Afebrile, NAD.   Nebs helpful- mild leukocytosis and concern for ? Dysphagia  - will change to NTL and order ST referral to eval/tx  - continue on on sched duonebs and prn albuterol  - pulmonary toilet/mobilize  - VS per unit protocol  - wean O2 off - keep sats > 90%  - recheck CBC 12/6- if cont difficult to wean will check CXR next week- consider prednisone taper     Primary hypertension  - chronic, controlled aggravating factors in play  - continue on PTA amlodipine  - VS per unit protocol     KORY on CPAP  - noted- per home settings/regimen     Constipation, unspecified constipation type  - is moving bowels per NN  - encourage adequate hydration  - continue prn Senna S  - monitor  bowel pattern closely        Physical deconditioning  - 2/2 above, rehab as above       Electronically signed by: NISA Gallegos CNP             Sincerely,        NISA Gallegos CNP

## 2021-12-03 NOTE — PROGRESS NOTES
"[facility] GERIATRIC SERVICES    Chief Complaint   Patient presents with     RECHECK     HPI:  Edison Saldivar is a 81 year old  (1940), who is being seen today for an episodic care visit at: [facility] ([facility]) [[id number]]. Today's concern is:     81 y.o male with PMH HTN, CVA, Heterozygous factor V Leiden on chronic warfarin therapy, Asthma, KORY/CPAP who was on hospice admitted to the hospital as above after a fall in A/L and subsequent right humerus fracture. Orthopedics consulted and recommended nonoperative management. Patient was placed in a Samiento brace with NWB and pain management. Also found to have some atelectasis on CXR and requiring O2. Reportedly not noted to have any s/s of COPD, CHF exacerbation. Admitted to [facility] for acute rehab and medical management. Seen today for recheck.    Nursing tells writer patient was noted coughing with thin liquids today. Nursing gave patient thickened liquid and coughing stopped. Has given pills crushed in pudding/apple sauce and is tolerating. INR today 4.8. Coumadin has been on hold 2 days.     Patient found lying in bed, alert, calm, NAD. Denies SOB, chest pain or dizziness. Reports noting some coughing with thin liquids. Reports h/o asthma and intermittent wheezing. Reports pain to RUE, but states improved pain control since changed to MS. Reports pain approx 5-6/10. Writer repositioned arm and patient reported helpful. Patient denied noting increased sedation or sleepiness with new medication. States appetite ok. Denies n/v or abdominal pain. VS reviewed and are stable. Afebrile.     Allergies, and PMH/PSH reviewed in EPIC today.  REVIEW OF SYSTEMS:  4 point ROS including Respiratory, CV, GI and , other than that noted in the HPI,  is negative    Objective:   BP (!) 145/68   Pulse 88   Temp 97.5  F (36.4  C)   Resp 18   Ht 1.727 m (5' 8\")   Wt 96.9 kg (213 lb 9.6 oz)   SpO2 93%   BMI 32.48 kg/m      Resp: Effort WNL, LS with scattered wheeze mainly on right. " O2 2.5 LNC  CV: VS as above, RUE edema + distal CMS  Abd- soft, nontender, BS +  Musc- KENDALL- Samiento brace on RUE  Psych- alert, calm, pleasant      Recent labs in EPIC reviewed by me today.     Assessment/Plan:      Orders:  Closed displaced transverse fracture of shaft of right humerus, initial encounter  Fall, subsequent encounter  - nonoperative management, Samiento brace, elevate, NWB  - PT/OT  - supportive cares/tx  - q shift CMS checks  - continue scheduled acetaminophen  - changed to scheduled and prn MS as daughter thought patient would do better on scheduled pain medication, seems to have helped- and tolerating.  - ice prn  (was in hospice prior to hosp- ongoing ACP- is DNR/DNI)     Vascular dementia without behavioral disturbance (H)  History of CVA (cerebrovascular accident)  - resides in A/L with wife, daughter helps out  - OT cog screen   -supportive cares/tx  - BP management - is on warfarin     Heterozygous factor V Leiden mutation (H)  *- noted, is on warfarin, INR goal 2-3- has had recent supratherapeutic INR     Acute and chronic respiratory failure with hypoxia (H)  - h/o asthma, some intermittent wheezing. Afebrile, NAD.   Nebs helpful- mild leukocytosis and concern for ? Dysphagia  - will change to NTL and order ST referral to eval/tx  - continue on on sched duonebs and prn albuterol  - pulmonary toilet/mobilize  - VS per unit protocol  - wean O2 off - keep sats > 90%  - recheck CBC 12/6- if cont difficult to wean will check CXR next week- consider prednisone taper     Primary hypertension  - chronic, controlled aggravating factors in play  - continue on PTA amlodipine  - VS per unit protocol     KORY on CPAP  - noted- per home settings/regimen     Constipation, unspecified constipation type  - is moving bowels per NN  - encourage adequate hydration  - continue prn Senna S  - monitor bowel pattern closely        Physical deconditioning  - 2/2 above, rehab as above       Electronically signed by:  NISA Gallegos CNP

## 2021-12-04 NOTE — H&P
[facility]    History and Physical  Hospitalist       Date of Admission:  12/3/2021    Assessment & Plan   This is a 81 years old male with multiple comorbidities including COPD with chronic hypoxic failure on 3 L of oxygen at home, diastolic heart failure, coagulopathy with history of factor V Leiden deficiency, KORY on CPAP at night, dementia, CVA, hypertension who was sent from TCU for acute hypoxic respiratory failure with concern for aspiration and COPD exacerbation requiring BiPAP.    1.  Acute hypoxic respiratory failure secondary to possible aspiration pneumonia and COPD exacerbation/ COVID 19 pneumonia :    Patient presented with acute hypoxia baseline oxygen at 3 L at admission requiring 15 L VATS upgraded to BiPAP later    VBG shows 7.49 with CO2 34 however at presentation patient was wheezing so was treated with the steroid and nebs that slightly improving    Chest x-ray show There is mild pulmonary venous congestion. Mild interstitial prominence in both lungs is nonspecific, but could be related to edema, fibrosis, or infection. No pneumothorax.    White blood cell count at admission 19,000    COVID-19 +     Plan:  For possible aspiration pneumonia I will start patient on Zosyn also added vancomycin to cover health acquired pneumonia with recent admission last month with fracture, no report for vomiting    For COPD continue with home inhalers patient received 125 mg Solu-Medrol in the emergency we will continue with 60 every 8 hours    COVID 19 : add steroid , evaluated to start Barcitinib in am , Airborne isolation     Continue with BiPAP and wean as possible, at the time of the exam he is on 50% FiO2 satting 97 I discussed with emergency to try to wean him hopefully as soon and call his O2 saturation 88-92 with history of COPD.    2.  Concern for dysphagia. Per  Note from TCU patient currently on nectar thick continue speech evaluation with further stabilization    3.  History of  diastolic heart failure at home taking Lasix 80 mg as above x-ray show possible vascular congestion will avoid IV fluid and ,  would avoid volume overload and consider doses of Lasix as needed.    4.  History of glaucoma resume home medication with eyedrops    5.  History of coagulopathy with factor V Leiden on Coumadin pharmacy was consulted to resume     6.  KORY on CPAP at night currently on BiPAP    7.  History of CVA that likely was contributing to his dysphagia, on full anticoagulation    8.  Hypertension I will hold amlodipine at this time until further stabilization    . 9 Lactic acidosis 2.6 expected secondary to infection recheck after hydration      DVT Prophylaxis: Warfarin  Code Status: DNR / DNI        Alfredo Elizabeth MD    Primary Care Physician   *Merlin Emery Brown    Chief Complaint   Hartness of breath      Patient and patient chart    History of Present Illness   This is a 81 years old male with multiple comorbidities including COPD with chronic hypoxic failure on 3 L of oxygen at home, diastolic heart failure, coagulopathy with history of factor V Leiden deficiency, KORY on CPAP at night, dementia, CVA, hypertension who was sent from TCU for acute hypoxic respiratory failure with concern for aspiration and COPD exacerbation requiring BiPAP.  History is limited per report from the emergency patient presented from TCU after he was discharged from the hospital last month with fall and fracture they noticed the patient hypoxic in 80s he is on oxygen at baseline he was put on oxygen without significant improvement with increase his oxygen so was sent to the emergency there was very high concern for aspiration as patient swallowing is getting worse with the recent evaluation.  There was report of fever at the TCU however no fever in the emergency by the time patient arrived to the emergency he was requiring 50 L of oxygen that progressively increased to BiPAP VBG showed pH 7.49 with CO2 34  which is notably support COPD exacerbation however he was wheezing he received a dose of steroid +antibiotics Zosyn.  Hx  is limited secondary to patient condition and dementia    Past Medical History    Past Medical History:   Diagnosis Date     Topete's esophagus      COPD (chronic obstructive pulmonary disease) (H)      Heterozygous factor V Leiden mutation (H)      History of CVA (cerebrovascular accident)      HTN (hypertension)      KORY on CPAP      Sneddon syndrome (H)      Vascular dementia (H)        Past Surgical History   Past Surgical History     Prior to Admission Medications   Prior to Admission Medications   Prescriptions Last Dose Informant Patient Reported? Taking?   CALCIUM-MAGNESIUM-ZINC PO  Spouse/Significant Other Yes No   Sig: Take 1 tablet by mouth every morning 100/40/15   Magnesium Oxide 250 MG TABS  Spouse/Significant Other Yes No   Sig: Take 500 mg by mouth daily   QUEtiapine (SEROQUEL) 25 MG tablet   No No   Sig: Take 0.5 tablets (12.5 mg) by mouth every 6 hours as needed (agitation, delirium)   acetaminophen (TYLENOL) 325 MG tablet   No No   Sig: Take 2 tablets (650 mg) by mouth 3 times daily   albuterol (PROAIR HFA/PROVENTIL HFA/VENTOLIN HFA) 108 (90 Base) MCG/ACT inhaler   Yes No   Sig: Inhale 2 puffs into the lungs every 4 hours as needed for shortness of breath / dyspnea or wheezing   amLODIPine (NORVASC) 5 MG tablet   No No   Sig: Take 1 tablet (5 mg) by mouth every morning . Hold for SBP < 110, HR < 60   brimonidine-timolol (COMBIGAN) 0.2-0.5 % ophthalmic solution  Spouse/Significant Other Yes No   Sig: Place 1 drop into both eyes every morning   brinzolamide (AZOPT) 1 % ophthalmic suspension  Spouse/Significant Other Yes No   Sig: Place 1 drop into both eyes every morning   co-enzyme Q-10 100 MG CAPS capsule  Spouse/Significant Other Yes No   Sig: Take 100 mg by mouth every morning   docusate sodium (COLACE) 100 MG capsule  Spouse/Significant Other Yes No   Sig: Take 100 mg by  mouth every evening   esomeprazole (NEXIUM) 40 MG DR capsule  Spouse/Significant Other Yes No   Sig: Take 40 mg by mouth every morning (before breakfast) Take 30-60 minutes before eating.   furosemide (LASIX) 40 MG tablet   Yes No   Sig: Take 80 mg by mouth daily   ipratropium - albuterol 0.5 mg/2.5 mg/3 mL (DUONEB) 0.5-2.5 (3) MG/3ML neb solution   No No   Sig: Take 1 vial (3 mLs) by nebulization 4 times daily   latanoprost (XALATAN) 0.005 % ophthalmic solution  Spouse/Significant Other Yes No   Sig: Place 1 drop into both eyes At Bedtime   magnesium hydroxide (MILK OF MAGNESIA) 400 MG/5ML suspension   No No   Sig: Take 30 mLs by mouth daily as needed for constipation   melatonin 3 MG tablet   No No   Sig: Take 1 tablet (3 mg) by mouth nightly as needed for sleep   mirabegron (MYRBETRIQ) 50 MG 24 hr tablet  Spouse/Significant Other Yes No   Sig: Take 50 mg by mouth every morning   morphine sulfate, high concentrate, (ROXANOL-CONCENTRATED) 20 MG/ML concentrated solution   No No   Sig: Take 0.125 mLs (2.5 mg) by mouth every 6 hours And 2.5 mg q 2 hours prn   multivitamin w/minerals (THERA-VIT-M) tablet  Spouse/Significant Other Yes No   Sig: Take 1 tablet by mouth every morning   potassium chloride ER (K-TAB/KLOR-CON) 10 MEQ CR tablet   Yes No   Sig: Take 20 mEq by mouth daily   senna-docusate (SENOKOT-S/PERICOLACE) 8.6-50 MG tablet   No No   Sig: Take 1 tablet by mouth 2 times daily as needed for constipation   vitamin B-12 (CYANOCOBALAMIN) 1000 MCG tablet  Spouse/Significant Other Yes No   Sig: Take 1,000 mcg by mouth every morning   vitamin C (ASCORBIC ACID) 1000 MG TABS   Yes No   Sig: Take 1,000 mg by mouth daily   vitamin D3 (CHOLECALCIFEROL) 2000 units tablet  Spouse/Significant Other Yes No   Sig: Take 2,000 Units by mouth daily   warfarin ANTICOAGULANT (COUMADIN) 2.5 MG tablet   No No   Sig: HOLD 11/26 and 11/27.  Recheck INR 11/28 with dose as per provider.  Take 7.5 mg Monday and 5 mg ROW.       Facility-Administered Medications: None     Allergies   No Known Allergies    Social History   Edison Saldivar  reports that he has never smoked. He has never used smokeless tobacco. He reports that he does not drink alcohol and does not use drugs.    Family History   Edison Saldivar family history is not on file.    Review of Systems   The 10 point Review of Systems is negative other than noted in the HPI or here.     Physical Exam   Temp: 97.9  F (36.6  C) Temp src: Oral BP: 124/77 Pulse: 92   Resp: 23 SpO2: 97 % O2 Device: BiPAP/CPAP Oxygen Delivery: 8 LPM  Vital Signs with Ranges  Temp:  [97.5  F (36.4  C)-98.7  F (37.1  C)] 97.9  F (36.6  C)  Pulse:  [88-96] 92  Resp:  [18-33] 23  BP: (120-145)/(66-77) 124/77  FiO2 (%):  [50 %] 50 %  SpO2:  [93 %-97 %] 97 %  213 lbs 0 oz    Constitutional: Awake in mild distress  Eyes: Conjunctiva and pupils examined and normal.  HEENT: Moist mucous membranes  Respiratory: Poor air entry bilaterally with diffuse wheezing  Cardiovascular: normal S1 and S2, positive for systolic murmur  GI: Soft, non-distended, non-tender, normal bowel sounds.  Skin: No rashes, no cyanosis, trace edema.  Musculoskeletal: splint in the right arm .   Neurologic: AXOX2 no focal       Data   Data reviewed today:  I personally reviewed no images or EKG's today.  Recent Labs   Lab 12/03/21  2148 12/03/21  2136 12/01/21  0902   WBC  --  19.5* 12.3*   HGB  --  11.9* 12.1*   MCV  --  95 95   PLT  --  224 255   INR  --  2.36*  --      --  141   POTASSIUM 4.3  --  3.9   CHLORIDE 107  --  105   CO2 24  --  24   BUN 23  --  19   CR 0.95  --  0.94   ANIONGAP 8  --  12   JULIANNE 8.8  --  9.2   *  --  194*   ALBUMIN 2.1*  --  2.2*   PROTTOTAL 7.1  --  6.5   BILITOTAL 0.6  --  0.8   ALKPHOS 307*  --  243*   ALT 35  --  32   *  --  94*       Imaging:  Recent Results (from the past 24 hour(s))   XR Chest Port 1 View    Narrative    EXAM: XR CHEST PORT 1 VIEW  LOCATION: [facility]  HOSPITAL  DATE/TIME: 12/3/2021 9:44 PM    INDICATION: Cough. Dyspnea. Wheezing.  COMPARISON: None.      Impression    IMPRESSION: There is mild pulmonary venous congestion. Mild interstitial prominence in both lungs is nonspecific, but could be related to edema, fibrosis, or infection. No pneumothorax.

## 2021-12-04 NOTE — ED NOTES
Patient did not tolerate removal of bipap. Patient's sats 84% with oxymask at 10L, increased work of breathing noted. MD notified and patient placed back on bipap.

## 2021-12-04 NOTE — PHARMACY-ADMISSION MEDICATION HISTORY
Admission medication history interview status for this patient is complete. See [facility] admission navigator for allergy information, prior to admission medications and immunization status.     Medication history interview done, indicate source(s): Patient's daughter, Juliane and nurse from [facility] (where patient came from)  Medication history resources (including written lists, pill bottles, clinic record): MAR from [facility]  Pharmacy: [facility] [place] and [facility] [place]    Changes made to PTA medication list:  Added: Zofran  Changed: None  Reported as Not Taking: Warfarin (on the patient's medication list but it was not being administered while the patient was at [facility])  Removed: None    Actions taken by pharmacist (provider contacted, etc):None     Additional medication history information:None    Medication reconciliation/reorder completed by provider prior to medication history?  Yes      Prior to Admission medications    Medication Sig Last Dose Taking? Auth Provider   acetaminophen (TYLENOL) 325 MG tablet Take 2 tablets (650 mg) by mouth 3 times daily 12/3/2021 at Unknown time Yes Marilyn Voss PA   albuterol (PROAIR HFA/PROVENTIL HFA/VENTOLIN HFA) 108 (90 Base) MCG/ACT inhaler Inhale 2 puffs into the lungs every 4 hours as needed for shortness of breath / dyspnea or wheezing  at PRN Yes Unknown, Entered By History   amLODIPine (NORVASC) 5 MG tablet Take 1 tablet (5 mg) by mouth every morning . Hold for SBP < 110, HR < 60 12/3/2021 at Unknown time Yes Marilyn Voss PA   brimonidine-timolol (COMBIGAN) 0.2-0.5 % ophthalmic solution Place 1 drop into both eyes every morning 12/3/2021 at Unknown time Yes Unknown, Entered By History   brinzolamide (AZOPT) 1 % ophthalmic suspension Place 1 drop into both eyes every morning 12/3/2021 at Unknown time Yes Unknown, Entered By History   CALCIUM-MAGNESIUM-ZINC PO Take 1 tablet by mouth every morning 100/40/15  12/3/2021 at Unknown time Yes Unknown, Entered By History   co-enzyme Q-10 100 MG CAPS capsule Take 100 mg by mouth every morning 12/3/2021 at Unknown time Yes Unknown, Entered By History   docusate sodium (COLACE) 100 MG capsule Take 100 mg by mouth every evening 12/3/2021 at Unknown time Yes Unknown, Entered By History   esomeprazole (NEXIUM) 40 MG DR capsule Take 40 mg by mouth every morning (before breakfast) Take 30-60 minutes before eating. 12/3/2021 at Unknown time Yes Unknown, Entered By History   furosemide (LASIX) 40 MG tablet Take 80 mg by mouth daily 12/3/2021 at Unknown time Yes Unknown, Entered By History   ipratropium - albuterol 0.5 mg/2.5 mg/3 mL (DUONEB) 0.5-2.5 (3) MG/3ML neb solution Take 1 vial (3 mLs) by nebulization 4 times daily 12/3/2021 at Unknown time Yes Marilyn Vsos PA   latanoprost (XALATAN) 0.005 % ophthalmic solution Place 1 drop into both eyes At Bedtime 12/3/2021 at Unknown time Yes Unknown, Entered By History   magnesium hydroxide (MILK OF MAGNESIA) 400 MG/5ML suspension Take 30 mLs by mouth daily as needed for constipation  at PRN Yes Marilyn Voss PA   Magnesium Oxide 250 MG TABS Take 500 mg by mouth daily 12/3/2021 at Unknown time Yes Unknown, Entered By History   melatonin 3 MG tablet Take 1 tablet (3 mg) by mouth nightly as needed for sleep  at PRN Yes Marilyn Voss PA   mirabegron (MYRBETRIQ) 50 MG 24 hr tablet Take 50 mg by mouth every morning 12/3/2021 at Unknown time Yes Unknown, Entered By History   morphine sulfate, high concentrate, (ROXANOL-CONCENTRATED) 20 MG/ML concentrated solution Take 0.125 mLs (2.5 mg) by mouth every 6 hours And 2.5 mg q 2 hours prn 12/3/2021 at Unknown time Yes Nory Amador APRN CNP   multivitamin w/minerals (THERA-VIT-M) tablet Take 1 tablet by mouth every morning 12/3/2021 at Unknown time Yes Unknown, Entered By History   ondansetron (ZOFRAN) 4 MG tablet Take 4 mg by mouth every 8 hours as needed for nausea   at PRN Yes Unknown, Entered By History   potassium chloride ER (K-TAB/KLOR-CON) 10 MEQ CR tablet Take 20 mEq by mouth daily 12/3/2021 at Unknown time Yes Reported, Patient   QUEtiapine (SEROQUEL) 25 MG tablet Take 0.5 tablets (12.5 mg) by mouth every 6 hours as needed (agitation, delirium)  at PRN Yes Marilyn Voss PA   senna-docusate (SENOKOT-S/PERICOLACE) 8.6-50 MG tablet Take 1 tablet by mouth 2 times daily as needed for constipation  at PRN Yes Marilyn Voss PA   vitamin B-12 (CYANOCOBALAMIN) 1000 MCG tablet Take 1,000 mcg by mouth every morning 12/3/2021 at Unknown time Yes Unknown, Entered By History   vitamin C (ASCORBIC ACID) 1000 MG TABS Take 1,000 mg by mouth daily 12/3/2021 at Unknown time Yes Unknown, Entered By History   vitamin D3 (CHOLECALCIFEROL) 2000 units tablet Take 2,000 Units by mouth daily 12/3/2021 at Unknown time Yes Unknown, Entered By History   warfarin ANTICOAGULANT (COUMADIN) 2.5 MG tablet HOLD 11/26 and 11/27.  Recheck INR 11/28 with dose as per provider.  Take 7.5 mg Monday and 5 mg ROW.   Marilyn Voss PA

## 2021-12-04 NOTE — ED TRIAGE NOTES
Patient arrives by EMS from TCU facility. Patient c/o increased SOB, and pain in right shoulder and arm. Patient reported to EMS that he fell out of bed this am and has increased swelling and pain to right arm. Patient is currently recovering from a humeral head fracture. Upon EMS, arrival patient's sats were in the 80's. Patient has COPD, but is not on oxygen at baseline. At this time, patient is confused, initially placed on Oxymask at 8L with sats 94%.

## 2021-12-04 NOTE — CONSULTS
See ED note for SW consult.     AMBER Turpin, [facility]  , ED Care Management   [phone]

## 2021-12-04 NOTE — TELEPHONE ENCOUNTER
"[facility] TRIAGE ENCOUNTER    Chief Complaint   Patient presents with     Cough       Edison Saldivar is a 81 year old (1940), Nurse called today to report: Patient on oxygen 3L at baseline. 81 percent on 3L. He received. 114/56, , temp 96.3 RR 26-32. Patient appears in distress. Speech saw him today and his liquids changed to nectar thick due to dysphagia. . Juliane daughter was contacted to talk about goals of care. Patient was previously on hospice, however was walking and living independently. Her concern is about the facility where he is getting care. She wants to see if he is able to improve from the current respiratory distress. Discussed the concerns for aspiration pneumonia and that he might not improve from this. She said \"It might be some improvement and at this time that is what we are looking for\" Discussed that is would be appropriate to focus on comfort however the daughter is not comfortable with him staying at the facility he is at and would like him evaluated and treated in the ED.     ASSESSMENT/PLAN    OK to send patient to ED for further evaluation    Electronically signed by:   Marilyn Salazar NP        "

## 2021-12-04 NOTE — PROGRESS NOTES
Patient was on bipap for WOB/SOB with current settings of 14/7 R14 40%. Patient received duonebs via in-line through bipap. Trial patient off bipap on 5 lpm oxymask for sats=94-97%.  The bridge of the nose looks good Skin integrity intact with a gel pad in place.Pt is tolerating it well. No complications  Noted at this time. Will continue to monitor and assess the pt's current respiratory status and needs.   Phyllis Hunter, RT on 12/4/2021 at 2:01 PM

## 2021-12-04 NOTE — ED NOTES
Care Management Initial Consult    General Information  Assessment completed with: Children, Daughter Juliane  Type of CM/SW Visit: Initial Assessment    Primary Care Provider verified and updated as needed: No   Readmission within the last 30 days: current reason for admission unrelated to previous admission   Return Category: New Diagnosis  Reason for Consult: discharge planning  Advance Care Planning: Advance Care Planning Reviewed: present on chart          Communication Assessment  Patient's communication style:      Hearing Difficulty or Deaf: no   Wear Glasses or Blind: no    Cognitive  Cognitive/Neuro/Behavioral: .WDL except  Level of Consciousness: alert  Arousal Level: arouses to voice  Orientation: disoriented to,place,time  Mood/Behavior: calm  Best Language: 0 - No aphasia  Speech: clear,logical    Living Environment:   People in home: spouse     Current living Arrangements: independent living facility      Able to return to prior arrangements: other (see comments) (TBD)  Living Arrangement Comments: TBD    Family/Social Support:  Care provided by: self  Provides care for: no one, unable/limited ability to care for self  Marital Status:   Children          Description of Support System: Supportive,Involved    Support Assessment: Adequate family and caregiver support    Current Resources:   Patient receiving home care services: No     Community Resources: Other (see comment) (TCU)  Equipment currently used at home: none  Supplies currently used at home:      Employment/Financial:  Employment Status:          Financial Concerns:     Referral to Financial Counselor: No       Lifestyle & Psychosocial Needs:  Social Determinants of Health     Tobacco Use: Low Risk      Smoking Tobacco Use: Never Smoker     Smokeless Tobacco Use: Never Used   Alcohol Use: Not on file   Financial Resource Strain: Not on file   Food Insecurity: Not on file   Transportation Needs: Not on file   Physical Activity: Not on  file   Stress: Not on file   Social Connections: Not on file   Intimate Partner Violence: Not on file   Depression: Not on file   Housing Stability: Not on file       Functional Status:  Prior to admission patient needed assistance:              Mental Health Status:          Chemical Dependency Status:                Values/Beliefs:  Spiritual, Cultural Beliefs, Catholic Practices, Values that affect care:                 Additional Information:  Patient presents to the ED from [facility] TCU. Patient has a high URR.     Patient has COVID and has dementia. SW is unable to go into patient's room due to COVID status. Assessment was completed with patient's daughter Juliane.     Patient was recently admitted from 11/23/21-11/26/21. He then went to TCU at [facility].     Per daughter, patients wife (whom he lives with) has dementia as well. Patient lives at [facility]. Patient's daughter has an appointment on Tuesday 12/7 to view their care suites.  noted that the patient would likely not be able to go back to a TCU with having covid. Patient is vaccinated, but is not boosted.     Patient's daughter expressed frustration with the hospital sending him to [facility]. She does not want him to go there again. She was provided with the patient relations phone number.     SW/ CC will continue to follow for discharge needs. SW/ CC team should contact patient's daughter on Tuesday to see the status of the care suites.     AMBER Turpin, [facility]  , ED Care Management   [phone]

## 2021-12-04 NOTE — ED NOTES
Bed: ED28  Expected date: 12/3/21  Expected time: 8:02 PM  Means of arrival: Ambulance  Comments:  [facility] SOB

## 2021-12-04 NOTE — ED PROVIDER NOTES
History   Chief Complaint:  Shortness of Breath and Fall      The history is limited by the condition of the patient (Dementia).      Edison Saldivar is a 81 year old male, on warfarin, with history of COPD who presents with shortness of breath and a fall today. EMS report, fell on his right side and his O2 saturation was in the 70's and he was put on 5L of oxygen. He currently lives in a TCU. His daughter notes that she is very unhappy with the pt's care in that TCU and doesn't want him to go back.     Review of Systems   Unable to perform ROS: Dementia   Respiratory: Positive for shortness of breath.    Musculoskeletal:        Right arm pain   All other systems reviewed and are negative.      Allergies:  The patient has no known allergies.     Medications:  albuterol  amlodipine (NORVASC) 5 MG tablet  co-enzyme Q-10  docusate sodium  esomeprazole  furosemide  Duoneb  magnesium hydroxide  mirabegron  morphine sulfate  quetiapine  senna-docusate  warfarin  ondansetron    Past Medical History:     Hemoptysis  Parkinsonim  Topete's esophagus  COPD  CVA  Hypertension  KORY  Obesity  Sneddon syndrome  Vascular dementia  Fracture of humerus    Asthma  Pneumonia    Past Surgical History:    Duodenal polypectomy with sphincterectomy     Social History:  Patient arrived at ED via EMS.  He is currently unaccompanied.    Physical Exam     Patient Vitals for the past 24 hrs:   BP Temp Temp src Pulse Resp SpO2 Weight   12/03/21 2300 -- -- -- 87 30 97 % --   12/03/21 2249 -- -- -- -- -- -- 96.6 kg (213 lb)   12/03/21 2246 124/77 97.9  F (36.6  C) -- 92 23 97 % --   12/03/21 2200 -- -- -- 92 -- -- --   12/03/21 2107 -- -- -- 90 (!) 31 95 % --   12/03/21 2100 -- -- -- 96 -- -- --   12/03/21 2031 120/66 98.7  F (37.1  C) Oral 95 30 95 % --   12/03/21 2030 120/66 -- -- 96 (!) 33 95 % --       Physical Exam  Nursing note and vitals reviewed.  HENT:   Mouth/Throat: Moist mucous membranes.   Eyes: EOMI, nonicteric  sclera  Cardiovascular: Normal rate, regular rhythm, no murmurs, rubs, or gallops  Pulmonary/Chest: Tachypnea with increased effort. Diffuse bilateral wheezes.   Abdominal: Soft. Nontender, nondistended, no guarding or rigidity.   Musculoskeletal: Normal range of motion.   Neurological: Alert. Moves all extremities spontaneously.   Skin: Skin is warm and dry. No rash noted.     Emergency Department Course       Imaging:  XR Chest Port 1 View   Final Result   IMPRESSION: There is mild pulmonary venous congestion. Mild interstitial prominence in both lungs is nonspecific, but could be related to edema, fibrosis, or infection. No pneumothorax.        Laboratory:  Labs Ordered and Resulted from Time of ED Arrival to Time of ED Departure   BASIC METABOLIC PANEL - Abnormal       Result Value    Sodium 139      Potassium 4.3      Chloride 107      Carbon Dioxide (CO2) 24      Anion Gap 8      Urea Nitrogen 23      Creatinine 0.95      Calcium 8.8      Glucose 179 (*)     GFR Estimate 76     HEPATIC FUNCTION PANEL - Abnormal    Bilirubin Total 0.6      Bilirubin Direct 0.2      Protein Total 7.1      Albumin 2.1 (*)     Alkaline Phosphatase 307 (*)      (*)     ALT 35     LACTIC ACID WHOLE BLOOD - Abnormal    Lactic Acid 2.6 (*)    INFLUENZA A/B & SARS-COV2 PCR MULTIPLEX - Abnormal    Influenza A PCR Negative      Influenza B PCR Negative      SARS CoV2 PCR Positive (*)    BLOOD GAS VENOUS WITH OXYHEMOGLOBIN - Abnormal    pH Venous 7.48 (*)     pCO2 Venous 34 (*)     pO2 Venous 78 (*)     Bicarbonate Venous 25      FIO2 50      Oxyhemoglobin Venous 95 (*)     Base Excess/Deficit (+/-) 1.9     INR - Abnormal    INR 2.36 (*)    CBC WITH PLATELETS AND DIFFERENTIAL - Abnormal    WBC Count 19.5 (*)     RBC Count 4.02 (*)     Hemoglobin 11.9 (*)     Hematocrit 38.0 (*)     MCV 95      MCH 29.6      MCHC 31.3 (*)     RDW 14.8      Platelet Count 224      % Neutrophils 85      % Lymphocytes 5      % Monocytes 3      %  Eosinophils 2      % Basophils 1      % Immature Granulocytes 4      NRBCs per 100 WBC 0      Absolute Neutrophils 16.9 (*)     Absolute Lymphocytes 0.9      Absolute Monocytes 0.5      Absolute Eosinophils 0.4      Absolute Basophils 0.1      Absolute Immature Granulocytes 0.7 (*)     Absolute NRBCs 0.1     TROPONIN I - Normal    Troponin I High Sensitivity 36     NT PROBNP INPATIENT - Normal    N terminal Pro BNP Inpatient 730     BLOOD GAS ARTERIAL WITH OXYHEMOGLOBIN        Reviewed:  I reviewed nursing notes, vitals and past medical history    Assessments:  2020 I obtained history and examined the patient as noted above.   2245 I rechecked the patient's family and explained findings.     Consults:  2248 I consulted with Dr. Elizabeth from our hospitalist service.     Interventions:    Medications   ipratropium - albuterol 0.5 mg/2.5 mg/3 mL (DUONEB) neb solution 6 mL (6 mLs Nebulization Given 12/3/21 2154)   methylPREDNISolone sodium succinate (solu-MEDROL) injection 125 mg (125 mg Intravenous Given 12/3/21 2152)   piperacillin-tazobactam (ZOSYN) intermittent infusion 4.5 g (0 g Intravenous Stopped 12/3/21 2350)   ipratropium - albuterol 0.5 mg/2.5 mg/3 mL (DUONEB) neb solution 6 mL (6 mLs Nebulization Given 12/4/21 0103)         Disposition:  The patient was admitted to the hospital under the care of Dr. Elizabeth.     Impression & Plan     CMS Diagnoses:   The patient has signs of Severe Sepsis     The patient has signs of Severe Sepsis as evidenced by:    1. 2 SIRS criteria, AND  2. Suspected infection, AND   3. Organ dysfunction: Lactic Acidosis with value >2.0    Time severe sepsis diagnosis confirmed: 2207 12/03/21 as this was the time when Lactate resulted, and the level was > 2.0    3 Hour Severe Sepsis Bundle Completion:    1. Initial Lactic Acid Result:   Recent Labs   Lab Test 12/03/21 2136 09/27/19  1348   LACT 2.6* 1.2     2. Blood Cultures before Antibiotics: Yes  3. Broad Spectrum Antibiotics  "Administered:  yes       Anti-infectives (From admission through now)    Start     Dose/Rate Route Frequency Ordered Stop    12/04/21 0400  piperacillin-tazobactam (ZOSYN) infusion 3.375 g        Note to Pharmacy: For [facility] and [facility]: For Zosyn-naive patients, use the \"Zosyn initial dose + extended infusion\" order panel.    3.375 g  100 mL/hr over 30 Minutes Intravenous EVERY 6 HOURS 12/03/21 2339      12/04/21 0100  vancomycin (VANCOCIN) 2,000 mg in sodium chloride 0.9 % 500 mL intermittent infusion         20 mg/kg × 96.6 kg  over 2 Hours Intravenous ONCE 12/04/21 0056 12/04/21 0444    12/03/21 2240  piperacillin-tazobactam (ZOSYN) intermittent infusion 4.5 g        Note to Pharmacy: For [facility], [facility] and [facility]: For Zosyn-naive patients, use the \"Zosyn initial dose + extended infusion\" order panel.    4.5 g  200 mL/hr over 30 Minutes Intravenous ONCE 12/03/21 2236 12/03/21 2350          4. Fluid volume administered in ED:  Bolus not administered as pt on bipap, has covid-19, and not showing signs of hypovolemia as no tachycardia, no hypotension, etc. Fluid boluses in severe covid-19 worsen oxygen requirements.                   Severe Sepsis reassessment:  1. Repeat Lactic Acid Level: Pending  2. MAP > 65 without need of IVF    Medical Decision Making:  Pt presents with dyspnea, hypoxia. He was immediately initiated on bipap for increased work of breathing in conjunction with wheezing - suspected COPD exacerbation. This substantially improved pt's respiratory condition in addition to nebs. Solumedrol given as well. He was noted to have significant leukocytosis and lactic acidosis as well raising concern for bacterial infection. CXR negative, but there was concern from TCU about aspiration - in fact pt was recently switched to thicker fluids. This raises concern for aspiration pneumonia. Pt was also recently admitted to hospital. Will cover all of the above with zosyn. Don't believe we need MRSA coverage at this time. " Trialed pt off bipap after several hours, but he quickly desatted with increased work of breathing. Pt incidentally tested positive for covid-19. He already received solumedrol. Held IVF in ED given need for bipap and desire to keep pt's with covid-19 pneumonia dry to try to minimize oxygen needs and give him a chance off bipap. Discussed with pt's daughter via phone and then discussed with hospitalist service, Dr. Elizabeth, who accepts pt for admission. Pt admitted in stable condition. Will need to board in ED overnight.     Critical Care Time: was 40 minutes for this patient excluding procedures    Diagnosis:    ICD-10-CM    1. Acute respiratory failure with hypoxia (H)  J96.01    2. COPD exacerbation (H)  J44.1    3. Aspiration pneumonia, unspecified aspiration pneumonia type, unspecified laterality, unspecified part of lung (H)  J69.0        Scribe Disclosure:  I, Cosme Pena, am serving as a scribe at 8:40 PM on 12/3/2021 to document services personally performed by Ambrocio Membreno MD based on my observations and the provider's statements to me.              Ambrocio Membreno MD  12/04/21 6774

## 2021-12-04 NOTE — PROGRESS NOTES
A BiPAP of  14/7 @ 50% was applied to the pt via the mask for an increase in WOB and/or SOB.  The bridge of the nose looks good Skin integrity intact with a gel pad in place.Pt is tolerating it well. No complications  Noted at this time. Will continue to monitor and assess the pt's current respiratory status and needs.     Gini De Leon, RT

## 2021-12-04 NOTE — ED NOTES
[facility]  ED Nurse Handoff Report    Edison Saldivar is a 81 year old male   ED Chief complaint: Shortness of Breath and Fall  . ED Diagnosis:   Final diagnoses:   Acute respiratory failure with hypoxia (H)   COPD exacerbation (H)   Aspiration pneumonia, unspecified aspiration pneumonia type, unspecified laterality, unspecified part of lung (H)     Allergies: No Known Allergies    Code Status: DNR / DNI  Activity level - Baseline/Home:  Assist of 1. Activity Level - Current:   Assist X 2. Lift room needed: No. Bariatric: No   Needed: No   Isolation: Yes. Infection: Covid pending   COVID r/o and special precautions.     Vital Signs:   Vitals:    12/03/21 2200 12/03/21 2246 12/03/21 2249 12/03/21 2300   BP:  124/77     Pulse: 92 92  87   Resp:  23  30   Temp:  97.9  F (36.6  C)     TempSrc:       SpO2:  97%  97%   Weight:   96.6 kg (213 lb)      Pain level:  0/10  Patient confused: Yes. Patient Falls Risk: Yes. Fall this morning  Elimination Status: Has voided   Patient Report - Initial Complaint: SOB, Fall. Focused Assessment:  Patient arrives by EMS from TCU facility. Patient c/o increased SOB, and pain in right shoulder and arm. Patient reported to EMS that he fell out of bed this am and has increased swelling and pain to right arm. Patient is currently recovering from a humeral head fracture. Upon EMS, arrival patient's sats were in the 80's. Patient has COPD, but is not on oxygen at baseline. At this time, patient is confused, initially placed on Oxymask at 8L with sats 94%.   Tests Performed:   Labs Ordered and Resulted from Time of ED Arrival to Time of ED Departure   BASIC METABOLIC PANEL - Abnormal       Result Value    Sodium 139      Potassium 4.3      Chloride 107      Carbon Dioxide (CO2) 24      Anion Gap 8      Urea Nitrogen 23      Creatinine 0.95      Calcium 8.8      Glucose 179 (*)     GFR Estimate 76     HEPATIC FUNCTION PANEL - Abnormal    Bilirubin Total 0.6       Bilirubin Direct 0.2      Protein Total 7.1      Albumin 2.1 (*)     Alkaline Phosphatase 307 (*)      (*)     ALT 35     LACTIC ACID WHOLE BLOOD - Abnormal    Lactic Acid 2.6 (*)    BLOOD GAS VENOUS WITH OXYHEMOGLOBIN - Abnormal    pH Venous 7.48 (*)     pCO2 Venous 34 (*)     pO2 Venous 78 (*)     Bicarbonate Venous 25      FIO2 50      Oxyhemoglobin Venous 95 (*)     Base Excess/Deficit (+/-) 1.9     INR - Abnormal    INR 2.36 (*)    CBC WITH PLATELETS AND DIFFERENTIAL - Abnormal    WBC Count 19.5 (*)     RBC Count 4.02 (*)     Hemoglobin 11.9 (*)     Hematocrit 38.0 (*)     MCV 95      MCH 29.6      MCHC 31.3 (*)     RDW 14.8      Platelet Count 224      % Neutrophils 85      % Lymphocytes 5      % Monocytes 3      % Eosinophils 2      % Basophils 1      % Immature Granulocytes 4      NRBCs per 100 WBC 0      Absolute Neutrophils 16.9 (*)     Absolute Lymphocytes 0.9      Absolute Monocytes 0.5      Absolute Eosinophils 0.4      Absolute Basophils 0.1      Absolute Immature Granulocytes 0.7 (*)     Absolute NRBCs 0.1     TROPONIN I - Normal    Troponin I High Sensitivity 36     NT PROBNP INPATIENT - Normal    N terminal Pro BNP Inpatient 730     INFLUENZA A/B & SARS-COV2 PCR MULTIPLEX     XR Chest Port 1 View   Final Result   IMPRESSION: There is mild pulmonary venous congestion. Mild interstitial prominence in both lungs is nonspecific, but could be related to edema, fibrosis, or infection. No pneumothorax.         Treatments provided: Placed on Bipap, solumedrol and neb treatment   Family Comments: family in lobby per patient   OBS brochure/video discussed/provided to patient:  Yes  ED Medications:   Medications   piperacillin-tazobactam (ZOSYN) intermittent infusion 4.5 g (4.5 g Intravenous New Bag 12/3/21 2739)   ipratropium - albuterol 0.5 mg/2.5 mg/3 mL (DUONEB) neb solution 6 mL (6 mLs Nebulization Given 12/3/21 4638)   methylPREDNISolone sodium succinate (solu-MEDROL) injection 125 mg (125 mg  Intravenous Given 12/3/21 2152)     Drips infusing:  Yes- Zosyn 4.5g   For the majority of the shift, the patient's behavior Green. Interventions performed were repositioning and questions answered .    Sepsis treatment initiated: No     Patient tested for COVID 19 prior to admission: YES    ED Nurse Name/Phone Number: Carrie Augustin RN,   11:01 PM      RECEIVING UNIT ED HANDOFF REVIEW    Above ED Nurse Handoff Report was reviewed: Yes  Reviewed by: Gretta Cisneros RN on December 4, 2021 at 4:31 PM

## 2021-12-04 NOTE — ED NOTES
Oral pills given, with water and no straw pt coughed after small sip. Swallow eval needed. Pills given with applesauce, pt tolerated well.

## 2021-12-04 NOTE — PROGRESS NOTES
[facility]    Hospitalist Progress Note    Date of Service (when I saw the patient): 12/04/2021  Provider:  Elijah Baltazar MD   Text Page  7am - 6PM       Assessment & Plan   Edison Saldivar is a 81 years old male with multiple comorbidities including COPD with chronic hypoxic failure on 3 L of oxygen at home, diastolic heart failure, coagulopathy with history of factor V Leiden deficiency, KORY on CPAP at night, dementia, CVA, hypertension who was sent from TCU for acute hypoxic respiratory failure with concern for aspiration and COPD exacerbation requiring BiPAP.     1.  Acute hypoxemic respiratory failure secondary to possible aspiration pneumonia and COPD exacerbation/ COVID 19 pneumonia :    Patient presented with acute hypoxia baseline oxygen at 3 L at admission requiring 15 L VATS upgraded to BiPAP later    VBG shows 7.49 with CO2 34 however at presentation patient was wheezing so was treated with the steroid and nebs that slightly improving    Chest x-ray shows  mild pulmonary venous congestion. Mild interstitial prominence in both lungs is nonspecific, but could be related to edema, fibrosis, or infection. No pneumothorax.    White blood cell count at admission 19,000    COVID-19 +      Plan:  For possible aspiration pneumonia I will start patient on Zosyn also added vancomycin to cover health acquired pneumonia with recent admission last month with fracture, no report for vomiting     For COPD continue with home inhalers patient received 125 mg Solu-Medrol in the emergency and one 60 today.  Decadron starting tomorrow per protocol     COVID 19 : Decadron and Remdesivir, Airborne isolation. O2 supplemental modality as tolerated to keep sat 94 or higher. RT to help.     2.  Concern for dysphagia. Per  Note from TCU patient currently on nectar thick. SLP consult requested.     3.  History of diastolic heart failure at home taking Lasix 80 mg as above x-ray show possible vascular  congestion will avoid IV fluid and ,  would avoid volume overload and consider doses of Lasix as needed. I&O and daily weight.      4.  History of glaucoma resume home medication with eyedrops     5.  History of coagulopathy with factor V Leiden on Coumadin, pharmacy is following      6.  KORY on CPAP at night currently on BiPAP     7.  History of CVA that likely was contributing to his dysphagia, on full anticoagulation     8.  Hypertension I will hold amlodipine at this time until further stabilization     9.  Lactic acidosis 2.6 expected secondary to infection recheck after hydration    DVT Prophylaxis: Warfarin  Code Status: No CPR- Do NOT Intubate    Disposition: Expected discharge in once weaned off to baseline O2 chronic therapy.     Interval History   Better today, keeping sat at 5 LNC. Afebrile, normotensive, normal HR. Proper interactions and cooperative.     -Data reviewed today: I reviewed all new labs and imaging results over the last 24 hours.     Physical Exam   Temp: 97.9  F (36.6  C) Temp src: Oral BP: 117/69 Pulse: 73   Resp: 24 SpO2: 98 % O2 Device: BiPAP/CPAP Oxygen Delivery: 8 LPM  Vitals:    12/03/21 2249   Weight: 96.6 kg (213 lb)     Vital Signs with Ranges  Temp:  [97.5  F (36.4  C)-98.7  F (37.1  C)] 97.9  F (36.6  C)  Pulse:  [72-96] 73  Resp:  [15-33] 24  BP: (113-149)/(65-87) 117/69  FiO2 (%):  [50 %] 50 %  SpO2:  [93 %-99 %] 98 %  No intake/output data recorded.    GEN:  Alert, oriented x 3, appears comfortable, NAD.  HEENT:  Normocephalic/atraumatic, no scleral icterus, no nasal discharge, mouth moist.  CV:  Regular rate and rhythm, no murmur or JVD.  S1 + S2 noted, no S3 or S4.  LUNGS:  Coarse to auscultation bilaterally without rales/rhonchi/wheezing/retractions.  Symmetric chest rise on inhalation noted.  ABD:  Active bowel sounds, soft, non-tender/non-distended.  No rebound/guarding/rigidity.  EXT:  No edema or cyanosis.  No joint synovitis noted.  SKIN:  Dry to touch, no  exanthems noted in the visualized areas.       Medications     - MEDICATION INSTRUCTIONS -       - MEDICATION INSTRUCTIONS -       - MEDICATION INSTRUCTIONS -       Warfarin Therapy Reminder         brimonidine-timolol  1 drop Both Eyes QAM     brinzolamide  1 drop Both Eyes QAM     ipratropium - albuterol 0.5 mg/2.5 mg/3 mL  3 mL Nebulization 4x daily     latanoprost  1 drop Both Eyes At Bedtime     methylPREDNISolone  62.5 mg Intravenous Q8H     mirabegron  50 mg Oral QAM     piperacillin-tazobactam  3.375 g Intravenous Q6H     sodium chloride (PF)  3 mL Intracatheter Q8H     vancomycin  750 mg Intravenous Q12H       Data   Recent Labs   Lab 12/04/21  0532 12/03/21  2148 12/03/21  2136 12/01/21  0902   WBC 21.5*  --  19.5* 12.3*   HGB 11.8*  --  11.9* 12.1*   MCV 95  --  95 95     --  224 255   INR 2.41*  --  2.36*  --     139  --  141   POTASSIUM 4.4 4.3  --  3.9   CHLORIDE 107 107  --  105   CO2 26 24  --  24   BUN 26 23  --  19   CR 1.03 0.95  --  0.94   ANIONGAP 7 8  --  12   JULIANNE 9.0 8.8  --  9.2   * 179*  --  194*   ALBUMIN  --  2.1*  --  2.2*   PROTTOTAL  --  7.1  --  6.5   BILITOTAL  --  0.6  --  0.8   ALKPHOS  --  307*  --  243*   ALT  --  35  --  32   AST  --  116*  --  94*       Recent Results (from the past 24 hour(s))   XR Chest Port 1 View    Narrative    EXAM: XR CHEST PORT 1 VIEW  LOCATION: [facility]  DATE/TIME: 12/3/2021 9:44 PM    INDICATION: Cough. Dyspnea. Wheezing.  COMPARISON: None.      Impression    IMPRESSION: There is mild pulmonary venous congestion. Mild interstitial prominence in both lungs is nonspecific, but could be related to edema, fibrosis, or infection. No pneumothorax.     Disclaimer: This note consists of symbols derived from keyboarding, dictation and/or voice recognition software. As a result, there may be errors in the script that have gone undetected. Please consider this when interpreting information found in this chart.

## 2021-12-05 NOTE — PROGRESS NOTES
RT Note:    Pt was placed on V60  CPAP at 7cm H2O, on O2 40% LPM for KORY.     Will continue to follow and assess.    Esha Voss, RT

## 2021-12-05 NOTE — PLAN OF CARE
Pt disoriented to time/place, calls appropriately and makes needs known. VSS on 5L O2 per oxymizer cannula. Denied pain; says MARILYN has minimal pain only with extreme movement. Tele SR w/ BBB, denied CP. PIV x 2 to left intact, SL. External catheter in place for incontinence but not working the best due to pt anatomy. Up to chair Ax2 lift. Total feed on pureed, nectar thick liquid diet. Turning q2h, frequent weight shifting due to pt tendency to slide in bed/chair w/ baseline lean to left, mepi to pre-existing wound on coccyx, alarms on, pulsate mattress ordered. WOC consult orders placed by MD this shift. Maintained covid iso precautions. Will continue POC.

## 2021-12-05 NOTE — PLAN OF CARE
Pt admitted to the floor this evening around 1700.  Pt is A/Ox2-3,  hx of dementia, CVA forgetful.  Up w/ A2 and lift, Q2 repos recommend PT/OT consult.     SLP consult for dysphagia, per TCU pt was on [place] thick liquids prior to hospitalization.  Pt had difficulty taking pills, coughing w/ small sips.       PT c/o pain in right upper arm/shoulder.  Hx of previous fall on 11/23 resulting in fx, noted for swelling, bruising on the elbow, and petechiae.  Arm in splint and elevated.  PT admitted this time 12/3 prior to second fall at TCU, and landed onto right side.  Recommend Xr of RUE.  See ortho note from previous admission on 11/23-11/26 for more information regarding plan of care for fx and splinting.      Pt has pressure injury on sacrum/coccyx that is open, moist/ red, wound cleaned and dressing placed.  PT has moist red rash, w/ blisters in Right axillary, miconazole powder ordered.   Recommend WOC consult.      PT is MONTALVO, and has course LS w/ wheezing, on 5 LPM oxymask SPO2 92-93%, CPAP at HS. PIV saline locked. Pt is on IV abx zosyn and vanco.  VSS. Tele SR w/ intermittent episodes of PSVT. Plan to discharge TBD. Continue with POC.

## 2021-12-05 NOTE — PHARMACY-VANCOMYCIN DOSING SERVICE
"Pharmacy Vancomycin Note  Date of Service 2021  Patient's  1940   81 year old, male    Indication: Healthcare-Associated Pneumonia  Day of Therapy: 2  Current vancomycin regimen:  750 mg IV q12h  Current vancomycin monitoring method: AUC  Current vancomycin therapeutic monitoring goal: 400-600 mg*h/L    InsightRX Prediction of Current Vancomycin Regimen  Loading dose: N/A  Regimen: 750 mg IV every 12 hours.  Start time: 14:32 on 2021  Exposure target: AUC24 (range)400-600 mg/L.hr   AUC24,ss: 452 mg/L.hr  Probability of AUC24 > 400: 71 %  Ctrough,ss: 15.6 mg/L  Probability of Ctrough,ss > 20: 19 %    Current estimated CrCl = Estimated Creatinine Clearance: 64.2 mL/min (based on SCr of 1.04 mg/dL).    Creatinine for last 3 days  12/3/2021:  9:48 PM Creatinine 0.95 mg/dL  2021:  5:32 AM Creatinine 1.03 mg/dL  2021:  7:16 AM Creatinine 1.04 mg/dL    Recent Vancomycin Levels (past 3 days)  2021:  7:16 AM Vancomycin 17.1 mg/L    Vancomycin IV Administrations (past 72 hours)                   vancomycin (VANCOCIN) 750 mg in sodium chloride 0.9 % 250 mL intermittent infusion (mg) 750 mg New Bag 21 0232     750 mg New Bag 21 1500    vancomycin (VANCOCIN) 2,000 mg in sodium chloride 0.9 % 500 mL intermittent infusion (mg) 2,000 mg New Bag 21 0144                Nephrotoxins and other renal medications (From now, onward)    Start     Dose/Rate Route Frequency Ordered Stop    21 1400  vancomycin (VANCOCIN) 750 mg in sodium chloride 0.9 % 250 mL intermittent infusion         750 mg  over 60-90 Minutes Intravenous EVERY 12 HOURS 21 0107      21 0400  piperacillin-tazobactam (ZOSYN) infusion 3.375 g        Note to Pharmacy: For [facility], [facility] and [facility]: For Zosyn-naive patients, use the \"Zosyn initial dose + extended infusion\" order panel.    3.375 g  100 mL/hr over 30 Minutes Intravenous EVERY 6 HOURS 21 3759               Contrast Orders - past 72 hours " (72h ago, onward)            None          Interpretation of levels and current regimen:  Vancomycin level is reflective of -600    Has serum creatinine changed greater than 50% in last 72 hours: No    Urine output:  unable to determine    Renal Function: Stable    InsightRX Prediction of Planned New Vancomycin Regimen  Loading dose: N/A  Regimen: 1500 mg IV every 24 hours.  Start time: 14:32 on 12/05/2021  Exposure target: AUC24 (range)400-600 mg/L.hr   AUC24,ss: 457 mg/L.hr  Probability of AUC24 > 400: 73 %  Ctrough,ss: 13.5 mg/L  Probability of Ctrough,ss > 20: 12 %  Probability of nephrotoxicity (Lodise JANEL 2009): 9 %    Plan:  1. Vancomycin 1500 mg every 24 hour  2. Vancomycin monitoring method: AUC  3. Vancomycin therapeutic monitoring goal: 400-600 mg*h/L  4. Pharmacy will check vancomycin levels as appropriate in 1-3 Days.  5. Serum creatinine levels will be ordered daily for the first week of therapy and at least twice weekly for subsequent weeks.    Yecenia Keating, [facility]

## 2021-12-05 NOTE — PROGRESS NOTES
"   12/05/21 [facility]   General Information   Onset of Illness/Injury or Date of Surgery 12/03/21   Referring Physician Dr. Baltazar   Pertinent History of Current Problem Per H&P, \"This is a 81 years old male with multiple comorbidities including COPD with chronic hypoxic failure on 3 L of oxygen at home, diastolic heart failure, coagulopathy with history of factor V Leiden deficiency, KORY on CPAP at night, dementia, CVA, hypertension who was sent from TCU for acute hypoxic respiratory failure with concern for aspiration and COPD exacerbation requiring BiPAP.\" Positive for COVID. Recent admission for R humeral head fracture post fall.   General Observations Pt awake/alert, with fair attention, pleasant and cooperative.   Past History of Dysphagia On mildly thick liquid at TCU per chart; pt reported being on thick liquid for about 2 weeks and eating foods like chicken noodle soup.   Type of Evaluation   Type of Evaluation Swallow Evaluation   Oral Motor   Oral Musculature generally intact   Dentition (Oral Motor)   Dentition (Oral Motor) significant number of missing teeth   Cough/Swallow/Gag Reflex (Oral Motor)   Volitional Throat Clear/Cough (Oral Motor) WNL   Vocal Quality/Secretion Management (Oral Motor)   Vocal Quality (Oral Motor) WFL   Secretion Management (Oral Motor) WNL   General Swallowing Observations   Current Diet/Method of Nutritional Intake (General Swallowing Observations, NIS) NPO   Respiratory Support (General Swallowing Observations) oxygen mask   Swallowing Evaluation Clinical swallow evaluation   Clinical Swallow Evaluation   Feeding Assistance frequent cues/help required   Clinical Swallow Evaluation Textures Trialed mildly thick liquids;pureed   Clinical Swallow Eval: Mildly Thick Liquids   Mode of Presentation cup;spoon;fed by clinician;self-fed   Oral Phase   (reduced control)   Pharyngeal Phase suspect impaired; reduction in laryngeal movement;coughing/choking  (delayed swallow)   Clinical " Swallow Evaluation: Puree Solid Texture Trial   Mode of Presentation, Puree spoon;fed by clinician   Oral Phase, Puree WFL   Pharyngeal Phase, Puree suspect grossly intact   Swallowing Recommendations   Diet Consistency Recommendations mildly thick liquids (level 2);pureed (level 4)   Supervision Level for Intake 1:1 supervision needed   Mode of Delivery Recommendations bolus size, small;liquids via spoon only;no straws;no cups;slow rate of intake   Swallowing Maneuver Recommendations alternate food and liquid intake   Monitoring/Assistance Required (Eating/Swallowing) monitor for cough or change in vocal quality with intake;stop eating activities when fatigue is present   Recommended Feeding/Eating Techniques (Swallow Eval) maintain upright posture during/after eating for 30 minutes;provide assist with feeding   Medication Administration Recommendations, Swallowing (SLP) crushed in puree   Instrumental Assessment Recommendations VFSS (videofluoroscopic swallowing study)  (consider as indicated)   Comment, Swallowing Recommendations Pt currently presents with mild-moderate oral and suspected pharyngeal dysphagia. + fair acceptance and adequate containment. Bolus formation/propulsion and lingual coordination appeared reduced. Suspect delayed swallow with ?reduced hyolaryngeal elevation. + persistent, congest cough following mildly thick liquid via cup. No other cough, throat clear, wet vocal quality, eye watering, or increased WOB.   General Therapy Interventions   Planned Therapy Interventions Dysphagia Treatment   Dysphagia treatment Modified diet education;Instruction of safe swallow strategies   SLP Therapy Assessment/Plan   Criteria for Skilled Therapeutic Interventions Met (SLP Eval) yes;treatment indicated   SLP Diagnosis mild-moderate oral and suspected pharyngeal dysphagia.   Rehab Potential (SLP Eval) good, to achieve stated therapy goals   Therapy Frequency (SLP Eval) 5 times/wk   Predicted Duration of  Therapy Intervention (SLP Eval) 1 week   Therapy Plan Review/Discharge Plan (SLP)   Therapy Plan Review (SLP) evaluation/treatment results reviewed;participants included;patient   SLP Discharge Planning    SLP Discharge Recommendation (DC Rec) Transitional Care Facility;home with home care speech therapy  (will require 24 supervision, significant A if d/c to home)   SLP Rationale for DC Rec Pt with baseline and ongoing dysphagia/SLP needs   SLP Brief overview of current status  Recommend puree (4) diet with mildly thick liquid (2) via tsp with strict aspiration precautions -- no cup or straw; slow rate; upright posture during and following intake; alternate solids/liquids; feed only when fully awake/alert; discontinue feeding if difficulty or cough, throat clear, wet vocal quality, or increased WOB observed.    Total Evaluation Time   Total Evaluation Time (Minutes) 14

## 2021-12-05 NOTE — PROGRESS NOTES
[facility]    Hospitalist Progress Note    Date of Service (when I saw the patient): 12/05/2021  Provider:  Elijah Baltazar MD   Text Page  7am - 6PM       Assessment & Plan   Edison Saldivar is a 81 years old male with multiple comorbidities including COPD with chronic hypoxic failure on 3 L of oxygen at home, diastolic heart failure, coagulopathy with history of factor V Leiden deficiency, KORY on CPAP at night, dementia, CVA, hypertension who was sent from TCU for acute hypoxic respiratory failure with concern for aspiration and COPD exacerbation requiring BiPAP.     1.  Acute hypoxemic respiratory failure secondary to possible aspiration pneumonia and COPD exacerbation/ COVID 19 pneumonia :    Patient presented with acute hypoxia baseline oxygen at 3 L at admission requiring 15 L VATS upgraded to BiPAP later    VBG shows 7.49 with CO2 34 however at presentation patient was wheezing so was treated with the steroid and nebs that slightly improving    Chest x-ray shows  mild pulmonary venous congestion. Mild interstitial prominence in both lungs is nonspecific, but could be related to edema, fibrosis, or infection. No pneumothorax.    White blood cell count at admission 19,000    COVID-19 +      Plan:  For possible aspiration pneumonia I will start patient on Zosyn also added vancomycin to cover health acquired pneumonia with recent admission last month with fracture, no report for vomiting     For COPD continue with home inhalers patient received 125 mg Solu-Medrol in the emergency and one 60 today.  Decadron starting tomorrow per protocol     COVID 19 : Decadron and Remdesivir, Airborne isolation. O2 supplemental modality as tolerated to keep sat 94 or higher. RT to help.     2.  Concern for dysphagia. Per  Note from TCU patient currently on nectar thick. SLP consult requested.     3.  History of diastolic heart failure at home taking Lasix 80 mg as above x-ray show possible vascular  congestion will avoid IV fluid and ,  would avoid volume overload and consider doses of Lasix as needed. I&O and daily weight.      4.  History of glaucoma resume home medication with eyedrops     5.  History of coagulopathy with factor V Leiden on Coumadin, pharmacy is following      6.  KORY on CPAP at night currently on BiPAP     7.  History of CVA that likely was contributing to his dysphagia, on full anticoagulation     8.  Hypertension I will hold amlodipine at this time until further stabilization     9.  Lactic acidosis 2.6 expected secondary to infection recheck after hydration    10.  Right humeral fracture splinted.  It was present before this admission, I am rechecking with x-ray because of fall on the right side.    DVT Prophylaxis: Warfarin  Code Status: No CPR- Do NOT Intubate    Disposition: Expected discharge in once weaned off to baseline O2 chronic therapy.     Interval History   Is feeling better, hemoglobin is a stable.  INR at goal.  Leukocytosis but suspect this is reactive to Decadron.  Oxygen supplementation through facial mask, keeping sat at 5 LNC. Afebrile, normotensive, normal HR. Proper interactions and cooperative.     -Data reviewed today: I reviewed all new labs and imaging results over the last 24 hours.     Physical Exam   Temp: (Abnormal) 96.7  F (35.9  C) Temp src: Axillary BP: 139/74 Pulse: 76   Resp: 24 SpO2: 95 % O2 Device: Oxymask Oxygen Delivery: 5 LPM  Vitals:    12/03/21 2249 12/04/21 1737 12/05/21 0552   Weight: 96.6 kg (213 lb) 95 kg (209 lb 8 oz) 94.3 kg (207 lb 12.8 oz)     Vital Signs with Ranges  Temp:  [96.7  F (35.9  C)-98.6  F (37  C)] 96.7  F (35.9  C)  Pulse:  [73-86] 76  Resp:  [15-28] 24  BP: (112-150)/(54-86) 139/74  FiO2 (%):  [40 %] 40 %  SpO2:  [92 %-98 %] 95 %  I/O last 3 completed shifts:  In: -   Out: 125 [Urine:125]    GEN:  Alert, oriented x 3, appears comfortable, NAD.  HEENT:  Normocephalic/atraumatic, no scleral icterus, no nasal discharge,  mouth moist.  CV:  Regular rate and rhythm, no murmur or JVD.  S1 + S2 noted, no S3 or S4.  LUNGS:  Coarse to auscultation bilaterally without rales/rhonchi/wheezing/retractions.  Symmetric chest rise on inhalation noted.  ABD:  Active bowel sounds, soft, non-tender/non-distended.  No rebound/guarding/rigidity.  EXT:  No edema or cyanosis.  No joint synovitis noted.  SKIN:  Dry to touch, no exanthems noted in the visualized areas.       Medications     - MEDICATION INSTRUCTIONS -       - MEDICATION INSTRUCTIONS -       - MEDICATION INSTRUCTIONS -       - MEDICATION INSTRUCTIONS -       Warfarin Therapy Reminder         remdesivir  100 mg Intravenous Q24H    And     sodium chloride 0.9%  50 mL Intravenous Q24H     brimonidine-timolol  1 drop Both Eyes QAM     brinzolamide  1 drop Both Eyes QAM     dexamethasone  6 mg Intravenous Daily     latanoprost  1 drop Both Eyes At Bedtime     miconazole   Topical BID     mirabegron  50 mg Oral QAM     piperacillin-tazobactam  3.375 g Intravenous Q6H     sodium chloride (PF)  3 mL Intracatheter Q8H     vancomycin  750 mg Intravenous Q12H       Data   Recent Labs   Lab 12/05/21  0716 12/04/21  0532 12/03/21  2148 12/03/21  2136   WBC 23.0* 21.5*  --  19.5*   HGB 11.2* 11.8*  --  11.9*   MCV 97 95  --  95    239  --  224   INR 2.55* 2.41*  --  2.36*   * 140 139  --    POTASSIUM 3.7 4.4 4.3  --    CHLORIDE 114* 107 107  --    CO2 26 26 24  --    BUN 31* 26 23  --    CR 1.04 1.03 0.95  --    ANIONGAP 7 7 8  --    JULIANNE 8.5 9.0 8.8  --    * 215* 179*  --    ALBUMIN 1.9* 2.0* 2.1*  --    PROTTOTAL 5.8* 7.1 7.1  --    BILITOTAL 0.6 0.8 0.6  --    ALKPHOS 294* 299* 307*  --    ALT 33 34 35  --    AST 95* 115* 116*  --        No results found for this or any previous visit (from the past 24 hour(s)).  Disclaimer: This note consists of symbols derived from keyboarding, dictation and/or voice recognition software. As a result, there may be errors in the script that have  gone undetected. Please consider this when interpreting information found in this chart.

## 2021-12-05 NOTE — PLAN OF CARE
VSS on 40% bipap. Tele SR.  Alert and confused. LS diminished. Resp non labored. C/o dry mouth. Oral care done frequently. Incontinent. Red groin and OA on coccyx. T&R Q2H. Right arm in brace. Edema in right arm. Denies pain. NPO. LPIV x 2.

## 2021-12-05 NOTE — PROVIDER NOTIFICATION
MD PAged: Can we get an order for miconazole powder?  Pt has a red moist rash under his arm from where his brace is.  Thank you

## 2021-12-06 NOTE — PLAN OF CARE
Pt alert, but confused to time, place, and situation, A2-3 w/ a lift total care.  Incontinent of B&B, perineum reddened, male ext. Cath in place, but not effective.  WOC complete on coccyx.  1 large BM this shift. Pt denies c/0 pain in RUE, 1 x dose of dilaudid ordered.  Sticky note place to rounding MD to order PRN pain medication.  PT is MONTALVO, on 5 LPM oxymizer, CPAP/BIPAP HS.  Prior to recent hospitalization pt was up with a walker ambulating, suggesting a PT/OT consult. SPoke w/ daughter today, would like rounding MD to updated.  See sticky note placed. Pt is on IV abx Zosyn, Vanco, Remdesivir.  Fluid bolus due to elevated Lactic, recheck pending.   VSS. Tele SR. WOC contsulted. Plan to discharge TBD. Continue with POC.

## 2021-12-06 NOTE — PROGRESS NOTES
"CLINICAL NUTRITION SERVICES  -  ASSESSMENT NOTE      RECOMMENDATIONS FOR MD/PROVIDER TO ORDER:   None     Recommendations Ordered by Registered Dietitian (RD):   Multivitamin  100mg thiamine  Vital cuisine TID w/ meals  Phos and magnesium labs w/ cosign as patient could be at risk for refeeding syndrome w/ supplement intake     Future/Additional Recommendations:   Adjust supplements pending PO intake/patient preference     Malnutrition:   Moderate in the context of acute on chronic illness         REASON FOR ASSESSMENT  Edison Saldivar is a 81 year old male seen by Registered Dietitian for Admission Nutrition Risk Screen for positive    Patient presents with COVID-19, dementia, COPD, h/o duodenal polypectomy w/ sphincterectomy, acute respiratory failure, aspiration PNA, HTN, HF, h/o CVA.    NUTRITION HISTORY  - Information obtained from chart  - Unable to obtain nutrition history from patient as he is confused.  - Supplements vitamin d3, ascorbic acid, vitamin b12, KCl, thera-vit-m, calcium-Magnesium-zinc  - NKFA      CURRENT NUTRITION ORDERS  Diet Order:     Dysphagia Pureed Diet (level 4), Mildly Thick (level 2)   No Straws    Current Intake/Tolerance:  Patient is eating 75% of meals per nursing records, ordered 707kcals and 33g protein yesterday.      NUTRITION FOCUSED PHYSICAL ASSESSMENT FOR DIAGNOSING MALNUTRITION)  No:  Patient not available                Observed:    No nutrition-related physical findings observed    Obtained from Chart/Interdisciplinary Team:  Edema Moderate    ANTHROPOMETRICS  Height: 5' 8\"  Weight: 209 lbs 6.4 oz  Body mass index is 31.84 kg/m .  Weight Status:  Obesity Grade I BMI 30-34.9  IBW: 70kg   % IBW: 136%  Weight History:   Wt Readings from Last 30 Encounters:   12/06/21 95 kg (209 lb 6.4 oz)   12/03/21 96.9 kg (213 lb 9.6 oz)   11/29/21 108 kg (238 lb)                       12% wt loss in 1 week   11/24/21 108 kg (238 lb 1.6 oz)   01/20/21 108 kg (238 lb)   06/04/20 108 kg (238 " lb)   11/06/19 109.3 kg (241 lb)   10/15/19 109.1 kg (240 lb 9.6 oz)   10/15/19 108.9 kg (240 lb)   10/04/19 109.9 kg (242 lb 3.2 oz)   10/02/19 112.2 kg (247 lb 6.4 oz)   09/27/19 108.5 kg (239 lb 3.2 oz)   05/22/19 108.4 kg (239 lb)   02/26/19 108.4 kg (239 lb)   01/28/19 108.8 kg (239 lb 13.8 oz)       LABS  Labs reviewed: -215, Na 148, UN 32, alb 1.9, tot pro 5., alk phos 294, ast 95, CRP 71.8    MEDICATIONS  Medications reviewed: decadron, zosyn, vancomycin, coumadin     ASSESSED NUTRITION NEEDS PER APPROVED PRACTICE GUIDELINES:    Dosing Weight 95 kg (209 lb 6.4 oz)     Estimated Energy Needs: 9029-6749 kcals ([place] Jeor and w/ Stress Factor 1-1.4)  Justification: obese and stressed  Estimated Protein Needs:  grams protein (1.2-1.5 g pro/Kg) using ABW 76.3kg  Justification: hypercatabolism with acute illness and preservation of lean body mass  Estimated Fluid Needs: 8725-3305  mL (25-30 mL/kg) using ABW 76.3kg  Justification: maintenance    MALNUTRITION:  % Weight Loss:  > 2% in 1 week (severe malnutrition)  % Intake:  Decreased intake does not meet criteria for malnutrition based on current information  Subcutaneous Fat Loss:  Unable to assess  Muscle Loss:  Unable to assess  Fluid Retention:  Moderate 2-3+    Malnutrition Diagnosis: Moderate malnutrition  In Context of:  Acute illness or injury  Chronic illness or disease    NUTRITION DIAGNOSIS:  Malnutrition related to COVID-19 on COPD as evidenced by 12% unintentional weight loss in 1 week, mild to moderate fluid accumulation      NUTRITION INTERVENTIONS  Recommendations / Nutrition Prescription  Multivitamin  100mg thiamine  Vital cuisine TID w/ meals  Phos and magnesium labs w/ cosign as patient could be at risk for refeeding syndrome w/ supplement intake      Implementation  Nutrition education: Not appropriate at this time due to patient condition  Composition of Meals and Snacks, Medical Food Supplement and  Multivitamin/Mineral  .      Nutrition Goals  Meet estimated nutrition needs  No additional significant weight loss  Electrolytes WNL      MONITORING AND EVALUATION:  Progress towards goals will be monitored and evaluated per protocol and Practice Guidelines, Diet Order, Food intake, Liquid meal replacement or supplement, Vitamin intake, Weight and Biochemical data

## 2021-12-06 NOTE — CONSULTS
[facility]  Antimicrobial Stewardship Team (AST) Note             To:  Hospitalist  Unit:  300  Patient Name: Edison Saldivar  Allergies: NKA s     Brief Summary:  Patient is a 81 years old male with multiple comorbidities including COPD with chronic hypoxic failure on 3 L of oxygen at home, diastolic heart failure, coagulopathy with history of factor V Leiden deficiency, KORY on CPAP at night, dementia, CVA, hypertension who was sent from TCU for acute hypoxic respiratory failure with concern for aspiration and COPD exacerbation requiring BiPAP.     Patient is currently on Zosyn (day 4) for possible aspiration PNA, & Vancomycin (day 3) for possible HAP    Assessment: Tmax = 98.2, WBC = 16.3 (?from 19.5 on 12/3), procalcitonin is moderately elevated at 0.53 (12/4), patient is Covid-19 (+), no other cultures are available, SLT consult/on thick nectar diet     Current Antibiotic therapy: Zosyn 3.375 grams IV q6hrs (day 4), Vancomycin 1500 mg IV q24 hrs (day 3), remdesivir 100 mg IV q24hs (day 3)    Clinical Features/Vital Signs (VS):    Culture Results:  Date Culture Site Organism   12/3 Covid -19 - PCR Nasopharyngeal Positive   12/3 Influenza A/B PCR Nasopharyngeal Negative                    Imaging:     Recommendation/Interventions:     * Recommend checking nares for MRSA and discontinuing IV Vancomycin if negative  * Patient does not have a history of Pseudomonas aeruginosa and ceftriaxone covers aspiration anaerobes,    consider simplifying Zosyn to ceftriaxone  * Consider stopping antibiotics after 5 day total course    Discussed w/ ID Staff-Dr Eugenia Lewis, PharmD, BCPS  Pharmacy Clinical Specialist  [phone]

## 2021-12-06 NOTE — PROGRESS NOTES
DATE:  12/5/2021   TIME OF RECEIPT FROM LAB:  1813  LAB TEST:  lactic  LAB VALUE:  2.5  RESULTS GIVEN WITH READ-BACK TO (PROVIDER):  On call hospitalist via answering service  TIME LAB VALUE REPORTED TO PROVIDER:   1816    Informed primary RN, Gretta Cisneros with above.

## 2021-12-06 NOTE — PROGRESS NOTES
[facility]    Hospitalist Progress Note    Date of Service (when I saw the patient): 12/06/2021  Provider:  Elijah Baltazar MD   Text Page  7am - 6PM       Assessment & Plan   Edison Saldivar is a 81 years old male with multiple comorbidities including COPD with chronic hypoxic failure on 3 L of oxygen at home, diastolic heart failure, coagulopathy with history of factor V Leiden deficiency, KORY on CPAP at night, dementia, CVA, hypertension who was sent from TCU for acute hypoxic respiratory failure with concern for aspiration and COPD exacerbation requiring BiPAP.     1.  Acute hypoxemic respiratory failure secondary to possible aspiration pneumonia and COPD exacerbation/ COVID 19 pneumonia :    Patient presented with acute hypoxia baseline oxygen at 3 L at admission requiring 15 L VATS upgraded to BiPAP later    VBG shows 7.49 with CO2 34 however at presentation patient was wheezing so was treated with the steroid and nebs that slightly improving    Chest x-ray shows  mild pulmonary venous congestion. Mild interstitial prominence in both lungs is nonspecific, but could be related to edema, fibrosis, or infection. No pneumothorax.    White blood cell count at admission 19,000    COVID-19 +      Plan:  For possible aspiration pneumonia I will start patient on Zosyn also added vancomycin to cover health acquired pneumonia with recent admission last month with fracture, no report for vomiting     For COPD continue with home inhalers patient received 125 mg Solu-Medrol in the emergency and one 60 today.  Decadron starting tomorrow per protocol     COVID 19 : Decadron and Remdesivir, Airborne isolation. O2 supplemental modality as tolerated to keep sat 94 or higher. RT to help.     2.  Concern for dysphagia. Per  Note from TCU patient currently on nectar thick. SLP consult requested.     3.  History of diastolic heart failure at home taking Lasix 80 mg as above x-ray show possible vascular  congestion will avoid IV fluid and ,  would avoid volume overload and consider doses of Lasix as needed. I&O and daily weight.      4.  History of glaucoma resume home medication with eyedrops     5.  History of coagulopathy with factor V Leiden on Coumadin, pharmacy is following      INR   Date Value Ref Range Status   12/06/2021 2.37 (H) 0.85 - 1.15 Final   10/01/2019 2.33 (H) 0.86 - 1.14 Final      6.  KORY on CPAP at night currently on BiPAP     7.  History of CVA that likely was contributing to his dysphagia, on full anticoagulation     8.  Hypertension I will hold amlodipine at this time until further stabilization     9.  Lactic acidosis 2.6 expected secondary to infection recheck after hydration    10.  Right humeral fracture splinted.  It was present before this admission,Repeat x-ray because of fall on the right side similar to prior.    DVT Prophylaxis: Warfarin  Code Status: No CPR- Do NOT Intubate    Disposition: Expected discharge in once weaned off to baseline O2 chronic therapy.     Interval History   Better, hemoglobin is stable. WBC is coming down. INR at goal.  Leukocytosis improving but suspect this is reactive to Decadron.  Oxygen supplementation through facial mask, keeping sat at 5 LNC. Afebrile, normotensive, normal HR. Proper interactions and cooperative.     -Data reviewed today: I reviewed all new labs and imaging results over the last 24 hours.     Physical Exam   Temp: 97.5  F (36.4  C) Temp src: Axillary BP: (Abnormal) 150/63 Pulse: 77   Resp: 24 SpO2: 95 % O2 Device: Oxymizer cannula Oxygen Delivery: 5 LPM  Vitals:    12/04/21 1737 12/05/21 0552 12/06/21 0415   Weight: 95 kg (209 lb 8 oz) 94.3 kg (207 lb 12.8 oz) 95 kg (209 lb 6.4 oz)     Vital Signs with Ranges  Temp:  [97.5  F (36.4  C)-98.2  F (36.8  C)] 97.5  F (36.4  C)  Pulse:  [74-86] 77  Resp:  [22-28] 24  BP: (130-154)/(63-77) 150/63  FiO2 (%):  [40 %] 40 %  SpO2:  [93 %-97 %] 95 %  I/O last 3 completed shifts:  In: -    Out: 400 [Urine:400]    GEN:  Alert, oriented x 3, appears comfortable, NAD.  HEENT:  Normocephalic/atraumatic, no scleral icterus, no nasal discharge, mouth moist.  CV:  Regular rate and rhythm, no murmur or JVD.  S1 + S2 noted, no S3 or S4.  LUNGS:  Coarse to auscultation bilaterally without rales/rhonchi/wheezing/retractions.  Symmetric chest rise on inhalation noted.  ABD:  Active bowel sounds, soft, non-tender/non-distended.  No rebound/guarding/rigidity.  EXT:  No edema or cyanosis.  No joint synovitis noted.  SKIN:  Dry to touch, no exanthems noted in the visualized areas.       Medications     - MEDICATION INSTRUCTIONS -       - MEDICATION INSTRUCTIONS -       - MEDICATION INSTRUCTIONS -       - MEDICATION INSTRUCTIONS -       Warfarin Therapy Reminder         remdesivir  100 mg Intravenous Q24H    And     sodium chloride 0.9%  50 mL Intravenous Q24H     brimonidine-timolol  1 drop Both Eyes QAM     brinzolamide  1 drop Both Eyes QAM     dexamethasone  6 mg Intravenous Daily     latanoprost  1 drop Both Eyes At Bedtime     miconazole   Topical BID     mirabegron  50 mg Oral QAM     piperacillin-tazobactam  3.375 g Intravenous Q6H     sodium chloride (PF)  3 mL Intracatheter Q8H     vancomycin  1,500 mg Intravenous Q24H     warfarin ANTICOAGULANT  2 mg Oral ONCE at 18:00       Data   Recent Labs   Lab 12/06/21  0522 12/05/21  0716 12/04/21  0532   WBC 16.3* 23.0* 21.5*   HGB 11.7* 11.2* 11.8*   MCV 97 97 95    262 239   INR 2.37* 2.55* 2.41*   * 147* 140   POTASSIUM 3.6 3.7 4.4   CHLORIDE 115* 114* 107   CO2 28 26 26   BUN 32* 31* 26   CR 0.96 1.04 1.03   ANIONGAP 5 7 7   JULIANNE 8.8 8.5 9.0   * 108* 215*   ALBUMIN  --  1.9* 2.0*   PROTTOTAL  --  5.8* 7.1   BILITOTAL  --  0.6 0.8   ALKPHOS  --  294* 299*   ALT  --  33 34   AST  --  95* 115*       No results found for this or any previous visit (from the past 24 hour(s)).  Disclaimer: This note consists of symbols derived from keyboarding,  dictation and/or voice recognition software. As a result, there may be errors in the script that have gone undetected. Please consider this when interpreting information found in this chart.

## 2021-12-06 NOTE — PROGRESS NOTES
RT Note:     Pt was placed on V60  CPAP at 7cm H2O, on O2 40% LPM for KORY.Skin integrity is good.     Will continue to follow and assess    RT Nedra

## 2021-12-06 NOTE — PROGRESS NOTES
"Sepsis Evaluation Progress Note    I was called to see Edison Saldivar due to abnormal vital signs triggering the Sepsis SIRS screening alert. He is known to have an infection.     Physical Exam   Vital Signs:  Temp: 98.2  F (36.8  C) Temp src: Axillary BP: 130/73 Pulse: 79   Resp: 22 SpO2: 93 % O2 Device: Oxymizer cannula Oxygen Delivery: 5 LPM     General: in no acute distress  Mental Status: baseline mental status.     Remainder of physical exam is significant for unremarkable    Data   Lactic Acid   Date Value Ref Range Status   12/05/2021 2.5 (H) 0.7 - 2.0 mmol/L Final   12/04/2021 1.9 0.7 - 2.0 mmol/L Final   09/27/2019 1.2 0.7 - 2.1 mmol/L Final       Assessment & Plan   Edison Saldivar meets SIRS criteria but does NOT have a lactate >2 or other evidence of acute organ damage.  These vital sign, lab and physical exam findings constitute a diagnosis of SEPSIS and Dehydration    Sepsis Time-Zero (    Anti-infectives (From now, onward)    Start     Dose/Rate Route Frequency Ordered Stop    12/05/21 2200  vancomycin 1500 mg in 0.9% NaCl 250 ml intermittent infusion 1,500 mg         1,500 mg  over 90 Minutes Intravenous EVERY 24 HOURS 12/05/21 1002      12/05/21 1700  remdesivir 100 mg in sodium chloride 0.9 % 250 mL intermittent infusion        Note to Pharmacy: Gently invert the bag 20 times to mix the solution. Lyophilized powder product must be used for pediatric patients LESS than 40 kg.     \"And\" Linked Group Details    100 mg  125-500 mL/hr over  Minutes Intravenous EVERY 24 HOURS 12/04/21 1301 12/09/21 1659    12/04/21 0400  piperacillin-tazobactam (ZOSYN) infusion 3.375 g        Note to Pharmacy: For [facility], [facility] and [facility]: For Zosyn-naive patients, use the \"Zosyn initial dose + extended infusion\" order panel.    3.375 g  100 mL/hr over 30 Minutes Intravenous EVERY 6 HOURS 12/03/21 7029          Current antibiotic coverage is appropriate for source of infection.     Disposition: The patient will remain on the " current unit. We will continue to monitor this patient closely..  Eli Good MD    Sepsis Criteria   Sepsis: 2+ SIRS criteria due to infection  Severe Sepsis: Sepsis AND 1+ new sign of acute organ dysfunction (Note: lactate >2 or acute encephalopathy each qualify as organ dysfunction)  Septic Shock: Sepsis AND hypotension despite volume resuscitation with 30 ml/kg crystalloid or lactate >=4  Note: HYPOTENSION is defined as 2 BP readings measured 3 hrs apart that have a SBP <90, MAP <65, or decrease >40 mmHg, occurring 6 hrs before or after t-zero

## 2021-12-06 NOTE — PLAN OF CARE
Pt disoriented to time. VSS on 4L O2 per oxymizer cannula. Denied pain; pt verbalized understanding to inform staff of need for analgesics if needed. Tele SR w/ BBB, denied CP. PIV x 2 to left intact, SL. External cath in place for incontinence. Critic aid to coccyx, turning q2h in bed, shifting weight in chair, pulsate mattress in use, WOC following. Total feed. Sling to RUE in place. Maintained covid iso precautions. Will continue POC.

## 2021-12-06 NOTE — CONSULTS
[facility] Nurse Inpatient Pressure Injury Assessment   Reason for consultation: Evaluate and treat Right buttock      ASSESSMENT  Pressure Injury: on Right buttock (near coccyx) , present on admission   Pressure Injury is Stage 2   Contributing factor of the pressure injury: pressure and immobility  Status: initial assessment  Recommend provider order: None, at this time     TREATMENT PLAN  Coccyx/buttocks/perianal area: BID  1. Cleanse with Adrian spray and soft dry wipe  2. Apply a thin layer of Critic Aid cream  3. Sidelying in bed, reposition Q2hrs in bed and Q1hr in chair  Orders Written  [facility] Nurse follow-up plan:weekly  Nursing to notify the Provider(s) and re-consult the WO Nurse if wound(s) deteriorates or new skin concern.    Patient History  According to provider note(s):  Edison Saldivar is a 81 years old male with multiple comorbidities including COPD with chronic hypoxic failure on 3 L of oxygen at home, diastolic heart failure, coagulopathy with history of factor V Leiden deficiency, KORY on CPAP at night, dementia, CVA, hypertension who was sent from TCU for acute hypoxic respiratory failure with concern for aspiration and COPD exacerbation requiring BiPAP    Objective Data  Containment of urine/stool: Incontinence Protocol and External catheter    Current Diet/ Nutrition:  Orders Placed This Encounter      Combination Diet Pureed Diet (level 4); Mildly Thick (level 2) (via tsp); No Straws      Output:   I/O last 3 completed shifts:  In: 240 [P.O.:240]  Out: 775 [Urine:775]    Risk Assessment:   Sensory Perception: 3-->slightly limited  Moisture: 3-->occasionally moist  Activity: 1-->bedfast  Mobility: 2-->very limited  Nutrition: 2-->probably inadequate  Friction and Shear: 1-->problem  Alvaro Score: 12      Labs:   Recent Labs   Lab 12/06/21  0522 12/05/21  0716   ALBUMIN  --  1.9*   HGB 11.7* 11.2*   INR 2.37* 2.55*   WBC 16.3* 23.0*   CRP 71.8* 108.0*       Physical Exam  Skin inspection: focused Right  buttock (near coccyx)    Wound Location:  Right buttock (near coccyx)  Date of last Photo NA  Wound History: Patient unaware he had wound  Measurements (length x width x depth, in cm) 1 cm x 0.7 cm   Wound Base:  100 % agranular  Tunneling N/A  Undermining N/A  Palpation of the wound bed: normal   Periwound skin: intact  Color: normal and consistent with surrounding tissue  Temperature: normal   Drainage:, small  Description of drainage: serosanguinous  Odor: none  Pain: denies     Interventions  Current support surface: Standard  Low air loss mattress with pulsation   Current off-loading measures: Pillows under calves and Pillows  Repositioning aid: Pillows  Visual inspection of wound(s) completed   Wound Care: was done per plan of care.  Supplies: at bedside  Educated provided: importance of repositioning, plan of care and Off-loading pressure  Education provided to: patient  and nurse  Discussed importance of:repositioning every 2 hours and off-loading pressure to wound  Discussed plan of care with Patient and Nurse    Dwight Beck RN CWOCN

## 2021-12-06 NOTE — PLAN OF CARE
VSS on 40% bipap. Tele SR.  Alert and confused. LS with BUL with wheezes and BLL diminished. Respirations non labored. C/o dry mouth. Oral care done frequently. Incontinent B&B. Male catheter did not work. Now trying purewick. Red groin and OA on coccyx. Pulsate mattress. T&R Q2H. Pt moves all over in bed, slides around, kicks off covers and removes pillows. Right arm in brace. Edema in right arm. Denies pain. NPO while on bipap. LPIV x 2.

## 2021-12-06 NOTE — PROGRESS NOTES
Cross Cover    Patient is having pain in arm (humerus fracture).  No pain medications. I have ordered PRN Tylenol as well as low dose PRN Oxycodone.    Sarah García MD

## 2021-12-07 NOTE — PROGRESS NOTES
[facility]    Hospitalist Progress Note    Date of Service (when I saw the patient): 12/07/2021  Provider:  Elijah Baltazar MD   Text Page  7am - 6PM       Assessment & Plan   Edison Saldivar is a 81 years old male with multiple comorbidities including COPD with chronic hypoxic failure on 3 L of oxygen at home, diastolic heart failure, coagulopathy with history of factor V Leiden deficiency, KORY on CPAP at night, dementia, CVA, hypertension who was sent from TCU for acute hypoxic respiratory failure with concern for aspiration and COPD exacerbation requiring BiPAP.     1.  Acute hypoxemic respiratory failure secondary to possible aspiration pneumonia and COPD exacerbation/ COVID 19 pneumonia :    Patient presented with acute hypoxia baseline oxygen at 3 L at admission requiring 15 L VATS upgraded to BiPAP later    VBG shows 7.49 with CO2 34 however at presentation patient was wheezing so was treated with the steroid and nebs that slightly improving    Chest x-ray shows  mild pulmonary venous congestion. Mild interstitial prominence in both lungs is nonspecific, but could be related to edema, fibrosis, or infection. No pneumothorax.    White blood cell count at admission 19,000    COVID-19 +      Plan:  For possible aspiration pneumonia   Zosyn & vancomycin to cover health acquired pneumonia with recent admission last month with fracture, no report for vomiting     For COPD continue with home inhalers patient received 125 mg Solu-Medrol in the emergency and one dose 60 next.  Decadron started per protocol     COVID 19 : Decadron and Remdesivir day # 4, Airborne isolation. O2 supplemental modality as tolerated to keep sat 94 or higher. RT to help.     2.  Concern for dysphagia. Per  Note from TCU patient currently on nectar thick. SLP consult requested.     3.  History of diastolic heart failure at home taking Lasix 80 mg as above x-ray show possible vascular congestion will avoid IV fluid and ,   would avoid volume overload and consider doses of Lasix as needed. I&O and daily weight.      4.  History of glaucoma resume home medication with eyedrops     5.  History of coagulopathy with factor V Leiden on Coumadin, pharmacy is following      INR   Date Value Ref Range Status   12/07/2021 1.85 (H) 0.85 - 1.15 Final   10/01/2019 2.33 (H) 0.86 - 1.14 Final      6.  KORY on CPAP at night currently on BiPAP     7.  History of CVA that likely was contributing to his dysphagia, on full anticoagulation     8.  Hypertension I will hold amlodipine at this time until further stabilization     9.  Lactic acidosis 2.6 expected secondary to infection recheck after hydration    10.  Right humeral fracture splinted.  It was present before this admission, Repeat x-ray because of fall on the right side, reports similar findings as in prior.    DVT Prophylaxis: Warfarin  Code Status: No CPR- Do NOT Intubate    Disposition: Expected discharge in once weaned off to baseline O2 chronic therapy.     Interval History   Denies symptoms at the moment of my visit.  Better, reports bowel movement today.  Hemoglobin is stable. WBC is coming down. INR subtherapeutic, pharmacy is following.  Leukocytosis improving but suspect this is reactive to Decadron.  Oxygen supplementation through facial mask, keeping sat at 5 LNC. Afebrile, normotensive, normal HR. Proper interactions and cooperative.     -Data reviewed today: I reviewed all new labs and imaging results over the last 24 hours.     Physical Exam   Temp: 98.3  F (36.8  C) Temp src: Axillary BP: 121/63 Pulse: 88   Resp: 22 SpO2: 96 % O2 Device: Nasal cannula Oxygen Delivery: 4 LPM  Vitals:    12/05/21 0552 12/06/21 0415 12/07/21 0500   Weight: 94.3 kg (207 lb 12.8 oz) 95 kg (209 lb 6.4 oz) 94.6 kg (208 lb 8 oz)     Vital Signs with Ranges  Temp:  [97.5  F (36.4  C)-98.3  F (36.8  C)] 98.3  F (36.8  C)  Pulse:  [70-88] 88  Resp:  [20-24] 22  BP: (121-144)/(63-80) 121/63  FiO2 (%):   [40 %] 40 %  SpO2:  [94 %-96 %] 96 %  I/O last 3 completed shifts:  In: 720 [P.O.:720]  Out: 1425 [Urine:1425]    GEN:  Alert, oriented x 3, appears comfortable, NAD.  HEENT:  Normocephalic/atraumatic, no scleral icterus, no nasal discharge, mouth moist.  CV:  Regular rate and rhythm, no murmur or JVD.  S1 + S2 noted, no S3 or S4.  LUNGS:  Coarse to auscultation bilaterally without rales/rhonchi/wheezing/retractions.  Symmetric chest rise on inhalation noted.  ABD:  Active bowel sounds, soft, non-tender/non-distended.  No rebound/guarding/rigidity.  EXT:  No edema or cyanosis.  No joint synovitis noted.  SKIN:  Dry to touch, no exanthems noted in the visualized areas.       Medications     - MEDICATION INSTRUCTIONS -       - MEDICATION INSTRUCTIONS -       - MEDICATION INSTRUCTIONS -       - MEDICATION INSTRUCTIONS -       Warfarin Therapy Reminder         remdesivir  100 mg Intravenous Q24H    And     sodium chloride 0.9%  50 mL Intravenous Q24H     brimonidine-timolol  1 drop Both Eyes QAM     brinzolamide  1 drop Both Eyes QAM     dexamethasone  6 mg Intravenous Daily     latanoprost  1 drop Both Eyes At Bedtime     miconazole   Topical BID     mirabegron  50 mg Oral QAM     multivitamin, therapeutic  1 tablet Oral Daily     piperacillin-tazobactam  3.375 g Intravenous Q6H     sodium chloride (PF)  3 mL Intracatheter Q8H     thiamine  100 mg Oral Daily     vancomycin  1,500 mg Intravenous Q24H     warfarin ANTICOAGULANT  4 mg Oral ONCE at 18:00       Data   Recent Labs   Lab 12/07/21  0728 12/06/21  0522 12/05/21  0716   WBC 17.2* 16.3* 23.0*   HGB 11.8* 11.7* 11.2*   MCV 97 97 97    260 262   INR 1.85* 2.37* 2.55*   * 148* 147*   POTASSIUM 4.2 3.6 3.7   CHLORIDE 113* 115* 114*   CO2 26 28 26   BUN 27 32* 31*   CR 0.87 0.96 1.04   ANIONGAP 7 5 7   JULIANNE 8.9 8.8 8.5   * 113* 108*   ALBUMIN 2.1*  --  1.9*   PROTTOTAL 6.7*  --  5.8*   BILITOTAL 0.8  --  0.6   ALKPHOS 272*  --  294*   ALT 54  --   33   *  --  95*       No results found for this or any previous visit (from the past 24 hour(s)).  Disclaimer: This note consists of symbols derived from keyboarding, dictation and/or voice recognition software. As a result, there may be errors in the script that have gone undetected. Please consider this when interpreting information found in this chart.

## 2021-12-07 NOTE — PLAN OF CARE
VSS. AOx3. Disoriented to time. 4L oximyzer NC. Denies pain and SOB. Pt stated they would inform staff of pain when analgesics are needed. PIV, 1x intact. Purewick in place. Tried external cath, but wouldn't fit around penis. Wound on coccyx. Wound cleanser used, sacral wound irrigated and Critic aid applied. Turn q2h in buddy. Pulsate mattress in use. Episode of runny fecal incontinence during shift. Total cares. Covid precautions maintained.

## 2021-12-07 NOTE — PROVIDER NOTIFICATION
12/07/21 0100   Mode: CPAP/ BiPAP/ AVAPS/ AVAPS AE   CPAP/BiPAP/ AVAPS/ AVAPS AE Mode CPAP   Equipment   Device Serial Number [id number]   CPAP/BiPAP/Settings   $BIPAP/CPAP Therapy one for intermittent (each treatment)   IPAP/EPAP (cmH2O) 7   Oxygen (%) 40   Cpap +7 placed on pt for KORY .

## 2021-12-07 NOTE — PLAN OF CARE
Pt oriented to self. VSS on 3L O2 per NC. C/O pain to RUE, PRN PO tylenol given x 2 per pt request w/ some relief, splint in place. Discussed w/ MD if ortho consult would benefit pt. Tele SR w/ BBB / prolong QT / freq PVCs, denied CP. PIV to left intact, SL. Up to chair for lunch Ax2 lift. Incontinent of bowel/bladder, purewick in place, gilberto cares performed. Critic aid paste to coccyx. Turn q2h, pulsate mattress in use. Maintained covid iso precautions. Will continue POC.

## 2021-12-08 NOTE — PLAN OF CARE
Disoriented to time/place. VSS on 3L O2 per NC. PRN PO tylenol given per pt request for reported RUE and R knee pain, improved. Tele SR, denied CP. PIV to left intact, SL. Up to chair Ax2 lift. Purewick in place for incontinence. Wound cares provided to coccyx, turn q2h, pulsate mattress in use. Maintained covid iso precautions. Will continue POC.    yes

## 2021-12-08 NOTE — PROVIDER NOTIFICATION
12/08/21 0400   Tech Time   $Tech Time (10 minute increments) 2   Mode: CPAP/ BiPAP/ AVAPS/ AVAPS AE   CPAP/BiPAP/ AVAPS/ AVAPS AE Mode CPAP   Equipment   Device Serial Number [id number]   CPAP/BiPAP/Settings   $BIPAP/CPAP Therapy one for intermittent (each treatment)   IPAP/EPAP (cmH2O) 7   Oxygen (%) 40   Cpap at night for KORY

## 2021-12-08 NOTE — PLAN OF CARE
Temp: 98.7  F (37.1  C) Temp src: Oral BP: 137/80 Pulse: 70   Resp: 23 SpO2: 96 % O2 Device: Nasal cannula Oxygen Delivery: 3 LPM    Patient A&Ox2, complains of some pain in R shoulder/arm however has been declining intervention. Patient up with 2 and a lift. Purewick in place to help with incontinence and male external catheter not working well with anatomy. Receiving IV abx and remdesivir. Please make sure family is getting called with updates! Brace in place. Patient resting in bed. Will continue to monitor.     Sharmila ESPARZA Will on 12/7/2021 at 10:35 PM

## 2021-12-08 NOTE — PLAN OF CARE
"/80 (BP Location: Left arm)   Pulse 70   Temp 97  F (36.1  C) (Axillary)   Resp 20   Ht 1.727 m (5' 8\")   Wt 94.6 kg (208 lb 8 oz)   SpO2 95%   BMI 31.70 kg/m    Airborne precautions maintained. A&O to self and place, needed ques for year and month. Denies pain. LS coarse crackles. Infrequent non-prod cough. BiPAP in place. Tolerates well. Incontinent urine and has pure-wick. Continues on Vanco and Zosyn IV. CMS RUE +. Right hand elevated on pillow to help decrease edema. Air mattress. Offloaded by Ax2 staff to decrease pressure to bottom. Tele: SR with prolonged T waves. Plan: Continue current plan of care.   "

## 2021-12-08 NOTE — PHARMACY-VANCOMYCIN DOSING SERVICE
"Pharmacy Vancomycin Note  Date of Service 2021  Patient's  1940   81 year old, male    Indication: Healthcare-Associated Pneumonia  Day of Therapy: 4  Current vancomycin regimen:  1500 mg IV q24h  Current vancomycin monitoring method: AUC  Current vancomycin therapeutic monitoring goal: 400-600 mg*h/L    Insight[employer] Prediction of Current Vancomycin Regimen      Regimen: 1500 mg IV every 24 hours.  Exposure target: AUC24 (range)400-600 mg/L.hr   AUC24,ss: 360 mg/L.hr  Probability of AUC24 > 400: 26 %  Ctrough,ss: 9.1 mg/L  Probability of Ctrough,ss > 20: 0 %  Probability of nephrotoxicity (Lodise JANEL ): 5 %      Current estimated CrCl = Estimated Creatinine Clearance: 76.8 mL/min (based on SCr of 0.87 mg/dL).    Creatinine for last 3 days  2021:  7:16 AM Creatinine 1.04 mg/dL  2021:  5:22 AM Creatinine 0.96 mg/dL  2021:  7:28 AM Creatinine 0.87 mg/dL    Recent Vancomycin Levels (past 3 days)  2021:  7:16 AM Vancomycin 17.1 mg/L  2021:  6:49 PM Vancomycin 11.1 mg/L    Vancomycin IV Administrations (past 72 hours)                   vancomycin 1500 mg in 0.9% NaCl 250 ml intermittent infusion 1,500 mg (mg) 1,500 mg New Bag 21 0056     1,500 mg New Bag 21 215    vancomycin (VANCOCIN) 750 mg in sodium chloride 0.9 % 250 mL intermittent infusion (mg) 750 mg New Bag 21 0232                Nephrotoxins and other renal medications (From now, onward)    Start     Dose/Rate Route Frequency Ordered Stop    21 2200  vancomycin 1500 mg in 0.9% NaCl 250 ml intermittent infusion 1,500 mg         1,500 mg  over 90 Minutes Intravenous EVERY 24 HOURS 21 1002      21 0400  piperacillin-tazobactam (ZOSYN) infusion 3.375 g        Note to Pharmacy: For [facility], [facility] and [facility]: For Zosyn-naive patients, use the \"Zosyn initial dose + extended infusion\" order panel.    3.375 g  100 mL/hr over 30 Minutes Intravenous EVERY 6 HOURS 21 2339               Contrast " Orders - past 72 hours (72h ago, onward)            None          Interpretation of levels and current regimen:  Vancomycin level is reflective of AUC less than 400    Has serum creatinine changed greater than 50% in last 72 hours: No    Urine output:  unable to determine    Renal Function: Stable    InsightRX Prediction of Planned New Vancomycin Regimen    Regimen: 1000 mg IV every 12 hours.  Start time: 01:00 on 12/08/2021  Exposure target: AUC24 (range)400-600 mg/L.hr   AUC24,ss: 473 mg/L.hr  Probability of AUC24 > 400: 87 %  Ctrough,ss: 15.2 mg/L  Probability of Ctrough,ss > 20: 8 %  Probability of nephrotoxicity (Lodise JANEL 2009): 10 %      Plan:  1. Increase Dose to 1000 mg q12h  2. Vancomycin monitoring method: AUC  3. Vancomycin therapeutic monitoring goal: 400-600 mg*h/L  4. Pharmacy will check vancomycin levels as appropriate in 1-3 Days.  5. Serum creatinine levels will be ordered daily for the first week of therapy and at least twice weekly for subsequent weeks.    De Pereira, [facility]

## 2021-12-08 NOTE — PROGRESS NOTES
[facility]    Hospitalist Progress Note    Date of Service (when I saw the patient): 12/08/2021  Provider:  Elijah Baltazar MD   Text Page  7am - 6PM       Assessment & Plan   Edison Saldivar is a 81 years old male with multiple comorbidities including COPD, not on oxygen at home per his daughter report to me, diastolic heart failure, coagulopathy with history of factor V Leiden deficiency, KORY on CPAP at night, dementia, CVA, hypertension who was sent from TCU for acute hypoxemic respiratory failure with concern for aspiration and COPD exacerbation requiring BiPAP.     1.  Acute hypoxemic respiratory failure secondary to possible aspiration pneumonia and COPD exacerbation/ COVID 19 pneumonia :    Patient presented with acute hypoxia baseline oxygen at 3 L at admission requiring 15 L VATS upgraded to BiPAP later    VBG shows 7.49 with CO2 34 however at presentation patient was wheezing so was treated with the steroid and nebs that slightly improving    Chest x-ray shows  mild pulmonary venous congestion. Mild interstitial prominence in both lungs is nonspecific, but could be related to edema, fibrosis, or infection. No pneumothorax.    White blood cell count at admission 19,000    COVID-19 +      Plan:  Possible aspiration pneumonia. Zosyn & Vancomycin to cover health acquired pneumonia with recent admission last month with fracture, no report for vomiting. Day # 5  COPD continue with home inhalers patient received 125 mg Solu-Medrol in the emergency and one dose 60 next.  Decadron started per protocol  COVID 19 fully Vaccinated with Moderna x 2 : Decadron and Remdesivir day # 5, Airborne isolation. O2 supplemental modality as tolerated to keep sat 94 or higher. RT to help.     2.  Concern for dysphagia. Per  Note from TCU patient currently on nectar thick. SLP consult requested.     3.  History of diastolic heart failure at home taking Lasix 80 mg as above x-ray show possible vascular  congestion will avoid IV fluid and ,  would avoid volume overload and consider doses of Lasix as needed. I&O and daily weight.      4.  History of glaucoma resume home medication with eyedrops     5.  History of coagulopathy with factor V Leiden on Coumadin, pharmacy is following      INR   Date Value Ref Range Status   12/08/2021 1.93 (H) 0.85 - 1.15 Final   10/01/2019 2.33 (H) 0.86 - 1.14 Final      6.  KORY on CPAP at night currently on BiPAP     7.  History of CVA that likely was contributing to his dysphagia, on full anticoagulation     8.  Hypertension I will hold amlodipine at this time until further stabilization     9.  Lactic acidosis 2.6 expected secondary to infection recheck after hydration    10.  Right humeral fracture splinted.  It was present before this admission, Repeat x-ray because of fall on the right side, reports similar findings as in prior.    DVT Prophylaxis: Warfarin  Code Status: No CPR- Do NOT Intubate    Disposition: Expected discharge in once weaned off to baseline O2 chronic therapy.     Daughter Juliane wants daily update if possible, Cell     Interval History   Denies pain in right arm at the moment of my visit, some R knee pain, with negative exam.    Hemoglobin is stable. WBC is coming down. INR subtherapeutic, pharmacy is following.  Leukocytosis is reactive to Decadron.  Oxygen supplementation through facial mask, keeping sat at 5 LNC. Afebrile, SBP not at goal, normal HR. Proper interactions and cooperative.     -Data reviewed today: I reviewed all new labs and imaging results over the last 24 hours.     Physical Exam   Temp: 97.9  F (36.6  C) Temp src: Oral BP: (Abnormal) 158/84 Pulse: 79   Resp: 20 SpO2: 96 % O2 Device: Nasal cannula Oxygen Delivery: 3 LPM  Vitals:    12/06/21 0415 12/07/21 0500 12/08/21 0509   Weight: 95 kg (209 lb 6.4 oz) 94.6 kg (208 lb 8 oz) 99.9 kg (220 lb 4.8 oz)     Vital Signs with Ranges  Temp:  [97  F (36.1  C)-98.7  F (37.1  C)]  97.9  F (36.6  C)  Pulse:  [69-79] 79  Resp:  [18-24] 20  BP: (137-158)/(80-84) 158/84  FiO2 (%):  [40 %] 40 %  SpO2:  [92 %-96 %] 96 %  I/O last 3 completed shifts:  In: -   Out: 475 [Urine:475]    GEN:  Alert, oriented x 3, appears comfortable, NAD.  HEENT:  Normocephalic/atraumatic, no scleral icterus, no nasal discharge, mouth moist.  CV:  Regular rate and rhythm, no murmur or JVD.  S1 + S2 noted, no S3 or S4.  LUNGS:  Coarse to auscultation bilaterally without rales/rhonchi/wheezing/retractions.  Symmetric chest rise on inhalation noted.  ABD:  Active bowel sounds, soft, non-tender/non-distended.  No rebound/guarding/rigidity.  EXT:  R arm splinted. No edema or cyanosis.  No joint synovitis noted.  SKIN:  Dry to touch, no exanthems noted in the visualized areas.       Medications     - MEDICATION INSTRUCTIONS -       - MEDICATION INSTRUCTIONS -       - MEDICATION INSTRUCTIONS -       - MEDICATION INSTRUCTIONS -       Warfarin Therapy Reminder         remdesivir  100 mg Intravenous Q24H    And     sodium chloride 0.9%  50 mL Intravenous Q24H     brimonidine-timolol  1 drop Both Eyes QAM     brinzolamide  1 drop Both Eyes QAM     dexamethasone  6 mg Intravenous Daily     latanoprost  1 drop Both Eyes At Bedtime     miconazole   Topical BID     mirabegron  50 mg Oral QAM     multivitamin, therapeutic  1 tablet Oral Daily     piperacillin-tazobactam  3.375 g Intravenous Q6H     sodium chloride (PF)  3 mL Intracatheter Q8H     thiamine  100 mg Oral Daily     vancomycin  1,000 mg Intravenous Q12H     warfarin ANTICOAGULANT  5 mg Oral ONCE at 18:00       Data   Recent Labs   Lab 12/08/21  0624 12/07/21  0728 12/06/21  0522 12/05/21  0716   WBC 16.3* 17.2* 16.3* 23.0*   HGB 12.1* 11.8* 11.7* 11.2*   MCV 95 97 97 97    244 260 262   INR 1.93* 1.85* 2.37* 2.55*   * 146* 148* 147*   POTASSIUM 3.8 4.2 3.6 3.7   CHLORIDE 113* 113* 115* 114*   CO2 27 26 28 26   BUN 23 27 32* 31*   CR 0.87 0.87 0.96 1.04    ANIONGAP 5 7 5 7   JULIANNE 8.8 8.9 8.8 8.5   * 114* 113* 108*   ALBUMIN  --  2.1*  --  1.9*   PROTTOTAL  --  6.7*  --  5.8*   BILITOTAL  --  0.8  --  0.6   ALKPHOS  --  272*  --  294*   ALT  --  54  --  33   AST  --  122*  --  95*       No results found for this or any previous visit (from the past 24 hour(s)).  Disclaimer: This note consists of symbols derived from keyboarding, dictation and/or voice recognition software. As a result, there may be errors in the script that have gone undetected. Please consider this when interpreting information found in this chart.

## 2021-12-08 NOTE — PROGRESS NOTES
Care Management Follow Up    Length of Stay (days): 5    Expected Discharge Date: 12/09/2021     Concerns to be Addressed: care coordination/care conferences,discharge planning     Patient plan of care discussed at interdisciplinary rounds: Yes    Anticipated Discharge Disposition: Assisted Living     Anticipated Discharge Services: None  Anticipated Discharge DME: Oxygen    Patient/family educated on Medicare website which has current facility and service quality ratings:  (NA)  Education Provided on the Discharge Plan:  yes  Patient/Family in Agreement with the Plan: yes    Additional Information:  CM following for discharge planning. [facility] review [facility] to [facility]. Prior to previous admission, pt was living at [facility], dtr Juliane has been discussing with [facility] staff and is planning on having pt discharge to their care suites at discharge.     Called and spoke with Julianeand verified the above plan. She reports that [facility] has a mechanical lift and they are able to take covid + patients. Called [facility] and LM requesting call back, would like to ensure they can meet pt's needs.     Of note, pt was not on home O2 prior to 11/23 admission. He does have a home CPAP that he uses at baseline. Pt's dtr is requesting daily updates from provider, paged Dr. Baltazar and requested he call Juliane at [phone].     Juliane requests that no financial conversations be had with pt's wife as this causes her undue stress, dtr is authorizing payment and increased services with [facility].     Demi Morgan RN BSN   Inpatient Care Coordination  [facility]   Phone [phone]

## 2021-12-09 NOTE — PLAN OF CARE
"/61 (BP Location: Left arm)   Pulse 89   Temp 98.5  F (36.9  C) (Axillary)   Resp 24   Ht 1.727 m (5' 8\")   Wt 97.4 kg (214 lb 11.2 oz)   SpO2 93%   BMI 32.65 kg/m      Oriented to self and situation. UP with a lift. On 3 LPM NC.  Pure wick in place. Takes medication whole in apple sauce or pudding. LS diminished. T & R q2h. On pureed diet with thick liquid. Poor appetite  "

## 2021-12-09 NOTE — PROGRESS NOTES
Care Management Follow Up    Length of Stay (days): 6    Expected Discharge Date: 12/10/2021     Concerns to be Addressed: care coordination/care conferences,discharge planning     Patient plan of care discussed at interdisciplinary rounds: Yes    Anticipated Discharge Disposition: Assisted Living     Anticipated Discharge Services: None  Anticipated Discharge DME: Oxygen    Patient/Family in Agreement with the Plan: yes    Additional Information:  CM following for discharge planning, per dtr Juliane, planning for pt to discharge to [facility] [phone]. No response back from [facility] at this time, again called and LM with nursing office requesting call back. Would like to ensure they are able to meet pt's needs at discharge.     Also received message from Danni with [facility] [phone], called back to discuss, unable to reach LM requesting call back.     Update 1400: Called pt's dtr Juliane to see if she has alternate contact information for nurse at [facility]. She gave information for nurse Kim [phone] [phone]. Juliane also offers that pt was on hospice with [facility] prior to 11/23 admission, they revoked hospice so pt could go to transitional care before returning to [facility], pt was then readmitted from TCU on 12/3.     Juliane is hoping that pt will be able to discharge to [facility] with resumption of hospice through [facility]. If pt discharges to [facility], she reports that he will first need a hospital bed (and oxygen if needed) delivered through hospice.     Called [facility] and  for Kim [phone]. Spoke with Danni with [facility] [phone] and informed of above. She will try to reach out to [facility] as well. She would also like update once discharge date known to ensure they can accommodate pt's admission. Paged Dr. James to update.     Update 2137: Per conversation, Dr. James anticipates pt to  possibly be discharge ready in 3 days. CM received call from [facility] nurse Kim [phone] [phone] updated her with pt's condition and mobility, she confirms that they are planning on admitting pt to their care suites with hospice at discharge. She requests that Danni with [facility] call her to coordinate what equipment will be needed at discharge, called and LM with Danni updating on discharge plans.     Kim reports that they prefer M-F admission, if pt is discharge ready on the weekend will need to call both [facility] and [facility] to see if they are jose to staff.     Demi Morgan RN BSN   Inpatient Care Coordination  [facility]   Phone [phone]

## 2021-12-09 NOTE — PLAN OF CARE
"Alert and oriented x 2. Disoriented to time and place. BP elevated. UP with a lift. O2 Sat 97% on 3 LPM NC. No home oxygen at baseline. Pure wick in place. Takes medication whole in apple sauce or pudding. LS diminished. T & R q2h. On pureed diet with thick liquid. Poor appetite    Will continue to monitor    BP (!) 160/76 (BP Location: Left arm)   Pulse 70   Temp 97.1  F (36.2  C) (Axillary)   Resp 25   Ht 1.727 m (5' 8\")   Wt 99.9 kg (220 lb 4.8 oz)   SpO2 97%   BMI 33.50 kg/m      "

## 2021-12-09 NOTE — PROGRESS NOTES
[facility]    Medicine Progress Note - Hospitalist Service       Date of Admission:  12/3/2021    Assessment & Plan           Edison Saldivar is a 81 years old male with multiple comorbidities including COPD, not on oxygen at home per his daughter report to me, diastolic heart failure, coagulopathy with history of factor V Leiden deficiency, KORY on CPAP at night, dementia, CVA, hypertension who was sent from TCU for acute hypoxemic respiratory failure with concern for aspiration and COPD exacerbation requiring BiPAP.    1. Acute hypoxic respiratory failure. Likely multifactorial. COPD exacerbation, COVID-19 infection, possible aspiration pneumonia.  -Supplemental oxygen as needed.    2. COVID-19 pneumonia. Positive SARS-CoV-2 PCR on 12/3. With acute hypoxic respiratory failure.  -Completed IV remdesivir.  -Continue dexamethasone 6 mg a day for 10-day course.  -Warfarin for anticoagulation.  -Supplemental oxygen as needed.    3. Possible aspiration pneumonia.  -Continue IV Zosyn.  -Stop IV vancomycin.  -Check MRSA PCR.    4. Subacute right humerus fracture.  -Pain medications as needed.  -Use incentive spirometry.    5. Dysphagia.  -Speech pathology following.  -Dysphagia diet.    6. Obstructive sleep apnea.  -Use CPAP while sleeping.    7. Hypertension.  -Amlodipine on hold.    8. Coagulopathy due to factor V Leiden disorder.  -Continue warfarin.    9. Hypernatremia. Mild.  -Currently resolved.  -Recheck metabolic panel tomorrow.       Diet: Snacks/Supplements Adult: Other; Vital Cuisine; With Meals  Combination Diet Pureed Diet (level 4); Liquidized/Moderately Thick (level 3) (tsp or small cup sip); No Straws    DVT Prophylaxis: Warfarin  Torres Catheter: Not present  Central Lines: None  Code Status: No CPR- Do NOT Intubate          Mundo James,   Hospitalist Service  [facility]  Securely message with the Vocera Web Console (learn more here)  Text page via [employer]  Paging/Directory        Clinically Significant Risk Factors Present on Admission                ______________________________________________________________________    Interval History   Short of breath. Coughing. Feels that these are both mildly improved from previous. Having some right arm pain. Denies chest pain, fevers, chills, nausea, or vomiting.    Data reviewed today: I reviewed all medications, new labs and imaging results over the last 24 hours.    Physical Exam   Vital Signs: Temp: 98.5  F (36.9  C) Temp src: Axillary BP: 111/61 Pulse: 89   Resp: 24 SpO2: 93 % O2 Device: Oxymizer cannula Oxygen Delivery: 3 LPM  Weight: 214 lbs 11.2 oz  Gen:  NAD, A&Ox3.  Eyes:  PERRL, sclera anicteric.  OP:  MMM, no lesions.  Neck:  Supple.  CV:  Regular, no loud murmurs.  Lung: Coarse breath sounds b/l, normal effort.  Ab:  +BS, soft.  Skin:  Warm, dry to touch.  No rash.  Ext:  Mild non pitting edema LE b/l.      Data   Recent Labs   Lab 12/09/21  0613 12/08/21  0624 12/07/21  0728 12/06/21  0522 12/05/21  0716   WBC 15.8* 16.3* 17.2*   < > 23.0*   HGB 12.5* 12.1* 11.8*   < > 11.2*   MCV 94 95 97   < > 97    213 244   < > 262   INR 2.46* 1.93* 1.85*   < > 2.55*    145* 146*   < > 147*   POTASSIUM 3.7 3.8 4.2   < > 3.7   CHLORIDE 112* 113* 113*   < > 114*   CO2 26 27 26   < > 26   BUN 23 23 27   < > 31*   CR 0.83 0.87 0.87   < > 1.04   ANIONGAP 5 5 7   < > 7   JULIANNE 8.7 8.8 8.9   < > 8.5   * 113* 114*   < > 108*   ALBUMIN  --   --  2.1*  --  1.9*   PROTTOTAL  --   --  6.7*  --  5.8*   BILITOTAL  --   --  0.8  --  0.6   ALKPHOS  --   --  272*  --  294*   ALT  --   --  54  --  33   AST  --   --  122*  --  95*    < > = values in this interval not displayed.

## 2021-12-09 NOTE — PROGRESS NOTES
RT Note:     Pt was placed on V60  CPAP at 7cm H2O, on O2 30%  for KORY. Skin integrity is good.     Will continue to follow and assess     RT Nedra

## 2021-12-10 NOTE — PROGRESS NOTES
Care Management Follow Up    Length of Stay (days): 7    Expected Discharge Date: 12/13/2021     Concerns to be Addressed: care coordination/care conferences,discharge planning     Patient plan of care discussed at interdisciplinary rounds: Yes    Anticipated Discharge Disposition: Assisted Living,Hospice     Anticipated Discharge Services: None  Anticipated Discharge DME: Oxygen    Patient/family educated on Medicare website which has current facility and service quality ratings:  (NA)  Education Provided on the Discharge Plan:  Yes, spoke with pt's dtr Ujliane   Patient/Family in Agreement with the Plan: yes    Referrals Placed by CM/SW: Hospice    Additional Information:  CM following for discharge planning, pt was a potential discharge for Sunday 12/12, spoke with [facility] who is unable to admit pt on Sunday. Planning for Monday 12/13 discharge pending progress.     Spoke with [facility] nurse Kim [phone] [phone] and Danni with [facility] [phone]. Hospice will deliver bed, O2 and any additional equipment to [facility] on Sunday 12/12. Pt is scheduled to discharge via stretcher on Monday 12/13 at 1030. Dtr updated, Kim and Danni aware of plan. All medications should be sent to [facility] at discharge.     Dtr would like [facility] to verify all equipment was delivered prior to pt discharge.     Dtr is on quarantine and will coordinate signing of hospice paperwork with Danni at [facility].     Demi Morgan RN BSN   Inpatient Care Coordination  [facility]   Phone [phone]

## 2021-12-10 NOTE — PROGRESS NOTES
[facility]    Medicine Progress Note - Hospitalist Service       Date of Admission:  12/3/2021    Assessment & Plan           Edsion Saldivar is a 81 years old male with multiple comorbidities including COPD, not on oxygen at home per his daughter report to me, diastolic heart failure, coagulopathy with history of factor V Leiden deficiency, KORY on CPAP at night, dementia, CVA, hypertension who was sent from TCU for acute hypoxemic respiratory failure with concern for aspiration and COPD exacerbation requiring BiPAP.     1. Acute hypoxic respiratory failure. Likely multifactorial. COPD exacerbation, COVID-19 infection, possible aspiration pneumonia.  -Supplemental oxygen as needed.     2. COVID-19 pneumonia. Positive SARS-CoV-2 PCR on 12/3. With acute hypoxic respiratory failure.  -Completed IV remdesivir.  -Continue dexamethasone 6 mg a day for 10-day course.  -Warfarin for anticoagulation.  -Supplemental oxygen as needed.     3. Possible aspiration pneumonia.  -Continue IV Zosyn.  -Stopped IV vancomycin on 12/9.  -MRSA PCR negative for MRSA on 12/9.     4. Subacute right humerus fracture.  -Pain medications as needed.  -Use incentive spirometry.     5. Dysphagia.  -Speech pathology following.  -Dysphagia diet.     6. Obstructive sleep apnea.  -Use CPAP while sleeping.     7. Hypertension.  -Amlodipine on hold.     8. Coagulopathy due to factor V Leiden disorder.  -Continue warfarin.     9. Hypernatremia. Mild.  Sodium 146.  -Encouraged increased fluid intake.  -Recheck metabolic panel tomorrow.          Diet: Snacks/Supplements Adult: Other; Vital Cuisine; With Meals  Combination Diet Pureed Diet (level 4); Liquidized/Moderately Thick (level 3) (tsp or small cup sip); No Straws    DVT Prophylaxis: Warfarin  Torres Catheter: Not present  Central Lines: None  Code Status: No CPR- Do NOT Intubate          Mundo James, DO  Hospitalist Service  [facility]  Securely  message with the [employer] Web Console (learn more here)  Text page via [facility] Paging/Directory        Clinically Significant Risk Factors Present on Admission                ______________________________________________________________________    Interval History   Short of breath.  Coughing.  Some right shoulder pain.  Feels that symptoms are better than previous.  Denies chest pain, fevers, chills, nausea, or vomiting.    Data reviewed today: I reviewed all medications, new labs and imaging results over the last 24 hours.     Physical Exam   Vital Signs: Temp: 98  F (36.7  C) Temp src: Axillary BP: (!) 144/74 Pulse: 82   Resp: 24 SpO2: 92 % O2 Device: Oxymizer cannula Oxygen Delivery: 3 LPM  Weight: 214 lbs 11.2 oz  Gen:  NAD, A&Ox2 to person and place.  Some trouble with time.  Eyes:  PERRL, sclera anicteric.  OP:  MMM, no lesions.  Neck:  Supple.  CV:  Regular, no loud murmurs.  Lung:  CTA b/l, normal effort.  Ab:  +BS, soft.  Skin:  Warm, dry to touch.  No rash.  Ext:  Mild non pitting edema LE b/l.      Data   Recent Labs   Lab 12/10/21  0648 12/09/21  0613 12/08/21  0624 12/07/21  0728 12/06/21  0522 12/05/21  0716   WBC 17.0* 15.8* 16.3* 17.2*   < > 23.0*   HGB 12.3* 12.5* 12.1* 11.8*   < > 11.2*   MCV 93 94 95 97   < > 97    212 213 244   < > 262   INR 2.80* 2.46* 1.93* 1.85*   < > 2.55*   * 143 145* 146*   < > 147*   POTASSIUM 3.7 3.7 3.8 4.2   < > 3.7   CHLORIDE 113* 112* 113* 113*   < > 114*   CO2 23 26 27 26   < > 26   BUN 25 23 23 27   < > 31*   CR 0.89 0.83 0.87 0.87   < > 1.04   ANIONGAP 10 5 5 7   < > 7   JULIANNE 8.5 8.7 8.8 8.9   < > 8.5   * 107* 113* 114*   < > 108*   ALBUMIN  --   --   --  2.1*  --  1.9*   PROTTOTAL  --   --   --  6.7*  --  5.8*   BILITOTAL  --   --   --  0.8  --  0.6   ALKPHOS  --   --   --  272*  --  294*   ALT  --   --   --  54  --  33   AST  --   --   --  122*  --  95*    < > = values in this interval not displayed.

## 2021-12-10 NOTE — PLAN OF CARE
"BP (!) 144/74 (BP Location: Left arm, Patient Position: Supine)   Pulse 82   Temp 98  F (36.7  C) (Axillary)   Resp 24   Ht 1.727 m (5' 8\")   Wt 97.4 kg (214 lb 11.2 oz)   SpO2 92%   BMI 32.65 kg/m      Oriented to self and situation. UP with a lift. On 3 LPM NC.  Pure wick in place. Takes medication whole in apple sauce or pudding. Bmx1. LS diminished. T & R q2h. On pureed diet with thick liquid. Poor appetite  "

## 2021-12-10 NOTE — PROGRESS NOTES
A&OX3. disoriented to time. Ls  diminshed with expiratory wheezing. Oxygen  Turn and repositioned in bed.95% on oximyzer cannula. Wound to coccyx. Barrier cream applied and open to air.Right hurmerous fracture,  brace intact. Right hand edematous. Hand elevated on a pilow.  Incontinent of B&B. puwick intact. Pureed diet, total feeder. Continue to monitor.

## 2021-12-10 NOTE — PROGRESS NOTES
CLINICAL NUTRITION SERVICES - BRIEF NOTE      RECOMMENDATIONS FOR MD/PROVIDER TO ORDER:   None     Recommendations Ordered by Registered Dietitian (RD):   Continue diet as ordered     Future/Additional Recommendations:   Adjust supplements pending PO intake/patient preference     Malnutrition: Moderate in the context of acute on chronic illness (please carry forward if malnutrition diagnosed)         NEW FINDINGS:   Patient appears to be discharging on hospice. Attempted to call patient x 2, no answer

## 2021-12-10 NOTE — CONSULTS
[facility]  Palliative Care Consultation   Text Page    Assessment & Plan   Edison Saldivar is a 81 year old male who was admitted on 12/3/2021.   Consulted by Dr. James to assist with goals of care, and development of plan of care    Recommendations:  1. Goals of Care- No CPR- Do NOT Intubate-restorative, hospice on discharge  Hospitalization goals discussed Discussed hospice on discharge with albert Cardozo. Discussed that we will get him as tuned up as able. Discussed that if his breathing changes or worsens it would not be unreasonable to change to a comfort focus in the hospital. She is not ready for that today. She would like to see him treated. She wants him to go back to [facility] on hospice.  Decisional Capacity- Unreliable. Patient does not have an advance directive. Per  informed consent policy next of kin should be involved if patient becomes unable.  POLST  Needs to be completed prior to discharge    2. Pain   Patient's opioid use in past 24 hours: 0  = 0 mg Daily Morphine Equivalent  [place] Board of Pharmacy Data Base Reviewed:    YES; As expected, no concern for misuse/abuse of controlled medications based on this report.  - Previously on scheduled morphine, if pain becomes a concern it would be reasonable to start this as needed    3. Dyspnea  -Continue with oxygen for comfort. I would recommend discharging him on oxygen    4. Spiritual Care  Oriented to Spiritual Health as part of Palliative Care team. Consultation placed for  to follow.  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 Care Planning  Appreciate [facility] for discharge planning.  Medications for discharge hospice medications to be filled prior to discharge    Medical Decision Making and Goals of Care:  Discussed on December 10, 2021 with Marilyn PAULA CNP: Phone call to daughter Juliane. Spouse had been signing his paperwork in the past however the wife  now has dementia and is unable to make decisions for him. She also has covid. Discussed overall goals of care with daughter Juliane. She reports that she would like to see him return to the [facility] with hospice again. She does not think rehab is a reasonable option for him. We discussed further hospitalizations and she does not want it unless an injury. We discussed equipment such as bed, OBT, oxygen and air mattress. I am not sure about a lift. Discussed that hospice will be able to order other equipment that they think is reasonable for him comfort. DIscussed this is the hospice role as they are the ones paying for it. We are able to recommend but not the final say. Discussed medications for comfort. Discussed stopping vitamins and nonessentials and she wants to continue all medications. Discussed diet and for now we continue with SLP recommendations. Discussed that he will likely continue to decline and have other episodes of aspiration and that it is reasonable to treat his symptoms but not send him back to the hospital. She stated she wants him comfortable and if he is not comfortable they would consider sending him in. Discussed that if she is interested in continuing to treat illnesses that are a part of the declining aspects of his dementia that hospice might not be the right thing for them. She stated she wants hospice and comfort cares for him. Discussed that in the future to discuss with hospice to treat him at [facility] vs sending him in. She verbalized understanding.    Thank you for involving us in the patient's care.     Marilyn PAULA, CNP  Pain Management and Palliative Care  [employer]  Pgr: [phone]    Time Spent on this Encounter   Total unit/floor time 98 minutes, time consisted of the following, examination of the patient, reviewing the record and completing documentation. >50% of time spent in counseling and coordination of care, Bedside Nurse Enrique and Hospitalist   Erika.  Time spend counseling with family consisted of the following topics, goals of care and symptom management.    Understanding of disease process:   This has been discussed with daughter Juliane.    History of Present Illness   Unable to obtain a history from the patient due to confusion  Electronic medical record     Edison Saldivar is a 81 year old male with a past medical history of COPD with chronic failure, was on oxygen while at the TCU which was new for him. KORY with cpap at home at all time, diastolic heart failure, factor five leiden on warfarin, vascular dementia, history of a CVA, who presents with shortness of breath, and was found to have covid. He has a recent history of weight loss from 245 down to 215 over the last 1 year. Prior to the fall with humerus fracture he was living in [facility], he was independent with mobility. He was starting to lose weight and overall health decline. He revoked from hospice to seek medical care outside of the hospice plan of care. He was sent to TCU due to limited mobility and to get stronger. Over that time the daughter reports that he has had a more significant decline.       Past Medical History   I have reviewed this patient's medical history and updated it with pertinent information if needed.   Past Medical History:   Diagnosis Date     Topete's esophagus      COPD (chronic obstructive pulmonary disease) (H)      Heterozygous factor V Leiden mutation (H)      History of CVA (cerebrovascular accident)      HTN (hypertension)      KORY on CPAP      Sneddon syndrome (H)      Vascular dementia (H)        Past Surgical History   I have reviewed this patient's surgical history and updated it with pertinent information if needed.  Past Surgical History:   Procedure Laterality Date     Duodenal polypectomy with sphincterotomy   09/23/2019    at [place]        Prior to Admission Medications   Prior to Admission Medications   Prescriptions Last Dose Informant Patient Reported?  Taking?   CALCIUM-MAGNESIUM-ZINC PO 12/3/2021 at Unknown time Spouse/Significant Other Yes Yes   Sig: Take 1 tablet by mouth every morning 100/40/15   Magnesium Oxide 250 MG TABS 12/3/2021 at Unknown time Spouse/Significant Other Yes Yes   Sig: Take 500 mg by mouth daily   QUEtiapine (SEROQUEL) 25 MG tablet  at PRN  No Yes   Sig: Take 0.5 tablets (12.5 mg) by mouth every 6 hours as needed (agitation, delirium)   acetaminophen (TYLENOL) 325 MG tablet 12/3/2021 at Unknown time  No Yes   Sig: Take 2 tablets (650 mg) by mouth 3 times daily   albuterol (PROAIR HFA/PROVENTIL HFA/VENTOLIN HFA) 108 (90 Base) MCG/ACT inhaler  at PRN  Yes Yes   Sig: Inhale 2 puffs into the lungs every 4 hours as needed for shortness of breath / dyspnea or wheezing   amLODIPine (NORVASC) 5 MG tablet 12/3/2021 at Unknown time  No Yes   Sig: Take 1 tablet (5 mg) by mouth every morning . Hold for SBP < 110, HR < 60   brimonidine-timolol (COMBIGAN) 0.2-0.5 % ophthalmic solution 12/3/2021 at Unknown time Spouse/Significant Other Yes Yes   Sig: Place 1 drop into both eyes every morning   brinzolamide (AZOPT) 1 % ophthalmic suspension 12/3/2021 at Unknown time Spouse/Significant Other Yes Yes   Sig: Place 1 drop into both eyes every morning   co-enzyme Q-10 100 MG CAPS capsule 12/3/2021 at Unknown time Spouse/Significant Other Yes Yes   Sig: Take 100 mg by mouth every morning   docusate sodium (COLACE) 100 MG capsule 12/3/2021 at Unknown time Spouse/Significant Other Yes Yes   Sig: Take 100 mg by mouth every evening   esomeprazole (NEXIUM) 40 MG DR capsule 12/3/2021 at Unknown time Spouse/Significant Other Yes Yes   Sig: Take 40 mg by mouth every morning (before breakfast) Take 30-60 minutes before eating.   furosemide (LASIX) 40 MG tablet 12/3/2021 at Unknown time  Yes Yes   Sig: Take 80 mg by mouth daily   ipratropium - albuterol 0.5 mg/2.5 mg/3 mL (DUONEB) 0.5-2.5 (3) MG/3ML neb solution 12/3/2021 at Unknown time  No Yes   Sig: Take 1 vial (3 mLs)  by nebulization 4 times daily   latanoprost (XALATAN) 0.005 % ophthalmic solution 12/3/2021 at Unknown time Spouse/Significant Other Yes Yes   Sig: Place 1 drop into both eyes At Bedtime   magnesium hydroxide (MILK OF MAGNESIA) 400 MG/5ML suspension  at PRN  No Yes   Sig: Take 30 mLs by mouth daily as needed for constipation   melatonin 3 MG tablet  at PRN  No Yes   Sig: Take 1 tablet (3 mg) by mouth nightly as needed for sleep   mirabegron (MYRBETRIQ) 50 MG 24 hr tablet 12/3/2021 at Unknown time Spouse/Significant Other Yes Yes   Sig: Take 50 mg by mouth every morning   morphine sulfate, high concentrate, (ROXANOL-CONCENTRATED) 20 MG/ML concentrated solution 12/3/2021 at Unknown time  No Yes   Sig: Take 0.125 mLs (2.5 mg) by mouth every 6 hours And 2.5 mg q 2 hours prn   multivitamin w/minerals (THERA-VIT-M) tablet 12/3/2021 at Unknown time Spouse/Significant Other Yes Yes   Sig: Take 1 tablet by mouth every morning   ondansetron (ZOFRAN) 4 MG tablet  at PRN  Yes Yes   Sig: Take 4 mg by mouth every 8 hours as needed for nausea   potassium chloride ER (K-TAB/KLOR-CON) 10 MEQ CR tablet 12/3/2021 at Unknown time  Yes Yes   Sig: Take 20 mEq by mouth daily   senna-docusate (SENOKOT-S/PERICOLACE) 8.6-50 MG tablet  at PRN  No Yes   Sig: Take 1 tablet by mouth 2 times daily as needed for constipation   vitamin B-12 (CYANOCOBALAMIN) 1000 MCG tablet 12/3/2021 at Unknown time Spouse/Significant Other Yes Yes   Sig: Take 1,000 mcg by mouth every morning   vitamin C (ASCORBIC ACID) 1000 MG TABS 12/3/2021 at Unknown time  Yes Yes   Sig: Take 1,000 mg by mouth daily   vitamin D3 (CHOLECALCIFEROL) 2000 units tablet 12/3/2021 at Unknown time Spouse/Significant Other Yes Yes   Sig: Take 2,000 Units by mouth daily   warfarin ANTICOAGULANT (COUMADIN) 2.5 MG tablet   No No   Sig: HOLD 11/26 and 11/27.  Recheck INR 11/28 with dose as per provider.  Take 7.5 mg Monday and 5 mg ROW.      Facility-Administered Medications: None      Allergies   No Known Allergies    Social History   I have updated and reviewed the following Social History Narrative:   Social History     Social History Narrative     Not on file        Family History   I have reviewed this patient's family history and updated it with pertinent information if needed.   No family history on file.    Review of Systems   Review of systems not obtained due to patient factors - confusion      Physical Exam   Temp:  [97.4  F (36.3  C)-98.6  F (37  C)] 98  F (36.7  C)  Pulse:  [68-83] 82  Resp:  [20-28] 24  BP: (127-154)/(61-77) 144/74  FiO2 (%):  [30 %] 30 %  SpO2:  [92 %-96 %] 92 %  214 lbs 11.2 oz  Exam:Assessment limited due to covid isolation, refer to hospitalist note for most recent assessment  GENERAL APPEARANCE:  Alert, cooperative  RESP:  breathing is even and nonlabored, on NC  CV:  rate-normal  PSYCH:  oriented to self and place    Delirium Screen/CAM:  Delirium = (#1 and #2 = YES) + (#3 and/or #4)   1) Acute onset and fluctuating course:   YES   (acute change in mental status from baseline over last 24 hours)  2) Inattention:   YES   (difficulty focusing, distractible, can't follow conversation)  3) Disorganized thinking:   No   (score only if #1 and #2 are YES)  (rambling/irrelevant conversation, unclear/illogical thoughts, inconsistency)  4) Altered level of consciousness:   No   (score only if #1 and #2 are YES)  (other than alert, calm, cooperative)    Delirium/CAM score: 2/4  Interpretation:  1)  Delirium:  Present  2)  Type:  hypoactive  3)  Severity:  mild    Data  I have personally reviewed all labs prior to medication changes  Results for orders placed or performed during the hospital encounter of 12/03/21 (from the past 24 hour(s))   INR   Result Value Ref Range    INR 2.80 (H) 0.85 - 1.15   CRP inflammation   Result Value Ref Range    CRP Inflammation 75.5 (H) 0.0 - 8.0 mg/L   D dimer quantitative   Result Value Ref Range    D-Dimer Quantitative 6.86 (H) 0.00  - 0.50 ug/mL FEU    Narrative    This D-dimer assay is intended for use in conjunction with a clinical pretest probability assessment model to exclude pulmonary embolism (PE) and deep venous thrombosis (DVT) in outpatients suspected of PE or DVT. The cut-off value is 0.50 ug/mL FEU.   Basic metabolic panel   Result Value Ref Range    Sodium 146 (H) 133 - 144 mmol/L    Potassium 3.7 3.4 - 5.3 mmol/L    Chloride 113 (H) 94 - 109 mmol/L    Carbon Dioxide (CO2) 23 20 - 32 mmol/L    Anion Gap 10 3 - 14 mmol/L    Urea Nitrogen 25 7 - 30 mg/dL    Creatinine 0.89 0.66 - 1.25 mg/dL    Calcium 8.5 8.5 - 10.1 mg/dL    Glucose 119 (H) 70 - 99 mg/dL    GFR Estimate 81 >60 mL/min/1.73m2   CBC with platelets   Result Value Ref Range    WBC Count 17.0 (H) 4.0 - 11.0 10e3/uL    RBC Count 4.18 (L) 4.40 - 5.90 10e6/uL    Hemoglobin 12.3 (L) 13.3 - 17.7 g/dL    Hematocrit 38.9 (L) 40.0 - 53.0 %    MCV 93 78 - 100 fL    MCH 29.4 26.5 - 33.0 pg    MCHC 31.6 31.5 - 36.5 g/dL    RDW 15.2 (H) 10.0 - 15.0 %    Platelet Count 184 150 - 450 10e3/uL

## 2021-12-10 NOTE — PROGRESS NOTES
A&OX3. Turn and repositioned in bed. Oxygen 96% on BIPAP. Redness to coccyx and gilberto area. Tim cream applied. Denied pain. Continue to monitor.

## 2021-12-11 NOTE — PROGRESS NOTES
SPIRITUAL HEALTH SERVICES Progress Note     -- Met with patient at staff consult request for emotional support.     -- Patient is uncomfortable, has cabin fever and is angry that he contracted covid.     -- He finds solace in his Sikhism linda.       -- I did some reflective conversation and life review work with him; then told him how blessed he is to be in the hospital getting the care he needs.  He agreed.     -- I asked if there was anything I could do for him.  He said pray.     -- I prayed the Hail  Libby and the Glory Be. (Two Sikhism prayers.)  He smiled and thanked me for the visit.      Rev. Malu Roe M.Div.  Staff   Phone  [phone]

## 2021-12-11 NOTE — PROGRESS NOTES
Pt placed on CPAP of +7 and 30% for KORY. Skin integrity is good/intact. RT will monitor.    Francia Raygoza RT

## 2021-12-11 NOTE — PLAN OF CARE
Vitals: Temp: 97.6  F (36.4  C) Temp src: Oral BP: 125/78 Pulse: 80   Resp: 22 SpO2: 95 % O2 Device: Nasal cannula Oxygen Delivery: 3 LPM  Pain: PRN Tylenol for pain; effective.   Neuro: AO4, calm and cooperative. Able to make needs known  Respiratory: LS dim/ whz. Mane.  Cardiac/tele: tele SR w/PVCs. Denies CP  GI/: Incontinent of bowel and bladder, 1 BM this shift  Skin: Wound on buttocks; wound care done. Brace on right arm  LDAs: PIV to left; SL  Labs: CRP 55.2- trending down  Diet: Purreed, honey thick. Poor appetite.   Activity: A2 with lift, turn and repo q2.   Plan: Discharge home to hospice Monday @ 1030. Will continue POC.

## 2021-12-11 NOTE — PLAN OF CARE
Patient alert to self and place overnight. Denied any pain. Repositioned q2-3 hours overnight, needs lift to get out of bed. VSS, patient using CPAP overnight with no supplemental O2. Tele is SR, incontinent of urine overnight. Plan to discharge on Monday at 10:30am to [facility] with hospice services.

## 2021-12-11 NOTE — PROGRESS NOTES
[facility]    Medicine Progress Note - Hospitalist Service       Date of Admission:  12/3/2021    Assessment & Plan           Edison Saldivar is a 81 years old male with multiple comorbidities including COPD, not on oxygen at home per his daughter report to me, diastolic heart failure, coagulopathy with history of factor V Leiden deficiency, KORY on CPAP at night, dementia, CVA, hypertension who was sent from TCU for acute hypoxemic respiratory failure with concern for aspiration and COPD exacerbation requiring BiPAP.     1. Acute hypoxic respiratory failure. Likely multifactorial. COPD exacerbation, COVID-19 infection, possible aspiration pneumonia.  -Supplemental oxygen as needed.     2. COVID-19 pneumonia. Positive SARS-CoV-2 PCR on 12/3. With acute hypoxic respiratory failure.  -Completed IV remdesivir.  -Continue dexamethasone 6 mg a day for 10-day course.  -Warfarin for anticoagulation.  -Supplemental oxygen as needed.     3. Possible aspiration pneumonia.  -Continue IV Zosyn for 1 more dose to complete 7-day course.  -Stopped IV vancomycin on 12/9.  -MRSA PCR negative for MRSA on 12/9.     4. Subacute right humerus fracture.  -Pain medications as needed.  -Use incentive spirometry.     5. Dysphagia.  -Speech pathology following.  -Dysphagia diet.     6. Obstructive sleep apnea.  -Use CPAP while sleeping.     7. Hypertension.  -Amlodipine on hold.     8. Coagulopathy due to factor V Leiden disorder.  -Continue warfarin.     9. Hypernatremia. Mild.  Sodium improved to 141.  -Encouraged good fluid intake.    10.  Goals of care.  -Patient and family plan to discharge to hospice care.         Diet: Snacks/Supplements Adult: Other; Vital Cuisine; With Meals  Combination Diet Pureed Diet (level 4); Liquidized/Moderately Thick (level 3) (tsp or small cup sip); No Straws    DVT Prophylaxis: Warfarin  Torres Catheter: Not present  Central Lines: None  Code Status: No CPR- Do NOT Intubate           Mundo James DO  Hospitalist Service  [facility]  Securely message with the [employer] Web Console (learn more here)  Text page via [facility] Paging/Directory        Clinically Significant Risk Factors Present on Admission                ______________________________________________________________________    Interval History   Short of breath at times.  Occasional cough.  Having some right shoulder pain.  Feels the pain is under better control.  Breathing better than previous.  Denies chest pain, fevers, chills, nausea, or vomiting.    Data reviewed today: I reviewed all medications, new labs and imaging results over the last 24 hours.    Physical Exam   Vital Signs: Temp: 97.6  F (36.4  C) Temp src: Oral BP: 125/78 Pulse: 80   Resp: 22 SpO2: 95 % O2 Device: Nasal cannula Oxygen Delivery: 3 LPM  Weight: 210 lbs 0 oz  Gen:  NAD, A&O seemingly x3.  Eyes:  PERRL, sclera anicteric.  OP:  MMM, no lesions.  Neck:  Supple.  CV:  Regular, no murmurs.  Lung:  CTA b/l, normal effort.  Ab:  +BS, soft.  Skin:  Warm, dry to touch.  No rash.  Ext:  Mild non pitting edema LE b/l.      Data   Recent Labs   Lab 12/11/21  0857 12/10/21  0648 12/09/21  0613 12/08/21  0624 12/07/21  0728 12/06/21  0522 12/05/21  0716   WBC  --  17.0* 15.8* 16.3* 17.2*   < > 23.0*   HGB  --  12.3* 12.5* 12.1* 11.8*   < > 11.2*   MCV  --  93 94 95 97   < > 97   PLT  --  184 212 213 244   < > 262   INR 2.69* 2.80* 2.46* 1.93* 1.85*   < > 2.55*    146* 143 145* 146*   < > 147*   POTASSIUM 4.6 3.7 3.7 3.8 4.2   < > 3.7   CHLORIDE 113* 113* 112* 113* 113*   < > 114*   CO2 22 23 26 27 26   < > 26   BUN 24 25 23 23 27   < > 31*   CR 0.80 0.89 0.83 0.87 0.87   < > 1.04   ANIONGAP 6 10 5 5 7   < > 7   JULIANNE 8.6 8.5 8.7 8.8 8.9   < > 8.5   * 119* 107* 113* 114*   < > 108*   ALBUMIN  --   --   --   --  2.1*  --  1.9*   PROTTOTAL  --   --   --   --  6.7*  --  5.8*   BILITOTAL  --   --   --   --  0.8  --  0.6   ALKPHOS  --    --   --   --  272*  --  294*   ALT  --   --   --   --  54  --  33   AST  --   --   --   --  122*  --  95*    < > = values in this interval not displayed.

## 2021-12-11 NOTE — PLAN OF CARE
Admitting Diagnosis: Acute RF with hypoxia / COVID+  Pertinent History:COPD, diastolic HF, KORY.  For vitals and assessment please see flow sheet.   Living Situation: Home with family.   Pain plan: C/o Right arm pain, prn Tylenol given.   Mobility: assistance, 2 people/lift.  Baseline activity:With assistive equipment.   Alarms/Safety: BA.   Pertinent test results: K+ 3.6, Mg+ 2.3.   Consults: Palliative/ spiritual/ WOC following.   Abnormals/Pending: none.   Other Cares/Comments: A & Ox 3, forgetful, tele SR, LS clear, O2 95% 3 L Osymizer.    Discharge Disposition: TBD.   Discharge Time: TBD.

## 2021-12-12 NOTE — PLAN OF CARE
Admitting Diagnosis: Acute RF with hypoxia / COVID+  Pertinent History:COPD, diastolic HF, KORY.  For vitals and assessment please see flow sheet.   Living Situation: Home with family.   Pain plan: denies pain.   Mobility: assistance, 2 people/lift.  Baseline activity:With assistive equipment.   Alarms/Safety: BA.   LDA'S:PIV.   Pertinent test results: K+ 4.6, Mg+ 2.3.   Consults: Palliative/ spiritual/ WOC following.   Abnormals/Pending: none.   Other Cares/Comments: A & Ox 3, forgetful, VSS, tele SR with PVC's, LS clear, o2  95% 3L NC.   Discharge Disposition: Possible discharge Monday hospice.  .   Discharge Time: @ 1030

## 2021-12-12 NOTE — PLAN OF CARE
SLP: Note patient to discharge on Monday with hospice cares and plans to resume current diet with note that as his condition declines his aspiration risk will increase. They plan to treat his symptoms but not send him back to the hospital. Will sign off at this time and complete order. SLP is available if any further questions arise prior to discharge.    Speech Language Therapy Discharge Summary    Reason for therapy discharge:    hospice    Progress towards therapy goal(s). See goals on Care Plan in Epic electronic health record for goal details.  Goals not met.  Barriers to achieving goals:   changed to hospice status.    Therapy recommendation(s):    No further therapy is recommended.    Patient is currently on a Pureed diet (IDDSI 4) with moderately thick liquids (IDDSI 3). Safe swallow strategies include upright for all po and remain upright for 30 minutes; small single sips (if overt s/sx of aspiration would try giving by teaspoon only), complete excellent oral cares 3-5 times per day to reduce risk of aspiration pneumonia.

## 2021-12-12 NOTE — PLAN OF CARE
Vitals: Temp: 97.8  F (36.6  C) Temp src: Oral BP: (!) 151/75 Pulse: 90   Resp: 28 SpO2: 93 % O2 Device: None (Room air)   Pain: PRN Tylenol for pain  Neuro: Ao2, able to make needs known. Calm and cooperative.   Respiratory: LS dim, whz, coarse, sosa.  Cardiac/tele: Tele ST freq PAC. Denies CP  GI/: Incontinent of bowel and bladder  Skin: Wound care done on open wound on buttocks  LDAs: PIV SL  Labs: Creat 0.78, GFR 86  Diet: Pureed food, honey thick  Activity: A2 with lift; up in chair this shift  Plan: Plan to discharge to hospice Monday morning @ 1030

## 2021-12-12 NOTE — PROGRESS NOTES
[facility]    Medicine Progress Note - Hospitalist Service       Date of Admission:  12/3/2021    Assessment & Plan           Edison Saldivar is a 81 years old male with multiple comorbidities including COPD, not on oxygen at home per his daughter report to me, diastolic heart failure, coagulopathy with history of factor V Leiden deficiency, KORY on CPAP at night, dementia, CVA, hypertension who was sent from TCU for acute hypoxemic respiratory failure with concern for aspiration and COPD exacerbation requiring BiPAP.     1. Acute hypoxic respiratory failure. Likely multifactorial. COPD exacerbation, COVID-19 infection, possible aspiration pneumonia.  -Supplemental oxygen as needed.     2. COVID-19 pneumonia. Positive SARS-CoV-2 PCR on 12/3. With acute hypoxic respiratory failure.  -Completed IV remdesivir.  -Continue dexamethasone 6 mg a day for 10-day course.  12/12 is day 8 of 10.  -Warfarin for anticoagulation.  -Supplemental oxygen as needed.     3. Possible aspiration pneumonia.  -Completed 7-day course of IV Zosyn on 12/11.  -Stopped IV vancomycin on 12/9.  -MRSA PCR negative for MRSA on 12/9.     4. Subacute right humerus fracture.  -Pain medications as needed.  -Use incentive spirometry.     5. Dysphagia.  -Speech pathology following.  -Dysphagia diet.     6. Obstructive sleep apnea.  -Use CPAP while sleeping.     7. Hypertension.  -Amlodipine on hold.     8. Coagulopathy due to factor V Leiden disorder.  -Continue warfarin.     9. Hypernatremia. Mild.  Sodium improved to 141.  -Encouraged good fluid intake.  -Monitor metabolic panel intermittently.     10.  Goals of care.  -Patient and family planning on starting hospice care at time of discharge.       Diet: Snacks/Supplements Adult: Other; Vital Cuisine; With Meals  Combination Diet Pureed Diet (level 4); Liquidized/Moderately Thick (level 3) (tsp or small cup sip); No Straws  Room Service    DVT Prophylaxis: Warfarin  Torres Catheter:  Not present  Central Lines: None  Code Status: No CPR- Do NOT Intubate          Mundo James DO  Hospitalist Service  [facility]  Securely message with the [employer] Web Console (learn more here)  Text page via [employer] Paging/Directory        Clinically Significant Risk Factors Present on Admission                ______________________________________________________________________    Interval History   Coughing.  Especially when eating.  Short of breath at times.  Continues to have right shoulder pain.  Feels the pain is under fairly good control with pain medications.  Denies chest pain, fevers, chills, nausea, or vomiting.    Data reviewed today: I reviewed all medications, new labs and imaging results over the last 24 hours.     Physical Exam   Vital Signs: Temp: 97.8  F (36.6  C) Temp src: Oral BP: (!) 151/75 Pulse: 90   Resp: 28 SpO2: 98 % O2 Device: Nasal cannula Oxygen Delivery: 3 LPM  Weight: 211 lbs 14.4 oz  Gen:  NAD, A&O seemingly x3.  Eyes:  PERRL, sclera anicteric.  OP:  MMM, no lesions.  Neck:  Supple.  CV:  Regular, no murmurs.  Lung:  CTA b/l, normal effort.  Ab:  +BS, soft.  Skin:  Warm, dry to touch.  No rash.  Ext:  No pitting edema LE b/l.      Data   Recent Labs   Lab 12/12/21  0754 12/11/21  0857 12/10/21  0648 12/09/21  0613 12/08/21  0624 12/07/21  0728   WBC  --   --  17.0* 15.8* 16.3* 17.2*   HGB  --   --  12.3* 12.5* 12.1* 11.8*   MCV  --   --  93 94 95 97   PLT  --   --  184 212 213 244   INR 2.71* 2.69* 2.80* 2.46* 1.93* 1.85*   NA  --  141 146* 143 145* 146*   POTASSIUM  --  4.6 3.7 3.7 3.8 4.2   CHLORIDE  --  113* 113* 112* 113* 113*   CO2  --  22 23 26 27 26   BUN  --  24 25 23 23 27   CR 0.78 0.80 0.89 0.83 0.87 0.87   ANIONGAP  --  6 10 5 5 7   JULIANNE  --  8.6 8.5 8.7 8.8 8.9   GLC  --  111* 119* 107* 113* 114*   ALBUMIN  --   --   --   --   --  2.1*   PROTTOTAL  --   --   --   --   --  6.7*   BILITOTAL  --   --   --   --   --  0.8   ALKPHOS  --   --   --    --   --  272*   ALT  --   --   --   --   --  54   AST  --   --   --   --   --  122*

## 2021-12-12 NOTE — PLAN OF CARE
VSS. A &Ox2 disorientated to situation and time.  LS diminished. 93-96% on Cpap overnight and 3L NC. Tele: SR, run of 9-beat V-tach. Reposition Q2 hrs. Lift and assist x2. Incontinent of urine, attempted purewick no success. Diet pureed (level 4) food, Moderately thick liquids (Level 3) no straws, sips only. Discharge plan to [facility] on 12/13 at 10:30 by stretcher.  Will continue to monitor.

## 2021-12-12 NOTE — PROGRESS NOTES
A CPAP of  +7 @ 30% remains on pt for KORY. Pt is tolerating it well. Will continue to monitor and assess the pt's current respiratory status and needs.    Gini De Leon, RT

## 2021-12-13 NOTE — PLAN OF CARE
"Blood pressure 130/60, pulse 84, temperature 99.2  F (37.3  C), temperature source Axillary, resp. rate 26, height 1.727 m (5' 8\"), weight 95.3 kg (210 lb), SpO2 96 %.    PRN tylenol given for pain.  Alert to self and place.  Can make his desires known.  Incontinent of bowel and bladder.  Pericare done.  See flow sheet for full assessment.  Safety maintained.    "

## 2021-12-13 NOTE — DISCHARGE SUMMARY
[facility]  Hospitalist Discharge Summary      Date of Admission:  12/3/2021  Date of Discharge:  12/13/2021  Discharging Provider: Mundo James DO      Discharge Diagnoses   1.  Acute hypoxic respiratory failure.  2.  COVID-19 pneumonia.  3.  Aspiration pneumonia.  4.  Subacute right humerus fracture.  5.  Dysphagia.  6.  Obstructive sleep apnea.  7.  Hypertension.  8.  Coagulopathy.  Due to factor V Leiden disorder.  9.  Hypernatremia.    Follow-ups Needed After Discharge   Follow-up Appointments     Follow Up and recommended labs and tests      Follow up with hospice in 1 day.  The following labs/tests are   recommended: INR check in 3 days.             Discharge Disposition   Discharged to long-term care facility  Condition at discharge: Stable      Hospital Course   Edison Saldivar is a 81 years old male with multiple comorbidities including COPD, not on oxygen at home per his daughter report to me, diastolic heart failure, coagulopathy with history of factor V Leiden deficiency, KORY on CPAP at night, dementia, CVA, hypertension who was sent from TCU for acute hypoxemic respiratory failure with concern for aspiration and COPD exacerbation requiring BiPAP.  Found to have COVID-19 infection.  Pneumonia likely accommodation of aspiration and COVID-19 pneumonia.  Initially started on antibiotics with IV vancomycin and Zosyn to treat aspiration.  He was noted to have dysphagia.  Seen in consultation by speech pathology.  On a dysphagia diet.  He was started on steroids with dexamethasone and IV remdesivir to treat COVID-19 infection.  On supplemental oxygen.  He had been on hospice previously.  Had been taken off of hospice in November of this year after a fall so that he could go to a rehab facility.  Patient and family have now decided to go back on hospice care.  He has been discharged to hospice care on 12/13/2021.  He has completed course of antibiotics for his aspiration pneumonia.   He does have 1 more day of dexamethasone to treat COVID-19 infection.  He is chronically on warfarin.  Warfarin dose has been decreased during hospital stay.  Continue warfarin 2.5 mg a day.  Check INR in 3 days.  This should be rediscussed with hospice care whether he wants to continue with warfarin use going forward while on hospice care.    Consultations This Hospital Stay   PHARMACY TO DOSE VANCO  PHARMACY TO DOSE WARFARIN  CARE MANAGEMENT / SOCIAL WORK IP CONSULT  SPEECH LANGUAGE PATH ADULT IP CONSULT  WOUND OSTOMY CONTINENCE NURSE  IP CONSULT  PALLIATIVE CARE ADULT IP CONSULT  SPIRITUAL HEALTH SERVICES IP CONSULT    Code Status   No CPR- Do NOT Intubate    Time Spent on this Encounter   I spent 35 minutes with Mr. Saldivar and working on discharge on 12/13/2021.       Mundo James, [facility] MEDICAL SURGICAL  [address] [place] [address]  Phone: [phone]  Fax: [phone]  ______________________________________________________________________    Physical Exam   Vital Signs: Temp: 98.6  F (37  C) Temp src: Oral BP: 130/68 Pulse: 96   Resp: 25 SpO2: 94 % O2 Device: Nasal cannula Oxygen Delivery: 3 LPM  Weight: 210 lbs 0 oz  Gen:  NAD, A&O seemingly x3.  Eyes:  PERRL, sclera anicteric.  OP:  MMM, no lesions.  Neck:  Supple.  CV:  Regular, no murmurs.  Lung:  CTA b/l, normal effort.  Ab:  +BS, soft.  Skin:  Warm, dry to touch.  No rash.  Ext:  Mild non pitting edema LE b/l.         Primary Care Physician   Merlin Emery Brown    Discharge Orders      Hemoglobin A1c     INR     Medication Therapy Management Referral      General info for SNF    Length of Stay Estimate: Long Term Care  Condition at Discharge: Stable  Level of care:skilled   Rehabilitation Potential: Poor  Admission H&P remains valid and up-to-date: Yes  Recent Chemotherapy: N/A  Use Nursing Home Standing Orders: Yes     Mantoux instructions    Give two-step Mantoux (PPD) Per Facility Policy Yes      Follow Up and recommended labs and tests    Follow up with hospice in 1 day.  The following labs/tests are recommended: INR check in 3 days.     Reason for your hospital stay    COVID-19 pneumonia.     Activity - Up ad lisandro     Diet    Follow this diet upon discharge:       Combination Diet Pureed Diet (level 4); Liquidized/Moderately Thick (level 3) (tsp or small cup sip); No Straws           Discharge Medications   Current Discharge Medication List      START taking these medications    Details   !! acetaminophen (TYLENOL) 650 MG suppository Place 1 suppository (650 mg) rectally every 4 hours as needed for fever  Qty: 2 suppository, Refills: 0    Associated Diagnoses: Acute respiratory failure with hypoxia (H); COPD exacerbation (H)      !! acetaminophen (TYLENOL) 650 MG suppository Place 1 suppository (650 mg) rectally every 4 hours as needed for fever  Qty: 2 suppository, Refills: 0    Associated Diagnoses: Acute respiratory failure with hypoxia (H); COPD exacerbation (H)      atropine 1 % ophthalmic solution Take 2 drops by mouth, place under tongue or place inside cheek 4 times daily  Qty: 2 mL, Refills: 0    Associated Diagnoses: Acute respiratory failure with hypoxia (H); COPD exacerbation (H)      bisacodyl (DULCOLAX) 10 MG suppository Place 1 suppository (10 mg) rectally daily as needed for constipation  Qty: 2 suppository, Refills: 0    Associated Diagnoses: Acute respiratory failure with hypoxia (H); COPD exacerbation (H)      dexamethasone (DECADRON) 6 MG tablet Take 1 tablet (6 mg) by mouth once for 1 dose  Qty: 1 tablet, Refills: 0    Associated Diagnoses: Aspiration pneumonia, unspecified aspiration pneumonia type, unspecified laterality, unspecified part of lung (H)      HYDROmorphone (DILAUDID) 2 MG tablet Take 1 tablet (2 mg) by mouth every 2 hours as needed for pain  Qty: 10 tablet, Refills: 0    Comments: Please bubble pack  Associated Diagnoses: Acute respiratory failure with hypoxia (H);  COPD exacerbation (H)      LORazepam (ATIVAN) 0.5 MG tablet Take 1 tablet (0.5 mg) by mouth every 4 hours as needed for anxiety  Qty: 15 tablet, Refills: 0    Comments: Please bubble pack  Associated Diagnoses: Acute respiratory failure with hypoxia (H); COPD exacerbation (H)      miconazole (MICATIN) 2 % external powder Apply topically 2 times daily  Qty: 43 g, Refills: 0    Associated Diagnoses: Acute respiratory failure with hypoxia (H); COPD exacerbation (H); Hemoptysis; History of CVA (cerebrovascular accident); Heterozygous factor V Leiden mutation (H); Vascular dementia without behavioral disturbance (H)       !! - Potential duplicate medications found. Please discuss with provider.      CONTINUE these medications which have CHANGED    Details   warfarin ANTICOAGULANT (COUMADIN) 2.5 MG tablet Take 1 tablet (2.5 mg) by mouth daily    Associated Diagnoses: Heterozygous factor V Leiden mutation (H); History of CVA (cerebrovascular accident)         CONTINUE these medications which have NOT CHANGED    Details   acetaminophen (TYLENOL) 325 MG tablet Take 2 tablets (650 mg) by mouth 3 times daily    Associated Diagnoses: Intra-abdominal infection      albuterol (PROAIR HFA/PROVENTIL HFA/VENTOLIN HFA) 108 (90 Base) MCG/ACT inhaler Inhale 2 puffs into the lungs every 4 hours as needed for shortness of breath / dyspnea or wheezing    Comments: Pharmacy may dispense brand covered by insurance (Proair, or proventil or ventolin or generic albuterol inhaler)      brimonidine-timolol (COMBIGAN) 0.2-0.5 % ophthalmic solution Place 1 drop into both eyes every morning      brinzolamide (AZOPT) 1 % ophthalmic suspension Place 1 drop into both eyes every morning      CALCIUM-MAGNESIUM-ZINC PO Take 1 tablet by mouth every morning 100/40/15      co-enzyme Q-10 100 MG CAPS capsule Take 100 mg by mouth every morning      docusate sodium (COLACE) 100 MG capsule Take 100 mg by mouth every evening      esomeprazole (NEXIUM) 40 MG   capsule Take 40 mg by mouth every morning (before breakfast) Take 30-60 minutes before eating.      furosemide (LASIX) 40 MG tablet Take 80 mg by mouth daily      ipratropium - albuterol 0.5 mg/2.5 mg/3 mL (DUONEB) 0.5-2.5 (3) MG/3ML neb solution Take 1 vial (3 mLs) by nebulization 4 times daily  Qty: 90 mL    Associated Diagnoses: Chronic obstructive pulmonary disease, unspecified COPD type (H); Atelectasis      latanoprost (XALATAN) 0.005 % ophthalmic solution Place 1 drop into both eyes At Bedtime      magnesium hydroxide (MILK OF MAGNESIA) 400 MG/5ML suspension Take 30 mLs by mouth daily as needed for constipation    Associated Diagnoses: Closed displaced transverse fracture of shaft of right humerus, initial encounter      Magnesium Oxide 250 MG TABS Take 500 mg by mouth daily      melatonin 3 MG tablet Take 1 tablet (3 mg) by mouth nightly as needed for sleep    Associated Diagnoses: Closed displaced transverse fracture of shaft of right humerus, initial encounter      mirabegron (MYRBETRIQ) 50 MG 24 hr tablet Take 50 mg by mouth every morning      multivitamin w/minerals (THERA-VIT-M) tablet Take 1 tablet by mouth every morning      ondansetron (ZOFRAN) 4 MG tablet Take 4 mg by mouth every 8 hours as needed for nausea      potassium chloride ER (K-TAB/KLOR-CON) 10 MEQ CR tablet Take 20 mEq by mouth daily      QUEtiapine (SEROQUEL) 25 MG tablet Take 0.5 tablets (12.5 mg) by mouth every 6 hours as needed (agitation, delirium)    Associated Diagnoses: Vascular dementia without behavioral disturbance (H)      senna-docusate (SENOKOT-S/PERICOLACE) 8.6-50 MG tablet Take 1 tablet by mouth 2 times daily as needed for constipation    Associated Diagnoses: Closed displaced transverse fracture of shaft of right humerus, initial encounter      vitamin B-12 (CYANOCOBALAMIN) 1000 MCG tablet Take 1,000 mcg by mouth every morning      vitamin C (ASCORBIC ACID) 1000 MG TABS Take 1,000 mg by mouth daily      vitamin D3  (CHOLECALCIFEROL) 2000 units tablet Take 2,000 Units by mouth daily         STOP taking these medications       amLODIPine (NORVASC) 5 MG tablet Comments:   Reason for Stopping:         morphine sulfate, high concentrate, (ROXANOL-CONCENTRATED) 20 MG/ML concentrated solution Comments:   Reason for Stopping:             Allergies   No Known Allergies

## 2021-12-13 NOTE — PROGRESS NOTES
Writer spoke with Danni at [facility] about pt's discharge planning. All questions answered. Writer notified Danni that medications were being sent with pt. PIV removed, tele removed, no complications. Personal belongings sent with patient. Pt was  by EMS transportation, medications sent with EMS team.

## 2021-12-13 NOTE — PROGRESS NOTES
A CPAP +7 @ 30% was applied to the pt via the mask for sleep apnea.   Skin is intact. Pt is tolerating it well. Will continue to monitor and assess the pt's current respiratory status and needs.    RT Mindi on 12/13/2021 at 5:00 AM

## 2021-12-13 NOTE — PLAN OF CARE
Admitting Diagnosis: Acute RF with hypoxia / COVID+  Pertinent History:COPD, diastolic HF, KORY.  For vitals and assessment please see flow sheet.   Living Situation: Home with family.   Pain plan: denies pain.   Mobility: assistance, 2 people/lift.  Baseline activity:With assistive equipment.   Alarms/Safety: BA.   Pertinent test results: K+ 4.6, Cr: 0.78.   Consults: Palliative/ spiritual/ WOC following.   Abnormals/Pending: none.   Other Cares/Comments: A & Ox 3, forgetful, VSS, tele SR with , LS clear, o2  93 - 94 % RA. Pure wick in place.   Discharge Disposition: Possible discharge tomorrow.   Discharge Time: @ 1030

## 2021-12-13 NOTE — PHARMACY-ANTICOAGULATION SERVICE
Clinical Pharmacy- Warfarin Discharge Note  This patient is currently on warfarin for the treatment of DVT/PE ppx (factor V Leiden).  INR Goal= 2-3  Expected length of therapy lifetime.           Anticoagulation Dose History     Recent Dosing and Labs Latest Ref Rng & Units 12/7/2021 12/8/2021 12/9/2021 12/10/2021 12/11/2021 12/12/2021 12/13/2021    Warfarin 2 mg - - - - 2 mg - - -    Warfarin 2.5 mg - - - - - - 2.5 mg -    Warfarin 3 mg - - - - - 3 mg - -    Warfarin 4 mg - 4 mg - 4 mg - - - -    Warfarin 5 mg - - 5 mg - - - - -    INR 0.85 - 1.15 1.85(H) 1.93(H) 2.46(H) 2.80(H) 2.69(H) 2.71(H) 2.80(H)          Agree with discharging the patient on a warfarin regimen of 2.5 mg daily, with an INR check on 12/16.

## 2021-12-13 NOTE — PLAN OF CARE
VSS. A &O x2 disoriented to situation and time. LS diminished. 96% while on Cpap, pt removed and NC a 2L at 96%. Tele SR. Assist x2 and Lift needed. Reposition Q2 hrs. Incontinent of urine and stool.  Diet puree and Mod thick liquids no straws. Discharge in AM to [facility] at 10:30 by stretcher. Will continue to monitor.

## 2021-12-13 NOTE — PROGRESS NOTES
Care Management Discharge Note    Discharge Date: 12/13/2021  Expected Time of Departure: 1100    Discharge Disposition: Assisted Living - [facility],Hospice - [facility]    Discharge Services: None    Discharge DME: Oxygen,Bed,Commode - supplied by hospice    Discharge Transportation: agency - [employer] [facility]    Private pay costs discussed: transportation costs   Reviewed out of pocket cost for  [email or web address] stretcher transport, $1042.75 for base rate and $25 per mile to the destination. Pt/family expressed understanding and are agreeable to this.      Patient requires stretcher transportation due to being COVID-19 positive, lacks trunk support to sit in a wheelchair, and hospice.    Stretcher transportation has been arranged for today at 1030. Patient and bedside nurse notified of transportation time.    Ambulance PCS form completed and placed in patient chart. Ambulance PCS form should be given to transportation team.      PAS Confirmation Code:  (N/A)  Patient/family educated on Medicare website which has current facility and service quality ratings: yes    Education Provided on the Discharge Plan:  yes  Persons Notified of Discharge Plans: Pt's dtr Juliane, pt's bedside nurse, physician, palliative, [facility], and [facility]  Patient/Family in Agreement with the Plan: yes    Handoff Referral Completed: No    Additional Information:  Sammie spoke with Kim at [facility], P: [phone] F: [phone], to confirm the pt's discharge plan for today.  Sammie faxed Kim the pt's discharge orders.  Sammie spoke with Danni at [facility], P: [phone] F: [phone], to confirm the pt's discharge plan for today.  Sammie faxed them the pt's discharge orders.  The pt's O2 and hospital bed were delivered to the facility yesterday.  Sammie spoke with the pt's dtr Juliane, [phone], to confirm the pt's discharge plan for the pt today.  Sammie  answered all of Juliane's questions and concerns.  Pt's transport was scheduled for today at 1030, but transport was delayed and [facility] staff was updated that they will be able to  the pt between [id number].    Sw will continue to be available as needed until discharge.      AMBER Tapia, [facility]  Inpatient Care Coordination  [facility]  [phone]
